# Patient Record
Sex: FEMALE | Race: BLACK OR AFRICAN AMERICAN | Employment: FULL TIME | URBAN - METROPOLITAN AREA
[De-identification: names, ages, dates, MRNs, and addresses within clinical notes are randomized per-mention and may not be internally consistent; named-entity substitution may affect disease eponyms.]

---

## 2017-02-24 ENCOUNTER — OFFICE VISIT (OUTPATIENT)
Dept: INTERNAL MEDICINE CLINIC | Age: 52
End: 2017-02-24

## 2017-02-24 VITALS
BODY MASS INDEX: 24.14 KG/M2 | HEIGHT: 68 IN | WEIGHT: 159.3 LBS | OXYGEN SATURATION: 98 % | DIASTOLIC BLOOD PRESSURE: 72 MMHG | HEART RATE: 92 BPM | SYSTOLIC BLOOD PRESSURE: 101 MMHG | TEMPERATURE: 99.1 F

## 2017-02-24 DIAGNOSIS — K52.9 GASTROENTERITIS: Primary | ICD-10-CM

## 2017-02-24 DIAGNOSIS — E78.5 DYSLIPIDEMIA: ICD-10-CM

## 2017-02-24 DIAGNOSIS — I10 ESSENTIAL HYPERTENSION: ICD-10-CM

## 2017-02-24 NOTE — PROGRESS NOTES
Chief Complaint   Patient presents with    Diarrhea     x 2 days, patient states worst yesterday    Vomiting    Abdominal Pain     patient rates pain today at a 6 on pain scale   . SUBECTIVE:    Trevon Huynh is a 47 y.o. female  Dictation on: 02/24/2017 10:51 AM by: Eldon BORRERO [[id number]]       Current Outpatient Prescriptions   Medication Sig Dispense Refill    meloxicam (MOBIC) 15 mg tablet Take 1 Tab by mouth daily.  30 Tab 6    losartan (COZAAR) 100 mg tablet TAKE 1 TABLET BY MOUTH ONCE DAILY 90 Tab 3    pravastatin (PRAVACHOL) 40 mg tablet TAKE 1 TABLET BY MOUTH ONCE NIGHTLY 30 Tab 11     Past Medical History:   Diagnosis Date    Asthma     Brachiocephalic artery injury     chronic dissection    Hypertension      Past Surgical History:   Procedure Laterality Date    HX HEENT       No Known Allergies    REVIEW OF SYSTEMS:  Review of Systems - Negative except   ENT ROS: negative for - headaches, hearing change, nasal congestion, oral lesions, tinnitus, visual changes or   Respiratory ROS: no cough, shortness of breath, or wheezing  Cardiovascular ROS: no chest pain or dyspnea on exertion  Gastrointestinal ROS: no abdominal pain, change in bowel habits, or black or blood  Genito-Urinary ROS: no dysuria, trouble voiding, or hematuria  Musculoskeletal ROS: negative  Neurological ROS: no TIA or stroke symptoms      Social History     Social History    Marital status:      Spouse name: N/A    Number of children: N/A    Years of education: N/A     Social History Main Topics    Smoking status: Never Smoker    Smokeless tobacco: Never Used    Alcohol use 0.0 oz/week     0 Standard drinks or equivalent per week      Comment: rare    Drug use: No    Sexual activity: Yes     Partners: Male     Other Topics Concern    None     Social History Narrative   r  Family History   Problem Relation Age of Onset    Hypertension Mother     Heart Disease Maternal Grandmother     Heart Attack Maternal Grandmother     Hypertension Maternal Grandmother     Diabetes Maternal Grandmother        OBJECTIVE:  Visit Vitals    /72 (BP 1 Location: Right arm, BP Patient Position: Sitting)    Pulse 92    Temp 99.1 °F (37.3 °C) (Oral)    Ht 5' 8\" (1.727 m)    Wt 159 lb 4.8 oz (72.3 kg)    SpO2 98%    BMI 24.22 kg/m2     ENT: perrla,  eom intact  NECK: supple. Thyroid normal, no JVD  CHEST: clear to ascultation and percussion   HEART: regular rate and rhythm  ABD: soft, bowel sounds active,   EXTREMITIES: no edema, pulse 1+  INTEGUMENT: clear      ASSESSMENT:  1. Gastroenteritis    2. Essential hypertension    3. Dyslipidemia        PLAN:    1. The patient has a viral gastroenteritis. Symptomatic treatment only. I explained to her that this is self limiting. She should avoid dairy products and salads for now. Soft foods for the rest of the day. 2. Blood pressure is excellent today. No adjustments are made. 3. She will hold her antihypertensive medication for the next 48 hours. 4. She will continue her statin as prescribed. Follow-up Disposition:  Return in about 6 months (around 8/24/2017).       Mounika Londono MD

## 2017-02-24 NOTE — LETTER
NOTIFICATION RETURN TO WORK / SCHOOL 
 
2/24/2017 11:05 AM 
 
Ms. Taylor Davis [address] [place] [id number] To Whom It May Concern: 
 
Taylor Davis is currently under the care of Juan Flores. She was seen in the today and  will return to work/school on: 2/27/2017. If there are questions or concerns please have the patient contact our office. Sincerely, Nicolas Tomas MD

## 2017-02-24 NOTE — MR AVS SNAPSHOT
Visit Information Date & Time Provider Department Dept. Phone Encounter #  
 2/24/2017  9:30 AM Greg Phan MD [facility] [phone] [id number] Upcoming Health Maintenance Date Due FOBT Q 1 YEAR AGE 50-75 3/24/2017* BREAST CANCER SCRN MAMMOGRAM 7/8/2018 PAP AKA CERVICAL CYTOLOGY 7/27/2018 DTaP/Tdap/Td series (2 - Td) 2/24/2027 *Topic was postponed. The date shown is not the original due date. Allergies as of 2/24/2017  Review Complete On: 2/24/2017 By: Quynh Boswell No Known Allergies Current Immunizations  Never Reviewed No immunizations on file. Not reviewed this visit Vitals BP  
  
  
  
  
  
 101/72 (BP 1 Location: Right arm, BP Patient Position: Sitting) BMI and BSA Data Body Mass Index Body Surface Area  
 24.22 kg/m 2 1.86 m 2 Preferred Pharmacy Pharmacy Name Phone [address] [facility] [phone] Your Updated Medication List  
  
   
This list is accurate as of: 2/24/17 11:04 AM.  Always use your most recent med list.  
  
  
  
  
 losartan 100 mg tablet Commonly known as:  COZAAR  
TAKE 1 TABLET BY MOUTH ONCE DAILY  
  
 meloxicam 15 mg tablet Commonly known as:  MOBIC Take 1 Tab by mouth daily. pravastatin 40 mg tablet Commonly known as:  PRAVACHOL  
TAKE 1 TABLET BY MOUTH ONCE NIGHTLY Introducing [facility] & HEALTH SERVICES! [facility] introduces [employer] patient portal. Now you can access parts of your medical record, email your doctor's office, and request medication refills online. 1. In your internet browser, go to [email or web address] 2. Click on the First Time User? Click Here link in the Sign In box. You will see the New Member Sign Up page. 3. Enter your [employer] Access Code exactly as it appears below.  You will not need to use this code after youve completed the sign-up process. If you do not sign up before the expiration date, you must request a new code. · [employer] Access Code: [email or web address] Expires: 5/25/2017 11:04 AM 
 
4. Enter the last four digits of your Social Security Number (xxxx) and Date of Birth (mm/dd/yyyy) as indicated and click Submit. You will be taken to the next sign-up page. 5. Create a [employer] ID. This will be your [employer] login ID and cannot be changed, so think of one that is secure and easy to remember. 6. Create a [employer] password. You can change your password at any time. 7. Enter your Password Reset Question and Answer. This can be used at a later time if you forget your password. 8. Enter your e-mail address. You will receive e-mail notification when new information is available in [address] 9. Click Sign Up. You can now view and download portions of your medical record. 10. Click the Download Summary menu link to download a portable copy of your medical information. If you have questions, please visit the Frequently Asked Questions section of the [employer] website. Remember, [employer] is NOT to be used for urgent needs. For medical emergencies, dial 911. Now available from your iPhone and Android! Please provide this summary of care documentation to your next provider. Your primary care clinician is listed as Miquel Ozuna. If you have any questions after today's visit, please call [phone].

## 2017-02-24 NOTE — PROGRESS NOTES
Chief Complaint   Patient presents with    Diarrhea     x 2 days, patient states worst yesterday    Vomiting    Abdominal Pain     patient rates pain today at a 6 on pain scale   1. Have you been to the ER, urgent care clinic since your last visit? Hospitalized since your last visit? No    2. Have you seen or consulted any other health care providers outside of the [address] [place] [address] since your last visit? Include any pap smears or colon screening.  No

## 2017-03-27 RX ORDER — LOSARTAN POTASSIUM 100 MG/1
TABLET ORAL
Qty: 90 TAB | Refills: 3 | Status: SHIPPED | OUTPATIENT
Start: 2017-03-27 | End: 2018-02-06 | Stop reason: SDUPTHER

## 2017-05-01 ENCOUNTER — HOSPITAL ENCOUNTER (OUTPATIENT)
Age: 52
Setting detail: OUTPATIENT SURGERY
Discharge: HOME | End: 2017-05-01
Attending: INTERNAL MEDICINE | Admitting: INTERNAL MEDICINE
Payer: COMMERCIAL

## 2017-05-01 ENCOUNTER — TELEPHONE (OUTPATIENT)
Dept: INTERNAL MEDICINE CLINIC | Age: 52
End: 2017-05-01

## 2017-05-01 ENCOUNTER — ANESTHESIA EVENT (OUTPATIENT)
Dept: ENDOSCOPY | Age: 52
End: 2017-05-01
Payer: COMMERCIAL

## 2017-05-01 ENCOUNTER — ANESTHESIA (OUTPATIENT)
Dept: ENDOSCOPY | Age: 52
End: 2017-05-01
Payer: COMMERCIAL

## 2017-05-01 VITALS
DIASTOLIC BLOOD PRESSURE: 97 MMHG | WEIGHT: 157.13 LBS | TEMPERATURE: 98 F | SYSTOLIC BLOOD PRESSURE: 127 MMHG | RESPIRATION RATE: 9 BRPM | BODY MASS INDEX: 23.81 KG/M2 | OXYGEN SATURATION: 99 % | HEIGHT: 68 IN | HEART RATE: 60 BPM

## 2017-05-01 LAB
H PYLORI FROM TISSUE: NEGATIVE
KIT LOT NO., HCLOLOT: NORMAL
NEGATIVE CONTROL: NEGATIVE
POSITIVE CONTROL: POSITIVE

## 2017-05-01 PROCEDURE — 77030013992 HC SNR POLYP ENDOSC BSC -B: Performed by: INTERNAL MEDICINE

## 2017-05-01 PROCEDURE — 88305 TISSUE EXAM BY PATHOLOGIST: CPT | Performed by: INTERNAL MEDICINE

## 2017-05-01 PROCEDURE — 74011250636 HC RX REV CODE- 250/636: Performed by: INTERNAL MEDICINE

## 2017-05-01 PROCEDURE — 87077 CULTURE AEROBIC IDENTIFY: CPT | Performed by: INTERNAL MEDICINE

## 2017-05-01 PROCEDURE — 76060000032 HC ANESTHESIA 0.5 TO 1 HR: Performed by: INTERNAL MEDICINE

## 2017-05-01 PROCEDURE — 74011250636 HC RX REV CODE- 250/636

## 2017-05-01 PROCEDURE — 74011000250 HC RX REV CODE- 250

## 2017-05-01 PROCEDURE — 77030019988 HC FCPS ENDOSC DISP BSC -B: Performed by: INTERNAL MEDICINE

## 2017-05-01 PROCEDURE — 76040000007: Performed by: INTERNAL MEDICINE

## 2017-05-01 RX ORDER — LIDOCAINE HYDROCHLORIDE 20 MG/ML
5 SOLUTION OROPHARYNGEAL AS NEEDED
Status: DISCONTINUED | OUTPATIENT
Start: 2017-05-01 | End: 2017-05-01 | Stop reason: HOSPADM

## 2017-05-01 RX ORDER — SODIUM CHLORIDE 9 MG/ML
100 INJECTION, SOLUTION INTRAVENOUS CONTINUOUS
Status: DISCONTINUED | OUTPATIENT
Start: 2017-05-01 | End: 2017-05-01 | Stop reason: HOSPADM

## 2017-05-01 RX ORDER — DIPHENHYDRAMINE HYDROCHLORIDE 50 MG/ML
50 INJECTION, SOLUTION INTRAMUSCULAR; INTRAVENOUS ONCE
Status: DISCONTINUED | OUTPATIENT
Start: 2017-05-01 | End: 2017-05-01 | Stop reason: HOSPADM

## 2017-05-01 RX ORDER — FENTANYL CITRATE 50 UG/ML
100 INJECTION, SOLUTION INTRAMUSCULAR; INTRAVENOUS ONCE
Status: DISCONTINUED | OUTPATIENT
Start: 2017-05-01 | End: 2017-05-01 | Stop reason: HOSPADM

## 2017-05-01 RX ORDER — LIDOCAINE HYDROCHLORIDE 20 MG/ML
INJECTION, SOLUTION EPIDURAL; INFILTRATION; INTRACAUDAL; PERINEURAL AS NEEDED
Status: DISCONTINUED | OUTPATIENT
Start: 2017-05-01 | End: 2017-05-01 | Stop reason: HOSPADM

## 2017-05-01 RX ORDER — EPINEPHRINE 0.1 MG/ML
1 INJECTION INTRACARDIAC; INTRAVENOUS
Status: DISCONTINUED | OUTPATIENT
Start: 2017-05-01 | End: 2017-05-01 | Stop reason: HOSPADM

## 2017-05-01 RX ORDER — ATROPINE SULFATE 0.1 MG/ML
0.5 INJECTION INTRAVENOUS
Status: DISCONTINUED | OUTPATIENT
Start: 2017-05-01 | End: 2017-05-01 | Stop reason: HOSPADM

## 2017-05-01 RX ORDER — SODIUM CHLORIDE 0.9 % (FLUSH) 0.9 %
5-10 SYRINGE (ML) INJECTION EVERY 8 HOURS
Status: DISCONTINUED | OUTPATIENT
Start: 2017-05-01 | End: 2017-05-01 | Stop reason: HOSPADM

## 2017-05-01 RX ORDER — DEXTROMETHORPHAN/PSEUDOEPHED 2.5-7.5/.8
1.2 DROPS ORAL
Status: DISCONTINUED | OUTPATIENT
Start: 2017-05-01 | End: 2017-05-01 | Stop reason: HOSPADM

## 2017-05-01 RX ORDER — DEXTROSE MONOHYDRATE AND SODIUM CHLORIDE 5; .9 G/100ML; G/100ML
100 INJECTION, SOLUTION INTRAVENOUS CONTINUOUS
Status: DISCONTINUED | OUTPATIENT
Start: 2017-05-01 | End: 2017-05-01 | Stop reason: HOSPADM

## 2017-05-01 RX ORDER — NALOXONE HYDROCHLORIDE 0.4 MG/ML
0.4 INJECTION, SOLUTION INTRAMUSCULAR; INTRAVENOUS; SUBCUTANEOUS
Status: DISCONTINUED | OUTPATIENT
Start: 2017-05-01 | End: 2017-05-01 | Stop reason: HOSPADM

## 2017-05-01 RX ORDER — FLUMAZENIL 0.1 MG/ML
0.2 INJECTION INTRAVENOUS
Status: DISCONTINUED | OUTPATIENT
Start: 2017-05-01 | End: 2017-05-01 | Stop reason: HOSPADM

## 2017-05-01 RX ORDER — MIDAZOLAM HYDROCHLORIDE 1 MG/ML
5 INJECTION, SOLUTION INTRAMUSCULAR; INTRAVENOUS
Status: DISCONTINUED | OUTPATIENT
Start: 2017-05-01 | End: 2017-05-01 | Stop reason: HOSPADM

## 2017-05-01 RX ORDER — LORAZEPAM 2 MG/ML
2 INJECTION INTRAMUSCULAR AS NEEDED
Status: DISCONTINUED | OUTPATIENT
Start: 2017-05-01 | End: 2017-05-01 | Stop reason: HOSPADM

## 2017-05-01 RX ORDER — SODIUM CHLORIDE 0.9 % (FLUSH) 0.9 %
5-10 SYRINGE (ML) INJECTION AS NEEDED
Status: DISCONTINUED | OUTPATIENT
Start: 2017-05-01 | End: 2017-05-01 | Stop reason: HOSPADM

## 2017-05-01 RX ORDER — PROPOFOL 10 MG/ML
INJECTION, EMULSION INTRAVENOUS AS NEEDED
Status: DISCONTINUED | OUTPATIENT
Start: 2017-05-01 | End: 2017-05-01 | Stop reason: HOSPADM

## 2017-05-01 RX ADMIN — PROPOFOL 50 MG: 10 INJECTION, EMULSION INTRAVENOUS at 10:12

## 2017-05-01 RX ADMIN — PROPOFOL 20 MG: 10 INJECTION, EMULSION INTRAVENOUS at 10:05

## 2017-05-01 RX ADMIN — PROPOFOL 50 MG: 10 INJECTION, EMULSION INTRAVENOUS at 10:00

## 2017-05-01 RX ADMIN — PROPOFOL 60 MG: 10 INJECTION, EMULSION INTRAVENOUS at 10:21

## 2017-05-01 RX ADMIN — PROPOFOL 50 MG: 10 INJECTION, EMULSION INTRAVENOUS at 10:24

## 2017-05-01 RX ADMIN — PROPOFOL 50 MG: 10 INJECTION, EMULSION INTRAVENOUS at 10:03

## 2017-05-01 RX ADMIN — PROPOFOL 50 MG: 10 INJECTION, EMULSION INTRAVENOUS at 10:29

## 2017-05-01 RX ADMIN — LIDOCAINE HYDROCHLORIDE 40 MG: 20 INJECTION, SOLUTION EPIDURAL; INFILTRATION; INTRACAUDAL; PERINEURAL at 09:56

## 2017-05-01 RX ADMIN — PROPOFOL 40 MG: 10 INJECTION, EMULSION INTRAVENOUS at 10:10

## 2017-05-01 RX ADMIN — PROPOFOL 50 MG: 10 INJECTION, EMULSION INTRAVENOUS at 10:17

## 2017-05-01 RX ADMIN — PROPOFOL 50 MG: 10 INJECTION, EMULSION INTRAVENOUS at 10:26

## 2017-05-01 RX ADMIN — SODIUM CHLORIDE 100 ML/HR: 900 INJECTION, SOLUTION INTRAVENOUS at 09:47

## 2017-05-01 RX ADMIN — PROPOFOL 80 MG: 10 INJECTION, EMULSION INTRAVENOUS at 09:58

## 2017-05-01 RX ADMIN — PROPOFOL 50 MG: 10 INJECTION, EMULSION INTRAVENOUS at 10:15

## 2017-05-01 NOTE — ANESTHESIA PREPROCEDURE EVALUATION
Anesthetic History   No history of anesthetic complications            Review of Systems / Medical History  Patient summary reviewed, nursing notes reviewed and pertinent labs reviewed    Pulmonary  Within defined limits          Asthma        Neuro/Psych   Within defined limits           Cardiovascular    Hypertension              Exercise tolerance: >4 METS     GI/Hepatic/Renal  Within defined limits              Endo/Other  Within defined limits      Arthritis     Other Findings              Physical Exam    Airway  Mallampati: II  TM Distance: 4 - 6 cm  Neck ROM: normal range of motion   Mouth opening: Normal     Cardiovascular  Regular rate and rhythm,  S1 and S2 normal,  no murmur, click, rub, or gallop             Dental  No notable dental hx       Pulmonary  Breath sounds clear to auscultation               Abdominal  GI exam deferred       Other Findings            Anesthetic Plan    ASA: 2  Anesthesia type: general and total IV anesthesia          Induction: Intravenous  Anesthetic plan and risks discussed with: Patient

## 2017-05-01 NOTE — IP AVS SNAPSHOT
[address] [facility] 
[phone] Patient: Cheyanne Hummel MRN: [id number] :1965 You are allergic to the following No active allergies Recent Documentation Height Weight Breastfeeding? BMI OB Status Smoking Status 1.727 m 71.3 kg No 23.89 kg/m2 Postmenopausal Never Smoker Emergency Contacts Name Discharge Info Relation Home Work Mobile Osmar Hernandez DISCHARGE CAREGIVER [3] Spouse [3] [phone] About your hospitalization You were admitted on:  May 1, 2017 You last received care in the:  [facility] ENDOSCOPY You were discharged on:  May 1, 2017 Unit phone number:  [phone] Why you were hospitalized Your primary diagnosis was:  Not on File Providers Seen During Your Hospitalizations Provider Role Specialty Primary office phone Hui Duran MD Attending Provider Internal Medicine [phone] Your Primary Care Physician (PCP) Primary Care Physician Office Phone Office Fax [address] [facility] [phone] Follow-up Information Follow up With Details Comments Contact Info Marcelo Payne MD   [facility] [address] [place] [address] 
[phone] Current Discharge Medication List  
  
CONTINUE these medications which have NOT CHANGED Dose & Instructions Dispensing Information Comments Morning Noon Evening Bedtime  
 losartan 100 mg tablet Commonly known as:  COZAAR Your last dose was: Your next dose is: TAKE 1 TABLET BY MOUTH ONCE DAILY Quantity:  90 Tab Refills:  3  
     
   
   
   
  
 meloxicam 15 mg tablet Commonly known as:  MOBIC Your last dose was: Your next dose is:    
   
   
 Dose:  15 mg Take 1 Tab by mouth daily. Quantity:  30 Tab Refills:  6  
     
   
   
   
  
 pravastatin 40 mg tablet Commonly known as:  PRAVACHOL Your last dose was: Your next dose is: TAKE 1 TABLET BY MOUTH ONCE NIGHTLY Quantity:  30 Tab Refills:  11 Discharge Instructions Endoscopy Discharge Instructions Dr. Lashaun Garcia [address] [facility] office [phone] [address] [facility] Office    [phone] NAME: Edgardo Clark RECORD IGITLK:[id number] PATIENT SS#[id number] YOB: 1965 SEX:  female AGE:  47 y.o. FINAL Discharge Procedure and Diagnosis:   
  
Procedure(s): 
COLONOSCOPY 
ESOPHAGOGASTRODUODENOSCOPY (EGD) ESOPHAGOGASTRODUODENAL (EGD) BIOPSY ENDOSCOPIC POLYPECTOMY FINDINGS:    
Colonic polyps removed Diverticulosis 
gastritis MEDICATIONS CONTINUE CURRENT MEDICATIONS 
 
  NEW MEDICATIONS 1.  
 2.  
 3.  
 
 
 
  
  
 
  
 
Testing Schedule Colonoscopy Screening                                   Recommendations Repeat colonoscopy in 3 years Repeat colonoscopy in 5 years Repeat colonoscopy in 10 years New additional 
Tests Call the office  
([phone]) for the appointment time YOUR NEXT APPOINTMENT WITH DR Teague Boulder: in  4 week(s). Darya Lindsey MD  
                 
 
 
                     
                                     
   If you had a colonoscopy the \"C\" indicates specific instructions    
  
x  Ordinarily you may resume your previous diet but your initial diet should be   light. Large meals can cause abdominal discomfort after these procedures. Specific Diet Recommendations:  
    
      High fiber diet. ,  
 
 GERD diet: avoid fried and fatty foods, peppermint, chocolate, alcohol,    
          coffee, citrus fruits and juices, and tomato products. Avoid lying down for 2  to 3  hours after eating.       
            
__x__  You may feel quite tired and need to rest and recuperate for several hours    following these procedures. __x__  Due to the fact that sedation was administered for this procedure, do not drive,   operate machinery or sign legal documents for the next 24 hours. __x__  Mild abdominal pain may be experienced after your procedure, but is should   disappear after several hours. Notify your physician if you have persistent pain,   tenderness or abdominal distension. __x__  C Many patients for the first few hours following the exam may experience        belching or passing gas through the rectum. Walking may help to relieve      
 distention and gas pains. A warm bath or shower will often help with abdominal  cramping.                                                                                        
  
__x__   Loco Victoriano may return to your normal routine tomorrow, according to how you feel        and depending on your doctors instructions. Be sure to call your doctor to make  an appointment for a post-surgery check-up on the date your doctor has   requested. __x__ C Rectal bleeding or spotting in small amounts may occur with the first bowel   movement following a colonoscopy or sigmoidoscopy. __x__  Loco Victoriano may experience a numbness or lack of sensation in throat. If present, do not   
 eat or drink. Before eating, test your ability to drink with small sips of water. Y     You may try clear liquids or soups. If you tolerate these, you may then eat solid     food which is not greasy or spicy.  
  
__x__ C   
 IF POLYPS REMOVED: Avoid any blood thinning medication such as plavix,   aspirin or coumadin  NSAIDS (like advil or alleve) for 7 days. __x__  Notify your physician if you cough or vomit blood or experience chest pain. Your biopsy or testing result should be available in 7-10 days Prescription will be electronically sent to your pharmacy you must  
  let your nurse know your pharmacy:  
                                                                                                                               
 
 
     [address] [place], [place] [address] TO HELP ENSURE A SMOOTH RECOVERY,  
    IT IS IMPORTANT TO FOLLOW THEM. _x___Pamphlet /Educational Information provided for diagnostic findings Additional education information can assessed at the sites below: 
 Anthony 
 http://www.digestive. niddk.nih.gov/ddiseases/a-z.asp Web MD patient information Signature of individual giving instruction : 
 Date: 5/1/2017 Discharge Orders None Introducing [facility] & HEALTH SERVICES! Marce Luis introduces [employer] patient portal. Now you can access parts of your medical record, email your doctor's office, and request medication refills online. 1. In your internet browser, go to [email or web address] 2. Click on the First Time User? Click Here link in the Sign In box. You will see the New Member Sign Up page. 3. Enter your [employer] Access Code exactly as it appears below.  You will not need to use this code after youve completed the sign-up process. If you do not sign up before the expiration date, you must request a new code. · [employer] Access Code: [email or web address] Expires: 5/25/2017 12:04 PM 
 
4. Enter the last four digits of your Social Security Number (xxxx) and Date of Birth (mm/dd/yyyy) as indicated and click Submit. You will be taken to the next sign-up page. 5. Create a [employer] ID. This will be your [employer] login ID and cannot be changed, so think of one that is secure and easy to remember. 6. Create a [employer] password. You can change your password at any time. 7. Enter your Password Reset Question and Answer. This can be used at a later time if you forget your password. 8. Enter your e-mail address. You will receive e-mail notification when new information is available in [address] 9. Click Sign Up. You can now view and download portions of your medical record. 10. Click the Download Summary menu link to download a portable copy of your medical information. If you have questions, please visit the Frequently Asked Questions section of the [employer] website. Remember, [employer] is NOT to be used for urgent needs. For medical emergencies, dial 911. Now available from your iPhone and Android! General Information Please provide this summary of care documentation to your next provider. Patient Signature:  ____________________________________________________________ Date:  ____________________________________________________________  
  
Aleksander John Provider Signature:  ____________________________________________________________ Date:  ____________________________________________________________

## 2017-05-01 NOTE — TELEPHONE ENCOUNTER
Pt had a colonoscopy today [id number] at [facility]. Pt stated, after she awoke from procedure her  told her that the  stated, Juvenalmonique Jt had a Gastrointestinal infection but no Rx was prescribed for her they told her to contact her PCP. Please give a call.  Best contact c [phone] or h [phone].

## 2017-05-01 NOTE — ANESTHESIA POSTPROCEDURE EVALUATION
Post-Anesthesia Evaluation and Assessment    Patient: John Monsivais MRN: [id number]  SSN: [id number]    YOB: 1965  Age: 47 y.o. Sex: female       Cardiovascular Function/Vital Signs  Visit Vitals    BP (!) 127/97    Pulse 60    Temp 36.7 °C (98 °F)    Resp 9    Ht 5' 8\" (1.727 m)    Wt 71.3 kg (157 lb 2 oz)    SpO2 99%    Breastfeeding No    BMI 23.89 kg/m2       Patient is status post general, total IV anesthesia anesthesia for Procedure(s):  COLONOSCOPY  ESOPHAGOGASTRODUODENOSCOPY (EGD)  ESOPHAGOGASTRODUODENAL (EGD) BIOPSY  ENDOSCOPIC POLYPECTOMY. Nausea/Vomiting: None    Postoperative hydration reviewed and adequate. Pain:  Pain Scale 1: Numeric (0 - 10) (05/01/17 1107)  Pain Intensity 1: 0 (05/01/17 1107)   Managed    Neurological Status: At baseline    Mental Status and Level of Consciousness: Arousable    Pulmonary Status:   O2 Device: Room air (05/01/17 1107)   Adequate oxygenation and airway patent    Complications related to anesthesia: None    Post-anesthesia assessment completed.  No concerns    Signed By: Jose Duarte MD     May 1, 2017

## 2017-05-01 NOTE — H&P
G I Procedure Note           Endoscopy History and Physical               [facility] [address] [place] [facility] Office  [address]  [id number]  [id number]    1965  47 y.o.  female      Date of Procedure:   Preoperative Diagnosis:       Procedure:    5/1/2017           SCREENING, GERD                              Procedure(s):  COLONOSCOPY  ESOPHAGOGASTRODUODENOSCOPY (EGD)      Gastroenterologist:  Anesthesia:           MD RANDY Recinos            History and procedure indication:  Kenny Youngblood is a 47 y.o. BLACK OR  female who presents with: SCREENING, GERD   including the additional history of Screening ,Screening for colon cancer,Dyspepsia-acid, GERD, Heartburn, persistent despite therapy,        Past Medical History:   Diagnosis Date    Asthma     Brachiocephalic artery injury     chronic dissection    Hypertension       Prior to Admission medications    Medication Sig Start Date End Date Taking? Authorizing Provider   losartan (COZAAR) 100 mg tablet TAKE 1 TABLET BY MOUTH ONCE DAILY 3/27/17   Alana Garcia MD   meloxicam (MOBIC) 15 mg tablet Take 1 Tab by mouth daily.  10/19/16   Alana Garcia MD   pravastatin (PRAVACHOL) 40 mg tablet TAKE 1 TABLET BY MOUTH ONCE NIGHTLY 2/11/16   Alana Garcia MD     No Known Allergies    Past Surgical History:   Procedure Laterality Date    HX HEENT       Family History   Problem Relation Age of Onset    Hypertension Mother     Heart Disease Maternal Grandmother     Heart Attack Maternal Grandmother     Hypertension Maternal Grandmother     Diabetes Maternal Grandmother       Social History   Substance Use Topics    Smoking status: Never Smoker    Smokeless tobacco: Never Used    Alcohol use 0.0 oz/week     0 Standard drinks or equivalent per week      Comment: rare PHYSICAL EXAM   There were no vitals taken for this visit. General appearance:  alert, well appearing, and in no distress  Mental status:  normal mood, behavior, speech, dress, motor activity and thought processes  Nose:      normal and patent, no erythema, discharge or polyps  Mouth:- mucous membranes moist, pharynx normal without lesions                  [x]  No Loose teeth      []    Loose teeth  Finger opening:  []1     []1.5    [] 2     [] 2.5     [x] 3      [] 3.5     [] 4   Mallampati:         [] Class 1     [x] Class 2    [] Class 3      [] Class 4      Neck - supple,      [x] Full ROM [] Decreased ROM  [] Short Neck no significant adenopathy    Chest - clear to auscultation, no wheezes, rales or rhonchi, symmetric air entry  Heart: normal rate, regular rhythm, normal S1, S2, no murmurs, rubs, clicks or gallops  Abdomen: abdomen soft, bowel sounds  [x] normal  [] increased  [] hypoactive                       [] no tenderness  [] epigastric tenderness  [] LLQ tenderness   [] RLQ tenderness                      No masses, organomegaly or guarding. Rectal exam: negative without mass, lesions or tenderness  Extremities: peripheral pulses normal, no pedal edema, no clubbing or cyanosis  Neurologic: Alert and oriented to person, place, and time; normal strength and tone.                          Normal symmetric reflexes  Normal gait:                                      Assessement:                                 Pre op dx:  SCREENING, GERD   Additional medical problems list below   Patient Active Problem List   Diagnosis Code    HTN (hypertension) I10    Bruit R09.89    Chest pain R07.9    Dyslipidemia E78.5    Carpal tunnel syndrome of left wrist G56.02    Cervical spondylosis M47.812    Brachiocephalic artery injury N15.020F                                                                                         This note documentation was performed prior to this planned procedure       after a history and physical was performed in the office. Date: 4 4 17                   Pre Procedure Evaluation (per anesthesia or per h&p)                                                Sedation/Assessment:                                                                                               Mallampati Classification                            []Class 1                    []Class 2                    [] Class 3                  [] Class 4                                              ASA classfication         []     Class I: Normally healthy         []     Class II: Patient with mild systemic disease (e.g. hypertension)         []     Class III: Patient with severe systemic disease (e.g. CHF), non-decompensated         []     Class IV: Patient with severe systemic disease, decompensated         []     Class V: Moribund patient, survival unlikely                     Plan:  [x]  Egd                                 [x] Colonoscopy                               [] with Moderate Sedation /Conscious Sedation                                 [x] MAC          Patient stable for planned procedure. See orders.      Marco Kwon MD

## 2017-05-01 NOTE — DISCHARGE INSTRUCTIONS
Endoscopy Discharge Instructions     Dr. Obey Amaya office   [facility]                                            NAME: [address] [place] [address] VINNIE:[id number]   PATIENT SS#[id number] YOB: 1965   SEX:  female AGE:  47 y.o. FINAL Discharge Procedure and Diagnosis:       Procedure(s):  COLONOSCOPY  ESOPHAGOGASTRODUODENOSCOPY (EGD)  ESOPHAGOGASTRODUODENAL (EGD) BIOPSY  ENDOSCOPIC POLYPECTOMY       FINDINGS:     Colonic polyps removed  Diverticulosis  gastritis                                            MEDICATIONS    [x] CONTINUE CURRENT MEDICATIONS     [] NEW MEDICATIONS           1.    2.    3.                      Testing   Schedule              Colonoscopy Screening                                   Recommendations   [x]  Repeat colonoscopy in 3 years   []  Repeat colonoscopy in 5 years   []  Repeat colonoscopy in 10 years      New additional  Tests  Call the office   [phone]) for the appointment time      []      []      []                YOUR NEXT APPOINTMENT WITH DR Phu Diaz: in  4 week(s). Pietro Alexander MD                                                                                        If you had a colonoscopy the \"C\" indicates specific instructions        x  Ordinarily you may resume your previous diet but your initial diet should be   light. Large meals can cause abdominal discomfort after these procedures. Specific Diet Recommendations:             [x] High fiber diet.,         [x] GERD diet: avoid fried and fatty foods, peppermint, chocolate, alcohol,               coffee, citrus fruits and juices, and tomato products.  Avoid lying down for 2  to 3  hours after eating.                     __x__  You may feel quite tired and need to rest and recuperate for several hours    following these procedures. __x__  Due to the fact that sedation was administered for this procedure, do not drive,   operate machinery or sign legal documents for the next 24 hours. __x__  Mild abdominal pain may be experienced after your procedure, but is should   disappear after several hours. Notify your physician if you have persistent pain,   tenderness or abdominal distension. __x__  C    Many patients for the first few hours following the exam may experience        belching or passing gas through the rectum. Walking may help to relieve        distention and gas pains. A warm bath or shower will often help with abdominal  cramping.                                                                                            __x__   Claire Rob may return to your normal routine tomorrow, according to how you feel        and depending on your doctors instructions. Be sure to call your doctor to make  an appointment for a post-surgery check-up on the date your doctor has   requested. __x__ C     Rectal bleeding or spotting in small amounts may occur with the first bowel   movement following a colonoscopy or sigmoidoscopy. __x__  Claire Rob may experience a numbness or lack of sensation in throat. If present, do not     eat or drink. Before eating, test your ability to drink with small sips of water. Y     You may try clear liquids or soups. If you tolerate these, you may then eat solid     food which is not greasy or spicy. __x__ C     IF POLYPS REMOVED: Avoid any blood thinning medication such as plavix,   aspirin or coumadin  NSAIDS (like advil or alleve) for 7 days. __x__  Notify your physician if you cough or vomit blood or experience chest pain.            Your biopsy or testing result should be available in 7-10 days Prescription will be electronically sent to your pharmacy you must     let your nurse know your pharmacy:                                                                                                                                            [id number] [place] [address]. TO HELP ENSURE A SMOOTH RECOVERY,       IT IS IMPORTANT TO FOLLOW THEM. _x___Pamphlet /Educational Information provided for diagnostic findings     Additional education information can assessed at the sites below:   [facility]   http://www.digestive. niddk.nih.gov/ddiseases/a-z.asp      Web MD patient information                                                                                                Signature of individual giving instruction :   Date: 5/1/2017

## 2017-05-01 NOTE — PROCEDURES
G I Procedure Note                         EGD    Dr. Veronica Steele office   [facility]                              [id number]                                 [id number]   1965                       47 y.o.                female        Procedure Date: 5/1/2017   Procedure  EGD  with biopsy. Pre Op Diagnosis:                     1. SCREENING, GERD                                                                                                                                                                          Post Op Diagnosis:                    1.   gastritis                                                                 H&p completed  Yes    Anesthesia Assessment Performed prior to procedure:No change   Medications Medication Record            Description of Procedure:  Michell Mendoza  was seen in the endoscopy suite and the pre procedure evaluation was completed. The patient was identified as Michell Mendoza  and the procedure verified as EGD with biopsy. A Time Out was held and the above information confirmed. The risks, benefits, complications, treatment options and expected outcomes were discussed with the patient. The possibilities of reaction to medication, pulmonary aspiration, perforation of a viscus, bleeding, failure to diagnose a condition and creating a complication requiring transfusion or operation were discussed with the patient who  Permit obtained and risks explained ( [address])     Procedure Note:  With the patient in the left lateral position and after appropriate conscious anesthesia the Olympus Video endoscope was passed under direct vision into the oropharynx.   The oropharynx appeared Normal   The instrument was advanced into the esophagus and the findings were a normal appearing esophageal mucosa without other anatomic abnormalities. The instrument was advanced into the stomach through the esophagogastric junction. The gastroscope was advanced progressively through the stomach visualizing the body and antral areas. The findings were a moderate gastritis with erosions. A biopsy was obtained. The instrument was further advanced through the pylorus into the duodenum. The bulb of the duodenum and the second portion of the duodenum were examined and the findings were a smooth appearing duodenal mucosa with mild erythema. A biopsy was obtained   The endoscope was withdrawn back into the stomach and retroflexed with examination of the fundus and cardia and the findings were no additional abnormalities . The instrument was withdrawn back into the esophagus and the the finding were no additional abnormalities    Biopsies were obtained for helicobacter testing and pathologic analysis from the representative areas. The endoscope was completely withdrawn and the patient tolerated the procedure well. 1.  Blood loss was nominal.  2.  For biopsy  Specimen verification by physician and nurse two sources name,           social security numbers     Suggestions  Plan 1. - Acid suppression with a proton pump inhibitor.  - Await pathology. - Await LUANN test result and treat for Helicobacter pylori if positive.       Bailey Driver MD                                                                             G I Procedure Note              COLONOSCOPY   Dr. Chetan Florence office   [facility] [place] [id number]                                   [id number]                                  [id number]   1965                                      47 y.o.                                    female      Procedure Date: 5/1/2017 Pre Op Diagnosis:                     1. SCREENING, GERD                                                                                                                                                                        Post Op Diagnosis:                    1.   diverticulosis, 8mm sigmoid colon removed x 2  p                                                         2.    3.             H&p completed: Yes            Anesthesia Assessment: Performed prior to procedure:      No change  Anesthesia Plan: Performed prior to procedure:                   No change       Medications: See Reviewed List and Reconcilation           Informed consent was obtained     Risk Statement:  Prior to the procedure the risks were explained to the patient and/or to the family including but not limited to perforation, bleeding, adverse drug reaction, aspiration, and even the need for possible surgery. A colonoscopy exam is not 100% accurate which may be related to preparation or blind spots during the exam.The possibility that an abnormality and /or cancer could be missed was also discussed as well as alternative x-ray options. Instrument:    Olympus adult Videocolonoscope                                   Immediate Procedure Reassessment Completed     With the patient in the left lateral position, a rectal examination was performed and the findings were:negative, stool guaiac negative. The Olympus Video colonoscope was inserted under direct vision into the rectum. The colonoscope was passed from the rectum to the cecum, which was identified by the ileocecal valve. The colon findings demonstrated:    ANUS: Anal exam reveals no masses or hemorrhoids, sphincter tone is normal.     RECTUM: Rectal exam reveals no masses or hemorrhoids. SIGMOID COLON: The patient was noted to have diverticulosis. The mucosa is normal with good vascular pattern and without ulcers or polyps.      DESCENDING COLON:  The mucosa is normal with good vascular pattern and without ulcers or polyps. SPLENIC FLEXURE: The splenic flexure is normal.     TRANSVERSE COLON: The mucosa is normal with good vascular pattern and without ulcers or polyps. HEPATIC FLEXURE: The hepatic flexure is normal.     ASCENDING COLON: The mucosa is normal with good vascular pattern and without ulcers or polyps. CECUM:  The ileocecal valve appears normal.     Scatttered diverticulosis was noted. A          A polyp was identified. 8 mm in size,x 3  located in the rectosigmoid removed by snare cautery and retrieved for pathology    he polypectomy site was reviewed and was free of hemorrhage. The colonoscope was slowly withdrawn >6 minute period and the instrument was retroflexed in the rectum. The rectal findings were:Normal  The patient tolerated the entire procedure well. Blood Loss minimal nominal    Colon preparation was good    Recommendations:     - Repeat colonoscopy in 3 years.       Copies sent to   Bakari Sanders MD  CC:  Brooke Robertson MD

## 2017-05-01 NOTE — ROUTINE PROCESS
Silviano Kendrick  1965  [id number]    Situation:  Verbal report received from: Priti Elias Rn  Procedure: Procedure(s):  COLONOSCOPY  ESOPHAGOGASTRODUODENOSCOPY (EGD)  ESOPHAGOGASTRODUODENAL (EGD) BIOPSY  ENDOSCOPIC POLYPECTOMY    Background:    Preoperative diagnosis: SCREENING, GERD  Postoperative diagnosis: diverticulosis, colon polyp    :  Dr. Kathleen Jerome  Assistant(s): Endoscopy Technician-1: Argmonique Jarvis  Endoscopy RN-1: Priti Elias RN    Specimens:   ID Type Source Tests Collected by Time Destination   1 : duodenum bx Preservative Duodenum  Niya Wilson MD 5/1/2017 1008 Pathology   2 : sigmoid colon polyps  Preservative Sigmoid  Niya Wilson MD 5/1/2017 1034 Pathology     H. Pylori  yes        Anesthesia gave intra-procedure sedation and medications, see anesthesia flow sheet yes    Intravenous fluids: NS@ KVO     Vital signs stable      Abdominal assessment: round and soft     Recommendation:  Discharge patient per MD order .      Family or Friend    Permission to share finding with family or friend yes

## 2017-05-01 NOTE — PROGRESS NOTES
Anesthesia reports 600mg Propofol, 40mg Lidocaine and 800mL NS given during procedure. Received report from anesthesia staff on vital signs and status of patient.

## 2017-05-06 RX ORDER — PRAVASTATIN SODIUM 40 MG/1
TABLET ORAL
Qty: 30 TAB | Refills: 11 | Status: SHIPPED | OUTPATIENT
Start: 2017-05-06 | End: 2017-05-08 | Stop reason: SDUPTHER

## 2017-05-09 RX ORDER — PRAVASTATIN SODIUM 40 MG/1
TABLET ORAL
Qty: 30 TAB | Refills: 11 | Status: SHIPPED | OUTPATIENT
Start: 2017-05-09 | End: 2018-06-24 | Stop reason: SDUPTHER

## 2017-07-28 ENCOUNTER — OFFICE VISIT (OUTPATIENT)
Dept: INTERNAL MEDICINE CLINIC | Age: 52
End: 2017-07-28

## 2017-07-28 VITALS
HEIGHT: 68 IN | TEMPERATURE: 98.6 F | WEIGHT: 158.7 LBS | OXYGEN SATURATION: 97 % | HEART RATE: 59 BPM | DIASTOLIC BLOOD PRESSURE: 77 MMHG | BODY MASS INDEX: 24.05 KG/M2 | SYSTOLIC BLOOD PRESSURE: 122 MMHG | RESPIRATION RATE: 18 BRPM

## 2017-07-28 DIAGNOSIS — I10 ESSENTIAL HYPERTENSION: ICD-10-CM

## 2017-07-28 DIAGNOSIS — K57.92 DIVERTICULITIS OF INTESTINE WITHOUT PERFORATION OR ABSCESS WITHOUT BLEEDING, UNSPECIFIED PART OF INTESTINAL TRACT: ICD-10-CM

## 2017-07-28 DIAGNOSIS — Z00.00 PHYSICAL EXAM: ICD-10-CM

## 2017-07-28 DIAGNOSIS — H93.11 TINNITUS, RIGHT: Primary | ICD-10-CM

## 2017-07-28 DIAGNOSIS — E78.5 DYSLIPIDEMIA: ICD-10-CM

## 2017-07-28 NOTE — PROGRESS NOTES
Chief Complaint   Patient presents with    Ear Pain     patient states that she has been experiencing ringing, sharp pain,and stuffiness in her right ear. she also states that the pain goes down into her neck. , and that there is some times tightness in her throat. 1. Have you been to the ER, urgent care clinic since your last visit? Hospitalized since your last visit? No    2. Have you seen or consulted any other health care providers outside of the [address] [place] [address] since your last visit? Include any pap smears or colon screening.  No

## 2017-07-28 NOTE — PROGRESS NOTES
[address] [place] [address] and Primary Care  [place] [address]  [address]  [place] [phone]  Phone:  [phone]  Fax: [phone]       Chief Complaint   Patient presents with    Ear Pain     patient states that she has been experiencing ringing, sharp pain,and stuffiness in her right ear. she also states that the pain goes down into her neck. , and that there is some times tightness in her throat. .      SUBJECTIVE:    Andrew Tejada is a 47 y.o. female Comes in for return visit with several complaints. First complaint is that of ringing in the right ear, which she has had for the last six months. She has also noted a decrease in hearing acuity bilaterally, which has been commented on by her  and daughter. The abdominal pain that she had was related to diverticulitis. She had a colonoscopy and upper endoscopy done by Dr Kandi Henson, and after treatment with antibiotics her pain abated. She also notes occasional pain with eating. She has a past history of primary hypertension and dyslipidemia. Current Outpatient Prescriptions   Medication Sig Dispense Refill    pravastatin (PRAVACHOL) 40 mg tablet TAKE 1 TABLET BY MOUTH ONCE NIGHTLY 30 Tab 11    losartan (COZAAR) 100 mg tablet TAKE 1 TABLET BY MOUTH ONCE DAILY 90 Tab 3    meloxicam (MOBIC) 15 mg tablet Take 1 Tab by mouth daily.  27 Tab 6     Past Medical History:   Diagnosis Date    Asthma     Brachiocephalic artery injury     chronic dissection    Hypertension     Ill-defined condition     high cholesterol     Past Surgical History:   Procedure Laterality Date    COLONOSCOPY N/A 5/1/2017    COL[facility] performed [facility]awn [facility] ENDOSCOPY    HX TONSILLECTOMY       No Known Allergies      REVIEW OF SYSTEMS:  General: negative for - chills or fever  ENT: negative for - headaches, nasal congestion or tinnitus  Respiratory: negative for - cough, hemoptysis, shortness of breath or wheezing  Cardiovascular : negative for - chest pain, edema, palpitations or shortness of breath  Gastrointestinal: negative for - abdominal pain, blood in stools, heartburn or nausea/vomiting  Genito-Urinary: no dysuria, trouble voiding, or hematuria  Musculoskeletal: negative for - gait disturbance, joint pain, joint stiffness or joint swelling  Neurological: no TIA or stroke symptoms  Hematologic: no bruises, no bleeding, no swollen glands  Integument: no lumps, mole changes, nail changes or rash  Endocrine: no malaise/lethargy or unexpected weight changes      Social History     Social History    Marital status:      Spouse name: N/A    Number of children: N/A    Years of education: N/A     Social History Main Topics    Smoking status: Never Smoker    Smokeless tobacco: Never Used    Alcohol use 0.0 oz/week     0 Standard drinks or equivalent per week      Comment: rare    Drug use: No    Sexual activity: Yes     Partners: Male     Other Topics Concern    None     Social History Narrative     Family History   Problem Relation Age of Onset    Hypertension Mother     Heart Disease Maternal Grandmother     Heart Attack Maternal Grandmother     Hypertension Maternal Grandmother     Diabetes Maternal Grandmother        OBJECTIVE:    Visit Vitals    /77 (BP 1 Location: Left arm, BP Patient Position: Sitting)    Pulse (!) 59    Temp 98.6 °F (37 °C) (Oral)    Resp 18    Ht 5' 8\" (1.727 m)    Wt 158 lb 11.2 oz (72 kg)    SpO2 97%    BMI 24.13 kg/m2     CONSTITUTIONAL: well , well nourished, appears age appropriate  EYES: perrla, eom intact  ENMT:moist mucous membranes, pharynx clear  NECK: supple.  Thyroid normal  RESPIRATORY: Chest: clear to ascultation and percussion   CARDIOVASCULAR: Heart: regular rate and rhythm  GASTROINTESTINAL: Abdomen: soft, bowel sounds active  HEMATOLOGIC: no pathological lymph nodes palpated  MUSCULOSKELETAL: Extremities: no edema, pulse 1+   INTEGUMENT: No unusual rashes or suspicious skin lesions noted. Nails appear normal.  NEUROLOGIC: non-focal exam   MENTAL STATUS: alert and oriented, appropriate affect      ASSESSMENT:  1. Tinnitus, right    2. Essential hypertension    3. Dyslipidemia    4. Diverticulitis of intestine without perforation or abscess without bleeding, unspecified part of intestinal tract    5. Physical exam        PLAN:    1. The patient has tinnitus. There is not much that can be done at this point. 2. She also has decreased hearing bilaterally. I will send her to ENT for evaluation. 3. The discomfort she is getting with mastication is rather vague. This could represent a problem with her lingular glands, but she has not noted any swelling. I will continue to observe. 4. Blood pressure is excellent today. No adjustments are made. 5. She will continue statin as prescribed in view of her presumed subclinical disease based on her bruits. 6. She will continue to follow Dr. Brenda Dupree regarding her GI symptoms for her presumed diverticulitis. Follow-up Disposition:  Return in about 6 months (around 1/28/2018).       Mani Dobson MD

## 2017-07-29 LAB
ALBUMIN SERPL-MCNC: 4.6 G/DL (ref 3.5–5.5)
ALBUMIN/GLOB SERPL: 1.8 {RATIO} (ref 1.2–2.2)
ALP SERPL-CCNC: 62 IU/L (ref 39–117)
ALT SERPL-CCNC: 8 IU/L (ref 0–32)
APO B SERPL-MCNC: 82 MG/DL (ref 54–133)
APPEARANCE UR: CLEAR
AST SERPL-CCNC: 12 IU/L (ref 0–40)
BASOPHILS # BLD AUTO: 0 X10E3/UL (ref 0–0.2)
BASOPHILS NFR BLD AUTO: 1 %
BILIRUB SERPL-MCNC: 0.6 MG/DL (ref 0–1.2)
BILIRUB UR QL STRIP: NEGATIVE
BUN SERPL-MCNC: 16 MG/DL (ref 6–24)
BUN/CREAT SERPL: 21 (ref 9–23)
CALCIUM SERPL-MCNC: 10.5 MG/DL (ref 8.7–10.2)
CHLORIDE SERPL-SCNC: 102 MMOL/L (ref 96–106)
CHOLEST SERPL-MCNC: 188 MG/DL (ref 100–199)
CO2 SERPL-SCNC: 23 MMOL/L (ref 18–29)
COLOR UR: YELLOW
CREAT SERPL-MCNC: 0.78 MG/DL (ref 0.57–1)
CRP SERPL HS-MCNC: 1.96 MG/L (ref 0–3)
EOSINOPHIL # BLD AUTO: 0.2 X10E3/UL (ref 0–0.4)
EOSINOPHIL NFR BLD AUTO: 4 %
ERYTHROCYTE [DISTWIDTH] IN BLOOD BY AUTOMATED COUNT: 13.3 % (ref 12.3–15.4)
GLOBULIN SER CALC-MCNC: 2.6 G/DL (ref 1.5–4.5)
GLUCOSE SERPL-MCNC: 77 MG/DL (ref 65–99)
GLUCOSE UR QL: NEGATIVE
HCT VFR BLD AUTO: 40.2 % (ref 34–46.6)
HDLC SERPL-MCNC: 97 MG/DL
HGB BLD-MCNC: 13.3 G/DL (ref 11.1–15.9)
HGB UR QL STRIP: NEGATIVE
IMM GRANULOCYTES # BLD: 0 X10E3/UL (ref 0–0.1)
IMM GRANULOCYTES NFR BLD: 0 %
KETONES UR QL STRIP: NEGATIVE
LDLC SERPL CALC-MCNC: 78 MG/DL (ref 0–99)
LEUKOCYTE ESTERASE UR QL STRIP: NEGATIVE
LYMPHOCYTES # BLD AUTO: 1.2 X10E3/UL (ref 0.7–3.1)
LYMPHOCYTES NFR BLD AUTO: 30 %
MCH RBC QN AUTO: 28.9 PG (ref 26.6–33)
MCHC RBC AUTO-ENTMCNC: 33.1 G/DL (ref 31.5–35.7)
MCV RBC AUTO: 87 FL (ref 79–97)
MICRO URNS: NORMAL
MONOCYTES # BLD AUTO: 0.3 X10E3/UL (ref 0.1–0.9)
MONOCYTES NFR BLD AUTO: 8 %
NEUTROPHILS # BLD AUTO: 2.2 X10E3/UL (ref 1.4–7)
NEUTROPHILS NFR BLD AUTO: 57 %
NITRITE UR QL STRIP: NEGATIVE
PH UR STRIP: 5.5 [PH] (ref 5–7.5)
PLATELET # BLD AUTO: 284 X10E3/UL (ref 150–379)
POTASSIUM SERPL-SCNC: 4.5 MMOL/L (ref 3.5–5.2)
PROT SERPL-MCNC: 7.2 G/DL (ref 6–8.5)
PROT UR QL STRIP: NEGATIVE
RBC # BLD AUTO: 4.6 X10E6/UL (ref 3.77–5.28)
SODIUM SERPL-SCNC: 142 MMOL/L (ref 134–144)
SP GR UR: 1.03 (ref 1–1.03)
TRIGL SERPL-MCNC: 63 MG/DL (ref 0–149)
TSH SERPL DL<=0.005 MIU/L-ACNC: 0.86 UIU/ML (ref 0.45–4.5)
UROBILINOGEN UR STRIP-MCNC: 0.2 MG/DL (ref 0.2–1)
VLDLC SERPL CALC-MCNC: 13 MG/DL (ref 5–40)
WBC # BLD AUTO: 3.9 X10E3/UL (ref 3.4–10.8)

## 2017-08-04 ENCOUNTER — HOSPITAL ENCOUNTER (OUTPATIENT)
Dept: MAMMOGRAPHY | Age: 52
Discharge: HOME | End: 2017-08-04
Attending: INTERNAL MEDICINE
Payer: COMMERCIAL

## 2017-08-04 DIAGNOSIS — Z12.31 VISIT FOR SCREENING MAMMOGRAM: ICD-10-CM

## 2017-08-04 PROCEDURE — 77067 SCR MAMMO BI INCL CAD: CPT

## 2018-02-01 ENCOUNTER — OFFICE VISIT (OUTPATIENT)
Dept: INTERNAL MEDICINE CLINIC | Age: 53
End: 2018-02-01

## 2018-02-01 VITALS
SYSTOLIC BLOOD PRESSURE: 93 MMHG | TEMPERATURE: 98.1 F | HEIGHT: 68 IN | RESPIRATION RATE: 16 BRPM | HEART RATE: 82 BPM | BODY MASS INDEX: 24.66 KG/M2 | WEIGHT: 162.7 LBS | DIASTOLIC BLOOD PRESSURE: 61 MMHG | OXYGEN SATURATION: 97 %

## 2018-02-01 DIAGNOSIS — M54.12 CERVICAL RADICULOPATHY: Primary | ICD-10-CM

## 2018-02-01 DIAGNOSIS — I10 ESSENTIAL HYPERTENSION: ICD-10-CM

## 2018-02-01 DIAGNOSIS — E78.5 DYSLIPIDEMIA: ICD-10-CM

## 2018-02-01 DIAGNOSIS — Z23 ENCOUNTER FOR IMMUNIZATION: ICD-10-CM

## 2018-02-01 RX ORDER — PREDNISONE 20 MG/1
80 TABLET ORAL
Qty: 20 TAB | Refills: 0 | Status: SHIPPED | OUTPATIENT
Start: 2018-02-01 | End: 2018-05-29 | Stop reason: ALTCHOICE

## 2018-02-01 NOTE — PROGRESS NOTES
Chief Complaint   Patient presents with    Hypertension     follow up      1. Have you been to the ER, urgent care clinic since your last visit? Hospitalized since your last visit? No    2. Have you seen or consulted any other health care providers outside of the [address] [place] [address] since your last visit? Include any pap smears or colon screening. No    After obtaining consent, and per orders of Dr. Babs Mcclure, injection of the flu vaccine given by Justine Guadalupe LPN. Patient instructed to remain in clinic for 20 minutes afterwards, and to report any adverse reaction to me immediately.

## 2018-02-01 NOTE — MR AVS SNAPSHOT
[address] [place] [address]. [address] [id number] 
[phone] Patient: Simran Barnhart MRN: [id number] :1965 Visit Information Date & Time Provider Department Dept. Phone Encounter #  
 2018  4:30 PM [facility] [phone] [id number] Upcoming Health Maintenance Date Due FOBT Q 1 YEAR AGE 50-75 2016 Influenza Age 5 to Adult 2017 PAP AKA CERVICAL CYTOLOGY 2018 BREAST CANCER SCRN MAMMOGRAM 2019 DTaP/Tdap/Td series (2 - Td) 2027 Allergies as of 2018  Review Complete On: 2018 By: Paxton Hernandez No Known Allergies Current Immunizations  Never Reviewed Name Date Influenza Vaccine (Quad) PF  Incomplete Not reviewed this visit You Were Diagnosed With   
  
 Codes Comments Encounter for immunization    -  Primary ICD-10-CM: D16 ICD-9-CM: V03.89 Cervical radiculopathy     ICD-10-CM: M54.12 
ICD-9-CM: 723.4 Vitals BP Pulse Temp Resp Height(growth percentile) Weight(growth percentile) 93/61 (BP 1 Location: Left arm, BP Patient Position: Sitting) 82 98.1 °F (36.7 °C) (Oral) 16 5' 8\" (1.727 m) 162 lb 11.2 oz (73.8 kg) SpO2 BMI OB Status Smoking Status 97% 24.74 kg/m2 Postmenopausal Never Smoker Vitals History BMI and BSA Data Body Mass Index Body Surface Area 24.74 kg/m 2 1.88 m 2 Preferred Pharmacy Pharmacy Name Phone [address] [facility] [address] [facility] [phone] Your Updated Medication List  
  
   
This list is accurate as of: 18  5:44 PM.  Always use your most recent med list.  
  
  
  
  
 losartan 100 mg tablet Commonly known as:  COZAAR  
TAKE 1 TABLET BY MOUTH ONCE DAILY  
  
 meloxicam 15 mg tablet Commonly known as:  MOBIC  
TAKE 1 TABLET BY MOUTH DAILY pravastatin 40 mg tablet Commonly known as:  PRAVACHOL  
TAKE 1 TABLET BY MOUTH ONCE NIGHTLY  
  
 predniSONE 20 mg tablet Commonly known as:  Jose Roberto Deakarma Take 4 Tabs by mouth daily (after dinner). Prescriptions Sent to Pharmacy Refills  
 predniSONE (DELTASONE) 20 mg tablet 0 Sig: Take 4 Tabs by mouth daily (after dinner). Class: Normal  
 Pharmacy: [address] [place], [address] [place], [place] [address]E RD AT [address] [facility] #: [phone] Route: Oral  
  
We Performed the Following INFLUENZA VIRUS VAC QUAD,SPLIT,PRESV FREE SYRINGE IM [id number] CPT(R)] AZ IMMUNIZ ADMIN,1 SINGLE/COMB VAC/TOXOID [id number] CPT(R)] Patient Instructions Vaccine Information Statement Influenza (Flu) Vaccine (Inactivated or Recombinant): What you need to know Many Vaccine Information Statements are available in Bahamian and other languages. See www.immunize.org/vis Hojas de Información Sobre Vacunas están disponibles en Español y en muchos otros idiomas. Visite www.immunize.org/vis 1. Why get vaccinated? Influenza (flu) is a contagious disease that spreads around the [place] every year, usually between October and May. Flu is caused by influenza viruses, and is spread mainly by coughing, sneezing, and close contact. Anyone can get flu. Flu strikes suddenly and can last several days. Symptoms vary by age, but can include: 
 fever/chills  sore throat  muscle aches  fatigue  cough  headache  runny or stuffy nose Flu can also lead to pneumonia and blood infections, and cause diarrhea and seizures in children. If you have a medical condition, such as heart or lung disease, flu can make it worse. Flu is more dangerous for some people. Infants and young children, people 72years of age and older, pregnant women, and people with certain health conditions or a weakened immune system are at greatest risk. Each year thousands of people in the [facility] die from flu, and many more are hospitalized. Flu vaccine can: 
 keep you from getting flu, 
 make flu less severe if you do get it, and 
 keep you from spreading flu to your family and other people. 2. Inactivated and recombinant flu vaccines A dose of flu vaccine is recommended every flu season. Children 6 months through 6years of age may need two doses during the same flu season. Everyone else needs only one dose each flu season. Some inactivated flu vaccines contain a very small amount of a mercury-based preservative called thimerosal. Studies have not shown thimerosal in vaccines to be harmful, but flu vaccines that do not contain thimerosal are available. There is no live flu virus in flu shots. They cannot cause the flu. There are many flu viruses, and they are always changing. Each year a new flu vaccine is made to protect against three or four viruses that are likely to cause disease in the upcoming flu season. But even when the vaccine doesnt exactly match these viruses, it may still provide some protection Flu vaccine cannot prevent: 
 flu that is caused by a virus not covered by the vaccine, or 
 illnesses that look like flu but are not. It takes about 2 weeks for protection to develop after vaccination, and protection lasts through the flu season. 3. Some people should not get this vaccine Tell the person who is giving you the vaccine:  If you have any severe, life-threatening allergies. If you ever had a life-threatening allergic reaction after a dose of flu vaccine, or have a severe allergy to any part of this vaccine, you may be advised not to get vaccinated. Most, but not all, types of flu vaccine contain a small amount of egg protein.  If you ever had Guillain-Barré Syndrome (also called GBS). Some people with a history of GBS should not get this vaccine.  This should be discussed with your doctor.  If you are not feeling well. It is usually okay to get flu vaccine when you have a mild illness, but you might be asked to come back when you feel better. 4. Risks of a vaccine reaction With any medicine, including vaccines, there is a chance of reactions. These are usually mild and go away on their own, but serious reactions are also possible. Most people who get a flu shot do not have any problems with it. Minor problems following a flu shot include:  
 soreness, redness, or swelling where the shot was given  hoarseness  sore, red or itchy eyes  cough  fever  aches  headache  itching  fatigue If these problems occur, they usually begin soon after the shot and last 1 or 2 days. More serious problems following a flu shot can include the following:  There may be a small increased risk of Guillain-Barré Syndrome (GBS) after inactivated flu vaccine. This risk has been estimated at 1 or 2 additional cases per million people vaccinated. This is much lower than the risk of severe complications from flu, which can be prevented by flu vaccine.  Young children who get the flu shot along with pneumococcal vaccine (PCV13) and/or DTaP vaccine at the same time might be slightly more likely to have a seizure caused by fever. Ask your doctor for more information. Tell your doctor if a child who is getting flu vaccine has ever had a seizure. Problems that could happen after any injected vaccine:  People sometimes faint after a medical procedure, including vaccination. Sitting or lying down for about 15 minutes can help prevent fainting, and injuries caused by a fall. Tell your doctor if you feel dizzy, or have vision changes or ringing in the ears.  Some people get severe pain in the shoulder and have difficulty moving the arm where a shot was given. This happens very rarely.  Any medication can cause a severe allergic reaction. Such reactions from a vaccine are very rare, estimated at about 1 in a million doses, and would happen within a few minutes to a few hours after the vaccination. As with any medicine, there is a very remote chance of a vaccine causing a serious injury or death. The safety of vaccines is always being monitored. For more information, visit: www.cdc.gov/vaccinesafety/ 
 
5. What if there is a serious reaction? What should I look for?  Look for anything that concerns you, such as signs of a severe allergic reaction, very high fever, or unusual behavior. Signs of a severe allergic reaction can include hives, swelling of the face and throat, difficulty breathing, a fast heartbeat, dizziness, and weakness  usually within a few minutes to a few hours after the vaccination. What should I do?  If you think it is a severe allergic reaction or other emergency that cant wait, call 9-1-1 and get the person to the nearest hospital. Otherwise, call your doctor.  Reactions should be reported to the Vaccine Adverse Event Reporting System (VAERS). Your doctor should file this report, or you can do it yourself through  the VAERS web site at www.vaers. [facility].gov, or by calling 5-427.118.6792. [employer] does not give medical advice. 6. The National Vaccine Injury Compensation Program 
 
The [facility] Vaccine Injury Compensation Program (VICP) is a federal program that was created to compensate people who may have been injured by certain vaccines. Persons who believe they may have been injured by a vaccine can learn about the program and about filing a claim by calling 2-100.760.3760 or visiting the [employer] website at www.[facility].gov/vaccinecompensation. There is a time limit to file a claim for compensation. 7. How can I learn more?  Ask your healthcare provider. He or she can give you the vaccine package insert or suggest other sources of information.  Call your local or state health department.  Contact the Centers for Disease Control and Prevention (CDC): 
- Call 9-772.307.5418 (1-800-CDC-INFO) or 
- Visit CDCs website at www.cdc.gov/flu Vaccine Information Statement Inactivated Influenza Vaccine 8/7/2015 
42 AUNDREA Joseph 328CT-99 [facility] and [employer] [facility] Centers for Disease Control and Prevention Office Use Only Introducing [facility]! [facility] introduces Earbits patient portal. Now you can access parts of your medical record, email your doctor's office, and request medication refills online. 1. In your internet browser, go to [email or web address] 2. Click on the First Time User? Click Here link in the Sign In box. You will see the New Member Sign Up page. 3. Enter your Earbits Access Code exactly as it appears below. You will not need to use this code after youve completed the sign-up process. If you do not sign up before the expiration date, you must request a new code. · Earbits Access Code: [email or web address] Expires: 5/2/2018  5:44 PM 
 
4. Enter the last four digits of your Social Security Number (xxxx) and Date of Birth (mm/dd/yyyy) as indicated and click Submit. You will be taken to the next sign-up page. 5. Create a Earbits ID. This will be your Earbits login ID and cannot be changed, so think of one that is secure and easy to remember. 6. Create a Earbits password. You can change your password at any time. 7. Enter your Password Reset Question and Answer. This can be used at a later time if you forget your password. 8. Enter your e-mail address. You will receive e-mail notification when new information is available in [address] 9. Click Sign Up. You can now view and download portions of your medical record. 10. Click the Download Summary menu link to download a portable copy of your medical information. If you have questions, please visit the Frequently Asked Questions section of the [employer] website. Remember, [employer] is NOT to be used for urgent needs. For medical emergencies, dial 911. Now available from your iPhone and Android! Please provide this summary of care documentation to your next provider. Your primary care clinician is listed as Miquel Ozuna. If you have any questions after today's visit, please call [phone].

## 2018-02-01 NOTE — PATIENT INSTRUCTIONS
Vaccine Information Statement    Influenza (Flu) Vaccine (Inactivated or Recombinant): What you need to know    Many Vaccine Information Statements are available in Romansh and other languages. See www.immunize.org/vis  Hojas de Información Sobre Vacunas están disponibles en Español y en muchos otros idiomas. Visite www.immunize.org/vis    1. Why get vaccinated? Influenza (flu) is a contagious disease that spreads around the [place] every year, usually between October and May. Flu is caused by influenza viruses, and is spread mainly by coughing, sneezing, and close contact. Anyone can get flu. Flu strikes suddenly and can last several days. Symptoms vary by age, but can include:   fever/chills   sore throat   muscle aches   fatigue   cough   headache    runny or stuffy nose    Flu can also lead to pneumonia and blood infections, and cause diarrhea and seizures in children. If you have a medical condition, such as heart or lung disease, flu can make it worse. Flu is more dangerous for some people. Infants and young children, people 72years of age and older, pregnant women, and people with certain health conditions or a weakened immune system are at greatest risk. Each year thousands of people in the [facility] die from flu, and many more are hospitalized. Flu vaccine can:   keep you from getting flu,   make flu less severe if you do get it, and   keep you from spreading flu to your family and other people. 2. Inactivated and recombinant flu vaccines    A dose of flu vaccine is recommended every flu season. Children 6 months through 6years of age may need two doses during the same flu season. Everyone else needs only one dose each flu season.        Some inactivated flu vaccines contain a very small amount of a mercury-based preservative called thimerosal. Studies have not shown thimerosal in vaccines to be harmful, but flu vaccines that do not contain thimerosal are available. There is no live flu virus in flu shots. They cannot cause the flu. There are many flu viruses, and they are always changing. Each year a new flu vaccine is made to protect against three or four viruses that are likely to cause disease in the upcoming flu season. But even when the vaccine doesnt exactly match these viruses, it may still provide some protection    Flu vaccine cannot prevent:   flu that is caused by a virus not covered by the vaccine, or   illnesses that look like flu but are not. It takes about 2 weeks for protection to develop after vaccination, and protection lasts through the flu season. 3. Some people should not get this vaccine    Tell the person who is giving you the vaccine:     If you have any severe, life-threatening allergies. If you ever had a life-threatening allergic reaction after a dose of flu vaccine, or have a severe allergy to any part of this vaccine, you may be advised not to get vaccinated. Most, but not all, types of flu vaccine contain a small amount of egg protein.  If you ever had Guillain-Barré Syndrome (also called GBS). Some people with a history of GBS should not get this vaccine. This should be discussed with your doctor.  If you are not feeling well. It is usually okay to get flu vaccine when you have a mild illness, but you might be asked to come back when you feel better. 4. Risks of a vaccine reaction    With any medicine, including vaccines, there is a chance of reactions. These are usually mild and go away on their own, but serious reactions are also possible. Most people who get a flu shot do not have any problems with it.      Minor problems following a flu shot include:    soreness, redness, or swelling where the shot was given     hoarseness   sore, red or itchy eyes   cough   fever   aches   headache   itching   fatigue  If these problems occur, they usually begin soon after the shot and last 1 or 2 days. More serious problems following a flu shot can include the following:     There may be a small increased risk of Guillain-Barré Syndrome (GBS) after inactivated flu vaccine. This risk has been estimated at 1 or 2 additional cases per million people vaccinated. This is much lower than the risk of severe complications from flu, which can be prevented by flu vaccine.  Young children who get the flu shot along with pneumococcal vaccine (PCV13) and/or DTaP vaccine at the same time might be slightly more likely to have a seizure caused by fever. Ask your doctor for more information. Tell your doctor if a child who is getting flu vaccine has ever had a seizure. Problems that could happen after any injected vaccine:      People sometimes faint after a medical procedure, including vaccination. Sitting or lying down for about 15 minutes can help prevent fainting, and injuries caused by a fall. Tell your doctor if you feel dizzy, or have vision changes or ringing in the ears.  Some people get severe pain in the shoulder and have difficulty moving the arm where a shot was given. This happens very rarely.  Any medication can cause a severe allergic reaction. Such reactions from a vaccine are very rare, estimated at about 1 in a million doses, and would happen within a few minutes to a few hours after the vaccination. As with any medicine, there is a very remote chance of a vaccine causing a serious injury or death. The safety of vaccines is always being monitored. For more information, visit: www.cdc.gov/vaccinesafety/    5. What if there is a serious reaction? What should I look for?  Look for anything that concerns you, such as signs of a severe allergic reaction, very high fever, or unusual behavior.     Signs of a severe allergic reaction can include hives, swelling of the face and throat, difficulty breathing, a fast heartbeat, dizziness, and weakness - usually within a few minutes to a few hours after the vaccination. What should I do?  If you think it is a severe allergic reaction or other emergency that cant wait, call 9-1-1 and get the person to the nearest hospital. Otherwise, call your doctor.  Reactions should be reported to the Vaccine Adverse Event Reporting System (VAERS). Your doctor should file this report, or you can do it yourself through  the VAERS web site at www.vaers. [facility].gov, or by calling 4-700.886.2056. VAERS does not give medical advice. 6. The National Vaccine Injury Compensation Program    The [facility] Vaccine Injury Compensation Program (VICP) is a federal program that was created to compensate people who may have been injured by certain vaccines. Persons who believe they may have been injured by a vaccine can learn about the program and about filing a claim by calling 2-258.396.3439 or visiting the [employer] website at www.[facility].gov/vaccinecompensation. There is a time limit to file a claim for compensation. 7. How can I learn more?  Ask your healthcare provider. He or she can give you the vaccine package insert or suggest other sources of information.  Call your local or state health department.  Contact the Centers for Disease Control and Prevention (CDC):  - Call 9-769.415.9617 (1-800-CDC-INFO) or  - Visit CDCs website at www.cdc.gov/flu    Vaccine Information Statement   Inactivated Influenza Vaccine   8/7/2015  42 AUNDREA Quinn 378AP-62    Department of Health and Human Services  Centers for Disease Control and Prevention    Office Use Only

## 2018-02-02 NOTE — PROGRESS NOTES
Chief Complaint   Patient presents with    Hypertension     follow up    . SUBECTIVE:    Adriana Lewis is a 47 y.o. female   Comes in for a return visit complaining of pain on the left side of her neck radiating down to shoulder and anterior and posterior chest region. It is episodic and oftentimes worse at night. It has been present now for the last 2-3 weeks. There has been no history of trauma. She has a past history of primary hypertension and dyslipidemia. MedDATA/gwo         Current Outpatient Prescriptions   Medication Sig Dispense Refill    predniSONE (DELTASONE) 20 mg tablet Take 4 Tabs by mouth daily (after dinner).  20 Tab 0    pravastatin (PRAVACHOL) 40 mg tablet TAKE 1 TABLET BY MOUTH ONCE NIGHTLY 30 Tab 11    losartan (COZAAR) 100 mg tablet TAKE 1 TABLET BY MOUTH ONCE DAILY 90 Tab 3    meloxicam (MOBIC) 15 mg tablet TAKE 1 TABLET BY MOUTH DAILY 30 Tab 11     Past Medical History:   Diagnosis Date    Asthma     Brachiocephalic artery injury     chronic dissection    Hypertension     Ill-defined condition     high cholesterol     Past Surgical History:   Procedure Laterality Date    COLONOSCOPY N/A 5/1/2017    COLONOSCOPY performed by Silvina Fowler [facility] at [facility] ENDOSCOPY    HX TONSILLECTOMY       No Known Allergies    REVIEW OF SYSTEMS:  Review of Systems - Negative except   ENT ROS: negative for - headaches, hearing change, nasal congestion, oral lesions, tinnitus, visual changes or   Respiratory ROS: no cough, shortness of breath, or wheezing  Cardiovascular ROS: no chest pain or dyspnea on exertion  Gastrointestinal ROS: no abdominal pain, change in bowel habits, or black or blood  Genito-Urinary ROS: no dysuria, trouble voiding, or hematuria  Musculoskeletal ROS: negative  Neurological ROS: no TIA or stroke symptoms      Social History     Social History    Marital status:      Spouse name: N/A    Number of children: 2    Years of education: N/A     Occupational History   42 [employer] History Main Topics    Smoking status: Never Smoker    Smokeless tobacco: Never Used    Alcohol use 0.0 oz/week     0 Standard drinks or equivalent per week      Comment: rare    Drug use: No    Sexual activity: Yes     Partners: Male     Other Topics Concern    None     Social History Narrative   r  Family History   Problem Relation Age of Onset    Hypertension Mother     Heart Disease Maternal Grandmother     Heart Attack Maternal Grandmother     Hypertension Maternal Grandmother     Diabetes Maternal Grandmother        OBJECTIVE:  Visit Vitals    BP 93/61 (BP 1 Location: Left arm, BP Patient Position: Sitting)    Pulse 82    Temp 98.1 °F (36.7 °C) (Oral)    Resp 16    Ht 5' 8\" (1.727 m)    Wt 162 lb 11.2 oz (73.8 kg)    SpO2 97%    BMI 24.74 kg/m2     ENT: perrla,  eom intact  NECK: supple. Thyroid normal, no JVD, excellent range of motion of cervical spine  CHEST: clear to ascultation and percussion   HEART: regular rate and rhythm  ABD: soft, bowel sounds active,   EXTREMITIES: no edema, pulse 1+  INTEGUMENT: clear  Neurological: Positive Adson's and Spurling's maneuver      ASSESSMENT:  1. Cervical radiculopathy    2. Essential hypertension    3. Dyslipidemia    4. Encounter for immunization        PLAN:    1. The patient appears to have cervical radiculopathy. I will give her a 5-day course of Prednisone 80 mg every day pc dinner. 2. She is somewhat intolerant of the Meloxicam.   3. Blood pressure excellent today, no adjustments are made. 4. She will continue statin as prescribed. MedDATA/gwo          . Orders Placed This Encounter    Influenza virus vaccine (QUADRIVALENT PRES FREE SYRINGE) IM (10636)    predniSONE (DELTASONE) 20 mg tablet       Follow-up Disposition:  Return in about 3 months (around 5/1/2018).       Cruz Xiao MD

## 2018-02-07 RX ORDER — LOSARTAN POTASSIUM 100 MG/1
TABLET ORAL
Qty: 90 TAB | Refills: 3 | Status: SHIPPED | OUTPATIENT
Start: 2018-02-07 | End: 2019-05-07 | Stop reason: SDUPTHER

## 2018-05-29 ENCOUNTER — OFFICE VISIT (OUTPATIENT)
Dept: INTERNAL MEDICINE CLINIC | Age: 53
End: 2018-05-29

## 2018-05-29 VITALS
BODY MASS INDEX: 24.71 KG/M2 | OXYGEN SATURATION: 97 % | RESPIRATION RATE: 16 BRPM | HEIGHT: 68 IN | HEART RATE: 66 BPM | TEMPERATURE: 97.9 F | WEIGHT: 163 LBS | DIASTOLIC BLOOD PRESSURE: 74 MMHG | SYSTOLIC BLOOD PRESSURE: 145 MMHG

## 2018-05-29 DIAGNOSIS — R00.2 PALPITATIONS: Primary | ICD-10-CM

## 2018-05-29 DIAGNOSIS — R07.89 OTHER CHEST PAIN: ICD-10-CM

## 2018-05-29 DIAGNOSIS — I10 ESSENTIAL HYPERTENSION: ICD-10-CM

## 2018-05-29 DIAGNOSIS — Z00.00 PHYSICAL EXAM: ICD-10-CM

## 2018-05-29 DIAGNOSIS — R19.5 OCCULT BLOOD IN STOOLS: ICD-10-CM

## 2018-05-29 DIAGNOSIS — E78.5 DYSLIPIDEMIA: ICD-10-CM

## 2018-05-29 RX ORDER — LORAZEPAM 0.5 MG/1
0.5 TABLET ORAL
Qty: 40 TAB | Refills: 1 | Status: SHIPPED | OUTPATIENT
Start: 2018-05-29 | End: 2019-07-10 | Stop reason: SDUPTHER

## 2018-05-29 NOTE — PROGRESS NOTES
Chief Complaint   Patient presents with    Chest Pain     Patient states that she thinks she notices a \"flutter\" in her heart beat. 1. Have you been to the ER, urgent care clinic since your last visit? Hospitalized since your last visit? No    2. Have you seen or consulted any other health care providers outside of the [address] [place] [address] since your last visit? Include any pap smears or colon screening.  No

## 2018-05-29 NOTE — MR AVS SNAPSHOT
[address] [place] [address] 
 
 
 [address] Lam [address] [id number] 
[phone] Patient: Leonid Winn MRN: [id number] :[id number] Visit Information Date & Time Provider Department Dept. Phone Encounter #  
 2018  3:30 PM [facility] [phone] [id number] Your Appointments 7/3/2018  3:30 PM  
Any with Heather Alvares MD  
[address] [place] [address] and Primary Care [facility] Appt Note: 1 month follow up  
 [address] Lam [address] 1 [facility]  
  
   
 [address] [address] Upcoming Health Maintenance Date Due FOBT Q 1 YEAR AGE 50-75 2016 PAP AKA CERVICAL CYTOLOGY 2018 Influenza Age 5 to Adult 2018 BREAST CANCER SCRN MAMMOGRAM 2019 DTaP/Tdap/Td series (2 - Td) 2027 Allergies as of 2018  Review Complete On: 2018 By: David Alan No Known Allergies Current Immunizations  Never Reviewed Name Date Influenza Vaccine (Quad) PF 2018 Not reviewed this visit You Were Diagnosed With   
  
 Codes Comments Essential hypertension    -  Primary ICD-10-CM: I10 
ICD-9-CM: 401.9 Other chest pain     ICD-10-CM: R07.89 ICD-9-CM: 786.59 Palpitations     ICD-10-CM: R00.2 ICD-9-CM: 785.1 Dyslipidemia     ICD-10-CM: E78.5 ICD-9-CM: 272.4 Occult blood in stools     ICD-10-CM: R19.5 ICD-9-CM: 792.1 Physical exam     ICD-10-CM: Z00.00 ICD-9-CM: V70.9 Vitals BP Pulse Temp Resp Height(growth percentile) Weight(growth percentile) 145/74 66 97.9 °F (36.6 °C) (Oral) 16 5' 8\" (1.727 m) 163 lb (73.9 kg) SpO2 BMI OB Status Smoking Status 97% 24.78 kg/m2 Postmenopausal Never Smoker Vitals History BMI and BSA Data Body Mass Index Body Surface Area 24.78 kg/m 2 1.88 m 2 Preferred Pharmacy Pharmacy Name Phone [address] [facility] [phone] Your Updated Medication List  
  
   
This list is accurate as of 5/29/18  5:22 PM.  Always use your most recent med list.  
  
  
  
  
 LORazepam 0.5 mg tablet Commonly known as:  ATIVAN Take 1 Tab by mouth two (2) times daily as needed for Anxiety. Max Daily Amount: 1 mg.  
  
 losartan 100 mg tablet Commonly known as:  COZAAR  
TAKE 1 TABLET BY MOUTH ONCE DAILY  
  
 meloxicam 15 mg tablet Commonly known as:  MOBIC  
TAKE 1 TABLET BY MOUTH DAILY pravastatin 40 mg tablet Commonly known as:  PRAVACHOL  
TAKE 1 TABLET BY MOUTH ONCE NIGHTLY Prescriptions Printed Refills LORazepam (ATIVAN) 0.5 mg tablet 1 Sig: Take 1 Tab by mouth two (2) times daily as needed for Anxiety. Max Daily Amount: 1 mg. Class: Print Route: Oral  
  
We Performed the Following APOLIPOPROTEIN B [id number] CPT(R)] CBC WITH AUTOMATED DIFF [87414 CPT(R)] COLLECTION VENOUS BLOOD,VENIPUNCTURE [id number] CPT(R)] CRP, HIGH SENSITIVITY [14281 CPT(R)] LIPID PANEL [38095 CPT(R)] METABOLIC PANEL, COMPREHENSIVE [30280 CPT(R)] OCCULT BLOOD, IMMUNOASSAY (FIT) [id number] CPT(R)] TSH 3RD GENERATION [71062 CPT(R)] URINALYSIS W/ RFLX MICROSCOPIC [44105 CPT(R)] Introducing [facility]! Matthew Vences introduces Guarnic patient portal. Now you can access parts of your medical record, email your doctor's office, and request medication refills online. 1. In your internet browser, go to [email or web address] 2. Click on the First Time User? Click Here link in the Sign In box. You will see the New Member Sign Up page. 3. Enter your Guarnic Access Code exactly as it appears below. You will not need to use this code after youve completed the sign-up process. If you do not sign up before the expiration date, you must request a new code. · AdReady Access Code: [email or web address] Expires: 8/27/2018  4:04 PM 
 
4. Enter the last four digits of your Social Security Number (xxxx) and Date of Birth (mm/dd/yyyy) as indicated and click Submit. You will be taken to the next sign-up page. 5. Create a AdReady ID. This will be your AdReady login ID and cannot be changed, so think of one that is secure and easy to remember. 6. Create a AdReady password. You can change your password at any time. 7. Enter your Password Reset Question and Answer. This can be used at a later time if you forget your password. 8. Enter your e-mail address. You will receive e-mail notification when new information is available in [address] 9. Click Sign Up. You can now view and download portions of your medical record. 10. Click the Download Summary menu link to download a portable copy of your medical information. If you have questions, please visit the Frequently Asked Questions section of the [employer]Ready website. Remember, AdReady is NOT to be used for urgent needs. For medical emergencies, dial 911. Now available from your iPhone and Android! Please provide this summary of care documentation to your next provider. Your primary care clinician is listed as Miquel Ozuna. If you have any questions after today's visit, please call [phone].

## 2018-05-30 LAB
ALBUMIN SERPL-MCNC: 4.3 G/DL (ref 3.5–5.5)
ALBUMIN/GLOB SERPL: 1.7 {RATIO} (ref 1.2–2.2)
ALP SERPL-CCNC: 56 IU/L (ref 39–117)
ALT SERPL-CCNC: 10 IU/L (ref 0–32)
APO B SERPL-MCNC: 72 MG/DL (ref 54–133)
APPEARANCE UR: CLEAR
AST SERPL-CCNC: 18 IU/L (ref 0–40)
BACTERIA #/AREA URNS HPF: NORMAL /[HPF]
BASOPHILS # BLD AUTO: 0.1 X10E3/UL (ref 0–0.2)
BASOPHILS NFR BLD AUTO: 2 %
BILIRUB SERPL-MCNC: 0.2 MG/DL (ref 0–1.2)
BILIRUB UR QL STRIP: NEGATIVE
BUN SERPL-MCNC: 19 MG/DL (ref 6–24)
BUN/CREAT SERPL: 23 (ref 9–23)
CALCIUM SERPL-MCNC: 10.1 MG/DL (ref 8.7–10.2)
CASTS URNS QL MICRO: NORMAL /LPF
CHLORIDE SERPL-SCNC: 101 MMOL/L (ref 96–106)
CHOLEST SERPL-MCNC: 175 MG/DL (ref 100–199)
CO2 SERPL-SCNC: 23 MMOL/L (ref 18–29)
COLOR UR: YELLOW
CREAT SERPL-MCNC: 0.84 MG/DL (ref 0.57–1)
CRP SERPL HS-MCNC: 1.25 MG/L (ref 0–3)
EOSINOPHIL # BLD AUTO: 0.2 X10E3/UL (ref 0–0.4)
EOSINOPHIL NFR BLD AUTO: 4 %
EPI CELLS #/AREA URNS HPF: NORMAL /HPF
ERYTHROCYTE [DISTWIDTH] IN BLOOD BY AUTOMATED COUNT: 12.8 % (ref 12.3–15.4)
GFR SERPLBLD CREATININE-BSD FMLA CKD-EPI: 80 ML/MIN/1.73
GFR SERPLBLD CREATININE-BSD FMLA CKD-EPI: 92 ML/MIN/1.73
GLOBULIN SER CALC-MCNC: 2.5 G/DL (ref 1.5–4.5)
GLUCOSE SERPL-MCNC: 65 MG/DL (ref 65–99)
GLUCOSE UR QL: NEGATIVE
HCT VFR BLD AUTO: 38.9 % (ref 34–46.6)
HDLC SERPL-MCNC: 89 MG/DL
HGB BLD-MCNC: 12.9 G/DL (ref 11.1–15.9)
HGB UR QL STRIP: ABNORMAL
IMM GRANULOCYTES # BLD: 0 X10E3/UL (ref 0–0.1)
IMM GRANULOCYTES NFR BLD: 0 %
KETONES UR QL STRIP: NEGATIVE
LDLC SERPL CALC-MCNC: 75 MG/DL (ref 0–99)
LEUKOCYTE ESTERASE UR QL STRIP: NEGATIVE
LYMPHOCYTES # BLD AUTO: 1.5 X10E3/UL (ref 0.7–3.1)
LYMPHOCYTES NFR BLD AUTO: 32 %
MCH RBC QN AUTO: 28.2 PG (ref 26.6–33)
MCHC RBC AUTO-ENTMCNC: 33.2 G/DL (ref 31.5–35.7)
MCV RBC AUTO: 85 FL (ref 79–97)
MICRO URNS: ABNORMAL
MONOCYTES # BLD AUTO: 0.3 X10E3/UL (ref 0.1–0.9)
MONOCYTES NFR BLD AUTO: 5 %
MUCOUS THREADS URNS QL MICRO: PRESENT
NEUTROPHILS # BLD AUTO: 2.7 X10E3/UL (ref 1.4–7)
NEUTROPHILS NFR BLD AUTO: 57 %
NITRITE UR QL STRIP: NEGATIVE
PH UR STRIP: 5.5 [PH] (ref 5–7.5)
PLATELET # BLD AUTO: 271 X10E3/UL (ref 150–379)
POTASSIUM SERPL-SCNC: 3.6 MMOL/L (ref 3.5–5.2)
PROT SERPL-MCNC: 6.8 G/DL (ref 6–8.5)
PROT UR QL STRIP: NEGATIVE
RBC # BLD AUTO: 4.57 X10E6/UL (ref 3.77–5.28)
RBC #/AREA URNS HPF: NORMAL /HPF
SODIUM SERPL-SCNC: 142 MMOL/L (ref 134–144)
SP GR UR: 1.02 (ref 1–1.03)
TRIGL SERPL-MCNC: 54 MG/DL (ref 0–149)
TSH SERPL DL<=0.005 MIU/L-ACNC: 1.25 UIU/ML (ref 0.45–4.5)
UROBILINOGEN UR STRIP-MCNC: 0.2 MG/DL (ref 0.2–1)
VLDLC SERPL CALC-MCNC: 11 MG/DL (ref 5–40)
WBC # BLD AUTO: 4.7 X10E3/UL (ref 3.4–10.8)
WBC #/AREA URNS HPF: NORMAL /HPF

## 2018-05-30 NOTE — PROGRESS NOTES
[address] [facility] and Primary Care  [place] [address]  [address] [facility] [address]  Phone:  [phone]  Fax: [phone]       Chief Complaint   Patient presents with    Chest Pain     Patient states that she thinks she notices a \"flutter\" in her heart beat. .      SUBJECTIVE:    Timothy Matthews is a 47 y.o. female She comes in for a return visit for followup. She is concerned because for the last two months she has had this vague sensation in her chest, which is episodic, lasting for several minutes, abating only to return again. There are no precipitating or exacerbating factors. She also had this vague left sided chest discomfort, which lasts for several seconds and abates. She notes that when she bends over to  items or similar actions, she gets this vague sensation in her chest.  It improves once she straightens up. She has a past history of primary hypertension and dyslipidemia. 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 She was seen by vascular surgery regarding this and it is not felt that anything needs to be done. Current Outpatient Prescriptions   Medication Sig Dispense Refill    LORazepam (ATIVAN) 0.5 mg tablet Take 1 Tab by mouth two (2) times daily as needed for Anxiety.  Max Daily Amount: 1 mg. 40 Tab 1    losartan (COZAAR) 100 mg tablet TAKE 1 TABLET BY MOUTH ONCE DAILY 90 Tab 3    pravastatin (PRAVACHOL) 40 mg tablet TAKE 1 TABLET BY MOUTH ONCE NIGHTLY 30 Tab 11    meloxicam (MOBIC) 15 mg tablet TAKE 1 TABLET BY MOUTH DAILY 30 Tab 11     Past Medical History:   Diagnosis Date    Asthma     Brachiocephalic artery injury     chronic dissection    Hypertension     Ill-defined condition     high cholesterol     Past Surgical History:   Procedure Laterality Date    COLONOSCOPY N/A 5/1/2017    COLONOSCOPY performed by Chayo Ventura MD at [facility] ENDOSCOPY    HX TONSILLECTOMY       No Known Allergies      REVIEW OF SYSTEMS:  General: negative for - chills or fever  ENT: negative for - headaches, nasal congestion or tinnitus  Respiratory: negative for - cough, hemoptysis, shortness of breath or wheezing  Cardiovascular : negative for - chest pain, edema, palpitations or shortness of breath  Gastrointestinal: negative for - abdominal pain, blood in stools, heartburn or nausea/vomiting  Genito-Urinary: no dysuria, trouble voiding, or hematuria  Musculoskeletal: negative for - gait disturbance, joint pain, joint stiffness or joint swelling  Neurological: no TIA or stroke symptoms  Hematologic: no bruises, no bleeding, no swollen glands  Integument: no lumps, mole changes, nail changes or rash  Endocrine: no malaise/lethargy or unexpected weight changes      Social History     Social History    Marital status:      Spouse name: N/A    Number of children: 2    Years of education: N/A     Occupational History   [address] History Main Topics    Smoking status: Never Smoker    Smokeless tobacco: Never Used    Alcohol use 0.0 oz/week     0 Standard drinks or equivalent per week      Comment: rare    Drug use: No    Sexual activity: Yes     Partners: Male     Other Topics Concern    None     Social History Narrative     Family History   Problem Relation Age of Onset    Hypertension Mother     Heart Disease Maternal Grandmother     Heart Attack Maternal Grandmother     Hypertension Maternal Grandmother     Diabetes Maternal Grandmother        OBJECTIVE:    Visit Vitals    /74    Pulse 66    Temp 97.9 °F (36.6 °C) (Oral)    Resp 16    Ht 5' 8\" (1.727 m)    Wt 163 lb (73.9 kg)    SpO2 97%    BMI 24.78 kg/m2     CONSTITUTIONAL: well , well nourished, appears age appropriate  EYES: perrla, eom intact  ENMT:moist mucous membranes, pharynx clear  NECK: supple.  Thyroid normal  RESPIRATORY: Chest: clear to ascultation and percussion   CARDIOVASCULAR: Heart: regular rate and rhythm  GASTROINTESTINAL: Abdomen: soft, bowel sounds active  HEMATOLOGIC: no pathological lymph nodes palpated  MUSCULOSKELETAL: Extremities: no edema, pulse 1+   INTEGUMENT: No unusual rashes or suspicious skin lesions noted. Nails appear normal.  NEUROLOGIC: non-focal exam   MENTAL STATUS: alert and oriented, appropriate affect      ASSESSMENT:  1. Palpitations    2. Other chest pain    3. Essential hypertension    4. Dyslipidemia    5. Occult blood in stools    6. Physical exam        PLAN:    1. These palpitations/fluttering sensation are not related to rhythm disturbances most likely. I think this is more related to nerves. I do not think a cardiac workup is indicated. I suggest an antidepressant or p.r.n. use of a minor tranquilizer. She opts for the second. 2. She has musculoskeletal chest wall pain on the left. Nothing needs to be done, other than reassurance. 3. Her blood pressure is excellent and no adjustments were made. 4. She will continue her statin as prescribed. The statin was prescribed primarily because of the bruits noted with the dissection that occurred. It was unclear whether or not this was atherosclerotic or related to fibromuscular dysplasia. .  Orders Placed This Encounter    OCCULT BLOOD, IMMUNOASSAY (FIT)    APOLIPOPROTEIN B    CBC WITH AUTOMATED DIFF    CRP, HIGH SENSITIVITY    LIPID PANEL    METABOLIC PANEL, COMPREHENSIVE    TSH 3RD GENERATION    URINALYSIS W/ RFLX MICROSCOPIC    LORazepam (ATIVAN) 0.5 mg tablet         Follow-up Disposition:  Return in about 4 weeks (around 6/26/2018).       Jarrett Sykes MD

## 2018-06-05 LAB — HEMOCCULT STL QL IA: NEGATIVE

## 2018-06-24 RX ORDER — PRAVASTATIN SODIUM 40 MG/1
TABLET ORAL
Qty: 90 TAB | Refills: 11 | Status: SHIPPED | OUTPATIENT
Start: 2018-06-24 | End: 2018-06-25 | Stop reason: SDUPTHER

## 2018-06-25 RX ORDER — PRAVASTATIN SODIUM 40 MG/1
TABLET ORAL
Qty: 90 TAB | Refills: 3 | Status: SHIPPED | OUTPATIENT
Start: 2018-06-25 | End: 2019-07-12 | Stop reason: SDUPTHER

## 2018-08-28 ENCOUNTER — HOSPITAL ENCOUNTER (OUTPATIENT)
Dept: MAMMOGRAPHY | Age: 53
Discharge: HOME | End: 2018-08-28
Attending: INTERNAL MEDICINE
Payer: COMMERCIAL

## 2018-08-28 DIAGNOSIS — Z12.31 VISIT FOR SCREENING MAMMOGRAM: ICD-10-CM

## 2018-08-28 PROCEDURE — 77067 SCR MAMMO BI INCL CAD: CPT

## 2019-05-08 RX ORDER — LOSARTAN POTASSIUM 100 MG/1
TABLET ORAL
Qty: 90 TAB | Refills: 3 | Status: SHIPPED | OUTPATIENT
Start: 2019-05-08 | End: 2020-03-23 | Stop reason: SDUPTHER

## 2019-07-08 ENCOUNTER — OFFICE VISIT (OUTPATIENT)
Dept: INTERNAL MEDICINE CLINIC | Age: 54
End: 2019-07-08

## 2019-07-08 VITALS
RESPIRATION RATE: 16 BRPM | SYSTOLIC BLOOD PRESSURE: 115 MMHG | WEIGHT: 174.4 LBS | TEMPERATURE: 98.4 F | OXYGEN SATURATION: 96 % | HEIGHT: 68 IN | BODY MASS INDEX: 26.43 KG/M2 | DIASTOLIC BLOOD PRESSURE: 73 MMHG | HEART RATE: 73 BPM

## 2019-07-08 DIAGNOSIS — I10 ESSENTIAL HYPERTENSION: ICD-10-CM

## 2019-07-08 DIAGNOSIS — M54.12 CERVICAL RADICULOPATHY: Primary | ICD-10-CM

## 2019-07-08 DIAGNOSIS — E78.5 DYSLIPIDEMIA: ICD-10-CM

## 2019-07-08 DIAGNOSIS — Z00.00 PHYSICAL EXAM: ICD-10-CM

## 2019-07-08 DIAGNOSIS — L30.9 DERMATITIS: ICD-10-CM

## 2019-07-08 RX ORDER — FLUOCINONIDE 1 MG/G
CREAM TOPICAL DAILY
Qty: 30 G | Refills: 2 | Status: SHIPPED | OUTPATIENT
Start: 2019-07-08 | End: 2019-10-02 | Stop reason: SDUPTHER

## 2019-07-08 RX ORDER — PREDNISONE 20 MG/1
TABLET ORAL
Qty: 16 TAB | Refills: 0 | Status: SHIPPED | OUTPATIENT
Start: 2019-07-08 | End: 2019-07-12 | Stop reason: SDUPTHER

## 2019-07-08 NOTE — PROGRESS NOTES
Chief Complaint   Patient presents with    Shoulder Pain     Patient states that she has been experiencing \"shooting pain\" in her right shoulder x one week.  Rash     Patient states that she has an itchy rash on both arms and hands. 1. Have you been to the ER, urgent care clinic since your last visit? Hospitalized since your last visit? No    2. Have you seen or consulted any other health care providers outside of the [address] [place] [address] since your last visit? Include any pap smears or colon screening.  No

## 2019-07-09 LAB
ALBUMIN SERPL-MCNC: 4.5 G/DL (ref 3.5–5.5)
ALBUMIN/GLOB SERPL: 1.8 {RATIO} (ref 1.2–2.2)
ALP SERPL-CCNC: 63 IU/L (ref 39–117)
ALT SERPL-CCNC: 12 IU/L (ref 0–32)
APO B SERPL-MCNC: 75 MG/DL
AST SERPL-CCNC: 12 IU/L (ref 0–40)
BASOPHILS # BLD AUTO: 0 X10E3/UL (ref 0–0.2)
BASOPHILS NFR BLD AUTO: 1 %
BILIRUB SERPL-MCNC: 0.4 MG/DL (ref 0–1.2)
BUN SERPL-MCNC: 10 MG/DL (ref 6–24)
BUN/CREAT SERPL: 11 (ref 9–23)
CALCIUM SERPL-MCNC: 10.3 MG/DL (ref 8.7–10.2)
CHLORIDE SERPL-SCNC: 103 MMOL/L (ref 96–106)
CHOLEST SERPL-MCNC: 184 MG/DL (ref 100–199)
CO2 SERPL-SCNC: 25 MMOL/L (ref 20–29)
CREAT SERPL-MCNC: 0.87 MG/DL (ref 0.57–1)
CRP SERPL HS-MCNC: 7.4 MG/L (ref 0–3)
EOSINOPHIL # BLD AUTO: 0.1 X10E3/UL (ref 0–0.4)
EOSINOPHIL NFR BLD AUTO: 1 %
ERYTHROCYTE [DISTWIDTH] IN BLOOD BY AUTOMATED COUNT: 13.3 % (ref 12.3–15.4)
GLOBULIN SER CALC-MCNC: 2.5 G/DL (ref 1.5–4.5)
GLUCOSE SERPL-MCNC: 73 MG/DL (ref 65–99)
GLUCOSE UR QL: NORMAL
HCT VFR BLD AUTO: 39.7 % (ref 34–46.6)
HDLC SERPL-MCNC: 93 MG/DL
HGB BLD-MCNC: 13.1 G/DL (ref 11.1–15.9)
IMM GRANULOCYTES # BLD AUTO: 0 X10E3/UL (ref 0–0.1)
IMM GRANULOCYTES NFR BLD AUTO: 0 %
KETONES UR QL STRIP: NORMAL
LDLC SERPL CALC-MCNC: 75 MG/DL (ref 0–99)
LYMPHOCYTES # BLD AUTO: 1 X10E3/UL (ref 0.7–3.1)
LYMPHOCYTES NFR BLD AUTO: 19 %
MCH RBC QN AUTO: 28.7 PG (ref 26.6–33)
MCHC RBC AUTO-ENTMCNC: 33 G/DL (ref 31.5–35.7)
MCV RBC AUTO: 87 FL (ref 79–97)
MONOCYTES # BLD AUTO: 0.4 X10E3/UL (ref 0.1–0.9)
MONOCYTES NFR BLD AUTO: 8 %
NEUTROPHILS # BLD AUTO: 3.9 X10E3/UL (ref 1.4–7)
NEUTROPHILS NFR BLD AUTO: 71 %
PH UR STRIP: NORMAL [PH]
PLATELET # BLD AUTO: 313 X10E3/UL (ref 150–450)
POTASSIUM SERPL-SCNC: 4.2 MMOL/L (ref 3.5–5.2)
PROT SERPL-MCNC: 7 G/DL (ref 6–8.5)
PROT UR QL STRIP: NORMAL
RBC # BLD AUTO: 4.57 X10E6/UL (ref 3.77–5.28)
SODIUM SERPL-SCNC: 143 MMOL/L (ref 134–144)
SP GR UR: NORMAL
TRIGL SERPL-MCNC: 82 MG/DL (ref 0–149)
TSH SERPL DL<=0.005 MIU/L-ACNC: 1.03 UIU/ML (ref 0.45–4.5)
VLDLC SERPL CALC-MCNC: 16 MG/DL (ref 5–40)
WBC # BLD AUTO: 5.4 X10E3/UL (ref 3.4–10.8)

## 2019-07-10 DIAGNOSIS — R00.2 PALPITATIONS: ICD-10-CM

## 2019-07-10 RX ORDER — MELOXICAM 15 MG/1
15 TABLET ORAL DAILY
Qty: 30 TAB | Refills: 11 | Status: SHIPPED | OUTPATIENT
Start: 2019-07-10 | End: 2021-02-04 | Stop reason: ALTCHOICE

## 2019-07-10 RX ORDER — LORAZEPAM 0.5 MG/1
0.5 TABLET ORAL
Qty: 40 TAB | Refills: 1 | Status: SHIPPED | OUTPATIENT
Start: 2019-07-10 | End: 2021-02-04 | Stop reason: ALTCHOICE

## 2019-07-11 ENCOUNTER — TELEPHONE (OUTPATIENT)
Dept: INTERNAL MEDICINE CLINIC | Age: 54
End: 2019-07-11

## 2019-07-12 DIAGNOSIS — M54.12 CERVICAL RADICULOPATHY: ICD-10-CM

## 2019-07-12 RX ORDER — PREDNISONE 20 MG/1
TABLET ORAL
Qty: 16 TAB | Refills: 0 | Status: SHIPPED | OUTPATIENT
Start: 2019-07-12 | End: 2019-08-12 | Stop reason: ALTCHOICE

## 2019-07-12 RX ORDER — PRAVASTATIN SODIUM 40 MG/1
TABLET ORAL
Qty: 90 TAB | Refills: 3 | Status: SHIPPED | OUTPATIENT
Start: 2019-07-12 | End: 2020-08-03 | Stop reason: SDUPTHER

## 2019-07-14 NOTE — PROGRESS NOTES
[address] [place] [address] and Primary Care  [place] [address]  [address] [place] [address]  Phone:  [phone]  Fax: [phone]       Chief Complaint   Patient presents with    Shoulder Pain     Patient states that she has been experiencing \"shooting pain\" in her right shoulder x one week.  Rash     Patient states that she has an itchy rash on both arms and hands. .      SUBJECTIVE:    Nicole Vail is a 49 y.o. female Comes in for return visit complaining of pain in her right shoulder for the last week. It has gotten progressively worse and is worse nocturnally. She has also had itching in both arms and hands. She has not noted a justin rash, however. She has a past history of primary hypertension and dyslipidemia. Current Outpatient Medications   Medication Sig Dispense Refill    fluocinonide (VANOS) 0.1 % topical cream Apply  to affected area daily. 30 g 2    losartan (COZAAR) 100 mg tablet TAKE 1 TABLET BY MOUTH ONCE DAILY 90 Tab 3    pravastatin (PRAVACHOL) 40 mg tablet TAKE 1 TABLET BY MOUTH ONCE NIGHTLY 90 Tab 3    predniSONE (DELTASONE) 20 mg tablet 4 tablets after dinner for 4 days 16 Tab 0    meloxicam (MOBIC) 15 mg tablet Take 1 Tab by mouth daily. 30 Tab 11    LORazepam (ATIVAN) 0.5 mg tablet Take 1 Tab by mouth two (2) times daily as needed for Anxiety.  Max Daily Amount: 1 mg. 40 Tab 1     Past Medical History:   Diagnosis Date    Asthma     Brachiocephalic artery injury     chronic dissection    Hypertension     Ill-defined condition     high cholesterol     Past Surgical History:   Procedure Laterality Date    COLONOSCOPY N/A 5/1/2017    COLONOSCOPY performed by Soco Valenzuela MD at [facility] ENDOSCOPY    HX TONSILLECTOMY       No Known Allergies      REVIEW OF SYSTEMS:  General: negative for - chills or fever  ENT: negative for - headaches, nasal congestion or tinnitus  Respiratory: negative for - cough, hemoptysis, shortness of breath or wheezing  Cardiovascular : negative for - chest pain, edema, palpitations or shortness of breath  Gastrointestinal: negative for - abdominal pain, blood in stools, heartburn or nausea/vomiting  Genito-Urinary: no dysuria, trouble voiding, or hematuria  Musculoskeletal: negative for - gait disturbance, joint pain, joint stiffness or joint swelling  Neurological: no TIA or stroke symptoms  Hematologic: no bruises, no bleeding, no swollen glands  Integument: no lumps, mole changes, nail changes or rash  Endocrine: no malaise/lethargy or unexpected weight changes      Social History     Socioeconomic History    Marital status:      Spouse name: Not on file    Number of children: 2    Years of education: Not on file    Highest education level: Not on file   Occupational History    Occupation: [employer]   Tobacco Use    Smoking status: Never Smoker    Smokeless tobacco: Never Used   Substance and Sexual Activity    Alcohol use: Yes     Alcohol/week: 0.0 oz     Comment: rare    Drug use: No    Sexual activity: Yes     Partners: Male     Family History   Problem Relation Age of Onset    Hypertension Mother     Heart Disease Maternal Grandmother     Heart Attack Maternal Grandmother     Hypertension Maternal Grandmother     Diabetes Maternal Grandmother        OBJECTIVE:    Visit Vitals  /73   Pulse 73   Temp 98.4 °F (36.9 °C) (Oral)   Resp 16   Ht 5' 8\" (1.727 m)   Wt 174 lb 6.4 oz (79.1 kg)   SpO2 96%   BMI 26.52 kg/m²     CONSTITUTIONAL: well , well nourished, appears age appropriate  EYES: perrla, eom intact  ENMT:moist mucous membranes, pharynx clear  NECK: supple.  Thyroid normal  RESPIRATORY: Chest: clear to ascultation and percussion   CARDIOVASCULAR: Heart: regular rate and rhythm  GASTROINTESTINAL: Abdomen: soft, bowel sounds active  HEMATOLOGIC: no pathological lymph nodes palpated  MUSCULOSKELETAL: Extremities: no edema, pulse 1+   INTEGUMENT: No unusual rashes or suspicious skin lesions noted. Nails appear normal.  Papular eruption on arms   NEUROLOGIC: non-focal exam, positive Adson's and Spurling's maneuver on R side  MENTAL STATUS: alert and oriented, appropriate affect      ASSESSMENT:  1. Cervical radiculopathy    2. Dermatitis    3. Essential hypertension    4. Dyslipidemia    5. Physical exam        PLAN:    1. The patient appears to have a cervical radiculopathy. I will give her a four day course of prednisone 80 mg p.c. dinner. I will see how she fares with this. 2. The dermatitis is nonspecific and will be treated with Lidex ointment, apply locally b.i.d.  3. Her blood pressure is excellent today, no adjustments are made. 4. She will continue statin in view of her increased cardiovascular risk. Orders Placed This Encounter    APOLIPOPROTEIN B    CBC WITH AUTOMATED DIFF    CRP, HIGH SENSITIVITY    LIPID PANEL    METABOLIC PANEL, COMPREHENSIVE    TSH 3RD GENERATION    URINALYSIS W/ RFLX MICROSCOPIC    DISCONTD: predniSONE (DELTASONE) 20 mg tablet    fluocinonide (VANOS) 0.1 % topical cream         Follow-up and Dispositions    · Return in about 6 months (around 1/8/2020).            Stiven Castano MD

## 2019-08-01 ENCOUNTER — OFFICE VISIT (OUTPATIENT)
Dept: INTERNAL MEDICINE CLINIC | Age: 54
End: 2019-08-01

## 2019-08-12 ENCOUNTER — OFFICE VISIT (OUTPATIENT)
Dept: INTERNAL MEDICINE CLINIC | Age: 54
End: 2019-08-12

## 2019-08-12 VITALS
DIASTOLIC BLOOD PRESSURE: 69 MMHG | BODY MASS INDEX: 27.37 KG/M2 | HEART RATE: 74 BPM | RESPIRATION RATE: 16 BRPM | WEIGHT: 180.6 LBS | OXYGEN SATURATION: 97 % | SYSTOLIC BLOOD PRESSURE: 109 MMHG | HEIGHT: 68 IN | TEMPERATURE: 98.2 F

## 2019-08-12 DIAGNOSIS — I87.2 VENOUS INSUFFICIENCY: Primary | ICD-10-CM

## 2019-08-12 DIAGNOSIS — R60.9 EDEMA, UNSPECIFIED TYPE: ICD-10-CM

## 2019-08-12 DIAGNOSIS — E66.3 OVERWEIGHT (BMI 25.0-29.9): ICD-10-CM

## 2019-08-12 DIAGNOSIS — I10 ESSENTIAL HYPERTENSION: ICD-10-CM

## 2019-08-12 NOTE — PROGRESS NOTES
Chief Complaint   Patient presents with    Foot Swelling     Patient in office with visible swelling in her both ankles and feet, with some pain in left leg. 1. Have you been to the ER, urgent care clinic since your last visit? Hospitalized since your last visit? No    2. Have you seen or consulted any other health care providers outside of the [address] [place] [address] since your last visit? Include any pap smears or colon screening.  No

## 2019-08-12 NOTE — PROGRESS NOTES
Chief Complaint   Patient presents with    Foot Swelling     Patient in office with visible swelling in her both ankles and feet, with some pain in left leg. .      SUBECTIVE:    Antonia Coles is a 49 y.o. female Comes in for return visit complaining of swelling of the left lower extremity. This is orthostatic in nature. It is a bit more exaggerated now than usual.  This has been present for the last several weeks. He also noted mild pain on the anterior aspect of the left leg. There is no tenderness, no pain of the calf. She has had no meaningful weight gain since I last saw her. Current Outpatient Medications   Medication Sig Dispense Refill    pravastatin (PRAVACHOL) 40 mg tablet TAKE 1 TABLET BY MOUTH ONCE NIGHTLY 90 Tab 3    meloxicam (MOBIC) 15 mg tablet Take 1 Tab by mouth daily. 30 Tab 11    LORazepam (ATIVAN) 0.5 mg tablet Take 1 Tab by mouth two (2) times daily as needed for Anxiety. Max Daily Amount: 1 mg. 40 Tab 1    fluocinonide (VANOS) 0.1 % topical cream Apply  to affected area daily.  30 g 2    losartan (COZAAR) 100 mg tablet TAKE 1 TABLET BY MOUTH ONCE DAILY 90 Tab 3     Past Medical History:   Diagnosis Date    Asthma     Brachiocephalic artery injury     chronic dissection    Hypertension     Ill-defined condition     high cholesterol     Past Surgical History:   Procedure Laterality Date    COLONOSCOPY N/A 5/1/2017    [facility] END[facility]    HX TONSILLECTOMY       No Known Allergies    REVIEW OF SYSTEMS:  Review of Systems - Negative except   ENT ROS: negative for - headaches, hearing change, nasal congestion, oral lesions, tinnitus, visual changes or   Respiratory ROS: no cough, shortness of breath, or wheezing  Cardiovascular ROS: no chest pain or dyspnea on exertion  Gastrointestinal ROS: no abdominal pain, change in bowel habits, or black or blood  Genito-Urinary ROS: no dysuria, trouble voiding, or hematuria  Musculoskeletal ROS: negative  Neurological ROS: no TIA or stroke symptoms      Social History     Socioeconomic History    Marital status:      Spouse name: Not on file    Number of children: 2    Years of education: Not on file    Highest education level: Not on file   Occupational History    Occupation: [employer]   Tobacco Use    Smoking status: Never Smoker    Smokeless tobacco: Never Used   Substance and Sexual Activity    Alcohol use: Yes     Alcohol/week: 0.0 standard drinks     Comment: rare    Drug use: No    Sexual activity: Yes     Partners: Male   r  Family History   Problem Relation Age of Onset    Hypertension Mother     Heart Disease Maternal Grandmother     Heart Attack Maternal Grandmother     Hypertension Maternal Grandmother     Diabetes Maternal Grandmother        OBJECTIVE:  Visit Vitals  /69   Pulse 74   Temp 98.2 °F (36.8 °C) (Oral)   Resp 16   Ht 5' 8\" (1.727 m)   Wt 180 lb 9.6 oz (81.9 kg)   SpO2 97%   BMI 27.46 kg/m²     ENT: perrla,  eom intact  NECK: supple. Thyroid normal, no JVD  CHEST: clear to ascultation and percussion   HEART: regular rate and rhythm  ABD: soft, bowel sounds active,   EXTREMITIES: 1+ edema distally L leg and trace R leg, pulse 1+  INTEGUMENT: clear      ASSESSMENT:  1. Venous insufficiency    2. Edema, unspecified type    3. Essential hypertension    4. Overweight (BMI 25.0-29. 9)        PLAN:    1. The current asymmetrical swelling of the lower extremities is most likely related to venous insufficiency. She has 1+ on the left and trace on the right. There is a significant orthostatic component present. There is no suggestive findings of DVT. I explained to the patient that this most likely represents venous insufficiency. If anything changes as far as quality or character, she is to call. The ideal treatment is support stockings, but it is not practical given the current ambient temperature.   This should get better once the weather changes, specifically the temperature drops. 2. Her BP is entirely normal today also. 3. I encouraged her to minimize further weight gain.     .  Orders Placed This Encounter    NT-PRO BNP             Alma Reynolds MD

## 2019-08-13 LAB — NT-PROBNP SERPL-MCNC: 73 PG/ML (ref 0–249)

## 2019-12-02 ENCOUNTER — HOSPITAL ENCOUNTER (OUTPATIENT)
Dept: GENERAL RADIOLOGY | Age: 54
Discharge: HOME | End: 2019-12-02
Attending: FAMILY MEDICINE
Payer: COMMERCIAL

## 2019-12-02 DIAGNOSIS — M25.511 RIGHT SHOULDER PAIN: ICD-10-CM

## 2019-12-02 PROCEDURE — 73030 X-RAY EXAM OF SHOULDER: CPT

## 2020-03-23 RX ORDER — LOSARTAN POTASSIUM 100 MG/1
TABLET ORAL
Qty: 90 TAB | Refills: 3 | Status: SHIPPED | OUTPATIENT
Start: 2020-03-23 | End: 2021-02-04 | Stop reason: ALTCHOICE

## 2020-08-04 RX ORDER — PRAVASTATIN SODIUM 40 MG/1
TABLET ORAL
Qty: 90 TAB | Refills: 3 | Status: SHIPPED | OUTPATIENT
Start: 2020-08-04 | End: 2022-03-19

## 2020-08-04 RX ORDER — PRAVASTATIN SODIUM 40 MG/1
TABLET ORAL
Qty: 90 TAB | Refills: 3 | Status: SHIPPED | OUTPATIENT
Start: 2020-08-04 | End: 2020-08-04 | Stop reason: SDUPTHER

## 2021-02-04 ENCOUNTER — OFFICE VISIT (OUTPATIENT)
Dept: FAMILY MEDICINE CLINIC | Age: 56
End: 2021-02-04
Payer: COMMERCIAL

## 2021-02-04 VITALS
SYSTOLIC BLOOD PRESSURE: 120 MMHG | OXYGEN SATURATION: 100 % | HEIGHT: 68 IN | RESPIRATION RATE: 12 BRPM | DIASTOLIC BLOOD PRESSURE: 88 MMHG | WEIGHT: 173.6 LBS | TEMPERATURE: 97.5 F | BODY MASS INDEX: 26.31 KG/M2 | HEART RATE: 79 BPM

## 2021-02-04 DIAGNOSIS — Z76.89 ENCOUNTER TO ESTABLISH CARE: Primary | ICD-10-CM

## 2021-02-04 DIAGNOSIS — I10 ESSENTIAL HYPERTENSION: ICD-10-CM

## 2021-02-04 DIAGNOSIS — R00.2 HEART PALPITATIONS: ICD-10-CM

## 2021-02-04 DIAGNOSIS — I77.79 DISSECTION OF RIGHT SUBCLAVIAN ARTERY (HCC): ICD-10-CM

## 2021-02-04 DIAGNOSIS — E78.5 DYSLIPIDEMIA: ICD-10-CM

## 2021-02-04 DIAGNOSIS — R59.0 SUPRACLAVICULAR ADENOPATHY: ICD-10-CM

## 2021-02-04 PROCEDURE — 93000 ELECTROCARDIOGRAM COMPLETE: CPT | Performed by: NURSE PRACTITIONER

## 2021-02-04 PROCEDURE — 99203 OFFICE O/P NEW LOW 30 MIN: CPT | Performed by: NURSE PRACTITIONER

## 2021-02-04 RX ORDER — AMLODIPINE BESYLATE 5 MG/1
5 TABLET ORAL DAILY
Qty: 90 TAB | Refills: 3 | Status: SHIPPED | OUTPATIENT
Start: 2021-02-04 | End: 2022-02-07

## 2021-02-04 RX ORDER — SUCRALFATE 1 G/1
TABLET ORAL AS NEEDED
COMMUNITY
Start: 2021-01-13 | End: 2022-10-11

## 2021-02-04 RX ORDER — LATANOPROST 50 UG/ML
1 SOLUTION/ DROPS OPHTHALMIC
COMMUNITY
Start: 2020-12-23 | End: 2022-10-11

## 2021-02-04 RX ORDER — ALBUTEROL SULFATE 90 UG/1
AEROSOL, METERED RESPIRATORY (INHALATION)
COMMUNITY

## 2021-02-04 NOTE — PROGRESS NOTES
HISTORY OF PRESENT ILLNESS  Thong Pinzon is a 48 y.o. female. HPI  Patient comes in today to establish care  PCP - Dr. Jerri Ariza on [facility]. Last visit last year,.     OBGYN - Dr. Zelda Cuellar.  Last visit Jan 2021. Goes to [facility] [employer]. Done in Nov 2020. Saw Dr. Zainab Valentin - last visit last month.  problems with diverticulitis. Hx of colon polyps. Last colonoscopy in 2017. Used to see Dr. Dora Quintanilla and Dr. Christa Harris- dull aching pain in left side of chest.  Had nuclear stress test.    Pain is always there - ibuprofen and tylenol does not help. Review of chart shows CTA chest from 2009 and 2015 - shows chronic right brachiocephalic artery dissection. She has not seen vascular since. She has been having swelling in left supraclavicular region, having palpitations almost daily, states they can occur when she is just laying in bed. Denies excessive stress or anxiety. She does drink mostly regular coffee all day.   Pt concerned with losartan making her feel strange. when she takes it, she does not feel focused. She used to take at night and would still have same feeling. Monitors BP at home. 110-120/80s  No regular exercise. Tries to eat healthy. Tries to watch salt intake. Eats a lot of salads, no meat. Trying to drink more water. Usually drinks coffee.   No Known Allergies    Past Medical History:   Diagnosis Date    Asthma     Brachiocephalic artery injury     chronic dissection    Hypertension     Ill-defined condition     high cholesterol       Past Surgical History:   Procedure Laterality Date    COLONOSCOPY N/A 5/1/2017    COLONOSCOPY performed by Lynne Adorno MD at [facility] END[facility]COPY    HX TONSILLECTOMY         Social History     Socioeconomic History    Marital status:      Spouse name: Not on file    Number of children: 2    Years of education: Not on file    Highest education level: Not on file   Occupational History    Occupation: [employer]   Social Needs    Financial resource strain: Not on file    Food insecurity     Worry: Not on file     Inability: Not on file    Transportation needs     Medical: Not on file     Non-medical: Not on file   Tobacco Use    Smoking status: Never Smoker    Smokeless tobacco: Never Used   Substance and Sexual Activity    Alcohol use: Yes     Alcohol/week: 0.0 standard drinks     Frequency: Monthly or less     Drinks per session: 1 or 2     Binge frequency: Never     Comment: rare    Drug use: No    Sexual activity: Yes     Partners: Male   Lifestyle    Physical activity     Days per week: Not on file     Minutes per session: Not on file    Stress: Not on file   Relationships    Social connections     Talks on phone: Not on file     Gets together: Not on file     Attends Zoroastrianism service: Not on file     Active member of club or organization: Not on file     Attends meetings of clubs or organizations: Not on file     Relationship status: Not on file    Intimate partner violence     Fear of current or ex partner: Not on file     Emotionally abused: Not on file     Physically abused: Not on file     Forced sexual activity: Not on file   Other Topics Concern    Not on file   Social History Narrative    Not on file       Family History   Problem Relation Age of Onset    Hypertension Mother     Glaucoma Father     No Known Problems Sister     No Known Problems Brother     Heart Disease Maternal Grandmother     Heart Attack Maternal Grandmother     Hypertension Maternal Grandmother     Diabetes Maternal Grandmother        Current Outpatient Medications   Medication Sig    sucralfate (CARAFATE) 1 gram tablet as needed.  albuterol (PROVENTIL HFA, VENTOLIN HFA, PROAIR HFA) 90 mcg/actuation inhaler Take  by inhalation every four (4) hours as needed for Wheezing.  latanoprost (XALATAN) 0.005 % ophthalmic solution 1 Drop nightly.     amLODIPine (NORVASC) 5 mg tablet Take 1 Tab by mouth daily.    pravastatin (PRAVACHOL) 40 mg tablet TAKE 1 TABLET BY MOUTH ONCE NIGHTLY     No current facility-administered medications for this visit. Review of Systems   Constitutional: Negative for chills, diaphoresis, fever, malaise/fatigue and weight loss. HENT: Negative for sore throat. Eyes: Positive for blurred vision. Negative for double vision. Respiratory: Negative for cough, sputum production, shortness of breath and wheezing. Cardiovascular: Positive for chest pain (radiating to back on left) and palpitations. Negative for leg swelling. Gastrointestinal: Negative for abdominal pain, blood in stool, constipation, diarrhea, melena, nausea and vomiting. Genitourinary: Negative for dysuria, frequency, hematuria and urgency. Musculoskeletal: Positive for back pain and neck pain. Skin: Negative. Swelling in left supraclavicular region   Neurological: Negative for dizziness, tingling, sensory change, speech change, focal weakness and headaches. Vitals:    02/04/21 1440   BP: 120/88   Pulse: 79   Resp: 12   Temp: 97.5 °F (36.4 °C)   TempSrc: Skin   SpO2: 100%   Weight: 173 lb 9.6 oz (78.7 kg)   Height: 5' 8\" (1.727 m)     Physical Exam  Vitals signs reviewed. Constitutional:       Appearance: Normal appearance. She is well-developed, well-groomed and normal weight. Neck:      Thyroid: No thyromegaly. Cardiovascular:      Rate and Rhythm: Normal rate and regular rhythm. Heart sounds: Normal heart sounds, S1 normal and S2 normal.   Pulmonary:      Effort: Pulmonary effort is normal.      Breath sounds: Normal breath sounds. Abdominal:      General: Bowel sounds are normal.      Palpations: Abdomen is soft. Tenderness: There is no abdominal tenderness. Musculoskeletal: Normal range of motion. Lymphadenopathy:      Cervical: No cervical adenopathy. Upper Body:      Right upper body: No supraclavicular adenopathy.       Left upper body: Supraclavicular adenopathy present. Skin:     General: Skin is warm and dry. Neurological:      Mental Status: She is alert and oriented to person, place, and time. Psychiatric:         Attention and Perception: Attention normal.         Mood and Affect: Mood normal.         Speech: Speech normal.         Behavior: Behavior normal. Behavior is cooperative. Thought Content: Thought content normal.       ASSESSMENT and PLAN    ICD-10-CM ICD-9-CM    1. Encounter to establish care  Z76.89 V65.8    2. Essential hypertension  I10 401.9 CBC WITH AUTOMATED DIFF      LIPID PANEL      METABOLIC PANEL, COMPREHENSIVE      HEMOGLOBIN A1C WITH EAG      amLODIPine (NORVASC) 5 mg tablet   3. Heart palpitations  R00.2 785.1 CBC WITH AUTOMATED DIFF      LIPID PANEL      METABOLIC PANEL, COMPREHENSIVE      HEMOGLOBIN A1C WITH EAG      TSH 3RD GENERATION      AMB POC EKG ROUTINE W/ 12 LEADS, INTER & REP      MAGNESIUM      REFERRAL TO CARDIOLOGY   4. Dissection of right subclavian artery (HCC)  I77.79 443.29 CTA CHEST W OR W WO CONT      REFERRAL TO VASCULAR SURGERY   5. Supraclavicular adenopathy  R59.0 785.6 CTA CHEST W OR W WO CONT      REFERRAL TO VASCULAR SURGERY   6. Dyslipidemia  E78.5 272.4 CBC WITH AUTOMATED DIFF      LIPID PANEL     Encounter Diagnoses   Name Primary?     Encounter to establish care Yes    Essential hypertension     Heart palpitations     Dissection of right subclavian artery (HCC)     Supraclavicular adenopathy     Dyslipidemia      Orders Placed This Encounter    CTA CHEST W OR W WO CONT    CBC WITH AUTOMATED DIFF    LIPID PANEL    METABOLIC PANEL, COMPREHENSIVE    HEMOGLOBIN A1C WITH EAG    TSH 3RD GENERATION    MAGNESIUM    [facility]    [facility]    AMB POC EKG ROUTINE W/ 12 LEADS, INTER & REP    sucralfate (CARAFATE) 1 gram tablet    albuterol (PROVENTIL HFA, VENTOLIN HFA, PROAIR HFA) 90 mcg/actuation inhaler    latanoprost (XALATAN) 0.005 % ophthalmic solution    amLODIPine (NORVASC) 5 mg tablet     Diagnoses and all orders for this visit:    1. Encounter to establish care    2. Essential hypertension - not tolerating losartan due to side effects. Change to amlodipine,  -     CBC WITH AUTOMATED DIFF; Future  -     LIPID PANEL; Future  -     METABOLIC PANEL, COMPREHENSIVE; Future  -     HEMOGLOBIN A1C WITH EAG; Future  -     amLODIPine (NORVASC) 5 mg tablet; Take 1 Tab by mouth daily. 3. Heart palpitations  -     CBC WITH AUTOMATED DIFF; Future  -     LIPID PANEL; Future  -     METABOLIC PANEL, COMPREHENSIVE; Future  -     HEMOGLOBIN A1C WITH EAG; Future  -     TSH 3RD GENERATION; Future  -     AMB POC EKG ROUTINE W/ 12 LEADS, INTER & REP  -     MAGNESIUM; Future  -     REFERRAL TO CARDIOLOGY    4. CHRONIC Dissection of right subclavian artery (HCC)  CTA from 2009 and 2015 shows chronic brachiocephalic artery dissection. Has not seen vascular in years. Patient to schedule appt. Will order CTA chest  -     CTA CHEST W OR W WO CONT; Future  -     REFERRAL TO VASCULAR SURGERY    5. Supraclavicular adenopathy  -     CTA CHEST W OR W WO CONT; Future  -     REFERRAL TO VASCULAR SURGERY    6. Dyslipidemia  -     CBC WITH AUTOMATED DIFF; Future  -     LIPID PANEL; Future      Follow-up and Dispositions    · Return in about 3 months (around 5/4/2021), or if symptoms worsen or fail to improve.       lab results and schedule of future lab studies reviewed with patient  reviewed diet, exercise and weight control    I have reviewed the patient's allergies and made any necessary changes. Medical, procedural, social and family histories have been reviewed and updated as medically indicated. I have reconciled and/or revised patient medications in the EMR. I have discussed each diagnosis listed in this note with Alfredo Cordero and/or their family.  I have discussed treatment options and the risk/benefit analysis of those options, including safe use of medications and possible medication side effects. Through the use of shared decision making we have agreed to the above plan. The patient has received an after-visit summary and questions were answered concerning future plans. ANASTACIO CartwrightC

## 2021-02-04 NOTE — PROGRESS NOTES
Chief Complaint   Patient presents with   Christian Vaughan Hence, [facility]     Pt concerned with losartan making her feel strange. 1. Have you been to the ER, urgent care clinic since your last visit? Hospitalized since your last visit? No    2. Have you seen or consulted any other health care providers outside of the [address] [place] [address] since your last visit? Include any pap smears or colon screening. No     NO NEW IMMUNIZATIONS SHOWN ON VIIS.    PAP - Dr. Vaughan Hence 1/21     Health Maintenance Due   Topic Date Due    Shingrix Vaccine Age 50> (1 of 2) 02/25/2016    PAP AKA CERVICAL CYTOLOGY  07/27/2018    Lipid Screen  07/08/2020    Breast Cancer Screen Mammogram  08/28/2020

## 2021-02-04 NOTE — PATIENT INSTRUCTIONS
Diverticulitis: Care Instructions  Overview     Diverticulitis occurs when pouches form in the wall of the colon and become inflamed or infected. It can be very painful. Doctors aren't sure what causes diverticulitis. There is no proof that foods such as nuts, seeds, or berries cause it or make it worse. A low-fiber diet may cause the colon to work harder to push stool forward. Pouches may form because of this extra work. It may be hard to think about healthy eating while you're in pain. But as you recover, you might think about how you can use healthy eating for overall better health. Healthy eating may help you avoid future attacks. Follow-up care is a key part of your treatment and safety. Be sure to make and go to all appointments, and call your doctor if you are having problems. It's also a good idea to know your test results and keep a list of the medicines you take. How can you care for yourself at home? · Drink plenty of fluids, enough so that your urine is light yellow or clear like water. If you have kidney, heart, or liver disease and have to limit fluids, talk with your doctor before you increase the amount of fluids you drink. · Stay with liquids or a bland diet (plain rice, bananas, dry toast or crackers, applesauce) until you are feeling better. Then you can return to regular foods and slowly increase the amount of fiber in your diet. · Use a heating pad set on low on your belly to relieve mild cramps and pain. · Get extra rest until you are feeling better. · Be safe with medicines. Read and follow all instructions on the label. ? If the doctor gave you a prescription medicine for pain, take it as prescribed. ? If you are not taking a prescription pain medicine, ask your doctor if you can take an over-the-counter medicine. · If your doctor prescribed antibiotics, take them as directed. Do not stop taking them just because you feel better.  You need to take the full course of antibiotics. · Do not use laxatives or enemas unless your doctor tells you to use them. When should you call for help? Call your doctor now or seek immediate medical care if:    · You have a fever.     · You are vomiting.     · You have new or worse belly pain.     · You cannot pass stools or gas. Watch closely for changes in your health, and be sure to contact your doctor if you have any problems. Where can you learn more? Go to http://www.gray.com/  Enter H901 in the search box to learn more about \"Diverticulitis: Care Instructions. \"  Current as of: April 15, 2020               Content Version: 12.6  © 8087-0613 [employer], [employer]. Care instructions adapted under license by [employer] (which disclaims liability or warranty for this information). If you have questions about a medical condition or this instruction, always ask your healthcare professional. [address] any warranty or liability for your use of this information.

## 2021-02-08 ENCOUNTER — TELEPHONE (OUTPATIENT)
Dept: FAMILY MEDICINE CLINIC | Age: 56
End: 2021-02-08

## 2021-02-08 DIAGNOSIS — Z76.89 ENCOUNTER TO ESTABLISH CARE: Primary | ICD-10-CM

## 2021-02-08 DIAGNOSIS — R00.2 HEART PALPITATIONS: ICD-10-CM

## 2021-02-08 DIAGNOSIS — I10 ESSENTIAL HYPERTENSION: ICD-10-CM

## 2021-02-08 NOTE — TELEPHONE ENCOUNTER
----- Message from Audrey Bailey sent at 2/8/2021 12:03 PM EST -----  Regarding: ANNETTE MUÑOZ / [facility] Message/Vendor Calls    Pt is requesting lab order be faxed to [address] [place] [address].  Fax not provided      Callback required   Best contact number(s):  [phone]          Audrey Bailey

## 2021-02-08 NOTE — TELEPHONE ENCOUNTER
Verified patient with two type of identifiers. Informed pt new lab orders for [facility] completed. Pt did not have fax number so will  orders from .

## 2021-02-13 LAB
ALB/GLOBRATIO, 58C: 1.7 (CALC) (ref 1–2.5)
ALBUMIN SERPL-MCNC: 4.5 G/DL (ref 3.6–5.1)
ALKALINE PHOSPHATASE, TOTAL, 25002000: 60 U/L (ref 37–153)
ALT SERPL-CCNC: 10 U/L (ref 6–29)
AST SERPL W P-5'-P-CCNC: 14 U/L (ref 10–35)
BASOPHILS # BLD: 51 CELLS/UL (ref 0–200)
BASOPHILS NFR BLD: 1 %
BILIRUB SERPL-MCNC: 0.3 MG/DL (ref 0.2–1.2)
BUN SERPL-MCNC: 14 MG/DL (ref 7–25)
BUN/CREATININE RATIO,BUCR: NORMAL (CALC) (ref 6–22)
CALCIUM SERPL-MCNC: 10.3 MG/DL (ref 8.6–10.4)
CHLORIDE SERPL-SCNC: 106 MMOL/L (ref 98–110)
CHOL/HDL RATIO,CHHDX: 2.1 (CALC)
CHOLEST SERPL-MCNC: 212 MG/DL
CO2 SERPL-SCNC: 26 MMOL/L (ref 20–32)
CREAT SERPL-MCNC: 0.79 MG/DL (ref 0.5–1.05)
EAG (MG/DL),9916804: 100 (CALC)
EAG (MMOL/L),9916805: 5.5 (CALC)
EOSINOPHIL # BLD: 82 CELLS/UL (ref 15–500)
EOSINOPHIL NFR BLD: 1.6 %
ERYTHROCYTE [DISTWIDTH] IN BLOOD BY AUTOMATED COUNT: 12.4 % (ref 11–15)
GLOBULIN,GLOB: 2.6 G/DL (CALC) (ref 1.9–3.7)
GLUCOSE SERPL-MCNC: 77 MG/DL (ref 65–99)
HBA1C MFR BLD HPLC: 5.1 % OF TOTAL HGB
HCT VFR BLD AUTO: 38.9 % (ref 35–45)
HDLC SERPL-MCNC: 101 MG/DL
HGB BLD-MCNC: 13.1 G/DL (ref 11.7–15.5)
LDL-CHOLESTEROL: 89 MG/DL (CALC)
LYMPHOCYTES # BLD: 1469 CELLS/UL (ref 850–3900)
LYMPHOCYTES NFR BLD: 28.8 %
MAGNESIUM SERPL-MCNC: 2.3 MG/DL (ref 1.5–2.5)
MCH RBC QN AUTO: 29.6 PG (ref 27–33)
MCHC RBC AUTO-ENTMCNC: 33.7 G/DL (ref 32–36)
MCV RBC AUTO: 87.8 FL (ref 80–100)
MONOCYTES # BLD: 423 CELLS/UL (ref 200–950)
MONOCYTES NFR BLD: 8.3 %
NEUTROPHILS # BLD AUTO: 3075 CELLS/UL (ref 1500–7800)
NEUTROPHILS # BLD: 60.3 %
NON-HDL CHOLESTEROL, 011976: 111 MG/DL (CALC)
PLATELET # BLD AUTO: 309 THOUSAND/UL (ref 140–400)
PMV BLD AUTO: 10.3 FL (ref 7.5–12.5)
POTASSIUM SERPL-SCNC: 4.1 MMOL/L (ref 3.5–5.3)
PROT SERPL-MCNC: 7.1 G/DL (ref 6.1–8.1)
RBC # BLD AUTO: 4.43 MILLION/UL (ref 3.8–5.1)
SODIUM SERPL-SCNC: 142 MMOL/L (ref 135–146)
TRIGL SERPL-MCNC: 119 MG/DL (ref ?–150)
TSH SERPL DL<=0.005 MIU/L-ACNC: 0.73 MIU/L
WBC # BLD AUTO: 5.1 THOUSAND/UL (ref 3.8–10.8)

## 2021-02-15 NOTE — PROGRESS NOTES
RECOMMENDATIONS:  Diabetes and thyroid screen negative. Liver and kidney function normal.  Blood counts normal (not anemic). Cholesterol numbers look great!

## 2021-02-26 ENCOUNTER — HOSPITAL ENCOUNTER (OUTPATIENT)
Dept: CT IMAGING | Age: 56
Discharge: HOME | End: 2021-02-26
Payer: OTHER GOVERNMENT

## 2021-02-26 DIAGNOSIS — R59.0 SUPRACLAVICULAR ADENOPATHY: ICD-10-CM

## 2021-02-26 DIAGNOSIS — I77.79 DISSECTION OF RIGHT SUBCLAVIAN ARTERY (HCC): ICD-10-CM

## 2021-02-26 PROCEDURE — 71275 CT ANGIOGRAPHY CHEST: CPT

## 2021-02-26 PROCEDURE — 74011000636 HC RX REV CODE- 636: Performed by: NURSE PRACTITIONER

## 2021-02-26 RX ADMIN — IOPAMIDOL 80 ML: 755 INJECTION, SOLUTION INTRAVENOUS at 17:53

## 2021-03-18 ENCOUNTER — TELEPHONE (OUTPATIENT)
Dept: FAMILY MEDICINE CLINIC | Age: 56
End: 2021-03-18

## 2021-03-18 NOTE — TELEPHONE ENCOUNTER
Patient would like a call from Ailyn she states that she got her blood pressure check at work it was 140/108 they told her to go home and contact her doctor she can be reached @ [phone]

## 2021-03-18 NOTE — TELEPHONE ENCOUNTER
Verified patient with two type of identifiers. Pt states was sent home by employer due to BP and saying she has a slight headache. Pt states also had some arm numbness. Pt states took meds around 6 AM and BP was 113/74 at home. Pt hd BP taken at work at 9 AM. Pt needs note for work.  Pt scheduled appt 3/19/21 at 3:30 AM.

## 2021-03-19 ENCOUNTER — OFFICE VISIT (OUTPATIENT)
Dept: FAMILY MEDICINE CLINIC | Age: 56
End: 2021-03-19
Payer: COMMERCIAL

## 2021-03-19 VITALS
HEIGHT: 68 IN | HEART RATE: 74 BPM | TEMPERATURE: 97.8 F | OXYGEN SATURATION: 100 % | DIASTOLIC BLOOD PRESSURE: 76 MMHG | RESPIRATION RATE: 12 BRPM | SYSTOLIC BLOOD PRESSURE: 108 MMHG | BODY MASS INDEX: 26.34 KG/M2 | WEIGHT: 173.8 LBS

## 2021-03-19 DIAGNOSIS — I77.79 DISSECTION OF RIGHT SUBCLAVIAN ARTERY (HCC): ICD-10-CM

## 2021-03-19 DIAGNOSIS — I10 ESSENTIAL HYPERTENSION: Primary | ICD-10-CM

## 2021-03-19 PROCEDURE — 99212 OFFICE O/P EST SF 10 MIN: CPT | Performed by: NURSE PRACTITIONER

## 2021-03-19 RX ORDER — GUAIFENESIN 100 MG/5ML
81 LIQUID (ML) ORAL DAILY
COMMUNITY
Start: 2021-03-19 | End: 2022-10-11

## 2021-03-19 NOTE — LETTER
NOTIFICATION RETURN TO WORK    3/19/2021 10:17 AM    Ms. Reina Layne  [address] [facility] [address] [facility] [address] [id number]      To Whom It May Concern:    Reina Layne is currently under the care of [facility]. She will be out of work on 3/18/21-3/19/21 and will return to work on: 3/22/21    If there are questions or concerns please have the patient contact our office.     Sincerely,          Cassie Mazariegos NP

## 2021-03-19 NOTE — PROGRESS NOTES
Chief Complaint   Patient presents with    Hypertension    Headache     Pt states was sent home by employer due to BP (140/108) and saying she has a slight headache. Pt states also had some arm numbness. Pt states took meds around 6 AM and BP was 113/74 at home. Pt hd BP taken at work at 9 AM. Pt needs note for work. 1. Have you been to the ER, urgent care clinic since your last visit? Hospitalized since your last visit? No    2. Have you seen or consulted any other health care providers outside of the [address] [place] [address] since your last visit? Include any pap smears or colon screening.  No    Health Maintenance Due   Topic Date Due    Hepatitis C Screening  Never done    Shingrix Vaccine Age 50> (1 of 2) Never done    PAP AKA CERVICAL CYTOLOGY  07/27/2018    Breast Cancer Screen Mammogram  08/28/2020

## 2021-03-19 NOTE — PROGRESS NOTES
Mansoor Lopez (: 1965) is a 55 y.o. female, established patient, here for evaluation of the following chief complaint(s):  Hypertension and Headache       ASSESSMENT/PLAN:  1. Essential hypertension - BP reading stable today. Provided note to return to work. 2. Dissection of right subclavian artery ([address] - CTA done 2021 shows stable. Has appt with vascular in near future. Return if symptoms worsen or fail to improve. SUBJECTIVE/OBJECTIVE:  HPI  Patient comes in today for elevated blood pressure reading at work  Pt states was sent home by employer due to BP (140/108) and saying she has a slight headache. Pt states also had some left arm numbness. Pt states took meds around 6 AM and BP was 113/74 at home. Pt hd BP taken at work at 9 AM. Pt needs note for work. Yesterday morning she had coffee and salt in her grits. thinks her headache was related to strong perfume. Patient had EKG on 21 - NSR. Patient is scheduled to see cardiology regarding palpitations and vascular regarding chronic dissection of right brachiocephalic artery. CTA chest on 2021 shows unchanged dissection. Some progression of atherosclerosis.   She is on statin, can take ASA 81mg  No Known Allergies    Past Medical History:   Diagnosis Date    Asthma     Brachiocephalic artery injury     chronic dissection    Glaucoma, low tension     Hypertension     Ill-defined condition     high cholesterol       Past Surgical History:   Procedure Laterality Date    COLONOSCOPY N/A 2017    COLONOSCOPY performed by [facility]ayo Segura MD at [facility] END[facility]    HX TONSILLECTOMY         Social History     Socioeconomic History    Marital status:      Spouse name: Not on file    Number of children: 2    Years of education: Not on file    Highest education level: Not on file   Occupational History    Occupation: [employer] Financial resource strain: Not on file   [employer] insecurity     Worry: Not on file     Inability: Not on file    Transportation needs     Medical: Not on file     Non-medical: Not on file   Tobacco Use    Smoking status: Never Smoker    Smokeless tobacco: Never Used   Substance and Sexual Activity    Alcohol use: Yes     Alcohol/week: 0.0 standard drinks     Frequency: Monthly or less     Drinks per session: 1 or 2     Binge frequency: Never     Comment: rare    Drug use: No    Sexual activity: Yes     Partners: Male   Lifestyle    Physical activity     Days per week: Not on file     Minutes per session: Not on file    Stress: Not on file   Relationships    Social connections     Talks on phone: Not on file     Gets together: Not on file     Attends Zoroastrianism service: Not on file     Active member of club or organization: Not on file     Attends meetings of clubs or organizations: Not on file     Relationship status: Not on file    Intimate partner violence     Fear of current or ex partner: Not on file     Emotionally abused: Not on file     Physically abused: Not on file     Forced sexual activity: Not on file   Other Topics Concern    Not on file   Social History Narrative    Not on file       Family History   Problem Relation Age of Onset    Hypertension Mother     Glaucoma Father     No Known Problems Sister     No Known Problems Brother     Heart Disease Maternal Grandmother     Heart Attack Maternal Grandmother     Hypertension Maternal Grandmother     Diabetes Maternal Grandmother        Current Outpatient Medications   Medication Sig    sucralfate (CARAFATE) 1 gram tablet as needed.  albuterol (PROVENTIL HFA, VENTOLIN HFA, PROAIR HFA) 90 mcg/actuation inhaler Take  by inhalation every four (4) hours as needed for Wheezing.  latanoprost (XALATAN) 0.005 % ophthalmic solution 1 Drop nightly.  amLODIPine (NORVASC) 5 mg tablet Take 1 Tab by mouth daily.     pravastatin (PRAVACHOL) 40 mg tablet TAKE 1 TABLET BY MOUTH ONCE NIGHTLY No current facility-administered medications for this visit. Review of Systems   Constitutional: Negative. Respiratory: Negative. Cardiovascular: Negative. Genitourinary: Negative. Neurological: Negative for numbness (tingling resolved left arm). Vitals:    03/19/21 0942 03/19/21 1015   BP: 120/77 108/76   Pulse: 74    Resp: 12    Temp: 97.8 °F (36.6 °C)    TempSrc: Skin    SpO2: 100%    Weight: 173 lb 12.8 oz (78.8 kg)    Height: 5' 8\" (1.727 m)      Physical Exam  Vitals signs reviewed. Constitutional:       Appearance: Normal appearance. She is well-developed, well-groomed and overweight. Cardiovascular:      Rate and Rhythm: Normal rate. Pulmonary:      Effort: Pulmonary effort is normal.   Skin:     General: Skin is warm and dry. Neurological:      Mental Status: She is alert and oriented to person, place, and time. Psychiatric:         Behavior: Behavior is cooperative. On this date 03/19/2021 I have spent 15 minutes reviewing previous notes, test results and face to face with the patient discussing the diagnosis and importance of compliance with the treatment plan as well as documenting on the day of the visit. An electronic signature was used to authenticate this note.   -- Piper Luis NP

## 2021-07-26 ENCOUNTER — OFFICE VISIT (OUTPATIENT)
Dept: CARDIOLOGY CLINIC | Age: 56
End: 2021-07-26
Payer: COMMERCIAL

## 2021-07-26 VITALS
OXYGEN SATURATION: 97 % | RESPIRATION RATE: 18 BRPM | HEART RATE: 88 BPM | DIASTOLIC BLOOD PRESSURE: 70 MMHG | WEIGHT: 173 LBS | SYSTOLIC BLOOD PRESSURE: 106 MMHG | BODY MASS INDEX: 26.22 KG/M2 | HEIGHT: 68 IN

## 2021-07-26 DIAGNOSIS — I10 ESSENTIAL HYPERTENSION: ICD-10-CM

## 2021-07-26 DIAGNOSIS — E78.5 DYSLIPIDEMIA: ICD-10-CM

## 2021-07-26 DIAGNOSIS — R06.09 DOE (DYSPNEA ON EXERTION): ICD-10-CM

## 2021-07-26 DIAGNOSIS — I77.79 DISSECTION OF RIGHT SUBCLAVIAN ARTERY (HCC): ICD-10-CM

## 2021-07-26 DIAGNOSIS — R00.2 PALPITATIONS: ICD-10-CM

## 2021-07-26 DIAGNOSIS — R07.2 PRECORDIAL PAIN: Primary | ICD-10-CM

## 2021-07-26 PROCEDURE — 99204 OFFICE O/P NEW MOD 45 MIN: CPT | Performed by: INTERNAL MEDICINE

## 2021-07-26 PROCEDURE — 93000 ELECTROCARDIOGRAM COMPLETE: CPT | Performed by: INTERNAL MEDICINE

## 2021-07-26 NOTE — PROGRESS NOTES
Chief Complaint   Patient presents with    New Patient     referred by pcp for palpitations - gets 1-2 times daily lasting for a couple of seconds     Shortness of Breath     and chest pressure when walking up stairs     Ankle swelling     just the left      1. Have you been to the ER, urgent care clinic since your last visit? Hospitalized since your last visit? No     2. Have you seen or consulted any other health care providers outside of the [address] [place] [address] since your last visit? Include any pap smears or colon screening.   No

## 2021-07-26 NOTE — PROGRESS NOTES
Vish Castellano NP         Subjective/HPI:     Michelle Kahn is a 55 y.o. female is here for new patient consultation. She has a past medical hx significant for HTN, hyperlipidemia, and chronic brachiocephalic artery dissection followed by Dr Navneet Snyder. She presents with c/o left sided chest pressure described as bricks sitting on her chest associated with exertion. She reports she has ZHOU that has worsened. She can only go up a few steps and will be out of breath. She has a fluttering sensation in her chest daily, lasting a few seconds. Symptoms started last year and have persisted. She has occasional left ankle swelling. Previous cardiac evaluation includes nuclear stress test 8/2015 neg for ischemia, echo with EF 55-60%, mild MR/TR/OH. Family med hx significant for grandmother with CAD, HTN, and DM. Current Outpatient Medications   Medication Sig    aspirin 81 mg chewable tablet Take 1 Tab by mouth daily.  albuterol (PROVENTIL HFA, VENTOLIN HFA, PROAIR HFA) 90 mcg/actuation inhaler Take  by inhalation every four (4) hours as needed for Wheezing.  latanoprost (XALATAN) 0.005 % ophthalmic solution 1 Drop nightly.  amLODIPine (NORVASC) 5 mg tablet Take 1 Tab by mouth daily.  pravastatin (PRAVACHOL) 40 mg tablet TAKE 1 TABLET BY MOUTH ONCE NIGHTLY    sucralfate (CARAFATE) 1 gram tablet as needed. (Patient not taking: Reported on 7/26/2021)     No current facility-administered medications for this visit. Vitals:    07/26/21 1333   BP: 106/70   Pulse: 88   Resp: 18   SpO2: 97%   Weight: 173 lb (78.5 kg)   Height: 5' 8\" (1.727 m)       I have reviewed the nurses notes, vitals, problem list, allergy list, medical history, family, social history and medications. Review of Symptoms:  General: Pt denies excessive weight gain or loss. Pt is able to conduct ADL's  HEENT: Denies blurred vision, headaches, epistaxis and difficulty swallowing.   Respiratory: Denies shortness of breath, +ZHOU, denies wheezing or stridor. Cardiovascular: + precordial pressure,+ palpitations,+ left leg edema denies PND  Gastrointestinal: Denies poor appetite, indigestion, abdominal pain or blood in stool  Urinary: Denies dysuria, pyuria  Musculoskeletal: Denies pain or swelling from muscles or joints  Neurologic: Denies tremor, paresthesias, or sensory motor disturbance  Skin: Denies rash, itching or texture change. Psych: Denies depression        Physical Exam:      General: Well developed, cooperative, alert in no acute distress, appears states age. HEENT: Supple, No carotid bruits, no JVD, trach is midline. PERRL, EOM intact  Heart:  Normal S1/S2 negative S3 or S4. Regular, no murmur, gallop or rub. Respiratory: Clear bilaterally x 4, no wheezing or rales  Abdomen:   Soft, non-tender, no masses, bowel sounds are active. Extremities:  No edema, normal cap refill, no cyanosis, atraumatic. Neuro: A&Ox3, speech clear, gait stable. Skin: Skin color is normal. No rashes or lesions. Non diaphoretic  Vascular: 2+ pulses symmetric in all extremities    Cardiographics    ECG: Sinus  Rhythm HR 90  -Old anteroseptal infarct. Assessment:      Diagnoses and all orders for this visit:    1. Precordial pain  -     ECHO ADULT COMPLETE; Future  -     CARDIAC HOLTER MONITOR; Future  -     NUCLEAR CARDIAC STRESS TEST; Future    2. ZHOU (dyspnea on exertion)  -     ECHO ADULT COMPLETE; Future  -     CARDIAC HOLTER MONITOR; Future  -     NUCLEAR CARDIAC STRESS TEST; Future    3. Palpitations  -     ECHO ADULT COMPLETE; Future  -     CARDIAC HOLTER MONITOR; Future  -     NUCLEAR CARDIAC STRESS TEST; Future    4. Essential hypertension  -     AMB POC EKG ROUTINE W/ 12 LEADS, INTER & REP  -     ECHO ADULT COMPLETE; Future  -     CARDIAC HOLTER MONITOR; Future  -     NUCLEAR CARDIAC STRESS TEST; Future    5. Dyslipidemia  -     ECHO ADULT COMPLETE; Future  -     CARDIAC HOLTER MONITOR;  Future  -     NUCLEAR CARDIAC STRESS TEST; Future    6. Dissection of right subclavian artery (HCC)  -     ECHO ADULT COMPLETE; Future  -     CARDIAC HOLTER MONITOR; Future  -     NUCLEAR CARDIAC STRESS TEST; Future      Specialty Problems        Cardiology Problems    Dyslipidemia        HTN (hypertension)              ICD-10-CM ICD-9-CM    1. Precordial pain  R07.2 786.51 ECHO ADULT COMPLETE      CARDIAC HOLTER MONITOR      NUCLEAR CARDIAC STRESS TEST   2. ZHOU (dyspnea on exertion)  R06.00 786.09 ECHO ADULT COMPLETE      CARDIAC HOLTER MONITOR      NUCLEAR CARDIAC STRESS TEST   3. Palpitations  R00.2 785.1 ECHO ADULT COMPLETE      CARDIAC HOLTER MONITOR      NUCLEAR CARDIAC STRESS TEST   4. Essential hypertension  I10 401.9 AMB POC EKG ROUTINE W/ 12 LEADS, INTER & REP      ECHO ADULT COMPLETE      CARDIAC HOLTER MONITOR      NUCLEAR CARDIAC STRESS TEST   5. Dyslipidemia  E78.5 272.4 ECHO ADULT COMPLETE      CARDIAC HOLTER MONITOR      NUCLEAR CARDIAC STRESS TEST   6. Dissection of right subclavian artery (HCC)  I77.79 443.29 ECHO ADULT COMPLETE      CARDIAC HOLTER MONITOR      NUCLEAR CARDIAC STRESS TEST         PLAN:  1. Precordial pain  Reports she has had onset of chest pressure with exertion, feels like bricks on her chest. She has worsening ZHOU and palpitations. ECG SR. Nuclear stress test neg for ischemia 8/31/15. Echo with EF 55-60%, mild MR/TR/GA. Obtain myoview exercise stress test and echo. Cont on ASA, statin    2. ZHOU (dyspnea on exertion)  See plans above    3. Palpitations  Reports daily episodes of fluttering heart beats, occurs most at night. Electrolytes and TSH NL 2/12/21. Obtain 24hr holter. 4. Essential hypertension  Well controlled on Amlodipine 5mg    5. Dyslipidemia  LDL 89 2/12/21. On Pravachol 40mg    6.  Dissection of right subclavian artery ([facility] [address]  Followed by Dr Gilbert Drummond    Patient seen and examined by me with nurse practitioner. Katie Mackay personally performed all components of the history, physical, and medical decision making and agree with the assessment and plan with minor modifications as noted. Classic, progressive exertional angina. ZHOU and palpitations as well. Eval as above. F/U after studies     Niels Vickers MD    Patient was made aware during visit today that all testing completed would be instantaneously available on their MyChart for review. Discussed that these results will be made available to the provider at the same time. They were advised to wait at least 3 business days to allow for provider's interpretation of results with follow-up before calling our office with concerns about their results.

## 2021-07-26 NOTE — LETTER
7/26/2021    Patient: Gabriela Sullivan   YOB: 1965   Date of Visit: 7/26/2021     Lori Ji NP  [address] [place] [address]  Via In Basket    Dear Lori Ji NP,      Thank you for referring Ms. Kassy Hernandez to [facility] for evaluation. My notes for this consultation are attached. If you have questions, please do not hesitate to call me. I look forward to following your patient along with you.       Sincerely,    Maury Roblero MD

## 2021-08-27 ENCOUNTER — ANCILLARY PROCEDURE (OUTPATIENT)
Dept: CARDIOLOGY CLINIC | Age: 56
End: 2021-08-27
Payer: COMMERCIAL

## 2021-08-27 ENCOUNTER — CLINICAL SUPPORT (OUTPATIENT)
Dept: CARDIOLOGY CLINIC | Age: 56
End: 2021-08-27

## 2021-08-27 ENCOUNTER — ANCILLARY PROCEDURE (OUTPATIENT)
Dept: CARDIOLOGY CLINIC | Age: 56
End: 2021-08-27

## 2021-08-27 VITALS
BODY MASS INDEX: 26.22 KG/M2 | DIASTOLIC BLOOD PRESSURE: 70 MMHG | WEIGHT: 173 LBS | HEIGHT: 68 IN | SYSTOLIC BLOOD PRESSURE: 106 MMHG

## 2021-08-27 VITALS
SYSTOLIC BLOOD PRESSURE: 120 MMHG | DIASTOLIC BLOOD PRESSURE: 86 MMHG | HEIGHT: 68 IN | BODY MASS INDEX: 26.37 KG/M2 | WEIGHT: 174 LBS

## 2021-08-27 DIAGNOSIS — R07.2 PRECORDIAL PAIN: ICD-10-CM

## 2021-08-27 DIAGNOSIS — I10 ESSENTIAL HYPERTENSION: ICD-10-CM

## 2021-08-27 DIAGNOSIS — E78.5 DYSLIPIDEMIA: ICD-10-CM

## 2021-08-27 DIAGNOSIS — R06.09 DOE (DYSPNEA ON EXERTION): ICD-10-CM

## 2021-08-27 DIAGNOSIS — I77.79 DISSECTION OF RIGHT SUBCLAVIAN ARTERY (HCC): ICD-10-CM

## 2021-08-27 DIAGNOSIS — R00.2 PALPITATIONS: ICD-10-CM

## 2021-08-27 PROCEDURE — 93306 TTE W/DOPPLER COMPLETE: CPT | Performed by: INTERNAL MEDICINE

## 2021-08-27 PROCEDURE — 93225 XTRNL ECG REC<48 HRS REC: CPT | Performed by: INTERNAL MEDICINE

## 2021-08-27 PROCEDURE — 93015 CV STRESS TEST SUPVJ I&R: CPT | Performed by: INTERNAL MEDICINE

## 2021-08-27 PROCEDURE — 78452 HT MUSCLE IMAGE SPECT MULT: CPT | Performed by: INTERNAL MEDICINE

## 2021-08-27 PROCEDURE — 93227 XTRNL ECG REC<48 HR R&I: CPT | Performed by: INTERNAL MEDICINE

## 2021-08-27 PROCEDURE — A9502 TC99M TETROFOSMIN: HCPCS | Performed by: INTERNAL MEDICINE

## 2021-08-29 LAB
STRESS BASELINE DIAS BP: 86 MMHG
STRESS BASELINE HR: 61 BPM
STRESS BASELINE SYS BP: 120 MMHG
STRESS ESTIMATED WORKLOAD: 4.6 METS
STRESS EXERCISE DUR MIN: NORMAL MIN:SEC
STRESS PEAK DIAS BP: 92 MMHG
STRESS PEAK SYS BP: 148 MMHG
STRESS PERCENT HR ACHIEVED: 90 %
STRESS POST PEAK HR: 148 BPM
STRESS RATE PRESSURE PRODUCT: NORMAL BPM*MMHG
STRESS ST DEPRESSION: 0 MM
STRESS ST ELEVATION: 0 MM
STRESS TARGET HR: 165 BPM

## 2021-08-30 ENCOUNTER — TELEPHONE (OUTPATIENT)
Dept: CARDIOLOGY CLINIC | Age: 56
End: 2021-08-30

## 2021-08-30 LAB
ECHO AO ASC DIAM: 3.47 CM
ECHO AO ROOT DIAM: 3.07 CM
ECHO AV PEAK GRADIENT: 6.22 MMHG
ECHO AV PEAK VELOCITY: 124.66 CM/S
ECHO EST RA PRESSURE: 3 MMHG
ECHO LA AREA 4C: 16.42 CM2
ECHO LA MAJOR AXIS: 2.98 CM
ECHO LA MINOR AXIS: 1.55 CM
ECHO LA VOL 2C: 44.14 ML (ref 22–52)
ECHO LA VOL 4C: 37.79 ML (ref 22–52)
ECHO LA VOL BP: 47.72 ML (ref 22–52)
ECHO LA VOL/BSA BIPLANE: 24.85 ML/M2 (ref 16–28)
ECHO LA VOLUME INDEX A2C: 22.99 ML/M2 (ref 16–28)
ECHO LA VOLUME INDEX A4C: 19.68 ML/M2 (ref 16–28)
ECHO LV E' LATERAL VELOCITY: 11.73 CM/S
ECHO LV GLOBAL LONGITUDINAL STRAIN (GLS): -18 PERCENT
ECHO LV INTERNAL DIMENSION DIASTOLIC: 4.43 CM (ref 3.9–5.3)
ECHO LV INTERNAL DIMENSION SYSTOLIC: 2.39 CM
ECHO LV ISOVOLUMETRIC RELAXATION TIME (IVRT): 79.92 MS
ECHO LV IVSD: 0.95 CM (ref 0.6–0.9)
ECHO LV MASS 2D: 146.4 G (ref 67–162)
ECHO LV MASS INDEX 2D: 76.2 G/M2 (ref 43–95)
ECHO LV POSTERIOR WALL DIASTOLIC: 1.02 CM (ref 0.6–0.9)
ECHO LVOT PEAK GRADIENT: 4.04 MMHG
ECHO LVOT PEAK VELOCITY: 100.53 CM/S
ECHO MV "A" WAVE DURATION: 142.72 MS
ECHO MV A VELOCITY: 66.9 CM/S
ECHO MV E DECELERATION TIME (DT): 154.15 MS
ECHO MV E VELOCITY: 80.06 CM/S
ECHO MV E/A RATIO: 1.2
ECHO MV E/E' LATERAL: 6.83
ECHO PVEIN A DURATION: 127.5 MS
ECHO PVEIN A VELOCITY: 32.45 CM/S
ECHO RIGHT VENTRICULAR SYSTOLIC PRESSURE (RVSP): 25.88 MMHG
ECHO RV TAPSE: 3.03 CM (ref 1.5–2)
ECHO TV REGURGITANT MAX VELOCITY: 239.17 CM/S
ECHO TV REGURGITANT PEAK GRADIENT: 22.88 MMHG
GLOBAL LONGITUDINAL STRAIN 2 CHAMBER: -19.7 PERCENT
GLOBAL LONGITUDINAL STRAIN 4 CHAMBER: -17.9 PERCENT
GLOBAL LONGITUDINAL STRAIN LONG AXIS: -16.5 PERCENT
LA VOL DISK BP: 43.49 ML (ref 22–52)

## 2021-08-30 NOTE — TELEPHONE ENCOUNTER
Spoke with patient. Verified patient with two patient identifiers. Advised both echo and EST normal.  Patient verbalized understanding.

## 2021-08-30 NOTE — PROGRESS NOTES
Please call the patient and inform that echocardiogram is normal, ejection fraction is 55-60%. No concern for heart failure at this time. Valves working appropriately.     Thanks,  Juan Oh

## 2021-08-30 NOTE — TELEPHONE ENCOUNTER
----- Message from Carmen Gonzalez NP sent at 8/30/2021  1:13 PM EDT -----  Please call the patient and inform that echocardiogram is normal, ejection fraction is 55-60%. No concern for heart failure at this time. Valves working appropriately.     Thanks,  [employer]

## 2021-09-02 ENCOUNTER — TELEPHONE (OUTPATIENT)
Dept: CARDIOLOGY CLINIC | Age: 56
End: 2021-09-02

## 2021-09-02 NOTE — PROGRESS NOTES
Please call the patient and inform that holter/event monitor is normal.  No extra heart beats, fast or slow heart rates during monitoring period.     Thanks,  [employer]

## 2021-09-02 NOTE — TELEPHONE ENCOUNTER
Spoke with patient. Verified patient with two patient identifiers. Advised pt of results for holter/ event monitor  Patient verbalized understanding.

## 2021-09-02 NOTE — TELEPHONE ENCOUNTER
----- Message from Yanet MediaANNETTE sent at 9/2/2021 12:49 PM EDT -----  Please call the patient and inform that holter/event monitor is normal.  No extra heart beats, fast or slow heart rates during monitoring period.     Thanks,  [employer]

## 2021-09-07 ENCOUNTER — TELEPHONE (OUTPATIENT)
Dept: CARDIOLOGY CLINIC | Age: 56
End: 2021-09-07

## 2021-09-07 NOTE — TELEPHONE ENCOUNTER
LMVM to call me. (Monitor benign). Spoke with patient regarding his medication prednisone also patient rescheduled his appointment.

## 2021-09-07 NOTE — TELEPHONE ENCOUNTER
----- Message from Alex Rush MD sent at 9/7/2021 10:09 AM EDT -----  Few extra beats; no treatment needed.

## 2021-09-07 NOTE — TELEPHONE ENCOUNTER
Spoke with patient. Verified patient with two patient identifiers. Advised we had already discussed monitor results, did not need her. Patient verbalized understanding.

## 2021-10-20 ENCOUNTER — IMMUNIZATION CLINIC (OUTPATIENT)
Dept: FAMILY MEDICINE CLINIC | Age: 56
End: 2021-10-20
Payer: COMMERCIAL

## 2021-10-20 DIAGNOSIS — Z23 ENCOUNTER FOR IMMUNIZATION: Primary | ICD-10-CM

## 2021-10-20 PROCEDURE — 90686 IIV4 VACC NO PRSV 0.5 ML IM: CPT | Performed by: FAMILY MEDICINE

## 2021-10-20 PROCEDURE — 90471 IMMUNIZATION ADMIN: CPT | Performed by: FAMILY MEDICINE

## 2022-02-07 DIAGNOSIS — I10 ESSENTIAL HYPERTENSION: ICD-10-CM

## 2022-02-07 RX ORDER — AMLODIPINE BESYLATE 5 MG/1
TABLET ORAL
Qty: 90 TABLET | Refills: 0 | Status: SHIPPED | OUTPATIENT
Start: 2022-02-07 | End: 2022-05-10

## 2022-02-07 RX ORDER — AMLODIPINE BESYLATE 5 MG/1
5 TABLET ORAL DAILY
Qty: 90 TABLET | Refills: 3 | Status: CANCELLED | OUTPATIENT
Start: 2022-02-07

## 2022-03-10 ENCOUNTER — OFFICE VISIT (OUTPATIENT)
Dept: FAMILY MEDICINE CLINIC | Age: 57
End: 2022-03-10
Payer: OTHER GOVERNMENT

## 2022-03-10 VITALS
HEIGHT: 68 IN | DIASTOLIC BLOOD PRESSURE: 80 MMHG | RESPIRATION RATE: 14 BRPM | HEART RATE: 79 BPM | TEMPERATURE: 98.9 F | SYSTOLIC BLOOD PRESSURE: 125 MMHG | OXYGEN SATURATION: 100 % | BODY MASS INDEX: 26.4 KG/M2 | WEIGHT: 174.2 LBS

## 2022-03-10 DIAGNOSIS — Z12.31 ENCOUNTER FOR SCREENING MAMMOGRAM FOR MALIGNANT NEOPLASM OF BREAST: ICD-10-CM

## 2022-03-10 DIAGNOSIS — I10 ESSENTIAL HYPERTENSION: Primary | ICD-10-CM

## 2022-03-10 DIAGNOSIS — E78.5 DYSLIPIDEMIA: ICD-10-CM

## 2022-03-10 DIAGNOSIS — K63.5 POLYP OF COLON, UNSPECIFIED PART OF COLON, UNSPECIFIED TYPE: ICD-10-CM

## 2022-03-10 DIAGNOSIS — Z11.59 NEED FOR HEPATITIS C SCREENING TEST: ICD-10-CM

## 2022-03-10 DIAGNOSIS — K57.90 DIVERTICULOSIS: ICD-10-CM

## 2022-03-10 PROCEDURE — 99214 OFFICE O/P EST MOD 30 MIN: CPT | Performed by: NURSE PRACTITIONER

## 2022-03-10 NOTE — PROGRESS NOTES
Perry Yadav (: 1965) is a 65 y.o. female, established patient, here for evaluation of the following chief complaint(s):  Medication Evaluation       ASSESSMENT/PLAN:  Below is the assessment and plan developed based on review of pertinent history, physical exam, labs, studies, and medications. 1. Essential hypertension - stable  Continue amlodipine  -     CBC W/O DIFF; Future  -     METABOLIC PANEL, COMPREHENSIVE; Future  -     LIPID PANEL; Future  -     TSH 3RD GENERATION; Future  -     URINALYSIS W/ RFLX MICROSCOPIC; Future  2. Dyslipidemia  3. Need for hepatitis C screening test  -     HEPATITIS C AB; Future  4. Polyp of colon, unspecified part of colon, unspecified type - patient should have repeated colonoscopy in , overdue for follow up  5. Diverticulosis  -     REFERRAL TO GASTROENTEROLOGY  6. Encounter for screening mammogram for malignant neoplasm of breast  -     NIK MAMMO BI SCREENING INCL CAD; Future      Return in about 1 year (around 3/10/2023). 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 Overdue for mammogram.  Pap with Dr. Abril Bishop - last done 1-2 years ago. Needs to schedule follow up. Patient saw cardiology regarding palpitations and vascular regarding chronic dissection of right brachiocephalic artery. holter/event monitor, echo, NST, EST normal.    CTA chest on 2021 shows unchanged dissection. Some progression of atherosclerosis. She is on statin, can take ASA 81mg    Hx HTN - taking amlodipine. Compliant w/ meds, low salt diet. No swelling, headache or dizziness. No chest pain, SOB, palpitations.   Otherwise feeling well since the last visit    No Known Allergies    Past Medical History:   Diagnosis Date    Asthma     Brachiocephalic artery injury     chronic dissection    Glaucoma, low tension     Hyperlipidemia     Hypertension     Ill-defined condition     high cholesterol       Past Surgical History:   Procedure Laterality Date    COLONOSCOPY N/A 5/1/2017    COLONOSCOPY performed by Beckie Johnson MD at \[facility]\"" ENDOSCOPY    HX TONSILLECTOMY         Social History     Socioeconomic History    Marital status:      Spouse name: Not on file    Number of children: 2    Years of education: Not on file    Highest education level: Not on file   Occupational History    Occupation: [employer]   Tobacco Use    Smoking status: Never Smoker    Smokeless tobacco: Never Used   Vaping Use    Vaping Use: Never used   Substance and Sexual Activity    Alcohol use: Yes     Alcohol/week: 0.0 standard drinks     Comment: rare    Drug use: No    Sexual activity: Yes     Partners: Male   Other Topics Concern    Not on file   Social History Narrative    Works at [employer]. Social Determinants of Health     Financial Resource Strain:     Difficulty of Paying Living Expenses: Not on file   Food Insecurity:     Worried About Running Out of Food in the Last Year: Not on file    Enedina of Food in the Last Year: Not on file   Transportation Needs:     Lack of Transportation (Medical): Not on file    Lack of Transportation (Non-Medical):  Not on file   Physical Activity:     Days of Exercise per Week: Not on file    Minutes of Exercise per Session: Not on file   Stress:     Feeling of Stress : Not on file   Social Connections:     Frequency of Communication with Friends and Family: Not on file    Frequency of Social Gatherings with Friends and Family: Not on file    Attends Taoism Services: Not on file    Active Member of Clubs or Organizations: Not on file    Attends Club or Organization Meetings: Not on file    Marital Status: Not on file   Intimate Partner Violence:     Fear of Current or Ex-Partner: Not on file    Emotionally Abused: Not on file    Physically Abused: Not on file  Sexually Abused: Not on file   Housing Stability:     Unable to Pay for Housing in the Last Year: Not on file    Number of Places Lived in the Last Year: Not on file    Unstable Housing in the Last Year: Not on file       Family History   Problem Relation Age of Onset    Hypertension Mother    Carreon Glaucoma Father     No Known Problems Sister     No Known Problems Brother     Heart Disease Maternal Grandmother     Heart Attack Maternal Grandmother     Hypertension Maternal Grandmother     Diabetes Maternal Grandmother        Current Outpatient Medications   Medication Sig    amLODIPine (NORVASC) 5 mg tablet TAKE 1 TABLET BY MOUTH DAILY    aspirin 81 mg chewable tablet Take 1 Tab by mouth daily.  albuterol (PROVENTIL HFA, VENTOLIN HFA, PROAIR HFA) 90 mcg/actuation inhaler Take  by inhalation every four (4) hours as needed for Wheezing.  latanoprost (XALATAN) 0.005 % ophthalmic solution 1 Drop nightly.  pravastatin (PRAVACHOL) 40 mg tablet TAKE 1 TABLET BY MOUTH ONCE NIGHTLY    sucralfate (CARAFATE) 1 gram tablet as needed. (Patient not taking: Reported on 7/26/2021)     No current facility-administered medications for this visit. Review of Systems   Constitutional: Negative. Cardiovascular: Negative. Gastrointestinal: Negative. Genitourinary: Negative. Neurological: Negative. Psychiatric/Behavioral: Negative. Vitals:    03/10/22 1525   BP: 125/80   Pulse: 79   Resp: 14   Temp: 98.9 °F (37.2 °C)   TempSrc: Oral   SpO2: 100%   Weight: 174 lb 3.2 oz (79 kg)   Height: 5' 8\" (1.727 m)     Physical Exam  Vitals reviewed. Constitutional:       Appearance: Normal appearance. She is well-developed, well-groomed and overweight. HENT:      Right Ear: Hearing normal.      Left Ear: Hearing normal.   Neck:      Thyroid: Thyromegaly (fullness) present. Cardiovascular:      Rate and Rhythm: Normal rate and regular rhythm.       Pulses:           Dorsalis pedis pulses are 2+ on the right side and 2+ on the left side. Heart sounds: Normal heart sounds, S1 normal and S2 normal.   Pulmonary:      Effort: Pulmonary effort is normal.      Breath sounds: Normal breath sounds. Abdominal:      General: Bowel sounds are normal.      Palpations: Abdomen is soft. Tenderness: There is abdominal tenderness in the periumbilical area and left upper quadrant. Musculoskeletal:      Right lower leg: No edema. Left lower leg: No edema. Lymphadenopathy:      Cervical: No cervical adenopathy. Skin:     General: Skin is warm and dry. Neurological:      Mental Status: She is alert and oriented to person, place, and time. Psychiatric:         Attention and Perception: Attention normal.         Mood and Affect: Mood and affect normal.         Speech: Speech normal.         Behavior: Behavior normal. Behavior is cooperative. Thought Content: Thought content normal.         Cognition and Memory: Cognition and memory normal.       On this date 03/10/2022 I have spent 32 minutes reviewing previous notes, test results and face to face with the patient discussing the diagnosis and importance of compliance with the treatment plan as well as documenting on the day of the visit. An electronic signature was used to authenticate this note.   -- Mar Andrew NP

## 2022-03-10 NOTE — PROGRESS NOTES
Chief Complaint   Patient presents with    Medication Evaluation       1. \"Have you been to the ER, urgent care clinic since your last visit? Hospitalized since your last visit? \" No    2. \"Have you seen or consulted any other health care providers outside of the [address] [place] [address] since your last visit? \" No     3. For patients aged 39-70: Has the patient had a colonoscopy / FIT/ Cologuard? Yes - Care Gap present. Rooming MA/LPN to request most recent results      If the patient is female:    4. For patients aged 41-77: Has the patient had a mammogram within the past 2 years? No      5. For patients aged 21-65: Has the patient had a pap smear?  No    Health Maintenance Due   Topic Date Due    Hepatitis C Screening  Never done    Cervical cancer screen  Never done    Shingrix Vaccine Age 50> (1 of 2) Never done    Breast Cancer Screen Mammogram  08/28/2020    Lipid Screen  02/12/2022

## 2022-03-19 RX ORDER — PRAVASTATIN SODIUM 40 MG/1
TABLET ORAL
Qty: 90 TABLET | Refills: 3 | Status: SHIPPED | OUTPATIENT
Start: 2022-03-19

## 2022-04-05 ENCOUNTER — TELEPHONE (OUTPATIENT)
Dept: FAMILY MEDICINE CLINIC | Age: 57
End: 2022-04-05

## 2022-04-05 NOTE — TELEPHONE ENCOUNTER
----- Message from Ana Perez sent at 4/4/2022  3:45 PM EDT -----  Subject: Appointment Request    Reason for Call: Urgent (Patient Request) No Script    QUESTIONS  Type of Appointment? Established Patient  Reason for appointment request? No appointments available during search  Additional Information for Provider? patient needs an appointment for   swollen left foot nothing available wants a call back to schedule.   ---------------------------------------------------------------------------  --------------  CALL BACK INFO  What is the best way for the office to contact you? OK to leave message on   voicemail  Preferred Call Back Phone Number? [phone]  ---------------------------------------------------------------------------  --------------  SCRIPT ANSWERS  Relationship to Patient? Self  (Is the patient requesting to see the provider for a procedure?)? No  (Is the patient requesting to see the provider urgently  today or   tomorrow. )? Yes  Have you been diagnosed with, awaiting test results for, or told that you   are suspected of having COVID-19 (Coronavirus)? (If patient has tested   negative or was tested as a requirement for work, school, or travel and   not based on symptoms, answer no)? No  Within the past 10 days have you developed any of the following symptoms   (answer no if symptoms have been present longer than 10 days or began   more than 10 days ago)? Fever or Chills, Cough, Shortness of breath or   difficulty breathing, Loss of taste or smell, Sore throat, Nasal   congestion, Sneezing or runny nose, Fatigue or generalized body aches   (answer no if pain is specific to a body part e.g. back pain), Diarrhea,   Headache? No  Have you had close contact with someone with COVID-19 in the last 7 days? No  (Service Expert  click yes below to proceed with [email or web address] As Usual   Scheduling)?  Yes

## 2022-04-05 NOTE — TELEPHONE ENCOUNTER
Verified patient with two type of identifiers. Pt states having swelling on left foot near her toes x 1 week. Pt states swelling comes and goes with walking. Pt denies injury, redness, warmth. Pt to go to ED if she develops increased pain, shortness of breath, chest pain. Pt verbalized understanding. Pt scheduled for 4/7/22 at 9:30 AM. Pt verbalized understanding.

## 2022-04-07 ENCOUNTER — OFFICE VISIT (OUTPATIENT)
Dept: FAMILY MEDICINE CLINIC | Age: 57
End: 2022-04-07

## 2022-04-07 VITALS
WEIGHT: 171 LBS | DIASTOLIC BLOOD PRESSURE: 82 MMHG | BODY MASS INDEX: 25.91 KG/M2 | RESPIRATION RATE: 14 BRPM | OXYGEN SATURATION: 98 % | HEIGHT: 68 IN | HEART RATE: 70 BPM | TEMPERATURE: 97.9 F | SYSTOLIC BLOOD PRESSURE: 125 MMHG

## 2022-04-07 DIAGNOSIS — M79.672 LEFT FOOT PAIN: Primary | ICD-10-CM

## 2022-04-07 DIAGNOSIS — E78.5 DYSLIPIDEMIA: ICD-10-CM

## 2022-04-07 DIAGNOSIS — I10 ESSENTIAL HYPERTENSION: ICD-10-CM

## 2022-04-07 DIAGNOSIS — Z11.59 NEED FOR HEPATITIS C SCREENING TEST: ICD-10-CM

## 2022-04-07 PROCEDURE — 99213 OFFICE O/P EST LOW 20 MIN: CPT | Performed by: NURSE PRACTITIONER

## 2022-04-07 RX ORDER — PREDNISONE 10 MG/1
10 TABLET ORAL 2 TIMES DAILY
Qty: 6 TABLET | Refills: 0 | Status: SHIPPED | OUTPATIENT
Start: 2022-04-07 | End: 2022-04-10

## 2022-04-07 NOTE — PATIENT INSTRUCTIONS
Gout: Care Instructions  Overview     Gout is a form of arthritis caused by a buildup of uric acid crystals in a joint. It causes sudden attacks of pain, swelling, redness, and stiffness, usually in one joint, especially the big toe. Gout usually comes on without a cause. But it can be brought on by drinking alcohol (especially beer), eating or drinking things made with high-fructose corn syrup, or eating seafood or red meat. Taking certain medicines, such as diuretics, can also trigger an attack of gout. Taking your medicines as prescribed and following up with your doctor regularly can help you avoid gout attacks in the future. Follow-up care is a key part of your treatment and safety. Be sure to make and go to all appointments, and call your doctor if you are having problems. It's also a good idea to know your test results and keep a list of the medicines you take. How can you care for yourself at home? · If the joint is swollen, put ice or a cold pack on the area for 10 to 20 minutes at a time. Put a thin cloth between the ice and your skin. · Prop up the sore limb on a pillow when you ice it or anytime you sit or lie down during the next 3 days. Try to keep it above the level of your heart. This can help reduce swelling. · Rest sore joints. Avoid activities that put weight or strain on the joints for a few days. Take short rest breaks from your regular activities during the day. · Take your medicines exactly as prescribed. Call your doctor if you think you are having a problem with your medicine. · Take pain medicines exactly as directed. ? If the doctor gave you a prescription medicine for pain, take it as prescribed. ? If you are not taking a prescription pain medicine, ask your doctor if you can take an over-the-counter medicine. · Eat less seafood and red meat. · Avoid foods or drinks that are made with high-fructose corn syrup. · Check with your doctor before drinking alcohol.   · Losing weight, if you are overweight, may help reduce attacks of gout. But do not go on a diet that causes rapid weight loss. Losing a lot of weight in a short amount of time can cause a gout attack. When should you call for help? Call your doctor now or seek immediate medical care if:    · You have a fever.     · The joint is so painful you cannot use it.     · You have sudden, unexplained swelling, redness, warmth, or severe pain in one or more joints. Watch closely for changes in your health, and be sure to contact your doctor if:    · You have joint pain.     · Your symptoms get worse or are not improving after 2 or 3 days. Where can you learn more? Go to http://www.gray.com/  Enter E531 in the search box to learn more about \"Gout: Care Instructions. \"  Current as of: December 20, 2021               Content Version: 13.2  © 1343-0939 [employer]. Care instructions adapted under license by [employer] (which disclaims liability or warranty for this information). If you have questions about a medical condition or this instruction, always ask your healthcare professional. [place] [address] any warranty or liability for your use of this information. Purine-Restricted Diet: Care Instructions  Your Care Instructions     Purines are substances that are found in some foods. Your body turns purines into uric acid. High levels of uric acid can cause gout, which is a form of arthritis that causes pain and inflammation in joints. You may be able to help control the amount of uric acid in your body by limiting high-purine foods in your diet. Follow-up care is a key part of your treatment and safety. Be sure to make and go to all appointments, and call your doctor if you are having problems. It's also a good idea to know your test results and keep a list of the medicines you take. How can you care for yourself at home?   · Plan your meals and snacks around foods that are low in purines and are safe for you to eat. These foods include:  ? Green vegetables and tomatoes. ? Fruits. ? Whole-grain breads, rice, and cereals. ? Eggs, peanut butter, and nuts. ? Low-fat milk, cheese, and other milk products. ? Popcorn. ? Gelatin desserts, chocolate, cocoa, and cakes and sweets, in small amounts. · You can eat certain foods that are medium-high in purines, but eat them only once in a while. These foods include:  ? Legumes, such as dried beans and dried peas. You can have 1 cup cooked legumes each day. ? Asparagus, cauliflower, spinach, mushrooms, and green peas. ? Fish and seafood (other than very high-purine seafood). ? Oatmeal, wheat bran, and wheat germ. · Limit very high-purine foods, including:  ? Organ meats, such as liver, kidneys, sweetbreads, and brains. ? Meats, including barron, beef, pork, and lamb. ? Game meats and any other meats in large amounts. ? Anchovies, sardines, herring, mackerel, and scallops. ? Gravy. ? Beer. Where can you learn more? Go to http://www.gray.com/  Enter F448 in the search box to learn more about \"Purine-Restricted Diet: Care Instructions. \"  Current as of: September 8, 2021               Content Version: 13.2  © 2006-2022 [employer]. Care instructions adapted under license by [employer] (which disclaims liability or warranty for this information). If you have questions about a medical condition or this instruction, always ask your healthcare professional. [place] [address] any warranty or liability for your use of this information.

## 2022-04-07 NOTE — PROGRESS NOTES
Chief Complaint   Patient presents with    Foot Swelling     Left      Pt states having swelling on left foot near her toes x 1 week. Pt states swelling comes and goes with walking. Pt denies injury, redness, warmth. 1. \"Have you been to the ER, urgent care clinic since your last visit? Hospitalized since your last visit? \" No    2. \"Have you seen or consulted any other health care providers outside of the [address] [place] [address] since your last visit? \" No     3. For patients aged 39-70: Has the patient had a colonoscopy / FIT/ Cologuard? Yes - Care Gap present. Most recent result on file      If the patient is female:    4. For patients aged 41-77: Has the patient had a mammogram within the past 2 years? No      5. For patients aged 21-65: Has the patient had a pap smear?  No    PAP - Dr. Ashlee Chang 2021 FW    Health Maintenance Due   Topic Date Due    Hepatitis C Screening  Never done    Cervical cancer screen  Never done    Breast Cancer Screen Mammogram  08/28/2020    Lipid Screen  02/12/2022

## 2022-04-07 NOTE — PROGRESS NOTES
Ashley Donato (: 1965) is a 65 y.o. female, established patient, here for evaluation of the following chief complaint(s): Foot Swelling (Left)       ASSESSMENT/PLAN:  Below is the assessment and plan developed based on review of pertinent history, physical exam, labs, studies, and medications. 1. Left foot pain - likely gout based on symptoms, history. Will check uric acid level. Given steroid, purine restricted diet. -     METABOLIC PANEL, COMPREHENSIVE; Future  -     CBC W/O DIFF; Future  -     URIC ACID; Future  -     XR FOOT LT AP/LAT; Future  -     predniSONE (DELTASONE) 10 mg tablet; Take 10 mg by mouth two (2) times a day for 3 days. , Normal, Disp-6 Tablet, R-0  2. Essential hypertension 0 stable. Needs labs done from visit last month.  -     METABOLIC PANEL, COMPREHENSIVE; Future  -     CBC W/O DIFF; Future  -     TSH 3RD GENERATION; Future  -     LIPID PANEL; Future  -     URINALYSIS W/MICROSCOPIC; Future  3. Dyslipidemia  -     METABOLIC PANEL, COMPREHENSIVE; Future  -     LIPID PANEL; Future  4. Need for hepatitis C screening test  -     HEPATITIS C AB; Future      Return if symptoms worsen or fail to improve. SUBJECTIVE/OBJECTIVE:  HPI patient comes in today for foot pain. Pt states having swelling on left foot near her toes x 1-2 weeks. started after eating chickenand shrimp from [place]. States she was not able to walk on foot. Had redness, warmth, swelling. States she was not able to wear shoes, even wearing a sock or having a sheet across foot caused pain. Denies injury. No hx of gout. No pain or swelling in calf until last few days.   No open wounds/sores on foot  No Known Allergies    Past Medical History:   Diagnosis Date    Asthma     Brachiocephalic artery injury     chronic dissection    Glaucoma, low tension     Hyperlipidemia     Hypertension     Ill-defined condition     high cholesterol       Past Surgical History:   Procedure Laterality Date    COLONOSCOPY N/A 5/1/2017    COLONOSCOPY performed by Brent Rubio MD at [facility] ENDOSCOPY    HX TONSILLECTOMY         Social History     Socioeconomic History    Marital status:      Spouse name: Not on file    Number of children: 2    Years of education: Not on file    Highest education level: Not on file   Occupational History    Occupation: [employer]   Tobacco Use    Smoking status: Never Smoker    Smokeless tobacco: Never Used   Vaping Use    Vaping Use: Never used   Substance and Sexual Activity    Alcohol use: Yes     Alcohol/week: 0.0 standard drinks     Comment: rare    Drug use: No    Sexual activity: Yes     Partners: Male   Other Topics Concern    Not on file   Social History Narrative    Works at [employer]. Social Determinants of Health     Financial Resource Strain:     Difficulty of Paying Living Expenses: Not on file   Food Insecurity:     Worried About Running Out of Food in the Last Year: Not on file    [place] of Food in the Last Year: Not on file   Transportation Needs:     Lack of Transportation (Medical): Not on file    Lack of Transportation (Non-Medical):  Not on file   Physical Activity:     Days of Exercise per Week: Not on file    Minutes of Exercise per Session: Not on file   Stress:     Feeling of Stress : Not on file   Social Connections:     Frequency of Communication with Friends and Family: Not on file    Frequency of Social Gatherings with Friends and Family: Not on file    Attends Yazdanism Services: Not on file    Active Member of Clubs or Organizations: Not on file    Attends Club or Organization Meetings: Not on file    Marital Status: Not on file   Intimate Partner Violence:     Fear of Current or Ex-Partner: Not on file    Emotionally Abused: Not on file    Physically Abused: Not on file    Sexually Abused: Not on file   Housing Stability:     Unable to Pay for Housing in the Last Year: Not on file    Number of Places Lived in the Last Year: Not on file    Unstable Housing in the Last Year: Not on file       Family History   Problem Relation Age of Onset    Hypertension Mother    [facility] Glaucoma Father     No Known Problems Sister     No Known Problems Brother     Heart Disease Maternal Grandmother     Heart Attack Maternal Grandmother     Hypertension Maternal Grandmother     Diabetes Maternal Grandmother        Current Outpatient Medications   Medication Sig    pravastatin (PRAVACHOL) 40 mg tablet TAKE 1 TABLET BY MOUTH EVERY NIGHT    amLODIPine (NORVASC) 5 mg tablet TAKE 1 TABLET BY MOUTH DAILY    aspirin 81 mg chewable tablet Take 1 Tab by mouth daily.  albuterol (PROVENTIL HFA, VENTOLIN HFA, PROAIR HFA) 90 mcg/actuation inhaler Take  by inhalation every four (4) hours as needed for Wheezing.  sucralfate (CARAFATE) 1 gram tablet as needed. (Patient not taking: Reported on 7/26/2021)    latanoprost (XALATAN) 0.005 % ophthalmic solution 1 Drop nightly. (Patient not taking: Reported on 4/7/2022)     No current facility-administered medications for this visit. Review of Systems   Constitutional: Negative for chills and fever. Respiratory: Negative. Cardiovascular: Negative. Musculoskeletal: Positive for arthralgias (left foot pain). Neurological: Negative. Vitals:    04/07/22 0938   BP: 125/82   Pulse: 70   Resp: 14   Temp: 97.9 °F (36.6 °C)   TempSrc: Oral   SpO2: 98%   Weight: 171 lb (77.6 kg)   Height: 5' 8\" (1.727 m)     Physical Exam  Constitutional:       Appearance: Normal appearance. Cardiovascular:      Rate and Rhythm: Normal rate. Pulses:           Dorsalis pedis pulses are 2+ on the left side. Comments: Trace ankle/foot edema  Pulmonary:      Effort: Pulmonary effort is normal.   Musculoskeletal:      Left lower leg: Edema present. Left foot: Normal range of motion.    Feet:      Left foot:      Skin integrity: Skin integrity normal.      Comments: Tenderness over 1st-3rd metatarsals, mild edema. No erythema. No open sores/wounds  Skin:     General: Skin is warm and dry. Neurological:      Mental Status: She is alert and oriented to person, place, and time. Psychiatric:         Mood and Affect: Mood normal.         Behavior: Behavior normal.         Thought Content: Thought content normal.         Judgment: Judgment normal.       On this date 04/07/2022 I have spent 21 minutes reviewing previous notes, test results and face to face with the patient discussing the diagnosis and importance of compliance with the treatment plan as well as documenting on the day of the visit. An electronic signature was used to authenticate this note.   -- Elli Gonzales NP

## 2022-04-08 LAB
ALBUMIN SERPL-MCNC: 4.1 G/DL (ref 3.5–5)
ALBUMIN/GLOB SERPL: 1.3 {RATIO} (ref 1.1–2.2)
ALP SERPL-CCNC: 73 U/L (ref 45–117)
ALT SERPL-CCNC: 19 U/L (ref 12–78)
ANION GAP SERPL CALC-SCNC: 6 MMOL/L (ref 5–15)
APPEARANCE UR: CLEAR
AST SERPL-CCNC: 13 U/L (ref 15–37)
BACTERIA URNS QL MICRO: ABNORMAL /HPF
BILIRUB SERPL-MCNC: 0.4 MG/DL (ref 0.2–1)
BILIRUB UR QL: NEGATIVE
BUN SERPL-MCNC: 18 MG/DL (ref 6–20)
BUN/CREAT SERPL: 21 (ref 12–20)
CALCIUM SERPL-MCNC: 10.2 MG/DL (ref 8.5–10.1)
CHLORIDE SERPL-SCNC: 107 MMOL/L (ref 97–108)
CHOLEST SERPL-MCNC: 204 MG/DL
CO2 SERPL-SCNC: 27 MMOL/L (ref 21–32)
COLOR UR: ABNORMAL
CREAT SERPL-MCNC: 0.86 MG/DL (ref 0.55–1.02)
EPITH CASTS URNS QL MICRO: ABNORMAL /LPF
ERYTHROCYTE [DISTWIDTH] IN BLOOD BY AUTOMATED COUNT: 12.7 % (ref 11.5–14.5)
GLOBULIN SER CALC-MCNC: 3.2 G/DL (ref 2–4)
GLUCOSE SERPL-MCNC: 84 MG/DL (ref 65–100)
GLUCOSE UR STRIP.AUTO-MCNC: NEGATIVE MG/DL
HCT VFR BLD AUTO: 45.1 % (ref 35–47)
HCV AB SERPL QL IA: NONREACTIVE
HDLC SERPL-MCNC: 106 MG/DL
HDLC SERPL: 1.9 {RATIO} (ref 0–5)
HGB BLD-MCNC: 14 G/DL (ref 11.5–16)
HGB UR QL STRIP: ABNORMAL
HYALINE CASTS URNS QL MICRO: ABNORMAL /LPF (ref 0–5)
KETONES UR QL STRIP.AUTO: NEGATIVE MG/DL
LDLC SERPL CALC-MCNC: 86.4 MG/DL (ref 0–100)
LEUKOCYTE ESTERASE UR QL STRIP.AUTO: NEGATIVE
MCH RBC QN AUTO: 29.2 PG (ref 26–34)
MCHC RBC AUTO-ENTMCNC: 31 G/DL (ref 30–36.5)
MCV RBC AUTO: 94.2 FL (ref 80–99)
NITRITE UR QL STRIP.AUTO: NEGATIVE
NRBC # BLD: 0 K/UL (ref 0–0.01)
NRBC BLD-RTO: 0 PER 100 WBC
PH UR STRIP: 5 [PH] (ref 5–8)
PLATELET # BLD AUTO: 311 K/UL (ref 150–400)
PMV BLD AUTO: 10.1 FL (ref 8.9–12.9)
POTASSIUM SERPL-SCNC: 4.6 MMOL/L (ref 3.5–5.1)
PROT SERPL-MCNC: 7.3 G/DL (ref 6.4–8.2)
PROT UR STRIP-MCNC: NEGATIVE MG/DL
RBC # BLD AUTO: 4.79 M/UL (ref 3.8–5.2)
RBC #/AREA URNS HPF: ABNORMAL /HPF (ref 0–5)
SODIUM SERPL-SCNC: 140 MMOL/L (ref 136–145)
SP GR UR REFRACTOMETRY: 1.02 (ref 1–1.03)
TRIGL SERPL-MCNC: 58 MG/DL (ref ?–150)
TSH SERPL DL<=0.05 MIU/L-ACNC: 0.68 UIU/ML (ref 0.36–3.74)
URATE SERPL-MCNC: 4 MG/DL (ref 2.6–6)
UROBILINOGEN UR QL STRIP.AUTO: 0.2 EU/DL (ref 0.2–1)
VLDLC SERPL CALC-MCNC: 11.6 MG/DL
WBC # BLD AUTO: 3.9 K/UL (ref 3.6–11)
WBC URNS QL MICRO: ABNORMAL /HPF (ref 0–4)

## 2022-04-11 ENCOUNTER — PATIENT MESSAGE (OUTPATIENT)
Dept: FAMILY MEDICINE CLINIC | Age: 57
End: 2022-04-11

## 2022-04-12 NOTE — PROGRESS NOTES
Thyroid screen negative. Liver and kidney function normal.  Blood counts normal (not anemic). Cholesterol numbers look great! Hepatitis C screening negative. Uric acid level normal.  This is an indirect measurement of gout. Just because it is not elevated, it does not necessarily rule gout out. If it was high, then it would be 100% gout.  Hope your foot is feeling better

## 2022-04-13 ENCOUNTER — PATIENT MESSAGE (OUTPATIENT)
Dept: FAMILY MEDICINE CLINIC | Age: 57
End: 2022-04-13

## 2022-04-13 ENCOUNTER — TELEPHONE (OUTPATIENT)
Dept: FAMILY MEDICINE CLINIC | Age: 57
End: 2022-04-13

## 2022-04-13 DIAGNOSIS — M79.662 PAIN AND SWELLING OF LEFT LOWER LEG: Primary | ICD-10-CM

## 2022-04-13 DIAGNOSIS — M79.89 PAIN AND SWELLING OF LEFT LOWER LEG: Primary | ICD-10-CM

## 2022-04-13 RX ORDER — CEPHALEXIN 500 MG/1
500 CAPSULE ORAL 3 TIMES DAILY
Qty: 30 CAPSULE | Refills: 0 | Status: SHIPPED | OUTPATIENT
Start: 2022-04-13 | End: 2022-04-23

## 2022-04-13 NOTE — TELEPHONE ENCOUNTER
Call patient  Ordered doppler to r/o DVT  I can also send antibiotic to provide coverage for cellulitis    Orders Placed This Encounter    DUPLEX LOWER EXT VENOUS LEFT     Standing Status:   Future     Standing Expiration Date:   10/13/2022    cephALEXin (KEFLEX) 500 mg capsule     Sig: Take 1 Capsule by mouth three (3) times daily for 10 days.      Dispense:  30 Capsule     Refill:  0

## 2022-04-13 NOTE — TELEPHONE ENCOUNTER
Left Message for patient to inform per Sherman Chin NP   Call patient  Ordered doppler to r/o DVT  I can also send antibiotic to provide coverage for cellulitis           Orders Placed This Encounter    DUPLEX LOWER EXT VENOUS LEFT       Standing Status:   Future       Standing Expiration Date:   10/13/2022    cephALEXin (KEFLEX) 500 mg capsule       Sig: Take 1 Capsule by mouth three (3) times daily for 10 days.       Dispense:  30 Capsule       Refill:  0      Pt given number to [facility]  and to call back with any questions. Also sent pt [employer] message.

## 2022-04-13 NOTE — TELEPHONE ENCOUNTER
Regarding: Left Foot   ----- Message from Gokul Chavira NP sent at 4/13/2022 11:02 AM EDT -----       ----- Message from Lisandra Sandhu to Praful Trammell NP sent at 4/13/2022 10:29 AM -----   Good morning Mrs Esther Moy,    Since my last visit with you my foot remains the same. The swelling still exists and pain. The medicine is not working and Im having pain in my leg. Please call me when time permits. This number will be good.  [phone]    Thanks,  Jimena Scott

## 2022-04-13 NOTE — TELEPHONE ENCOUNTER
Steffen Prietouch 4/13/2022 10:33 AM EDT    ???  ----- Message -----  From: Gege Ovalle  Sent: 4/13/2022 10:29 AM EDT  To: [facility]  Subject: Left Foot     Good morning Mrs Juan Luis Kim,    Since my last visit with you my foot remains the same. The swelling still exists and pain. The medicine is not working and Im having pain in my leg. Please call me when time permits. This number will be good.  [phone]    Thanks,  Ita Caballero

## 2022-04-19 ENCOUNTER — HOSPITAL ENCOUNTER (OUTPATIENT)
Dept: VASCULAR SURGERY | Age: 57
Discharge: HOME | End: 2022-04-19
Payer: COMMERCIAL

## 2022-04-19 DIAGNOSIS — M79.662 PAIN AND SWELLING OF LEFT LOWER LEG: ICD-10-CM

## 2022-04-19 DIAGNOSIS — M79.89 PAIN AND SWELLING OF LEFT LOWER LEG: ICD-10-CM

## 2022-04-19 PROCEDURE — 93971 EXTREMITY STUDY: CPT

## 2022-04-19 PROCEDURE — 93971 EXTREMITY STUDY: CPT | Performed by: INTERNAL MEDICINE

## 2022-05-01 ENCOUNTER — APPOINTMENT (OUTPATIENT)
Dept: GENERAL RADIOLOGY | Age: 57
End: 2022-05-01
Attending: EMERGENCY MEDICINE
Payer: COMMERCIAL

## 2022-05-01 ENCOUNTER — HOSPITAL ENCOUNTER (EMERGENCY)
Age: 57
Discharge: HOME | End: 2022-05-01
Attending: EMERGENCY MEDICINE
Payer: COMMERCIAL

## 2022-05-01 VITALS
OXYGEN SATURATION: 97 % | WEIGHT: 174 LBS | RESPIRATION RATE: 18 BRPM | HEART RATE: 85 BPM | HEIGHT: 68 IN | TEMPERATURE: 99.1 F | DIASTOLIC BLOOD PRESSURE: 91 MMHG | SYSTOLIC BLOOD PRESSURE: 155 MMHG | BODY MASS INDEX: 26.37 KG/M2

## 2022-05-01 DIAGNOSIS — R07.89 MUSCULOSKELETAL CHEST PAIN: Primary | ICD-10-CM

## 2022-05-01 LAB
ALBUMIN SERPL-MCNC: 4 G/DL (ref 3.5–5)
ALBUMIN/GLOB SERPL: 1 {RATIO} (ref 1.1–2.2)
ALP SERPL-CCNC: 83 U/L (ref 45–117)
ALT SERPL-CCNC: 17 U/L (ref 12–78)
ANION GAP SERPL CALC-SCNC: 9 MMOL/L (ref 5–15)
AST SERPL-CCNC: 15 U/L (ref 15–37)
BASOPHILS # BLD: 0 K/UL (ref 0–0.1)
BASOPHILS NFR BLD: 1 % (ref 0–1)
BILIRUB SERPL-MCNC: 0.4 MG/DL (ref 0.2–1)
BUN SERPL-MCNC: 10 MG/DL (ref 6–20)
BUN/CREAT SERPL: 14 (ref 12–20)
CALCIUM SERPL-MCNC: 10.1 MG/DL (ref 8.5–10.1)
CHLORIDE SERPL-SCNC: 104 MMOL/L (ref 97–108)
CO2 SERPL-SCNC: 27 MMOL/L (ref 21–32)
CREAT SERPL-MCNC: 0.74 MG/DL (ref 0.55–1.02)
D DIMER PPP FEU-MCNC: 0.28 MG/L FEU (ref 0–0.65)
DIFFERENTIAL METHOD BLD: NORMAL
EOSINOPHIL # BLD: 0.1 K/UL (ref 0–0.4)
EOSINOPHIL NFR BLD: 2 % (ref 0–7)
ERYTHROCYTE [DISTWIDTH] IN BLOOD BY AUTOMATED COUNT: 12.3 % (ref 11.5–14.5)
GLOBULIN SER CALC-MCNC: 3.9 G/DL (ref 2–4)
GLUCOSE SERPL-MCNC: 88 MG/DL (ref 65–100)
HCT VFR BLD AUTO: 45 % (ref 35–47)
HGB BLD-MCNC: 14.6 G/DL (ref 11.5–16)
IMM GRANULOCYTES # BLD AUTO: 0 K/UL (ref 0–0.04)
IMM GRANULOCYTES NFR BLD AUTO: 0 % (ref 0–0.5)
LYMPHOCYTES # BLD: 1 K/UL (ref 0.8–3.5)
LYMPHOCYTES NFR BLD: 21 % (ref 12–49)
MCH RBC QN AUTO: 29.4 PG (ref 26–34)
MCHC RBC AUTO-ENTMCNC: 32.4 G/DL (ref 30–36.5)
MCV RBC AUTO: 90.7 FL (ref 80–99)
MONOCYTES # BLD: 0.3 K/UL (ref 0–1)
MONOCYTES NFR BLD: 6 % (ref 5–13)
NEUTS SEG # BLD: 3.5 K/UL (ref 1.8–8)
NEUTS SEG NFR BLD: 70 % (ref 32–75)
NRBC # BLD: 0 K/UL (ref 0–0.01)
NRBC BLD-RTO: 0 PER 100 WBC
PLATELET # BLD AUTO: 284 K/UL (ref 150–400)
PMV BLD AUTO: 9.9 FL (ref 8.9–12.9)
POTASSIUM SERPL-SCNC: 3.4 MMOL/L (ref 3.5–5.1)
PROT SERPL-MCNC: 7.9 G/DL (ref 6.4–8.2)
RBC # BLD AUTO: 4.96 M/UL (ref 3.8–5.2)
SODIUM SERPL-SCNC: 140 MMOL/L (ref 136–145)
TROPONIN-HIGH SENSITIVITY: 4 NG/L (ref 0–51)
WBC # BLD AUTO: 4.9 K/UL (ref 3.6–11)

## 2022-05-01 PROCEDURE — 36415 COLL VENOUS BLD VENIPUNCTURE: CPT

## 2022-05-01 PROCEDURE — 85379 FIBRIN DEGRADATION QUANT: CPT

## 2022-05-01 PROCEDURE — 71045 X-RAY EXAM CHEST 1 VIEW: CPT

## 2022-05-01 PROCEDURE — 93005 ELECTROCARDIOGRAM TRACING: CPT

## 2022-05-01 PROCEDURE — 80053 COMPREHEN METABOLIC PANEL: CPT

## 2022-05-01 PROCEDURE — 85025 COMPLETE CBC W/AUTO DIFF WBC: CPT

## 2022-05-01 PROCEDURE — 99285 EMERGENCY DEPT VISIT HI MDM: CPT

## 2022-05-01 PROCEDURE — 84484 ASSAY OF TROPONIN QUANT: CPT

## 2022-05-01 RX ORDER — METHOCARBAMOL 500 MG/1
500 TABLET, FILM COATED ORAL 4 TIMES DAILY
Qty: 28 TABLET | Refills: 0 | Status: SHIPPED | OUTPATIENT
Start: 2022-05-01 | End: 2022-05-08

## 2022-05-01 NOTE — ED NOTES
Per pt reports right shoulder pain that started yesterday that radiates to left/right side of chest, denies sob, nausea/vomiting. PMHx HTN and high cholesterol. Emergency Department Nursing Plan of Care       The Nursing Plan of Care is developed from the Nursing assessment and Emergency Department Attending provider initial evaluation. The plan of care may be reviewed in the ED Provider note.     The Plan of Care was developed with the following considerations:   Patient / Family readiness to learn indicated by:verbalized understanding  Persons(s) to be included in education: patient  Barriers to Learning/Limitations:No    [id number] [facility], RAUL    5/1/2022   11:40 AM

## 2022-05-01 NOTE — ED PROVIDER NOTES
EMERGENCY DEPARTMENT HISTORY AND PHYSICAL EXAM      Date: 5/1/2022  Patient Name: Shon Saab  Patient Age and Sex: 65 y.o. female     History of Presenting Illness     Chief Complaint   Patient presents with    Shoulder Pain       History Provided By: Patient    HPI: Shon Saab is a 52-year-old history of HTN hyperlipidemia chronic right brachiocephalic artery dissection presenting with left shoulder pain. Patient reports onset of left neck and shoulder pain upon waking yesterday pain rating from her right chest to the back behind her shoulder blade and down her left arm. Pain is worse with left arm movement. It was relieved with ibuprofen transiently yesterday and again with Tylenol today however she presents for persistent pain. Pain is moderate to severe in nature. Transient shortness of breath yesterday which is now resolved. No nausea vomiting or diaphoresis. No exertional nature to the pain. Pain is sore in nature. There are no other complaints, changes, or physical findings at this time. PCP: Boston Morales NP    No current facility-administered medications on file prior to encounter. Current Outpatient Medications on File Prior to Encounter   Medication Sig Dispense Refill    pravastatin (PRAVACHOL) 40 mg tablet TAKE 1 TABLET BY MOUTH EVERY NIGHT 90 Tablet 3    amLODIPine (NORVASC) 5 mg tablet TAKE 1 TABLET BY MOUTH DAILY 90 Tablet 0    aspirin 81 mg chewable tablet Take 1 Tab by mouth daily.  sucralfate (CARAFATE) 1 gram tablet as needed. (Patient not taking: Reported on 7/26/2021)      albuterol (PROVENTIL HFA, VENTOLIN HFA, PROAIR HFA) 90 mcg/actuation inhaler Take  by inhalation every four (4) hours as needed for Wheezing.  latanoprost (XALATAN) 0.005 % ophthalmic solution 1 Drop nightly.  (Patient not taking: Reported on 4/7/2022)         Past History     Past Medical History:  Past Medical History:   Diagnosis Date    Asthma     Brachiocephalic artery injury     chronic dissection    Glaucoma, low tension     Hyperlipidemia     Hypertension     Ill-defined condition     high cholesterol       Past Surgical History:  Past Surgical History:   Procedure Laterality Date    COLONOSCOPY N/A 5/1/2017    COL[facility]COPY performed by [facility]useppe [facility] at [facility] ENDOSCOPY    HX TONSILLECTOMY         Family History:  Family History   Problem Relation Age of Onset    Hypertension Mother     Glaucoma Father     No Known Problems Sister     No Known Problems Brother     Heart Disease Maternal Grandmother     Heart Attack Maternal Grandmother     Hypertension Maternal Grandmother     Diabetes Maternal Grandmother        Social History:  Social History     Tobacco Use    Smoking status: Never Smoker    Smokeless tobacco: Never Used   Vaping Use    Vaping Use: Never used   Substance Use Topics    Alcohol use: Yes     Alcohol/week: 0.0 standard drinks     Comment: rare    Drug use: No       Allergies:  No Known Allergies      Review of Systems   Review of Systems   Constitutional: Negative for chills and fever. HENT: Negative for congestion and rhinorrhea. Respiratory: Positive for shortness of breath. Cardiovascular: Positive for chest pain. Gastrointestinal: Negative for abdominal pain, nausea and vomiting. Genitourinary: Negative for dysuria and frequency. Musculoskeletal: Positive for arthralgias. Negative for myalgias. All other systems reviewed and are negative. Physical Exam   Physical Exam  Vitals and nursing note reviewed. Constitutional:       General: She is not in acute distress. Appearance: Normal appearance. She is not ill-appearing. HENT:      Head: Normocephalic. Mouth/Throat:      Mouth: Mucous membranes are moist.   Eyes:      Conjunctiva/sclera: Conjunctivae normal.   Cardiovascular:      Rate and Rhythm: Normal rate and regular rhythm. Pulses: Normal pulses.       Comments: Radial and ulnar pulses are symmetric bilaterally  Pulmonary:      Effort: Pulmonary effort is normal.      Breath sounds: Normal breath sounds. Abdominal:      General: Abdomen is flat. Palpations: Abdomen is soft. Musculoskeletal:         General: No deformity. Comments: Tenderness palpation along left trapezius muscle some pain with range of motion of left shoulder   Skin:     General: Skin is warm and dry. Capillary Refill: Capillary refill takes less than 2 seconds. Neurological:      Mental Status: She is alert and oriented to person, place, and time. Mental status is at baseline. Psychiatric:         Behavior: Behavior normal.         Thought Content: Thought content normal.          Diagnostic Study Results     Labs -     Recent Results (from the past 12 hour(s))   D DIMER    Collection Time: 05/01/22 11:36 AM   Result Value Ref Range    D-dimer 0.28 0.00 - 0.65 mg/L FEU   CBC WITH AUTOMATED DIFF    Collection Time: 05/01/22 11:36 AM   Result Value Ref Range    WBC 4.9 3.6 - 11.0 K/uL    RBC 4.96 3.80 - 5.20 M/uL    HGB 14.6 11.5 - 16.0 g/dL    HCT 45.0 35.0 - 47.0 %    MCV 90.7 80.0 - 99.0 FL    MCH 29.4 26.0 - 34.0 PG    MCHC 32.4 30.0 - 36.5 g/dL    RDW 12.3 11.5 - 14.5 %    PLATELET 725 351 - 508 K/uL    MPV 9.9 8.9 - 12.9 FL    NRBC 0.0 0  WBC    ABSOLUTE NRBC 0.00 0.00 - 0.01 K/uL    NEUTROPHILS 70 32 - 75 %    LYMPHOCYTES 21 12 - 49 %    MONOCYTES 6 5 - 13 %    EOSINOPHILS 2 0 - 7 %    BASOPHILS 1 0 - 1 %    IMMATURE GRANULOCYTES 0 0.0 - 0.5 %    ABS. NEUTROPHILS 3.5 1.8 - 8.0 K/UL    ABS. LYMPHOCYTES 1.0 0.8 - 3.5 K/UL    ABS. MONOCYTES 0.3 0.0 - 1.0 K/UL    ABS. EOSINOPHILS 0.1 0.0 - 0.4 K/UL    ABS. BASOPHILS 0.0 0.0 - 0.1 K/UL    ABS. IMM.  GRANS. 0.0 0.00 - 0.04 K/UL    DF AUTOMATED     METABOLIC PANEL, COMPREHENSIVE    Collection Time: 05/01/22 11:36 AM   Result Value Ref Range    Sodium 140 136 - 145 mmol/L    Potassium 3.4 (L) 3.5 - 5.1 mmol/L    Chloride 104 97 - 108 mmol/L    CO2 27 21 - 32 mmol/L    Anion gap 9 5 - 15 mmol/L    Glucose 88 65 - 100 mg/dL    BUN 10 6 - 20 MG/DL    Creatinine 0.74 0.55 - 1.02 MG/DL    BUN/Creatinine ratio 14 12 - 20      GFR est AA >60 >60 ml/min/1.73m2    GFR est non-AA >60 >60 ml/min/1.73m2    Calcium 10.1 8.5 - 10.1 MG/DL    Bilirubin, total 0.4 0.2 - 1.0 MG/DL    ALT (SGPT) 17 12 - 78 U/L    AST (SGOT) 15 15 - 37 U/L    Alk. phosphatase 83 45 - 117 U/L    Protein, total 7.9 6.4 - 8.2 g/dL    Albumin 4.0 3.5 - 5.0 g/dL    Globulin 3.9 2.0 - 4.0 g/dL    A-G Ratio 1.0 (L) 1.1 - 2.2     TROPONIN-HIGH SENSITIVITY    Collection Time: 05/01/22 11:36 AM   Result Value Ref Range    Troponin-High Sensitivity 4 0 - 51 ng/L       Radiologic Studies -   XR CHEST PORT   Final Result   No acute cardiopulmonary process seen        CT Results  (Last 48 hours)    None        CXR Results  (Last 48 hours)               05/01/22 1106  XR CHEST PORT Final result    Impression:  No acute cardiopulmonary process seen       Narrative:  EXAM: XR CHEST PORT       INDICATION: chest pain       COMPARISON: 7/17/2015       FINDINGS: A portable AP radiograph of the chest was obtained at 1056 hours. The   lungs are clear. The cardiac and mediastinal contours and pulmonary vascularity   are normal.  The bones and soft tissues are grossly within normal limits. Medical Decision Making   I am the first provider for this patient. I reviewed the vital signs, available nursing notes, past medical history, past surgical history, family history and social history. Vital Signs-Reviewed the patient's vital signs. Patient Vitals for the past 12 hrs:   Temp Pulse Resp BP SpO2   05/01/22 1039 99.1 °F (37.3 °C) 85 18 (!) 155/91 97 %       Records Reviewed: Nursing Notes and Old Medical Records    Provider Notes (Medical Decision Making):   Differential diagnosis includes musculoskeletal chest pain, trapezius muscle spasm, ACS, PE.     Doubt worsening of chronic dissection given location and nature of pain. Doubt PE however given pleuritic nature of pain and dyspnea yesterday will obtain D-dimer; cannot apply PERC given her age. .  Will evaluate for ACS with EKG troponin. CBC for anemia CMP for electrolyte derangement MICHAEL. Chest x-ray for pneumothorax or other acute cardiopulmonary pathology. ED Course:   Initial assessment performed. The patients presenting problems have been discussed, and they are in agreement with the care plan formulated and outlined with them. I have encouraged them to ask questions as they arise throughout their visit. ED Course as of 05/01/22 1234   Sun May 01, 2022   1044 On chart review 81year-old history hypertension, hyperlipidemia, chronic brachiocephalic artery dissection followed by Dr. Leonela Escamilla, followed by Dr. Loyd Sands, echo NST Holter negative. CTA chest 2/26 showed stable dissection. [WB]   1216 X-ray clear [WB]   1222 Troponin 4, D-dimer negative [WB]   1222 Likely skeletal chest pain [WB]      ED Course User Index  [WB] Kelin Ryan MD     Critical Care Time:   0    Disposition:  Discharge Note:  The patient has been re-evaluated and is ready for discharge. Reviewed available results with patient. Counseled patient on diagnosis and care plan. Patient has expressed understanding, and all questions have been answered. Patient agrees with plan and agrees to follow up as recommended, or to return to the ED if their symptoms worsen. Discharge instructions have been provided and explained to the patient, along with reasons to return to the ED. PLAN:  Current Discharge Medication List      START taking these medications    Details   methocarbamoL (ROBAXIN) 500 mg tablet Take 1 Tablet by mouth four (4) times daily for 7 days. Qty: 28 Tablet, Refills: 0  Start date: 5/1/2022, End date: 5/8/2022           2.    Follow-up Information     Follow up With Specialties Details Why Yvette Sports    [facility] EMERGENCY DEPT Emergency Medicine  As needed, If symptoms haylie Cami [facility]  [phone]        3. Return to ED if worse     Diagnosis     Clinical Impression:   1. Musculoskeletal chest pain        Attestations:    Soco Maki M.D. Please note that this dictation was completed with [employer], the computer voice recognition software. Quite often unanticipated grammatical, syntax, homophones, and other interpretive errors are inadvertently transcribed by the computer software. Please disregard these errors. Please excuse any errors that have escaped final proofreading. Thank you.

## 2022-05-01 NOTE — DISCHARGE INSTRUCTIONS
Take aleve 500 mg twice a day, tylenol 1000 mg every 6 hours, robaxin as prescribed. Follow up with PCP.

## 2022-05-02 LAB
ATRIAL RATE: 80 BPM
CALCULATED P AXIS, ECG09: 63 DEGREES
CALCULATED R AXIS, ECG10: 14 DEGREES
CALCULATED T AXIS, ECG11: 37 DEGREES
DIAGNOSIS, 93000: NORMAL
P-R INTERVAL, ECG05: 160 MS
Q-T INTERVAL, ECG07: 354 MS
QRS DURATION, ECG06: 76 MS
QTC CALCULATION (BEZET), ECG08: 408 MS
VENTRICULAR RATE, ECG03: 80 BPM

## 2022-05-03 ENCOUNTER — PATIENT MESSAGE (OUTPATIENT)
Dept: FAMILY MEDICINE CLINIC | Age: 57
End: 2022-05-03

## 2022-05-03 DIAGNOSIS — M62.830 BACK SPASM: Primary | ICD-10-CM

## 2022-05-03 RX ORDER — CYCLOBENZAPRINE HCL 10 MG
10 TABLET ORAL
Qty: 30 TABLET | Refills: 1 | Status: SHIPPED | OUTPATIENT
Start: 2022-05-03 | End: 2022-07-06 | Stop reason: SDUPTHER

## 2022-05-03 NOTE — TELEPHONE ENCOUNTER
From: Ana Desouza  To: Brody Nelson NP  Sent: 5/3/2022 10:37 AM EDT  Subject: Appointment Request    Appointment Request From: Ana Desuoza    With Provider: Brody Nelson NP [facility]    Preferred Date Range: 5/3/2022  5/4/2022    Preferred Times: Any Time    Reason for visit: Routine/Problem Visit    Comments:  Chest pain shoulder. check aneurysm. Methocarbamol 500mg is not helping.

## 2022-05-03 NOTE — TELEPHONE ENCOUNTER
Stop Robaxin  Ordered Flexeril, can also take Tylenol ES 2 tabs every 6 hours as needed for pain  Orders Placed This Encounter    cyclobenzaprine (FLEXERIL) 10 mg tablet     Sig: Take 1 Tablet by mouth three (3) times daily as needed for Muscle Spasm(s).      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

## 2022-07-06 ENCOUNTER — OFFICE VISIT (OUTPATIENT)
Dept: FAMILY MEDICINE CLINIC | Age: 57
End: 2022-07-06
Payer: COMMERCIAL

## 2022-07-06 VITALS
HEIGHT: 68 IN | TEMPERATURE: 98.4 F | WEIGHT: 171 LBS | DIASTOLIC BLOOD PRESSURE: 83 MMHG | SYSTOLIC BLOOD PRESSURE: 127 MMHG | BODY MASS INDEX: 25.91 KG/M2 | HEART RATE: 73 BPM | OXYGEN SATURATION: 97 % | RESPIRATION RATE: 16 BRPM

## 2022-07-06 DIAGNOSIS — M62.830 BACK SPASM: ICD-10-CM

## 2022-07-06 DIAGNOSIS — L25.9 CONTACT DERMATITIS, UNSPECIFIED CONTACT DERMATITIS TYPE, UNSPECIFIED TRIGGER: Primary | ICD-10-CM

## 2022-07-06 PROCEDURE — 99212 OFFICE O/P EST SF 10 MIN: CPT | Performed by: NURSE PRACTITIONER

## 2022-07-06 RX ORDER — PREDNISONE 20 MG/1
20 TABLET ORAL 2 TIMES DAILY
Qty: 10 TABLET | Refills: 0 | Status: SHIPPED | OUTPATIENT
Start: 2022-07-06 | End: 2022-07-11

## 2022-07-06 RX ORDER — TRIAMCINOLONE ACETONIDE 1 MG/G
CREAM TOPICAL 2 TIMES DAILY
Qty: 60 G | Refills: 1 | Status: SHIPPED | OUTPATIENT
Start: 2022-07-06 | End: 2022-10-11 | Stop reason: SDUPTHER

## 2022-07-06 RX ORDER — CYCLOBENZAPRINE HCL 10 MG
10 TABLET ORAL
Qty: 30 TABLET | Refills: 1 | Status: SHIPPED | OUTPATIENT
Start: 2022-07-06

## 2022-07-06 NOTE — PROGRESS NOTES
Chief Complaint   Patient presents with    Rash     x 3 weeks       1. \"Have you been to the ER, urgent care clinic since your last visit? Hospitalized since your last visit? \" Yes When: 5/1/22 [facility] for shoulder pain    2. \"Have you seen or consulted any other health care providers outside of the [address] [place] [address] since your last visit? \" No     3. For patients aged 39-70: Has the patient had a colonoscopy / FIT/ Cologuard? No      If the patient is female:    4. For patients aged 41-77: Has the patient had a mammogram within the past 2 years? Yes - Care Gap present. Most recent result on file      5. For patients aged 21-65: Has the patient had a pap smear? Yes - Care Gap present.  Rooming MA/LPN to request most recent results - Dr Cornelia Bowden, does not remember first name or practice    Health Maintenance Due   Topic Date Due    Cervical cancer screen  Never done    Shingrix Vaccine Age 50> (1 of 2) Never done

## 2022-07-06 NOTE — PROGRESS NOTES
Raghavendra Lam (: 1965) is a 65 y.o. female, established patient, here for evaluation of the following chief complaint(s):  Rash (x 3 weeks)       ASSESSMENT/PLAN:  Below is the assessment and plan developed based on review of pertinent history, physical exam, labs, studies, and medications. 1. Contact dermatitis, unspecified contact dermatitis type, unspecified trigger - given oral steroid, steroid cream,  Avoid contact with irritants. Use mild soaps and lotions. Avoid prolonged use of steroid cream to avoid hypopigmentation  -     predniSONE (DELTASONE) 20 mg tablet; Take 1 Tablet by mouth two (2) times a day for 5 days. , Normal, Disp-10 Tablet, R-0  -     triamcinolone acetonide (KENALOG) 0.1 % topical cream; Apply  to affected area two (2) times a day. use thin layer, Normal, Disp-60 g, R-1  2. Back spasm  -     cyclobenzaprine (FLEXERIL) 10 mg tablet; Take 1 Tablet by mouth three (3) times daily as needed for Muscle Spasm(s). , Normal, Disp-30 Tablet, R-1      Return if symptoms worsen or fail to improve. SUBJECTIVE/OBJECTIVE:  HPI  Patient comes in today for rash  Has a raised, pruritic rash on bilateral elbows, right forearm, neck, and behind ears. The worst area of the rash is located on left forearm. 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 She has been using Aveeno wash and lotion, but had been using coconut oil which has fragrances, vaseline.   Has used hydrocortisone cream without any relief  No Known Allergies    Past Medical History:   Diagnosis Date    Asthma     Brachiocephalic artery injury     chronic dissection    Glaucoma, low tension     Hyperlipidemia     Hypertension     Ill-defined condition     high cholesterol       Past Surgical History:   Procedure Laterality Date    COLONOSCOPY N/A 2017    [facility]    HX TONSILLECTOMY         Social History Socioeconomic History    Marital status:      Spouse name: Not on file    Number of children: 2    Years of education: Not on file    Highest education level: Not on file   Occupational History    Occupation: [employer]   Tobacco Use    Smoking status: Never Smoker    Smokeless tobacco: Never Used   Vaping Use    Vaping Use: Never used   Substance and Sexual Activity    Alcohol use: Yes     Alcohol/week: 0.0 standard drinks     Comment: rare    Drug use: No    Sexual activity: Yes     Partners: Male   Other Topics Concern    Not on file   Social History Narrative    Works at [employer]. Social Determinants of Health     Financial Resource Strain:     Difficulty of Paying Living Expenses: Not on file   Food Insecurity:     Worried About Running Out of Food in the Last Year: Not on file    [place] of Food in the Last Year: Not on file   Transportation Needs:     Lack of Transportation (Medical): Not on file    Lack of Transportation (Non-Medical):  Not on file   Physical Activity:     Days of Exercise per Week: Not on file    Minutes of Exercise per Session: Not on file   Stress:     Feeling of Stress : Not on file   Social Connections:     Frequency of Communication with Friends and Family: Not on file    Frequency of Social Gatherings with Friends and Family: Not on file    Attends Congregation Services: Not on file    Active Member of [address] [place] [address] or Organizations: Not on file    Attends Club or Organization Meetings: Not on file    Marital Status: Not on file   Intimate Partner Violence:     Fear of Current or Ex-Partner: Not on file    Emotionally Abused: Not on file    Physically Abused: Not on file    Sexually Abused: Not on file   Housing Stability:     Unable to Pay for Housing in the Last Year: Not on file    Number of [place] in the Last Year: Not on file    Unstable Housing in the Last Year: Not on file       Family History   Problem Relation Age of Onset    Hypertension Mother     Glaucoma Father     No Known Problems Sister     No Known Problems Brother     Heart Disease Maternal Grandmother     Heart Attack Maternal Grandmother     Hypertension Maternal Grandmother     Diabetes Maternal Grandmother        Current Outpatient Medications   Medication Sig    amLODIPine (NORVASC) 5 mg tablet TAKE 1 TABLET BY MOUTH DAILY    cyclobenzaprine (FLEXERIL) 10 mg tablet Take 1 Tablet by mouth three (3) times daily as needed for Muscle Spasm(s).  pravastatin (PRAVACHOL) 40 mg tablet TAKE 1 TABLET BY MOUTH EVERY NIGHT    aspirin 81 mg chewable tablet Take 1 Tab by mouth daily.  albuterol (PROVENTIL HFA, VENTOLIN HFA, PROAIR HFA) 90 mcg/actuation inhaler Take  by inhalation every four (4) hours as needed for Wheezing.  sucralfate (CARAFATE) 1 gram tablet as needed. (Patient not taking: Reported on 7/26/2021)    latanoprost (XALATAN) 0.005 % ophthalmic solution 1 Drop nightly. (Patient not taking: Reported on 4/7/2022)     No current facility-administered medications for this visit. Review of Systems   Constitutional: Negative. Respiratory: Negative. Cardiovascular: Negative. Skin: Positive for rash (pruritic rash). Vitals:    07/06/22 0929   BP: 127/83   Pulse: 73   Resp: 16   Temp: 98.4 °F (36.9 °C)   TempSrc: Oral   SpO2: 97%   Weight: 171 lb (77.6 kg)   Height: 5' 8\" (1.727 m)     Physical Exam  Vitals reviewed. Constitutional:       Appearance: Normal appearance. She is well-developed and well-groomed. Cardiovascular:      Rate and Rhythm: Normal rate. Pulmonary:      Effort: Pulmonary effort is normal.   Skin:     General: Skin is warm and dry. Findings: Rash present. Rash is papular (hyperpigmented papular rash noted left neck, bilateral elbows and right forearm). Neurological:      Mental Status: She is alert and oriented to person, place, and time.    Psychiatric:         Behavior: Behavior is cooperative. On this date 07/06/2022 I have spent 15 minutes reviewing previous notes, test results and face to face with the patient discussing the diagnosis and importance of compliance with the treatment plan as well as documenting on the day of the visit. An electronic signature was used to authenticate this note.   -- Cassie Mazariegos NP

## 2022-07-06 NOTE — PATIENT INSTRUCTIONS
Dermatitis: Care Instructions  Overview  Dermatitis is the general name used for any rash or inflammation of the skin. Different kinds of dermatitis cause different kinds of rashes. Common causes of a rash include new medicines, plants (such as poison oak or poison ivy), heat, and stress. Certain illnesses can also cause a rash. An allergic reaction to something that touches your skin, such as latex, nickel, or poison ivy, is called contact dermatitis. Contact dermatitis may also be caused by something that irritates the skin, such as bleach, a chemical, or soap. These types of rashes cannot be spread from person to person. How long your rash will last depends on what caused it. Rashes may last a few days or months. Follow-up care is a key part of your treatment and safety. Be sure to make and go to all appointments, and call your doctor if you are having problems. It's also a good idea to know your test results and keep a list of the medicines you take. How can you care for yourself at home? · Do not scratch the rash. Cut your nails short, and file them smooth. Or wear gloves if this helps keep you from scratching. · Wash the area with water only. Pat dry. · Put cold, wet cloths on the rash to reduce itching. · Keep cool, and stay out of the sun. · Leave the rash open to the air as much as possible. · If the rash itches, use hydrocortisone cream. Follow the directions on the label. Calamine lotion may help for plant rashes. · If itching affects your sleep, ask your doctor if you can take an antihistamine that might reduce itching and make you sleepy, such as diphenhydramine (Benadryl). Be safe with medicines. Read and follow all instructions on the label. · If your doctor prescribed a cream, use it as directed. If your doctor prescribed medicine, take it exactly as directed. When should you call for help?    Call your doctor now or seek immediate medical care if:    · You have symptoms of infection, such as:  ? Increased pain, swelling, warmth, or redness. ? Red streaks leading from the area. ? Pus draining from the area. ? A fever.     · You have joint pain along with the rash. Watch closely for changes in your health, and be sure to contact your doctor if:    · Your rash is changing or getting worse.     · You are not getting better as expected. Where can you learn more? Go to http://www.gray.com/  Enter F270 in the search box to learn more about \"Dermatitis: Care Instructions. \"  Current as of: November 15, 2021               Content Version: 13.2  © 1699-3375 [employer]. Care instructions adapted under license by [employer] (which disclaims liability or warranty for this information). If you have questions about a medical condition or this instruction, always ask your healthcare professional. [address] any warranty or liability for your use of this information.

## 2022-08-23 ENCOUNTER — NURSE TRIAGE (OUTPATIENT)
Dept: OTHER | Facility: CLINIC | Age: 57
End: 2022-08-23

## 2022-08-23 NOTE — TELEPHONE ENCOUNTER
Received call from Nav Ying at [facility] with [employer] [facility]. Subjective: Caller states \"Having some left leg swelling for a couple weeks now. \"     Current Symptoms: left lower leg swelling, knee down, pain, denies chest pain/SOB, no redness or discoloration, no prior injuries    Onset: 2 weeks ago; gradual, intermittent    Associated Symptoms: reduced activity    Pain Severity: 9/10; aching and pain; constant    Temperature: Denies      What has been tried: tylenol, elevation, compression stocking, doesn't help    LMP: NA Pregnant: NA    Recommended disposition: See HCP within 4 Hours (or PCP triage). Advised to go to [facility] if unable to get appointment in time frame. Care advice provided, patient verbalizes understanding; denies any other questions or concerns; instructed to call back for any new or worsening symptoms. Patient/Caller agrees with recommended disposition; writer provided warm transfer to Tegan Glover at [facility] for appointment scheduling    Attention Provider: Thank you for allowing me to participate in the care of your patient. The patient was connected to triage in response to information provided to the [facility]. Please do not respond through this encounter as the response is not directed to a shared pool.       Reason for Disposition   [1] Thigh, calf, or ankle swelling AND [2] only 1 side    Protocols used: Leg Swelling and Edema-ADULT-

## 2022-09-02 ENCOUNTER — NURSE TRIAGE (OUTPATIENT)
Dept: OTHER | Facility: CLINIC | Age: 57
End: 2022-09-02

## 2022-09-02 NOTE — TELEPHONE ENCOUNTER
Received call from [place] at [facility] with Red Flag Complaint. Subjective: Caller states \"has swelling in left leg up to knee, left and right ankle and feet. Has pain in left leg and right foot\"     Current Symptoms:     Onset: 2 weeks ago; intermittent    Associated Symptoms: NA    Pain Severity: 9/10; difficult to walk;     Temperature:      What has been tried: nothing    LMP: NA Pregnant: No    Recommended disposition: See in Office Today    Care advice provided, patient verbalizes understanding; denies any other questions or concerns; instructed to call back for any new or worsening symptoms. Was disconnected with pt when attempting to transfer to scheduling. Tried calling pt back multiple times with no answer. Spoke with [facility] Cronin [place], dispo  provided. She will attempt to reach pt as well    Attention Provider: Thank you for allowing me to participate in the care of your patient. The patient was connected to triage in response to information provided to the ECC. Please do not respond through this encounter as the response is not directed to a shared pool.         Reason for Disposition   MODERATE swelling of both ankles (e.g., swelling extends up to the knees) AND new-onset or worsening    Protocols used: Leg Swelling and Edema-ADULT-[place]

## 2022-09-06 ENCOUNTER — OFFICE VISIT (OUTPATIENT)
Dept: URGENT CARE | Age: 57
End: 2022-09-06
Payer: COMMERCIAL

## 2022-09-06 VITALS
TEMPERATURE: 97.5 F | OXYGEN SATURATION: 98 % | DIASTOLIC BLOOD PRESSURE: 95 MMHG | BODY MASS INDEX: 26.3 KG/M2 | SYSTOLIC BLOOD PRESSURE: 135 MMHG | HEART RATE: 101 BPM | WEIGHT: 173 LBS | RESPIRATION RATE: 16 BRPM

## 2022-09-06 DIAGNOSIS — R60.0 BILATERAL LEG EDEMA: Primary | ICD-10-CM

## 2022-09-06 PROCEDURE — 99202 OFFICE O/P NEW SF 15 MIN: CPT | Performed by: FAMILY MEDICINE

## 2022-09-06 RX ORDER — HYDROCHLOROTHIAZIDE 25 MG/1
25 TABLET ORAL DAILY
Qty: 30 TABLET | Refills: 0 | Status: SHIPPED | OUTPATIENT
Start: 2022-09-06 | End: 2022-10-11 | Stop reason: SDUPTHER

## 2022-09-06 NOTE — PROGRESS NOTES
Ankle swelling  This is a new problem. The current episode started more than 1 week ago. The problem occurs daily. The problem has not changed since onset. Pertinent negatives include no chest pain, no abdominal pain, no headaches and no shortness of breath. Associated symptoms comments: - left lower leg and ankle edema more than rt - feels tightness   No joint/ muscle pain   No trouble walking   No swelling when wakes up  and develops coni the day- she is on her feet all day @ work . The symptoms are aggravated by walking and standing. The symptoms are relieved by rest and laying down. She has tried nothing for the symptoms. Past Medical History:   Diagnosis Date    Asthma     Brachiocephalic artery injury     chronic dissection    Glaucoma, low tension     Hyperlipidemia     Hypertension     Ill-defined condition     high cholesterol        Past Surgical History:   Procedure Laterality Date    COLONOSCOPY N/A 5/1/2017    COLONOSCOPY performed by Cherie Barajas MD at hospitals ENDOSCOPY    HX TONSILLECTOMY           Family History   Problem Relation Age of Onset    Hypertension Mother     Glaucoma Father     No Known Problems Sister     No Known Problems Brother     Heart Disease Maternal Grandmother     Heart Attack Maternal Grandmother     Hypertension Maternal Grandmother     Diabetes Maternal Grandmother         Social History     Socioeconomic History    Marital status:      Spouse name: Not on file    Number of children: 2    Years of education: Not on file    Highest education level: Not on file   Occupational History    Occupation: [employer]   Tobacco Use    Smoking status: Never    Smokeless tobacco: Never   Vaping Use    Vaping Use: Never used   Substance and Sexual Activity    Alcohol use:  Yes     Alcohol/week: 0.0 standard drinks     Comment: rare    Drug use: No    Sexual activity: Yes     Partners: Male   Other Topics Concern    Not on file   Social History Narrative Works at [employer]. Social Determinants of Health     Financial Resource Strain: Not on file   Food Insecurity: Not on file   Transportation Needs: Not on file   Physical Activity: Not on file   Stress: Not on file   Social Connections: Not on file   Intimate Partner Violence: Not on file   Housing Stability: Not on file                ALLERGIES: Patient has no known allergies. Review of Systems   Constitutional:  Negative for chills and fever. Respiratory:  Negative for shortness of breath. Cardiovascular:  Negative for chest pain. Gastrointestinal:  Negative for abdominal pain. Musculoskeletal:  Negative for arthralgias, back pain, gait problem, joint swelling and myalgias. Neurological:  Negative for headaches. All other systems reviewed and are negative. Vitals:    09/06/22 1553   BP: (!) 135/95   Pulse: (!) 101   Resp: 16   Temp: 97.5 °F (36.4 °C)   SpO2: 98%   Weight: 173 lb (78.5 kg)       Physical Exam  Vitals and nursing note reviewed. Cardiovascular:      Rate and Rhythm: Normal rate and regular rhythm. Pulmonary:      Effort: Pulmonary effort is normal.      Breath sounds: Normal breath sounds. Musculoskeletal:         General: No tenderness or signs of injury. Right lower leg: No edema. Left lower leg: Edema (trace) present. Comments: Edema on Left lower leg and ankle   No calf tenderness bilateral  No vericose veins        MDM    Procedures        ICD-10-CM ICD-9-CM    1. Bilateral leg edema  R60.0 782.3     leg > Rt           Secondary to Amlodipine     Start HCTZ- if DAGOBERTO stable otr low, can stop amlodipine   Medications Ordered Today   Medications    hydroCHLOROthiazide (HYDRODIURIL) 25 mg tablet     Sig: Take 1 Tablet by mouth daily. Dispense:  30 Tablet     Refill:  0     No results found for any visits on 09/06/22. The patients condition was discussed with the patient and they understand.   The patient is to follow up with primary care doctor. If signs and symptoms become worse the pt is to go to the ER. The patient is to take medications as prescribed.

## 2022-10-11 ENCOUNTER — VIRTUAL VISIT (OUTPATIENT)
Dept: FAMILY MEDICINE CLINIC | Age: 57
End: 2022-10-11
Payer: COMMERCIAL

## 2022-10-11 DIAGNOSIS — I10 ESSENTIAL HYPERTENSION: ICD-10-CM

## 2022-10-11 DIAGNOSIS — M25.472 ANKLE EDEMA, BILATERAL: Primary | ICD-10-CM

## 2022-10-11 DIAGNOSIS — M25.471 ANKLE EDEMA, BILATERAL: Primary | ICD-10-CM

## 2022-10-11 DIAGNOSIS — L25.9 CONTACT DERMATITIS, UNSPECIFIED CONTACT DERMATITIS TYPE, UNSPECIFIED TRIGGER: ICD-10-CM

## 2022-10-11 PROCEDURE — 99213 OFFICE O/P EST LOW 20 MIN: CPT | Performed by: NURSE PRACTITIONER

## 2022-10-11 RX ORDER — TRIAMCINOLONE ACETONIDE 1 MG/G
CREAM TOPICAL 2 TIMES DAILY
Qty: 60 G | Refills: 1 | Status: SHIPPED | OUTPATIENT
Start: 2022-10-11

## 2022-10-11 RX ORDER — HYDROCHLOROTHIAZIDE 25 MG/1
25 TABLET ORAL DAILY
Qty: 90 TABLET | Refills: 3 | Status: SHIPPED | OUTPATIENT
Start: 2022-10-11

## 2022-10-11 RX ORDER — FLUCONAZOLE 150 MG/1
TABLET ORAL
Qty: 6 TABLET | Refills: 0 | Status: SHIPPED | OUTPATIENT
Start: 2022-10-11

## 2022-10-11 NOTE — PROGRESS NOTES
Chief Complaint   Patient presents with    Ankle swelling     x 1 month       1. \"Have you been to the ER, urgent care clinic since your last visit? Hospitalized since your last visit? \" Yes When: [facility] urgent care due to ankle swelling     2. \"Have you seen or consulted any other health care providers outside of the [address] [place] [address] since your last visit? \" No     3. For patients aged 39-70: Has the patient had a colonoscopy / FIT/ Cologuard? Yes - Care Gap present. Most recent result on file      If the patient is female:    4. For patients aged 41-77: Has the patient had a mammogram within the past 2 years? Yes - Care Gap present. Rooming MA/LPN to request most recent results [phone]      5. For patients aged 21-65: Has the patient had a pap smear? Yes - Care Gap present.  Rooming MA/LPN to request most recent results [address] Maintenance Due   Topic Date Due    Cervical cancer screen  Never done    Shingrix Vaccine Age 49> (1 of 2) Never done    COVID-19 Vaccine (4 - Booster for Moderna series) 03/03/2022    Flu Vaccine (1) 08/01/2022    Breast Cancer Screen Mammogram  09/11/2022

## 2022-10-11 NOTE — PROGRESS NOTES
Pool Grijalva is a 65 y.o. female who was seen by synchronous (real-time) audio-video technology on 10/11/2022 for Ankle swelling (x 1 month)        Assessment & Plan:   Diagnoses and all orders for this visit:    1. Ankle edema, bilateral - continue HCTZ. Encouraged compression stockings. Swelling could be related to use of CCB - patient to hold amlodipine for 3-4 days to see if improvement. Monitor BP while holding medication. Patient to send message with update. 2. Essential hypertension  -     hydroCHLOROthiazide (HYDRODIURIL) 25 mg tablet; Take 1 Tablet by mouth daily. 3. Contact dermatitis, unspecified contact dermatitis type, unspecified trigger  -     triamcinolone acetonide (KENALOG) 0.1 % topical cream; Apply  to affected area two (2) times a day. use thin layer  -     fluconazole (DIFLUCAN) 150 mg tablet; Take 1 tablet by mouth every 4 days for 6 doses      I spent at least 22 minutes on this visit with this established patient. Subjective: [facility]. States usually occurs after being on feet all day. Was given HCTZ. Has been helping, When she takes medication, it does help. Does not use much salt in diet. She is on her feet all day. She has been wearing compression stockings. No dyspnea, no orthopnea. No varicose veins. Of note, she is taking amlodipine 5mg  Prior to Admission medications    Medication Sig Start Date End Date Taking? Authorizing Provider   triamcinolone acetonide (KENALOG) 0.1 % topical cream Apply  to affected area two (2) times a day. use thin layer 10/11/22  Yes Jd Virgen NP   hydroCHLOROthiazide (HYDRODIURIL) 25 mg tablet Take 1 Tablet by mouth daily.  10/11/22  Yes Jd Virgne NP   fluconazole (DIFLUCAN) 150 mg tablet Take 1 tablet by mouth every 4 days for 6 doses 10/11/22  Yes Jd Virgen NP   cyclobenzaprine (FLEXERIL) 10 mg tablet Take 1 Tablet by mouth three (3) times daily as needed for Muscle Spasm(s). 7/6/22  Yes Rebecca Virgen NP   amLODIPine (NORVASC) 5 mg tablet TAKE 1 TABLET BY MOUTH DAILY 5/10/22  Yes Susie Virgen NP   pravastatin (PRAVACHOL) 40 mg tablet TAKE 1 TABLET BY MOUTH EVERY NIGHT 3/19/22  Yes Reina Barcenas MD   albuterol (PROVENTIL HFA, VENTOLIN HFA, PROAIR HFA) 90 mcg/actuation inhaler Take  by inhalation every four (4) hours as needed for Wheezing. Yes Provider, Historical   hydroCHLOROthiazide (HYDRODIURIL) 25 mg tablet Take 1 Tablet by mouth daily. 9/6/22 10/11/22  Julia Melvin MD   triamcinolone acetonide (KENALOG) 0.1 % topical cream Apply  to affected area two (2) times a day. use thin layer 7/6/22 10/11/22  Rebecca Virgen NP   aspirin 81 mg chewable tablet Take 1 Tab by mouth daily. Patient not taking: Reported on 10/11/2022 3/19/21 10/11/22  Nessa Mehta NP   sucralfate (CARAFATE) 1 gram tablet as needed. Patient not taking: No sig reported 1/13/21 10/11/22  Provider, Historical   latanoprost (XALATAN) 0.005 % ophthalmic solution 1 Drop nightly. Patient not taking: No sig reported 12/23/20 10/11/22  Provider, Historical     Patient Active Problem List   Diagnosis Code    HTN (hypertension) I10    Bruit R09.89    Chest pain R07.9    Dyslipidemia E78.5    Carpal tunnel syndrome of left wrist G56.02    Cervical spondylosis N61.147    Brachiocephalic artery injury S84.914O     Current Outpatient Medications   Medication Sig Dispense Refill    triamcinolone acetonide (KENALOG) 0.1 % topical cream Apply  to affected area two (2) times a day. use thin layer 60 g 1    hydroCHLOROthiazide (HYDRODIURIL) 25 mg tablet Take 1 Tablet by mouth daily. 90 Tablet 3    fluconazole (DIFLUCAN) 150 mg tablet Take 1 tablet by mouth every 4 days for 6 doses 6 Tablet 0    cyclobenzaprine (FLEXERIL) 10 mg tablet Take 1 Tablet by mouth three (3) times daily as needed for Muscle Spasm(s).  30 Tablet 1    amLODIPine (NORVASC) 5 mg tablet TAKE 1 TABLET BY MOUTH DAILY 90 Tablet 3    pravastatin (PRAVACHOL) 40 mg tablet TAKE 1 TABLET BY MOUTH EVERY NIGHT 90 Tablet 3    albuterol (PROVENTIL HFA, VENTOLIN HFA, PROAIR HFA) 90 mcg/actuation inhaler Take  by inhalation every four (4) hours as needed for Wheezing. No Known Allergies  Past Medical History:   Diagnosis Date    Asthma     Brachiocephalic artery injury     chronic dissection    Glaucoma, low tension     Hyperlipidemia     Hypertension     Ill-defined condition     high cholesterol     Past Surgical History:   Procedure Laterality Date    COLONOSCOPY N/A 5/1/2017    COLONOSCOPY performed by Kenzie Akers MD at [facility] ENDOSCOPY    HX TONSILLECTOMY       Family History   Problem Relation Age of Onset    Hypertension Mother     Glaucoma Father     No Known Problems Sister     No Known Problems Brother     Heart Disease Maternal Grandmother     Heart Attack Maternal Grandmother     Hypertension Maternal Grandmother     Diabetes Maternal Grandmother      Social History     Tobacco Use    Smoking status: Never    Smokeless tobacco: Never   Substance Use Topics    Alcohol use: Yes     Alcohol/week: 0.0 standard drinks     Comment: rare       Review of Systems   Constitutional: Negative. Respiratory: Negative. Cardiovascular:  Positive for leg swelling (bilateral ankle swelling). Negative for chest pain and palpitations. Gastrointestinal: Negative. Genitourinary: Negative. Musculoskeletal: Negative. Neurological: Negative. Objective:   No flowsheet data found.    General: alert, cooperative, no distress   Mental  status: normal mood, behavior, speech, dress, motor activity, and thought processes, able to follow commands   HENT: NCAT   Neck: no visualized mass   Resp: no respiratory distress   Neuro: no gross deficits   Skin: no discoloration or lesions of concern on visible areas   Psychiatric: normal affect, consistent with stated mood, no evidence of hallucinations       We discussed the expected course, resolution and complications of the diagnosis(es) in detail. Medication risks, benefits, costs, interactions, and alternatives were discussed as indicated. I advised her to contact the office if her condition worsens, changes or fails to improve as anticipated. She expressed understanding with the diagnosis(es) and plan. Yoyl Ojeda, was evaluated through a synchronous (real-time) audio-video encounter. The patient (or guardian if applicable) is aware that this is a billable service, which includes applicable co-pays. This Virtual Visit was conducted with patient's (and/or legal guardian's) consent. The visit was conducted pursuant to the emergency declaration under the [address] [place] [address], [address] [place] [address] authority and the [employer] and [employer] General Act. Patient identification was verified, and a caregiver was present when appropriate. The patient was located at: Home: [address] [place] [phone]  The provider was located at:  Facility (Appt Department): [facility] [address]

## 2022-10-19 ENCOUNTER — PATIENT MESSAGE (OUTPATIENT)
Dept: FAMILY MEDICINE CLINIC | Age: 57
End: 2022-10-19

## 2022-10-20 NOTE — TELEPHONE ENCOUNTER
Teja Kumar 10/20/2022 9:07 AM EDT      ----- Message -----  From: Patti Brownlee  Sent: 10/19/2022 6:39 PM EDT  To: Jovan Mcclelland  Subject: Mariama Johnson been monitoring my blood pressure for the past 7 days and here are my results. I did two readings. I forgot to take a reading on Oct. 18th    Wednesday, Oct. 12th 3:40pm 106/78 108/74  Thursday, Oct 13th 4:30pm 127/86 139/90  Friday, Oct 14th 4:25pm 111/75 118/80  Saturday Oct 15th 9:55pm 112/75 106/80  Jax Oct 16th 7:00pm 118/80 122/82  Monday Oct 17th 6:50pm 110/75 110/76  Wednesday, Oct 19th 6:35pm 124/82 114/85  Let me know what you think.

## 2023-03-19 RX ORDER — PRAVASTATIN SODIUM 40 MG/1
TABLET ORAL
Qty: 90 TABLET | Refills: 3 | Status: SHIPPED | OUTPATIENT
Start: 2023-03-19

## 2023-09-25 RX ORDER — HYDROCHLOROTHIAZIDE 25 MG/1
25 TABLET ORAL DAILY
Qty: 90 TABLET | Refills: 3 | OUTPATIENT
Start: 2023-09-25

## 2023-09-25 NOTE — TELEPHONE ENCOUNTER
For Pharmacy Admin Tracking Only    Program: Medication Refill  CPA in place:    Recommendation Provided To:    Intervention Detail: New Rx: 1, reason: Patient Preference  Intervention Accepted By:   Dhiraj Kaimnski Closed?:    Time Spent (min): 5

## 2023-09-26 RX ORDER — HYDROCHLOROTHIAZIDE 25 MG/1
25 TABLET ORAL DAILY
Qty: 30 TABLET | Refills: 0 | Status: SHIPPED | OUTPATIENT
Start: 2023-09-26

## 2023-09-26 NOTE — TELEPHONE ENCOUNTER
----- Message from [facility] sent at 9/26/2023 10:05 AM EDT -----  Subject: Refill Request    QUESTIONS  Name of Medication? hydroCHLOROthiazide (HYDRODIURIL) 25 MG tablet  Patient-reported dosage and instructions? once a day   How many days do you have left? 0  [facility]? [id number]  Pharmacy phone number (if available)? [phone]  ---------------------------------------------------------------------------  --------------  CALL BACK INFO  What is the best way for the office to contact you? OK to leave message on   voicemail  Preferred Call Back Phone Number? [phone]  ---------------------------------------------------------------------------  --------------  SCRIPT ANSWERS  Relationship to Patient?  Self

## 2023-09-26 NOTE — TELEPHONE ENCOUNTER
Patient has scheduled an appt    Last appointment: 10/11/22  Next appointment: 10/6/23  Previous refill encounter(s): 10/11/22 #90 with 3 refills    Requested Prescriptions     Pending Prescriptions Disp Refills    hydroCHLOROthiazide (HYDRODIURIL) 25 MG tablet 30 tablet 0     Sig: Take 1 tablet by mouth daily     Refused Prescriptions Disp Refills    hydroCHLOROthiazide (HYDRODIURIL) 25 MG tablet [Pharmacy Med Name: HYDROCHLOROTHIAZIDE 25MG TABLETS] 90 tablet 3     Sig: TAKE 1 TABLET BY MOUTH DAILY     Refused By: Neda Sinha     Reason for Refusal: Patient needs an appointment

## 2023-09-27 RX ORDER — HYDROCHLOROTHIAZIDE 25 MG/1
25 TABLET ORAL DAILY
Qty: 90 TABLET | OUTPATIENT
Start: 2023-09-27

## 2023-10-03 SDOH — ECONOMIC STABILITY: TRANSPORTATION INSECURITY
IN THE PAST 12 MONTHS, HAS LACK OF TRANSPORTATION KEPT YOU FROM MEETINGS, WORK, OR FROM GETTING THINGS NEEDED FOR DAILY LIVING?: NO

## 2023-10-03 SDOH — ECONOMIC STABILITY: HOUSING INSECURITY
IN THE LAST 12 MONTHS, WAS THERE A TIME WHEN YOU DID NOT HAVE A STEADY PLACE TO SLEEP OR SLEPT IN A SHELTER (INCLUDING NOW)?: NO

## 2023-10-03 SDOH — ECONOMIC STABILITY: FOOD INSECURITY: WITHIN THE PAST 12 MONTHS, YOU WORRIED THAT YOUR FOOD WOULD RUN OUT BEFORE YOU GOT MONEY TO BUY MORE.: NEVER TRUE

## 2023-10-03 SDOH — ECONOMIC STABILITY: INCOME INSECURITY: HOW HARD IS IT FOR YOU TO PAY FOR THE VERY BASICS LIKE FOOD, HOUSING, MEDICAL CARE, AND HEATING?: NOT HARD AT ALL

## 2023-10-03 SDOH — ECONOMIC STABILITY: FOOD INSECURITY: WITHIN THE PAST 12 MONTHS, THE FOOD YOU BOUGHT JUST DIDN'T LAST AND YOU DIDN'T HAVE MONEY TO GET MORE.: NEVER TRUE

## 2023-10-06 ENCOUNTER — OFFICE VISIT (OUTPATIENT)
Age: 58
End: 2023-10-06
Payer: OTHER GOVERNMENT

## 2023-10-06 VITALS
SYSTOLIC BLOOD PRESSURE: 121 MMHG | TEMPERATURE: 98.2 F | HEIGHT: 68 IN | DIASTOLIC BLOOD PRESSURE: 85 MMHG | OXYGEN SATURATION: 98 % | HEART RATE: 87 BPM | WEIGHT: 172 LBS | BODY MASS INDEX: 26.07 KG/M2 | RESPIRATION RATE: 18 BRPM

## 2023-10-06 DIAGNOSIS — H40.9 GLAUCOMA, UNSPECIFIED GLAUCOMA TYPE, UNSPECIFIED LATERALITY: ICD-10-CM

## 2023-10-06 DIAGNOSIS — L30.9 DERMATITIS: ICD-10-CM

## 2023-10-06 DIAGNOSIS — Z23 ENCOUNTER FOR IMMUNIZATION: ICD-10-CM

## 2023-10-06 DIAGNOSIS — E78.2 MIXED HYPERLIPIDEMIA: ICD-10-CM

## 2023-10-06 DIAGNOSIS — Z00.00 ROUTINE GENERAL MEDICAL EXAMINATION AT A HEALTH CARE FACILITY: Primary | ICD-10-CM

## 2023-10-06 DIAGNOSIS — I10 ESSENTIAL (PRIMARY) HYPERTENSION: ICD-10-CM

## 2023-10-06 DIAGNOSIS — Z13.1 SCREENING FOR DIABETES MELLITUS (DM): ICD-10-CM

## 2023-10-06 LAB
ALBUMIN SERPL-MCNC: 4 G/DL (ref 3.5–5)
ALBUMIN/GLOB SERPL: 1.2 (ref 1.1–2.2)
ALP SERPL-CCNC: 64 U/L (ref 45–117)
ALT SERPL-CCNC: 17 U/L (ref 12–78)
ANION GAP SERPL CALC-SCNC: 6 MMOL/L (ref 5–15)
APPEARANCE UR: CLEAR
AST SERPL-CCNC: 14 U/L (ref 15–37)
BILIRUB SERPL-MCNC: 0.5 MG/DL (ref 0.2–1)
BILIRUB UR QL: NEGATIVE
BUN SERPL-MCNC: 15 MG/DL (ref 6–20)
BUN/CREAT SERPL: 17 (ref 12–20)
CALCIUM SERPL-MCNC: 10.6 MG/DL (ref 8.5–10.1)
CHLORIDE SERPL-SCNC: 105 MMOL/L (ref 97–108)
CHOLEST SERPL-MCNC: 234 MG/DL
CO2 SERPL-SCNC: 29 MMOL/L (ref 21–32)
COLOR UR: ABNORMAL
CREAT SERPL-MCNC: 0.9 MG/DL (ref 0.55–1.02)
ERYTHROCYTE [DISTWIDTH] IN BLOOD BY AUTOMATED COUNT: 12.2 % (ref 11.5–14.5)
EST. AVERAGE GLUCOSE BLD GHB EST-MCNC: 100 MG/DL
GLOBULIN SER CALC-MCNC: 3.4 G/DL (ref 2–4)
GLUCOSE SERPL-MCNC: 89 MG/DL (ref 65–100)
GLUCOSE UR STRIP.AUTO-MCNC: NEGATIVE MG/DL
HBA1C MFR BLD: 5.1 % (ref 4–5.6)
HCT VFR BLD AUTO: 44.4 % (ref 35–47)
HDLC SERPL-MCNC: 98 MG/DL
HDLC SERPL: 2.4 (ref 0–5)
HGB BLD-MCNC: 14.3 G/DL (ref 11.5–16)
HGB UR QL STRIP: NEGATIVE
KETONES UR QL STRIP.AUTO: ABNORMAL MG/DL
LDLC SERPL CALC-MCNC: 122.8 MG/DL (ref 0–100)
LEUKOCYTE ESTERASE UR QL STRIP.AUTO: NEGATIVE
MCH RBC QN AUTO: 29.4 PG (ref 26–34)
MCHC RBC AUTO-ENTMCNC: 32.2 G/DL (ref 30–36.5)
MCV RBC AUTO: 91.2 FL (ref 80–99)
NITRITE UR QL STRIP.AUTO: NEGATIVE
NRBC # BLD: 0 K/UL (ref 0–0.01)
NRBC BLD-RTO: 0 PER 100 WBC
PH UR STRIP: 6.5 (ref 5–8)
PLATELET # BLD AUTO: 323 K/UL (ref 150–400)
PMV BLD AUTO: 9.9 FL (ref 8.9–12.9)
POTASSIUM SERPL-SCNC: 3.6 MMOL/L (ref 3.5–5.1)
PROT SERPL-MCNC: 7.4 G/DL (ref 6.4–8.2)
PROT UR STRIP-MCNC: NEGATIVE MG/DL
RBC # BLD AUTO: 4.87 M/UL (ref 3.8–5.2)
SODIUM SERPL-SCNC: 140 MMOL/L (ref 136–145)
SP GR UR REFRACTOMETRY: 1.03 (ref 1–1.03)
TRIGL SERPL-MCNC: 66 MG/DL
TSH SERPL DL<=0.05 MIU/L-ACNC: 0.82 UIU/ML (ref 0.36–3.74)
UROBILINOGEN UR QL STRIP.AUTO: 1 EU/DL (ref 0.2–1)
VLDLC SERPL CALC-MCNC: 13.2 MG/DL
WBC # BLD AUTO: 4 K/UL (ref 3.6–11)

## 2023-10-06 PROCEDURE — 90472 IMMUNIZATION ADMIN EACH ADD: CPT | Performed by: NURSE PRACTITIONER

## 2023-10-06 PROCEDURE — 3079F DIAST BP 80-89 MM HG: CPT | Performed by: NURSE PRACTITIONER

## 2023-10-06 PROCEDURE — 99396 PREV VISIT EST AGE 40-64: CPT | Performed by: NURSE PRACTITIONER

## 2023-10-06 PROCEDURE — 90471 IMMUNIZATION ADMIN: CPT | Performed by: NURSE PRACTITIONER

## 2023-10-06 PROCEDURE — 90750 HZV VACC RECOMBINANT IM: CPT | Performed by: NURSE PRACTITIONER

## 2023-10-06 PROCEDURE — 90674 CCIIV4 VAC NO PRSV 0.5 ML IM: CPT | Performed by: NURSE PRACTITIONER

## 2023-10-06 PROCEDURE — 3074F SYST BP LT 130 MM HG: CPT | Performed by: NURSE PRACTITIONER

## 2023-10-06 RX ORDER — LATANOPROST 50 UG/ML
SOLUTION/ DROPS OPHTHALMIC
COMMUNITY
Start: 2023-09-30

## 2023-10-06 RX ORDER — HYDROCHLOROTHIAZIDE 25 MG/1
25 TABLET ORAL DAILY
Qty: 90 TABLET | Refills: 3 | Status: SHIPPED | OUTPATIENT
Start: 2023-10-06

## 2023-10-06 ASSESSMENT — ENCOUNTER SYMPTOMS
ABDOMINAL PAIN: 0
CONSTIPATION: 0
SHORTNESS OF BREATH: 0
COUGH: 0
DIARRHEA: 0
NAUSEA: 0
BLOOD IN STOOL: 0
VOMITING: 0

## 2023-10-06 ASSESSMENT — PATIENT HEALTH QUESTIONNAIRE - PHQ9
1. LITTLE INTEREST OR PLEASURE IN DOING THINGS: 0
2. FEELING DOWN, DEPRESSED OR HOPELESS: 0
SUM OF ALL RESPONSES TO PHQ QUESTIONS 1-9: 0
SUM OF ALL RESPONSES TO PHQ QUESTIONS 1-9: 0
SUM OF ALL RESPONSES TO PHQ9 QUESTIONS 1 & 2: 0
SUM OF ALL RESPONSES TO PHQ QUESTIONS 1-9: 0
SUM OF ALL RESPONSES TO PHQ QUESTIONS 1-9: 0

## 2023-10-06 NOTE — PROGRESS NOTES
Lila Yu (:  1965) is a 63 y.o. female,Established patient, here for evaluation of the following chief complaint(s):  Follow-up (Hypertension and medication refills) and Skin Problem (Dry skin right arm )         ASSESSMENT/PLAN:  1. Routine general medical examination at a health care facility  2. Essential (primary) hypertension - continue HCTZ 25mg. Stable. Check labs  -     hydroCHLOROthiazide (HYDRODIURIL) 25 MG tablet; Take 1 tablet by mouth daily, Disp-90 tablet, R-3Normal  -     CBC; Future  -     Comprehensive Metabolic Panel; Future  -     TSH; Future  -     Urinalysis; Future  3. Mixed hyperlipidemia - check FLP  -     Lipid Panel; Future  4. Dermatitis - will trial stronger topical steroid. If no improvement, patient to schedule with dermatology  -     fluocinolone 0.01 % cream; Apply topically 2 times daily As needed for rash on arm and feet, Topical, 2 TIMES DAILY Starting Fri 10/6/2023, Disp-60 g, R-0, Normal  5. Screening for diabetes mellitus (DM)  -     Hemoglobin A1C; Future  6. Glaucoma, unspecified glaucoma type, unspecified laterality - per ophthalmology  7. Encounter for immunization  - Zoster, SHINGRIX, (18 yrs +), IM  - Influenza, FLUCELVAX, (age 10 mo+), IM, Preservative Free, 0.5 mL      Return in about 6 months (around 2024). Subjective   SUBJECTIVE/OBJECTIVE:  HPI  patient comes in today for CPE    Hx HTN - taking HCTZ 25mg  Compliant w/ meds, low salt diet. No swelling, headache or dizziness. No chest pain, SOB, palpitations. Otherwise feeling well since the last visit. Minimal swelling in ankles, especially left    Rash on right forearm and bilateral feet. Triamcinolone did not help rash on arm    Dr. Alexei Huerta - last week - found floaters in right eye. Followed for glaucoma - no change in pressures. 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  Pap with

## 2023-11-02 DIAGNOSIS — I10 ESSENTIAL (PRIMARY) HYPERTENSION: ICD-10-CM

## 2023-11-02 RX ORDER — HYDROCHLOROTHIAZIDE 25 MG/1
12.5 TABLET ORAL DAILY
Qty: 45 TABLET | Refills: 3 | Status: ON HOLD
Start: 2023-11-02

## 2023-11-02 NOTE — TELEPHONE ENCOUNTER
Left a detailed message regarding patient labs and adjusted medication doses per NP Masood. Patient can return call to office if needed.

## 2023-11-02 NOTE — TELEPHONE ENCOUNTER
----- Message from BETZY Khanna NP sent at 11/1/2023  8:02 PM EDT -----  Cholesterol numbers elevated on pravastatin.  Have you missed any doses recently?  If not, may need to change medication to atorvastatin 20mg, which is a stronger medication.    Calcium level is elevated again.  This is likely related to the HCTZ you are taking for your blood pressure.  I would like to cut this medication in half (half of 25mg tablet - 12.5mg dose).  Monitor BP at home, if BP remains above 140/90, we may need to add another medication.    Liver and kidney function normal  Diabetes screening negative.  Thyroid screening negative  Blood counts normal

## 2023-11-20 ENCOUNTER — OFFICE VISIT (OUTPATIENT)
Age: 58
End: 2023-11-20

## 2023-11-20 VITALS
BODY MASS INDEX: 25.85 KG/M2 | TEMPERATURE: 99 F | DIASTOLIC BLOOD PRESSURE: 83 MMHG | RESPIRATION RATE: 20 BRPM | WEIGHT: 170 LBS | HEART RATE: 76 BPM | SYSTOLIC BLOOD PRESSURE: 131 MMHG | OXYGEN SATURATION: 100 %

## 2023-11-20 DIAGNOSIS — N10 ACUTE PYELONEPHRITIS: Primary | ICD-10-CM

## 2023-11-20 LAB
BILIRUBIN, URINE, POC: NEGATIVE
BLOOD URINE, POC: ABNORMAL
GLUCOSE URINE, POC: NEGATIVE
KETONES, URINE, POC: ABNORMAL
LEUKOCYTE ESTERASE, URINE, POC: ABNORMAL
NITRITE, URINE, POC: POSITIVE
PH, URINE, POC: 6 (ref 4.6–8)
PROTEIN,URINE, POC: ABNORMAL
SPECIFIC GRAVITY, URINE, POC: >=1.03 (ref 1–1.03)
URINALYSIS CLARITY, POC: CLEAR
URINALYSIS COLOR, POC: YELLOW
UROBILINOGEN, POC: ABNORMAL

## 2023-11-20 RX ORDER — ONDANSETRON 4 MG/1
4 TABLET, FILM COATED ORAL 3 TIMES DAILY PRN
Qty: 12 TABLET | Refills: 0 | Status: SHIPPED | OUTPATIENT
Start: 2023-11-20

## 2023-11-20 RX ORDER — CIPROFLOXACIN 500 MG/1
500 TABLET, FILM COATED ORAL 2 TIMES DAILY
Qty: 14 TABLET | Refills: 0 | Status: SHIPPED | OUTPATIENT
Start: 2023-11-20 | End: 2023-11-27

## 2023-11-20 RX ORDER — CEFTRIAXONE 1 G/1
1000 INJECTION, POWDER, FOR SOLUTION INTRAMUSCULAR; INTRAVENOUS ONCE
Status: COMPLETED | OUTPATIENT
Start: 2023-11-20 | End: 2023-11-20

## 2023-11-20 RX ADMIN — CEFTRIAXONE 1000 MG: 1 INJECTION, POWDER, FOR SOLUTION INTRAMUSCULAR; INTRAVENOUS at 16:59

## 2023-11-20 NOTE — PROGRESS NOTES
Oropharynx is clear. Eyes:      General: No scleral icterus. Extraocular Movements: Extraocular movements intact. Conjunctiva/sclera: Conjunctivae normal.      Pupils: Pupils are equal, round, and reactive to light. Cardiovascular:      Rate and Rhythm: Normal rate and regular rhythm. Pulmonary:      Effort: Pulmonary effort is normal.      Breath sounds: Normal breath sounds. Abdominal:      General: Abdomen is flat. Bowel sounds are normal. There is no distension. Palpations: Abdomen is soft. There is no mass. Tenderness: There is abdominal tenderness in the suprapubic area and left lower quadrant. There is right CVA tenderness. There is no guarding or rebound. Negative signs include Rovsing's sign, McBurney's sign and obturator sign. Neurological:      Mental Status: She is alert. Results for orders placed or performed in visit on 11/20/23   AMB POC URINALYSIS DIP STICK AUTO W/ MICRO   Result Value Ref Range    Color (UA POC) Yellow     Clarity (UA POC) Clear     Glucose, Urine, POC Negative Negative    Bilirubin, Urine, POC Negative Negative    Ketones, Urine, POC 2+ Negative    Specific Gravity, Urine, POC >=1.030 1.001 - 1.035    Blood (UA POC) 2+ Negative    pH, Urine, POC 6.0 4.6 - 8.0    Protein, Urine, POC 2+ Negative    Urobilinogen, POC 0.2 mg/dL     Nitrite, Urine, POC Positive Negative    Leukocyte Esterase, Urine, POC 1+ Negative         An electronic signature was used to authenticate this note.     BETZY Ford NP

## 2023-11-23 LAB
BACTERIA SPEC CULT: ABNORMAL
CC UR VC: ABNORMAL
SERVICE CMNT-IMP: ABNORMAL

## 2023-12-16 ENCOUNTER — OFFICE VISIT (OUTPATIENT)
Age: 58
End: 2023-12-16

## 2023-12-16 VITALS
SYSTOLIC BLOOD PRESSURE: 161 MMHG | OXYGEN SATURATION: 98 % | BODY MASS INDEX: 25.46 KG/M2 | RESPIRATION RATE: 16 BRPM | TEMPERATURE: 97.8 F | WEIGHT: 168 LBS | HEIGHT: 68 IN | DIASTOLIC BLOOD PRESSURE: 94 MMHG | HEART RATE: 65 BPM

## 2023-12-16 DIAGNOSIS — H69.91 DYSFUNCTION OF RIGHT EUSTACHIAN TUBE: Primary | ICD-10-CM

## 2023-12-16 DIAGNOSIS — R03.0 ELEVATED BLOOD PRESSURE READING: ICD-10-CM

## 2023-12-16 NOTE — PROGRESS NOTES
Subjective: (As above and below)     The patient/guardian gave verbal consent to treat. Chief Complaint   Patient presents with    Headache     Pt. C/o right ear pain starting 1 week ago, Tues night started with headache above right eye into right ear       Janes Castro is a 63 y.o. female who presents for evaluation of : right ear pain and pressure. Symptom onset 1 week ago. Associated mild headache. No blurred vision, vomiting, fever, chills or URI symptoms . Preceding illness: none. No other identified aggravating or alleviating factors. Symptoms are constant and overall not improved. Review of Systems    Review of Systems - negative except as listed above    Reviewed PmHx, RxHx, FmHx, SocHx, AllgHx and updated in chart. Family History   Problem Relation Age of Onset    Heart Disease Maternal Grandmother     Heart Attack Maternal Grandmother     Hypertension Maternal Grandmother     Diabetes Maternal Grandmother     Glaucoma Father     Hypertension Mother     No Known Problems Sister     No Known Problems Brother        Past Medical History:   Diagnosis Date    Asthma     Brachiocephalic artery injury     chronic dissection    Glaucoma, low tension     Hyperlipidemia     Hypertension     Ill-defined condition     high cholesterol      Social History     Socioeconomic History    Marital status:      Spouse name: None    Number of children: None    Years of education: None    Highest education level: None   Tobacco Use    Smoking status: Never     Passive exposure: Never    Smokeless tobacco: Never   Vaping Use    Vaping Use: Never used   Substance and Sexual Activity    Alcohol use: Yes     Comment: occ    Drug use: No   Social History Narrative    Works at [employer] since Nov 2021 - works at [employer].      Social Determinants of Health     Financial Resource Strain: Low Risk  (10/3/2023)    Overall Financial Resource Strain (CARDIA)     Difficulty of Paying

## 2023-12-18 ENCOUNTER — APPOINTMENT (OUTPATIENT)
Facility: HOSPITAL | Age: 58
End: 2023-12-18
Attending: ANESTHESIOLOGY
Payer: OTHER GOVERNMENT

## 2023-12-18 ENCOUNTER — HOSPITAL ENCOUNTER (INPATIENT)
Facility: HOSPITAL | Age: 58
LOS: 25 days | Discharge: NURSING, REHAB OR LONG-TERM CARE | End: 2024-01-12
Attending: ANESTHESIOLOGY | Admitting: ANESTHESIOLOGY
Payer: OTHER GOVERNMENT

## 2023-12-18 DIAGNOSIS — I60.9 SAH (SUBARACHNOID HEMORRHAGE) (HCC): Primary | ICD-10-CM

## 2023-12-18 DIAGNOSIS — I67.848 CEREBRAL VASOSPASM: ICD-10-CM

## 2023-12-18 DIAGNOSIS — I60.8 ANEURYSMAL SUBARACHNOID HEMORRHAGE (HCC): ICD-10-CM

## 2023-12-18 DIAGNOSIS — I67.1 INTRACRANIAL ANEURYSM: ICD-10-CM

## 2023-12-18 DIAGNOSIS — I67.848 VASOSPASM OF CEREBRAL ARTERY: ICD-10-CM

## 2023-12-18 LAB
ACT BLD: 163 SECS (ref 79–138)
ACT BLD: 244 SECS (ref 79–138)
ACT BLD: 255 SECS (ref 79–138)
ANION GAP SERPL CALC-SCNC: 12 MMOL/L (ref 5–15)
ARTERIAL PATENCY WRIST A: POSITIVE
ASPIRIN: 391 ARU
BASE EXCESS BLD CALC-SCNC: 4.9 MMOL/L
BASOPHILS # BLD: 0 K/UL (ref 0–0.1)
BASOPHILS NFR BLD: 0 % (ref 0–1)
BDY SITE: ABNORMAL
BUN SERPL-MCNC: 11 MG/DL (ref 6–20)
BUN/CREAT SERPL: 11 (ref 12–20)
CA-I BLD-SCNC: 1.23 MMOL/L (ref 1.12–1.32)
CALCIUM SERPL-MCNC: 9.4 MG/DL (ref 8.5–10.1)
CHLORIDE SERPL-SCNC: 102 MMOL/L (ref 97–108)
CO2 SERPL-SCNC: 25 MMOL/L (ref 21–32)
CREAT SERPL-MCNC: 1 MG/DL (ref 0.55–1.02)
DIFFERENTIAL METHOD BLD: ABNORMAL
EOSINOPHIL # BLD: 0 K/UL (ref 0–0.4)
EOSINOPHIL NFR BLD: 0 % (ref 0–7)
ERYTHROCYTE [DISTWIDTH] IN BLOOD BY AUTOMATED COUNT: 12.1 % (ref 11.5–14.5)
EST. AVERAGE GLUCOSE BLD GHB EST-MCNC: 100 MG/DL
GAS FLOW.O2 O2 DELIVERY SYS: ABNORMAL
GAS FLOW.O2 SETTING OXYMISER: 16 BPM
GLUCOSE BLD STRIP.AUTO-MCNC: 102 MG/DL (ref 65–117)
GLUCOSE BLD STRIP.AUTO-MCNC: 125 MG/DL (ref 65–117)
GLUCOSE BLD STRIP.AUTO-MCNC: 159 MG/DL (ref 65–117)
GLUCOSE BLD STRIP.AUTO-MCNC: 177 MG/DL (ref 65–117)
GLUCOSE BLD STRIP.AUTO-MCNC: 90 MG/DL (ref 65–117)
GLUCOSE BLD STRIP.AUTO-MCNC: 98 MG/DL (ref 65–117)
GLUCOSE SERPL-MCNC: 178 MG/DL (ref 65–100)
HBA1C MFR BLD: 5.1 % (ref 4–5.6)
HCO3 BLD-SCNC: 27.2 MMOL/L (ref 22–26)
HCT VFR BLD AUTO: 43.1 % (ref 35–47)
HGB BLD-MCNC: 14.2 G/DL (ref 11.5–16)
IMM GRANULOCYTES # BLD AUTO: 0.1 K/UL (ref 0–0.04)
IMM GRANULOCYTES NFR BLD AUTO: 1 % (ref 0–0.5)
LYMPHOCYTES # BLD: 0.4 K/UL (ref 0.8–3.5)
LYMPHOCYTES NFR BLD: 3 % (ref 12–49)
MAGNESIUM SERPL-MCNC: 1.9 MG/DL (ref 1.6–2.4)
MCH RBC QN AUTO: 29.2 PG (ref 26–34)
MCHC RBC AUTO-ENTMCNC: 32.9 G/DL (ref 30–36.5)
MCV RBC AUTO: 88.7 FL (ref 80–99)
MONOCYTES # BLD: 0.7 K/UL (ref 0–1)
MONOCYTES NFR BLD: 5 % (ref 5–13)
NEUTS SEG # BLD: 13.5 K/UL (ref 1.8–8)
NEUTS SEG NFR BLD: 91 % (ref 32–75)
NRBC # BLD: 0 K/UL (ref 0–0.01)
NRBC BLD-RTO: 0 PER 100 WBC
O2/TOTAL GAS SETTING VFR VENT: 50 %
P2Y12 PLT RESPONSE: 88 PRU (ref 194–418)
PCO2 BLD: 32.4 MMHG (ref 35–45)
PEEP RESPIRATORY: 5 CMH2O
PH BLD: 7.53 (ref 7.35–7.45)
PHOSPHATE SERPL-MCNC: 1.7 MG/DL (ref 2.6–4.7)
PLATELET # BLD AUTO: 256 K/UL (ref 150–400)
PMV BLD AUTO: 9.5 FL (ref 8.9–12.9)
PO2 BLD: 189 MMHG (ref 80–100)
POTASSIUM SERPL-SCNC: 2.6 MMOL/L (ref 3.5–5.1)
PROCALCITONIN SERPL-MCNC: 0.21 NG/ML
RBC # BLD AUTO: 4.86 M/UL (ref 3.8–5.2)
RBC MORPH BLD: ABNORMAL
SAO2 % BLD: 99.8 % (ref 92–97)
SERVICE CMNT-IMP: ABNORMAL
SERVICE CMNT-IMP: NORMAL
SODIUM SERPL-SCNC: 139 MMOL/L (ref 136–145)
SPECIMEN TYPE: ABNORMAL
VAS BASILAR ARTERY EDV: 15.2 CM/S
VAS BASILAR ARTERY MEAN VEL: 26 CM/S
VAS BASILAR ARTERY PSV: 48.9 CM/S
VAS LEFT ACA EDV: 15.8 CM/S
VAS LEFT ACA MEAN VEL: 30.1 CM/S
VAS LEFT ACA PSV: 58.7 CM/S
VAS LEFT EX ICA MEAN VEL: 38 CM/S
VAS LEFT ICA DIST EDV: 17.9 CM/S
VAS LEFT ICA DIST PSV: 79.4 CM/S
VAS LEFT ICA EDV: 10.2 CM/S
VAS LEFT ICA MEAN VEL: 22.3 CM/S
VAS LEFT ICA PSV: 46.4 CM/S
VAS LEFT LINDEGAARD RATIO: 0.74
VAS LEFT MCA 1 EDV: 15.7 CM/S
VAS LEFT MCA 1 MEAN VEL: 28.2 CM/S
VAS LEFT MCA 1 PSV: 53.2 CM/S
VAS LEFT PCA 1 EDV: 9.5 CM/S
VAS LEFT PCA 1 MEAN VEL: 19.6 CM/S
VAS LEFT PCA 1 PSV: 39.7 CM/S
VAS LEFT VERTEBRAL EDV TCD: 13.8 CM/S
VAS LEFT VERTEBRAL MEAN VEL: 27.3 CM/S
VAS LEFT VERTEBRAL PSV TCD: 54.4 CM/S
VAS RIGHT ACA EDV: 13.9 CM/S
VAS RIGHT ACA MEAN VEL: 38.8 CM/S
VAS RIGHT ACA PSV: 88.5 CM/S
VAS RIGHT EX ICA MEAN VEL: 41 CM/S
VAS RIGHT ICA DIST EDV: 14.6 CM/S
VAS RIGHT ICA DIST PSV: 92.4 CM/S
VAS RIGHT ICA EDV: 5.3 CM/S
VAS RIGHT ICA MEAN VEL: 14.1 CM/S
VAS RIGHT ICA PSV: 31.7 CM/S
VAS RIGHT LINDEGAARD RATIO: 0.8
VAS RIGHT MCA 1 EDV: 17.9 CM/S
VAS RIGHT MCA 1 MEAN VEL: 31.3 CM/S
VAS RIGHT MCA 1 PSV: 58.1 CM/S
VAS RIGHT PCA 1 EDV: 7.1 CM/S
VAS RIGHT PCA 1 MEAN VEL: 18.8 CM/S
VAS RIGHT PCA 1 PSV: 42.1 CM/S
VAS RIGHT VERTEBRAL EDV TCD: 9.7 CM/S
VAS RIGHT VERTEBRAL MEAN VEL: 17 CM/S
VAS RIGHT VERTEBRAL PSV TCD: 31.6 CM/S
VENTILATION MODE VENT: ABNORMAL
VT SETTING VENT: 400 ML
WBC # BLD AUTO: 14.7 K/UL (ref 3.6–11)

## 2023-12-18 PROCEDURE — 82803 BLOOD GASES ANY COMBINATION: CPT

## 2023-12-18 PROCEDURE — 82962 GLUCOSE BLOOD TEST: CPT

## 2023-12-18 PROCEDURE — 93886 INTRACRANIAL COMPLETE STUDY: CPT

## 2023-12-18 PROCEDURE — 71045 X-RAY EXAM CHEST 1 VIEW: CPT

## 2023-12-18 PROCEDURE — 2580000003 HC RX 258: Performed by: RADIOLOGY

## 2023-12-18 PROCEDURE — A4216 STERILE WATER/SALINE, 10 ML: HCPCS | Performed by: NURSE PRACTITIONER

## 2023-12-18 PROCEDURE — 94002 VENT MGMT INPAT INIT DAY: CPT

## 2023-12-18 PROCEDURE — 84145 PROCALCITONIN (PCT): CPT

## 2023-12-18 PROCEDURE — 6370000000 HC RX 637 (ALT 250 FOR IP): Performed by: NURSE PRACTITIONER

## 2023-12-18 PROCEDURE — 6360000002 HC RX W HCPCS: Performed by: NURSE PRACTITIONER

## 2023-12-18 PROCEDURE — 36600 WITHDRAWAL OF ARTERIAL BLOOD: CPT

## 2023-12-18 PROCEDURE — 2580000003 HC RX 258: Performed by: NURSE PRACTITIONER

## 2023-12-18 PROCEDURE — 74018 RADEX ABDOMEN 1 VIEW: CPT

## 2023-12-18 PROCEDURE — 6360000002 HC RX W HCPCS

## 2023-12-18 PROCEDURE — 2000000000 HC ICU R&B

## 2023-12-18 PROCEDURE — 2580000003 HC RX 258

## 2023-12-18 PROCEDURE — 85576 BLOOD PLATELET AGGREGATION: CPT

## 2023-12-18 PROCEDURE — 2500000003 HC RX 250 WO HCPCS: Performed by: STUDENT IN AN ORGANIZED HEALTH CARE EDUCATION/TRAINING PROGRAM

## 2023-12-18 PROCEDURE — 2500000003 HC RX 250 WO HCPCS: Performed by: RADIOLOGY

## 2023-12-18 PROCEDURE — 83036 HEMOGLOBIN GLYCOSYLATED A1C: CPT

## 2023-12-18 PROCEDURE — 03HY32Z INSERTION OF MONITORING DEVICE INTO UPPER ARTERY, PERCUTANEOUS APPROACH: ICD-10-PCS | Performed by: ANESTHESIOLOGY

## 2023-12-18 PROCEDURE — 85347 COAGULATION TIME ACTIVATED: CPT

## 2023-12-18 PROCEDURE — B3161ZZ FLUOROSCOPY OF RIGHT INTERNAL CAROTID ARTERY USING LOW OSMOLAR CONTRAST: ICD-10-PCS | Performed by: RADIOLOGY

## 2023-12-18 PROCEDURE — 6360000004 HC RX CONTRAST MEDICATION: Performed by: RADIOLOGY

## 2023-12-18 PROCEDURE — 6370000000 HC RX 637 (ALT 250 FOR IP): Performed by: ANESTHESIOLOGY

## 2023-12-18 PROCEDURE — APPNB60 APP NON BILLABLE TIME 46-60 MINS

## 2023-12-18 PROCEDURE — 83735 ASSAY OF MAGNESIUM: CPT

## 2023-12-18 PROCEDURE — 36415 COLL VENOUS BLD VENIPUNCTURE: CPT

## 2023-12-18 PROCEDURE — APPNB180 APP NON BILLABLE TIME > 60 MINS: Performed by: NURSE PRACTITIONER

## 2023-12-18 PROCEDURE — 5A1935Z RESPIRATORY VENTILATION, LESS THAN 24 CONSECUTIVE HOURS: ICD-10-PCS | Performed by: ANESTHESIOLOGY

## 2023-12-18 PROCEDURE — 84100 ASSAY OF PHOSPHORUS: CPT

## 2023-12-18 PROCEDURE — 99254 IP/OBS CNSLTJ NEW/EST MOD 60: CPT

## 2023-12-18 PROCEDURE — 70450 CT HEAD/BRAIN W/O DYE: CPT

## 2023-12-18 PROCEDURE — 6360000002 HC RX W HCPCS: Performed by: RADIOLOGY

## 2023-12-18 PROCEDURE — B31R1ZZ FLUOROSCOPY OF INTRACRANIAL ARTERIES USING LOW OSMOLAR CONTRAST: ICD-10-PCS | Performed by: RADIOLOGY

## 2023-12-18 PROCEDURE — 2580000003 HC RX 258: Performed by: STUDENT IN AN ORGANIZED HEALTH CARE EDUCATION/TRAINING PROGRAM

## 2023-12-18 PROCEDURE — 80048 BASIC METABOLIC PNL TOTAL CA: CPT

## 2023-12-18 PROCEDURE — B3151ZZ FLUOROSCOPY OF BILATERAL COMMON CAROTID ARTERIES USING LOW OSMOLAR CONTRAST: ICD-10-PCS | Performed by: RADIOLOGY

## 2023-12-18 PROCEDURE — C1769 GUIDE WIRE: HCPCS

## 2023-12-18 PROCEDURE — APPNB30 APP NON BILLABLE TIME 0-30 MINS: Performed by: NURSE PRACTITIONER

## 2023-12-18 PROCEDURE — B31F1ZZ FLUOROSCOPY OF LEFT VERTEBRAL ARTERY USING LOW OSMOLAR CONTRAST: ICD-10-PCS | Performed by: RADIOLOGY

## 2023-12-18 PROCEDURE — 2500000003 HC RX 250 WO HCPCS: Performed by: NURSE PRACTITIONER

## 2023-12-18 PROCEDURE — 03VK3HZ RESTRICTION OF RIGHT INTERNAL CAROTID ARTERY WITH INTRALUMINAL DEVICE, FLOW DIVERTER, PERCUTANEOUS APPROACH: ICD-10-PCS | Performed by: RADIOLOGY

## 2023-12-18 PROCEDURE — 85025 COMPLETE CBC W/AUTO DIFF WBC: CPT

## 2023-12-18 RX ORDER — POTASSIUM CHLORIDE 750 MG/1
40 TABLET, FILM COATED, EXTENDED RELEASE ORAL ONCE
Status: DISCONTINUED | OUTPATIENT
Start: 2023-12-18 | End: 2023-12-18

## 2023-12-18 RX ORDER — NICARDIPINE HYDROCHLORIDE 2.5 MG/ML
INJECTION INTRAVENOUS
Status: DISPENSED
Start: 2023-12-18 | End: 2023-12-18

## 2023-12-18 RX ORDER — INSULIN LISPRO 100 [IU]/ML
0-4 INJECTION, SOLUTION INTRAVENOUS; SUBCUTANEOUS EVERY 4 HOURS
Status: DISCONTINUED | OUTPATIENT
Start: 2023-12-18 | End: 2023-12-18

## 2023-12-18 RX ORDER — VERAPAMIL HYDROCHLORIDE 2.5 MG/ML
INJECTION, SOLUTION INTRAVENOUS PRN
Status: COMPLETED | OUTPATIENT
Start: 2023-12-18 | End: 2023-12-18

## 2023-12-18 RX ORDER — ONDANSETRON 4 MG/1
4 TABLET, ORALLY DISINTEGRATING ORAL EVERY 8 HOURS PRN
Status: DISCONTINUED | OUTPATIENT
Start: 2023-12-18 | End: 2024-01-12 | Stop reason: HOSPADM

## 2023-12-18 RX ORDER — SODIUM CHLORIDE, SODIUM LACTATE, POTASSIUM CHLORIDE, CALCIUM CHLORIDE 600; 310; 30; 20 MG/100ML; MG/100ML; MG/100ML; MG/100ML
INJECTION, SOLUTION INTRAVENOUS CONTINUOUS
Status: DISCONTINUED | OUTPATIENT
Start: 2023-12-18 | End: 2023-12-24

## 2023-12-18 RX ORDER — LIDOCAINE HYDROCHLORIDE 10 MG/ML
INJECTION, SOLUTION EPIDURAL; INFILTRATION; INTRACAUDAL; PERINEURAL PRN
Status: COMPLETED | OUTPATIENT
Start: 2023-12-18 | End: 2023-12-18

## 2023-12-18 RX ORDER — SODIUM CHLORIDE, SODIUM LACTATE, POTASSIUM CHLORIDE, AND CALCIUM CHLORIDE .6; .31; .03; .02 G/100ML; G/100ML; G/100ML; G/100ML
1000 INJECTION, SOLUTION INTRAVENOUS ONCE
Status: COMPLETED | OUTPATIENT
Start: 2023-12-18 | End: 2023-12-18

## 2023-12-18 RX ORDER — ATROPINE SULFATE 0.1 MG/ML
INJECTION INTRAVENOUS
Status: DISPENSED
Start: 2023-12-18 | End: 2023-12-18

## 2023-12-18 RX ORDER — LABETALOL HYDROCHLORIDE 5 MG/ML
5 INJECTION, SOLUTION INTRAVENOUS EVERY 6 HOURS PRN
Status: DISCONTINUED | OUTPATIENT
Start: 2023-12-18 | End: 2023-12-18

## 2023-12-18 RX ORDER — SODIUM CHLORIDE 0.9 % (FLUSH) 0.9 %
5-40 SYRINGE (ML) INJECTION PRN
Status: DISCONTINUED | OUTPATIENT
Start: 2023-12-18 | End: 2024-01-12 | Stop reason: HOSPADM

## 2023-12-18 RX ORDER — ONDANSETRON 2 MG/ML
4 INJECTION INTRAMUSCULAR; INTRAVENOUS EVERY 6 HOURS PRN
Status: DISCONTINUED | OUTPATIENT
Start: 2023-12-18 | End: 2024-01-12 | Stop reason: HOSPADM

## 2023-12-18 RX ORDER — EPINEPHRINE 0.1 MG/ML
INJECTION INTRAVENOUS
Status: DISPENSED
Start: 2023-12-18 | End: 2023-12-18

## 2023-12-18 RX ORDER — AMIODARONE HYDROCHLORIDE 50 MG/ML
INJECTION, SOLUTION INTRAVENOUS
Status: DISPENSED
Start: 2023-12-18 | End: 2023-12-18

## 2023-12-18 RX ORDER — LEVETIRACETAM 500 MG/5ML
500 INJECTION, SOLUTION, CONCENTRATE INTRAVENOUS EVERY 12 HOURS
Status: DISCONTINUED | OUTPATIENT
Start: 2023-12-18 | End: 2023-12-21

## 2023-12-18 RX ORDER — SODIUM CHLORIDE 9 MG/ML
INJECTION, SOLUTION INTRAVENOUS PRN
Status: DISCONTINUED | OUTPATIENT
Start: 2023-12-18 | End: 2024-01-12 | Stop reason: HOSPADM

## 2023-12-18 RX ORDER — CHLORHEXIDINE GLUCONATE ORAL RINSE 1.2 MG/ML
15 SOLUTION DENTAL 2 TIMES DAILY
Status: DISCONTINUED | OUTPATIENT
Start: 2023-12-18 | End: 2023-12-19

## 2023-12-18 RX ORDER — POLYETHYLENE GLYCOL 3350 17 G/17G
17 POWDER, FOR SOLUTION ORAL DAILY PRN
Status: DISCONTINUED | OUTPATIENT
Start: 2023-12-18 | End: 2024-01-12 | Stop reason: HOSPADM

## 2023-12-18 RX ORDER — FENTANYL CITRATE 50 UG/ML
25 INJECTION, SOLUTION INTRAMUSCULAR; INTRAVENOUS
Status: DISCONTINUED | OUTPATIENT
Start: 2023-12-18 | End: 2024-01-03

## 2023-12-18 RX ORDER — SODIUM CHLORIDE 0.9 % (FLUSH) 0.9 %
5-40 SYRINGE (ML) INJECTION EVERY 12 HOURS SCHEDULED
Status: DISCONTINUED | OUTPATIENT
Start: 2023-12-18 | End: 2024-01-12 | Stop reason: HOSPADM

## 2023-12-18 RX ORDER — PROPOFOL 10 MG/ML
5-50 INJECTION, EMULSION INTRAVENOUS CONTINUOUS
Status: DISCONTINUED | OUTPATIENT
Start: 2023-12-18 | End: 2023-12-19

## 2023-12-18 RX ORDER — ACETAMINOPHEN 325 MG/1
650 TABLET ORAL EVERY 6 HOURS PRN
Status: DISCONTINUED | OUTPATIENT
Start: 2023-12-18 | End: 2024-01-12 | Stop reason: HOSPADM

## 2023-12-18 RX ORDER — ACETAMINOPHEN 650 MG/1
650 SUPPOSITORY RECTAL EVERY 6 HOURS PRN
Status: DISCONTINUED | OUTPATIENT
Start: 2023-12-18 | End: 2024-01-12 | Stop reason: HOSPADM

## 2023-12-18 RX ORDER — ASPIRIN 325 MG
325 TABLET ORAL ONCE
Status: COMPLETED | OUTPATIENT
Start: 2023-12-18 | End: 2023-12-18

## 2023-12-18 RX ORDER — DEXTROSE MONOHYDRATE 100 MG/ML
INJECTION, SOLUTION INTRAVENOUS CONTINUOUS PRN
Status: DISCONTINUED | OUTPATIENT
Start: 2023-12-18 | End: 2024-01-12 | Stop reason: HOSPADM

## 2023-12-18 RX ORDER — INSULIN LISPRO 100 [IU]/ML
0-4 INJECTION, SOLUTION INTRAVENOUS; SUBCUTANEOUS EVERY 6 HOURS
Status: DISCONTINUED | OUTPATIENT
Start: 2023-12-19 | End: 2023-12-19

## 2023-12-18 RX ORDER — POTASSIUM CHLORIDE 7.45 MG/ML
10 INJECTION INTRAVENOUS
Status: COMPLETED | OUTPATIENT
Start: 2023-12-18 | End: 2023-12-18

## 2023-12-18 RX ORDER — SENNA AND DOCUSATE SODIUM 50; 8.6 MG/1; MG/1
1 TABLET, FILM COATED ORAL 2 TIMES DAILY
Status: DISCONTINUED | OUTPATIENT
Start: 2023-12-18 | End: 2024-01-12 | Stop reason: HOSPADM

## 2023-12-18 RX ADMIN — WATER 1000 MG: 1 INJECTION INTRAMUSCULAR; INTRAVENOUS; SUBCUTANEOUS at 03:20

## 2023-12-18 RX ADMIN — HEPARIN SODIUM: 1000 INJECTION INTRAVENOUS; SUBCUTANEOUS at 15:22

## 2023-12-18 RX ADMIN — SENNOSIDES AND DOCUSATE SODIUM 1 TABLET: 50; 8.6 TABLET ORAL at 19:59

## 2023-12-18 RX ADMIN — ASPIRIN 325 MG: 325 TABLET ORAL at 14:24

## 2023-12-18 RX ADMIN — POTASSIUM CHLORIDE 10 MEQ: 10 INJECTION, SOLUTION INTRAVENOUS at 08:54

## 2023-12-18 RX ADMIN — POTASSIUM CHLORIDE 10 MEQ: 10 INJECTION, SOLUTION INTRAVENOUS at 10:14

## 2023-12-18 RX ADMIN — FAMOTIDINE 20 MG: 10 INJECTION, SOLUTION INTRAVENOUS at 19:59

## 2023-12-18 RX ADMIN — SODIUM CHLORIDE, PRESERVATIVE FREE 10 ML: 5 INJECTION INTRAVENOUS at 20:06

## 2023-12-18 RX ADMIN — CHLORHEXIDINE GLUCONATE 15 ML: 1.2 RINSE ORAL at 08:34

## 2023-12-18 RX ADMIN — SODIUM CHLORIDE, POTASSIUM CHLORIDE, SODIUM LACTATE AND CALCIUM CHLORIDE 1000 ML: 600; 310; 30; 20 INJECTION, SOLUTION INTRAVENOUS at 17:34

## 2023-12-18 RX ADMIN — POTASSIUM CHLORIDE 10 MEQ: 10 INJECTION, SOLUTION INTRAVENOUS at 06:37

## 2023-12-18 RX ADMIN — SENNOSIDES AND DOCUSATE SODIUM 1 TABLET: 50; 8.6 TABLET ORAL at 08:32

## 2023-12-18 RX ADMIN — IOPAMIDOL 102 ML: 612 INJECTION, SOLUTION INTRAVENOUS at 16:18

## 2023-12-18 RX ADMIN — HEPARIN SODIUM: 1000 INJECTION INTRAVENOUS; SUBCUTANEOUS at 15:20

## 2023-12-18 RX ADMIN — PROPOFOL 40 MCG/KG/MIN: 10 INJECTION, EMULSION INTRAVENOUS at 02:00

## 2023-12-18 RX ADMIN — SODIUM CHLORIDE, POTASSIUM CHLORIDE, SODIUM LACTATE AND CALCIUM CHLORIDE: 600; 310; 30; 20 INJECTION, SOLUTION INTRAVENOUS at 18:25

## 2023-12-18 RX ADMIN — LEVETIRACETAM 500 MG: 100 INJECTION, SOLUTION, CONCENTRATE INTRAVENOUS at 02:58

## 2023-12-18 RX ADMIN — LIDOCAINE HYDROCHLORIDE 10 ML: 10 INJECTION, SOLUTION EPIDURAL; INFILTRATION; INTRACAUDAL; PERINEURAL at 15:17

## 2023-12-18 RX ADMIN — FAMOTIDINE 20 MG: 10 INJECTION, SOLUTION INTRAVENOUS at 08:31

## 2023-12-18 RX ADMIN — Medication 60 MG: at 11:30

## 2023-12-18 RX ADMIN — VERAPAMIL HYDROCHLORIDE 5 MG: 2.5 INJECTION, SOLUTION INTRAVENOUS at 15:37

## 2023-12-18 RX ADMIN — Medication 60 MG: at 17:05

## 2023-12-18 RX ADMIN — PROPOFOL 30 MCG/KG/MIN: 10 INJECTION, EMULSION INTRAVENOUS at 04:29

## 2023-12-18 RX ADMIN — CHLORHEXIDINE GLUCONATE 15 ML: 1.2 RINSE ORAL at 20:06

## 2023-12-18 RX ADMIN — Medication 60 MG: at 08:49

## 2023-12-18 RX ADMIN — SODIUM CHLORIDE, POTASSIUM CHLORIDE, SODIUM LACTATE AND CALCIUM CHLORIDE: 600; 310; 30; 20 INJECTION, SOLUTION INTRAVENOUS at 04:31

## 2023-12-18 RX ADMIN — Medication 60 MG: at 23:20

## 2023-12-18 RX ADMIN — POTASSIUM BICARBONATE 40 MEQ: 782 TABLET, EFFERVESCENT ORAL at 07:04

## 2023-12-18 RX ADMIN — Medication 60 MG: at 20:00

## 2023-12-18 RX ADMIN — AZITHROMYCIN MONOHYDRATE 500 MG: 500 INJECTION, POWDER, LYOPHILIZED, FOR SOLUTION INTRAVENOUS at 03:20

## 2023-12-18 RX ADMIN — PROPOFOL 30 MCG/KG/MIN: 10 INJECTION, EMULSION INTRAVENOUS at 11:57

## 2023-12-18 RX ADMIN — LEVETIRACETAM 500 MG: 100 INJECTION, SOLUTION, CONCENTRATE INTRAVENOUS at 14:15

## 2023-12-18 RX ADMIN — SODIUM CHLORIDE, PRESERVATIVE FREE 10 ML: 5 INJECTION INTRAVENOUS at 08:34

## 2023-12-18 RX ADMIN — Medication 60 MG: at 04:48

## 2023-12-18 RX ADMIN — PROPOFOL 25 MCG/KG/MIN: 10 INJECTION, EMULSION INTRAVENOUS at 21:58

## 2023-12-18 RX ADMIN — TICAGRELOR 180 MG: 90 TABLET ORAL at 14:24

## 2023-12-18 RX ADMIN — POTASSIUM CHLORIDE 10 MEQ: 10 INJECTION, SOLUTION INTRAVENOUS at 07:53

## 2023-12-18 RX ADMIN — POTASSIUM PHOSPHATE, MONOBASIC POTASSIUM PHOSPHATE, DIBASIC 30 MMOL: 224; 236 INJECTION, SOLUTION, CONCENTRATE INTRAVENOUS at 12:36

## 2023-12-18 ASSESSMENT — PULMONARY FUNCTION TESTS
PIF_VALUE: 16
PIF_VALUE: 16
PIF_VALUE: 19
PIF_VALUE: 20
PIF_VALUE: 19
PIF_VALUE: 20
PIF_VALUE: 14

## 2023-12-18 ASSESSMENT — PAIN SCALES - GENERAL
PAINLEVEL_OUTOF10: 0

## 2023-12-18 NOTE — PROGRESS NOTES
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    Patient remains intubated and sedated on 20 mcg/kg/min of propofol. She is more responsive compared to exam pre-procedure. Eyes will open to voice. Attempts to track horizontally with eyes. PERRL. No blink to visual threat. She is following commands in all extremities and moving extremities spontaneously. Nods appropriately. Denies headache. Attempted to smile and stick out tongue with ETT in place. No facial droop appreciated with ETT in place. Withdraws to pain in extremities.     Skin cool to touch. Appropriate for ethnicity. Distal foot pulses palpable. Right groin arteriotomy site with slight sanguinous drainage on dressing. Site was marked. Will monitor. No continuous bleeding noted. No hematoma or bruising noted.       Plan to leave intubated overnight with possible extubation tomorrow if neuro exam remains stable. Intensivist will manage SAT/SBT tomorrow. Will hold off on Head CT tonight and will have a follow up Head CT in AM.    Check aspirin test and P2Y12 test, labs pending.  SBP goal <140  Follow neurovascular checks post procedure protocol    Plan discussed with Dr. Hamilton, Intensivist, and RN.       Skylar Shi, [facility]  Neurocritical care NP/Neurointerventional Surgery

## 2023-12-18 NOTE — CARE COORDINATION
Care Management Initial Assessment       RUR: Calculating   Readmission? No     12/18/23 1424   Service Assessment   Patient Orientation Unable to Assess  (intubated , sedated)   Cognition Other (see comment)  (Unable to assess)   History Provided By Spouse  ( Ravinder Geller [phone])   Primary Caregiver Self   Support Systems Spouse/Significant Other;Parent   Patient's Healthcare Decision Maker is: Named in Scanned ACP Document   PCP Verified by CM Yes  (Serena BO)   Last Visit to PCP Within last year   Prior Functional Level Independent in ADLs/IADLs   Current Functional Level Assistance with the following:;Bathing;Dressing;Toileting;Mobility   Can patient return to prior living arrangement Unknown at present   Ability to make needs known: Unable   Family able to assist with home care needs: Yes   Would you like for me to discuss the discharge plan with any other family members/significant others, and if so, who? Yes  (Spouse)   Financial Resources Medicaid   Community Resources None   Social/Functional History   Lives With Spouse   Type of Home House   Home Layout Two level   Home Access Stairs to enter with rails   Entrance Stairs - Number of Steps 2   Entrance Stairs - Rails Both   Bathroom Shower/Tub Tub/Shower unit   Bathroom Equipment Grab bars in shower;Shower chair   Home Equipment None   Receives Help From Family   ADL Assistance Independent   Ambulation Assistance Independent   Transfer Assistance Independent   Active  Yes   Mode of Transportation Car   Type of Occupation [employer]   Discharge Planning   Living Arrangements Spouse/Significant Other   Current Services Prior To Admission None   Potential Assistance Needed N/A   Potential Assistance Purchasing Medications No   One/Two Story Residence Two story   Lift Chair Available No   History of falls? 0     Patient transferred from [facility] for neurosurgery/neurointerventional evaluation. CTA: SAH, patient remains intubated

## 2023-12-18 NOTE — H&P
[facility]    ICU TEAM H & P Note        Name: Soledad Geller   : 1965   MRN: [id number]   Date: 2023        Subjective:     HPI:  This is a 58-year-old female with history significant for asthma, brachiocephalic artery injury, glaucoma, hyperlipidemia and hypertension that presented to [facility] after being found down by her  at home laying in vomit.  Patient is unable to participate in HPI as she is currently intubated and sedated and majority information obtained from EMR/medical records.  In short, initially reported that the patient had some abdominal pain for a week and nausea and vomiting since yesterday. Apparently,  reports that he was downstairs watching football and heard a thump and found the patient covered in vomit on the floor.  Patient has been having headache for the last several days and was actually evaluated urgent care for headache and was told it was eustachian tube dysfunction.  On arrival in the emergency department, patient is lethargic with limited ability to answer questions.  She wakes up to tactile stimuli and is able to tell me her right side hurts but is otherwise not answering questions and patient became lethargic and started vomiting dark red emesis during triage per ER documentation. 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 Further laboratory workup was completed and significant for hypokalemia with potassium of 2.5, mild EVELYN with creatinine of 1.18 and mild elevation in blood glucose of 219.  Neuro interventional and neurosurgery were consulted with no consulted and  by: Angel Somers EDT    CBC with Diff    Collection Time: 12/17/23 11:08 PM   Result Value Ref Range    WBC 16.8 (H) 3.6 - 11.0 K/uL    RBC 5.02 3.80 - 5.20 M/uL    Hemoglobin 14.5 11.5 - 16.0 g/dL    Hematocrit 44.6 35.0 - 47.0 %    MCV 88.8 80.0 - 99.0 FL    MCH 28.9 26.0 - 34.0 PG    MCHC 32.5 30.0 - 36.5 g/dL    RDW 12.2 11.5 - 14.5 %    Platelets 382 150 - 400 K/uL    MPV 10.0 8.9 - 12.9 FL    Nucleated RBCs 0.0 0  WBC    nRBC 0.00 0.00 - 0.01 K/uL    Neutrophils % 92 (H) 32 - 75 %    Lymphocytes % 4 (L) 12 - 49 %    Monocytes % 4 (L) 5 - 13 %    Eosinophils % 0 0 - 7 %    Basophils % 0 0 - 1 %    Immature Granulocytes 0 0.0 - 0.5 %    Neutrophils Absolute 15.4 (H) 1.8 - 8.0 K/UL    Lymphocytes Absolute 0.7 (L) 0.8 - 3.5 K/UL    Monocytes Absolute 0.7 0.0 - 1.0 K/UL    Eosinophils Absolute 0.0 0.0 - 0.4 K/UL    Basophils Absolute 0.0 0.0 - 0.1 K/UL    Absolute Immature Granulocyte 0.0 0.00 - 0.04 K/UL    Differential Type SMEAR SCANNED      RBC Comment NORMOCYTIC, NORMOCHROMIC     CMP    Collection Time: 12/17/23 11:08 PM   Result Value Ref Range    Sodium 136 136 - 145 mmol/L    Potassium 2.5 (LL) 3.5 - 5.1 mmol/L    Chloride 100 97 - 108 mmol/L    CO2 28 21 - 32 mmol/L    Anion Gap 8 5 - 15 mmol/L    Glucose 219 (H) 65 - 100 mg/dL    BUN 14 6 - 20 MG/DL    Creatinine 1.18 (H) 0.55 - 1.02 MG/DL    Bun/Cre Ratio 12 12 - 20      Est, Glom Filt Rate 54 (L) >60 ml/min/1.73m2    Calcium 10.1 8.5 - 10.1 MG/DL    Total Bilirubin 0.5 0.2 - 1.0 MG/DL    ALT 19 12 - 78 U/L    AST 18 15 - 37 U/L    Alk Phosphatase 67 45 - 117 U/L    Total Protein 8.5 (H) 6.4 - 8.2 g/dL    Albumin 4.2 3.5 - 5.0 g/dL    Globulin 4.3 (H) 2.0 - 4.0 g/dL    Albumin/Globulin Ratio 1.0 (L) 1.1 - 2.2     Lipase    Collection Time: 12/17/23 11:08 PM   Result Value Ref Range    Lipase 28 13 - 75 U/L   Extra Tubes Hold    Collection Time: 12/17/23 11:08 PM   Result Value Ref Range    Specimen HOld SST     Comment:        Add-on orders for

## 2023-12-18 NOTE — PROGRESS NOTES
Dr Hamilton called me a few minutes ago asking not necessarily for a consult but my thoughts on possibly putting in a prophylactic ventricular drain. The fusiform aneurysm is not amenable to clipping without sacrificing major cerebral vessels. I have looked at the CT of the brain done earlier this AM and in my opinion, she does not have hydrocephalus, in fact the ventricles are small and nearly slit-like so under those circumstances I would not recommend placing a ventricular drain in the absence of hydrocephalus. In addition there is some increased risk of re-rupture of the aneurysm if a ventricular drain is placed before it has been secured in some way. I have discussed this with Dr Hamilton  of course we remain available for questions or if she were to develop hydrocephalus. The fact that she is intubated and has a large SAH makes her a poorer grade SAH with a poor prognosis

## 2023-12-18 NOTE — PROGRESS NOTES
Patient arrives to angio for diagnotic cerebral angiogram with possible rt M1 aneurysm repair, with possible flow diverting stent and/or possible web embolization and/or coil and/or glue with general anesthesia.    1440: Patient placed on angio table with assistance. IR Staff and anesthesia present at bedside. Anesthesia to remain at bedside to manage pt airway, medications, VS and pt status.     1630: TRANSFER - OUT REPORT:    Verbal report given to Bruce CLEANING on Soledad Geller  being transferred to ICU 12 for routine post-op       Report consisted of patient's Situation, Background, Assessment and   Recommendations(SBAR).     Information from the following report(s) Surgery Report, Intake/Output, MAR, Cardiac Rhythm NSR, and Alarm Parameters was reviewed with the receiving nurse.           Lines:   Peripheral IV 12/17/23 Left Antecubital (Active)   Site Assessment Clean, dry & intact 12/18/23 0800   Line Status Flushed;Capped;Infusing 12/18/23 0800   Line Care Connections checked and tightened 12/18/23 0800   Phlebitis Assessment No symptoms 12/18/23 0800   Infiltration Assessment 0 12/18/23 0800   Alcohol Cap Used Yes 12/18/23 0800   Dressing Status Clean, dry & intact 12/18/23 0800   Dressing Type Transparent 12/18/23 0800       Peripheral IV 12/17/23 Distal;Left;Posterior Forearm (Active)   Site Assessment Clean, dry & intact 12/18/23 0800   Line Status Flushed;Capped;Infusing 12/18/23 0800   Line Care Cap changed;Connections checked and tightened 12/18/23 0800   Phlebitis Assessment No symptoms 12/18/23 0800   Infiltration Assessment 0 12/18/23 0800   Alcohol Cap Used Yes 12/18/23 0800   Dressing Status Clean, dry & intact 12/18/23 0800   Dressing Type Transparent 12/18/23 0800       Peripheral IV 12/18/23 Distal;Right;Ventral Cephalic (Active)   Site Assessment Clean, dry & intact 12/18/23 0800   Line Status Flushed;Capped;Infusing 12/18/23 0800   Line Care Cap changed;Connections checked and tightened

## 2023-12-18 NOTE — BRIEF OP NOTE
Neuro-Interventional Surgery - Brief procedure note      Patient: Soledad Geller MRN: [id number]     YOB: 1965  Age: 58 y.o.  Sex: female      Service: Neuro-Interventional Surgery    Date of Procedure: 12/18/2023    Pre-Procedure Diagnosis: Intracranial aneurysm and Subarachnoid hemorrhage    Post-Procedure Diagnosis: SAME    Procedure(s): Catheter cerebral/cervical arteriogram and Flow diversion stent embolization of right supraclinoid ICA aneurysm    Vessels selected: Right common carotid artery, Right internal carotid artery, Left common carotid artery, and Left vertebral artery    Brief Description of Procedure: Again visualized is the ectatic, eccentrically fusiform supraclinoid right ICA aneurysm, likely source of patient's subarachnoid hemorrhage.  Aneurysm embolization performed by deploying flow diversion stent across the neck of the supraclinoid ICA aneurysm.  No immediate complications.    Right common femoral arterial puncture site hemostasis achieved by deploying 8 F Angio-Seal closure device without complications.  No hematoma or oozing noted.  Distal R lower extremity pulses remain unchanged post hemostasis.  Sterile dressing applied over the puncture site.     Anesthesia:General    Estimated Blood Loss: 30 ml.    Specimens:  None    Implants: 4.5 mm x 48 mm RENEE X flow diversion stent.    Apparent Intraoperative Complications:  None immediate    Patient Condition:  Stable    Disposition:  Intensive care unit    Attestation:  I performed the procedure.        Signed By: Venu Hamilton MD     December 18, 2023     [facility] NEUROINTERVENTIONAL SURGERY

## 2023-12-18 NOTE — PROGRESS NOTES
Code Stroke -- ICH      1.) HPI:  Patient found down on the floor vomiting by spouse at home yesterday evening. Her family brought her into [facility] ED where she was found to have a right sided M1 aneurysm rupture with IPH and SAH noted on CT scan.  She was transferred to [facility] for a higher level of care, intervention by Neurointerventional Surgery and followed by Neurosurgery.  She is currently sedated on propofol and ventilated. She had been feeling bad for a couple of weeks with a headache on the right side of her head and over her right eye.  She had been seen at urgent care and treated for eustachian tube dysfunction.  She has a history of HTN and HLD.    2.) Medical hx  Principal Problem:    Aneurysmal subarachnoid hemorrhage (HCC)      3.) Traumatic ICH?   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 Large associated subarachnoid hemorrhage. Fibromuscular dysplasia.     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  Hypoplastic right vertebral artery.    Plan:  Neurointerventional Surgery will take her to angio today for aneurysm repair.     Time spent: 60 minutes.     BETZY Quintanilla - NP  Neurocritical Care Nurse Practitioner  [phone]       4:47 AM  NIHSS:      1a-LOC: 1    1b-Month/Age:2    1c-Open/Close Hand:0    2-Best Gaze:2    3-Visual Fields:0    4-Facial Palsy:0 (intubated with ETT)    5a-Left Arm:2    5b-Right Arm:2    6a-Left Leg:3    6b-Right Leg:3    7-Limb Ataxia:0    8-Sensory:1    9-Best Language:2    10-Dysarthria:UN - intubated and sedated    11-Extinction/Inattention: 1  TOTAL SCORE: 19

## 2023-12-18 NOTE — PROGRESS NOTES
0044: TRANSFER - IN REPORT:    Verbal report received from HERMILA Bolivar on Soledad Geller  being received from [facility] for urgent transfer      Report consisted of patient's Situation, Background, Assessment and   Recommendations(SBAR).     Information from the following report(s) Nurse Handoff Report, Index, Adult Overview, Intake/Output, MAR, Recent Results, Cardiac Rhythm NSR, Alarm Parameters, and Neuro Assessment was reviewed with the receiving nurse.    Opportunity for questions and clarification was provided.      0150: Assessment completed upon patient's arrival to unit and care assumed. Brought in by critical care transport crew. Updated report received. Pt arrived with garcia in place. PT OGT on intermitted suction @[facility] ER due to aspiration risk. YURIDIA Silva made aware and ordered to keep it on intermitted suction.     0200: After initial assessment. YURIDIA Silva at bedside. Turned pt sedation off to obtain a true neuro assessment. Pt could follow basic commands in all extremities. See charting for full assessment.     0230: YURIDIA Hernadez wanted Q1 neuros to be done without turning sedation off, to give pt some rest. Will work sedation down till pt can follow commands without discomfort.

## 2023-12-18 NOTE — PROGRESS NOTES
I participated in the IDT meeting where the plan of care for Soledad Geller was discussed on [facility] 7S2 INTENSIVE CARE. I reviewed the patient's medical record prior to this meeting.    We will continue to follow and assess for spiritual/emotional support during this hospitalization.    Please contact  paging service for any immediate needs.      _____________________________  Sonia Sifuentes M.Div., M.S., B.C.C.  Staff

## 2023-12-18 NOTE — PROGRESS NOTES
NIS Brief Progress Note    Patient is a 58 y.o. female who was transferred overnight to [facility] from [facility] ED due to a SAH due to ruptured cerebral aneurysm. CTA showed a large fusiform aneurysm of the right supraclinoid ICA with eccentric broad-based component. 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 Fibromuscular dysplasia.     Patient intubated and sedated on propofol. Eyes will open slightly to voice. She is following commands and giving me a thumbs up. Wiggles toes on the right foot. No blink to threat. No tracking with eyes. Pupils equal round 3 mm bilaterally and sluggish response to light. Yonis on command. Appears to move right side more than left side. Withdraws to pain in all extremities. + corneal reflexes. Negative Babinski's reflex.  Skin cool to touch, but appropriate for ethnicity.    PLAN: Baseline mRS score: 0  Continue with current NIS plan outlined in consult note  Start TCDs today   SBP goal <140  Strict I and O  Potassium low at 2.6, potassium replaced this morning per Intensivist   Check Mag, Phos, BMP daily   NSGY following  CTH this morning shows some IVH associated with the SAH, radiology report pending  ECHO pending  Neuro checks every hour    Plan discussed with Dr. Hamilton, Intensivist, RN, and patient's family.    Skylar Shi, [facility]  Neurocritical care NP/Neurointerventional Surgery     Addendum: Radiology report of CTH: New intraventricular extension with blood in the bilateral and third ventricles. Ongoing large degree of diffuse subarachnoid hemorrhage. No hydrocephalus. Per Neurosurgery notes, Dr. Ferrara does not feel patient needs EVD at this time.    11:41 AM Spoke with Dr. Hamilton and he would like to obtain a follow up CT of Head to assess for any progression of SAH and assess for any hydrocephalus prior to going to angio for possible aneurysm treatment. Discussed with Intensivist and primary RN.     1306: CT  of Head reviewed and stable. TCDs show no evidence of vasospasm.  1344: Dr. Hamilton had a long discussion with the patient's family on risks and benefits of performing procedure to treat aneurysm. The family initially decided to hold off on doing the procedure, however, the patient's  would like to go ahead and move forward with treating the aneurysm. I informed Dr. Hamilton and RN. Per discussion with Dr. Hamilton, ordered 325 mg of aspirin and 180 mg of Brilinta prior to procedure in preparation for flow diversion stent embolization of the fusiform aneurysm. I explained to the patient's  that there is a risk of further hemorrhage with starting antiplatelet agents. Angio team was also notified. Discussed with the Intensivist.

## 2023-12-18 NOTE — CONSULTS
NeuroInterventional Surgery Consult  Christina Hernadez, APRN - NP    Patient: Soledad Geller MRN: [id number]  SSN: [id number]    YOB: 1965  Age: 58 y.o.  Sex: female      Chief Complaint: Headache, vomiting    HPI:   58 y.o. -H Black /   female consulted for subarachnoid hemorrhage see on CT.  Patient was found down on the floor vomiting by spouse at home yesterday evening.  They stated that when they found her she was able to speak to them, was making sense and was even making jokes waiting for EMS to arrive.  When EMS arrived she was able to get up to a chair with assistance. EMS brought her into [facility] ED where she was found to have a right sided M1 aneurysm rupture with a large diffuse amount of SAH and some IPH noted on CT scan.  The ED physician immediately notified the Neurointerventional  surgeon on call, Dr. Venu Hamilton, and Neurosurgeon, Dr. Fonseca. She was then transferred to [facility] ICU for a higher level of care and intervention by Neurointerventional Surgery for possible aneurysm repair.      She had been feeling bad for a couple of weeks with a headache on the right side of her head and over her right eye.  She had been seen yesterday urgent care and treated for eustachian tube dysfunction.  She has a history of HTN and HLD.  She was not on any blood thinners but had been using a NSAID for the headache.    She is currently sedated with propofol, ventilated and with family at the bedside.      Past Medical History:   Diagnosis Date    Asthma     Brachiocephalic artery injury     chronic dissection    Glaucoma, low tension     Hyperlipidemia     Hypertension     Ill-defined condition     high cholesterol     Family History   Problem Relation Age of Onset    Heart Disease Maternal Grandmother     Heart Attack Maternal Grandmother     Hypertension Maternal Grandmother     Diabetes Maternal Grandmother     Glaucoma Father     Hypertension Mother     No Known  patient's spouse did disclose that the patient is a Mandaen and that she would prefer to not receive blood or blood products, but that in the event of an emergency the spouse, himself, will consent for her to receive blood during the procedure because he believes that she would agree with that decision. The patient's spouse expressed understanding and elected to proceed with the procedure. The patient's spouse consented for the procedure.                  Time spent: 60 minutes.     BETZY Quintanilla NP  Neurocritical Care Nurse Practitioner  [phone]       I have discussed the diagnosis and the intended plan as seen in the above orders with Emily Pride NP - ICU intensivist, Primary nurse and  family at the bedside.    Thank you for this consult and participating in the care of this patient.    I have spent 60 minutes of time involved in chart review, lab review, imaging review, consultations with specialists and attendings, family discussion/decision making and documentation.     Signed By: BETZY Quintanilla NP, Neurocritical Care Nurse Practitioner    December 18, 2023

## 2023-12-18 NOTE — PROGRESS NOTES
RT Note:  Vent Set up     12/18/23 0144   Vent Information   Vent Mode AC/VC   $Ventilation $Initial Day   Ventilator Settings   Vt (Set, mL) 400 mL   Resp Rate (Set) 16 bpm   PEEP/CPAP (cmH2O) 5   FiO2  50 %   Vent Patient Data (Readings)   Vt (Measured) 415 mL   Peak Inspiratory Pressure (cmH2O) 16 cmH2O   Rate Measured 16 br/min   Minute Volume (L/min) 6.7 Liters   Peak Inspiratory Flow (lpm) 46 L/sec  (1:3)   Mean Airway Pressure (cmH2O) 8.7 cmH20   Plateau Pressure (cm H2O) 12 cm H2O   Driving Pressure 7   I:E Ratio 1:3   Flow Sensitivity 3 L/min   PEEP Intrinsic (cm H2O) 0.3 cm H2O   Static Compliance (L/cm H2O) 51

## 2023-12-19 ENCOUNTER — APPOINTMENT (OUTPATIENT)
Facility: HOSPITAL | Age: 58
End: 2023-12-19
Attending: ANESTHESIOLOGY
Payer: OTHER GOVERNMENT

## 2023-12-19 LAB
ANION GAP SERPL CALC-SCNC: 9 MMOL/L (ref 5–15)
BASOPHILS # BLD: 0 K/UL (ref 0–0.1)
BASOPHILS NFR BLD: 0 % (ref 0–1)
BUN SERPL-MCNC: 7 MG/DL (ref 6–20)
BUN/CREAT SERPL: 13 (ref 12–20)
CALCIUM SERPL-MCNC: 8.3 MG/DL (ref 8.5–10.1)
CHLORIDE SERPL-SCNC: 112 MMOL/L (ref 97–108)
CO2 SERPL-SCNC: 21 MMOL/L (ref 21–32)
CREAT SERPL-MCNC: 0.53 MG/DL (ref 0.55–1.02)
DIFFERENTIAL METHOD BLD: ABNORMAL
EOSINOPHIL # BLD: 0 K/UL (ref 0–0.4)
EOSINOPHIL NFR BLD: 0 % (ref 0–7)
ERYTHROCYTE [DISTWIDTH] IN BLOOD BY AUTOMATED COUNT: 12.3 % (ref 11.5–14.5)
GLUCOSE BLD STRIP.AUTO-MCNC: 122 MG/DL (ref 65–117)
GLUCOSE BLD STRIP.AUTO-MCNC: 89 MG/DL (ref 65–117)
GLUCOSE BLD STRIP.AUTO-MCNC: 95 MG/DL (ref 65–117)
GLUCOSE BLD STRIP.AUTO-MCNC: 97 MG/DL (ref 65–117)
GLUCOSE SERPL-MCNC: 115 MG/DL (ref 65–100)
HCT VFR BLD AUTO: 35.9 % (ref 35–47)
HGB BLD-MCNC: 11.8 G/DL (ref 11.5–16)
IMM GRANULOCYTES # BLD AUTO: 0 K/UL (ref 0–0.04)
IMM GRANULOCYTES NFR BLD AUTO: 0 % (ref 0–0.5)
LYMPHOCYTES # BLD: 0.4 K/UL (ref 0.8–3.5)
LYMPHOCYTES NFR BLD: 3 % (ref 12–49)
MAGNESIUM SERPL-MCNC: 1.9 MG/DL (ref 1.6–2.4)
MCH RBC QN AUTO: 28.9 PG (ref 26–34)
MCHC RBC AUTO-ENTMCNC: 32.9 G/DL (ref 30–36.5)
MCV RBC AUTO: 87.8 FL (ref 80–99)
MONOCYTES # BLD: 0.6 K/UL (ref 0–1)
MONOCYTES NFR BLD: 5 % (ref 5–13)
NEUTS SEG # BLD: 11.5 K/UL (ref 1.8–8)
NEUTS SEG NFR BLD: 92 % (ref 32–75)
NRBC # BLD: 0 K/UL (ref 0–0.01)
NRBC BLD-RTO: 0 PER 100 WBC
PHOSPHATE SERPL-MCNC: 2.2 MG/DL (ref 2.6–4.7)
PLATELET # BLD AUTO: 240 K/UL (ref 150–400)
PMV BLD AUTO: 9.6 FL (ref 8.9–12.9)
POTASSIUM SERPL-SCNC: 3.2 MMOL/L (ref 3.5–5.1)
RBC # BLD AUTO: 4.09 M/UL (ref 3.8–5.2)
RBC MORPH BLD: ABNORMAL
SERVICE CMNT-IMP: ABNORMAL
SERVICE CMNT-IMP: NORMAL
SODIUM SERPL-SCNC: 142 MMOL/L (ref 136–145)
VAS BASILAR ARTERY EDV: 14.9 CM/S
VAS BASILAR ARTERY MEAN VEL: 34 CM/S
VAS BASILAR ARTERY PSV: 73.1 CM/S
VAS LEFT ACA EDV: 29.6 CM/S
VAS LEFT ACA MEAN VEL: 50.6 CM/S
VAS LEFT ACA PSV: 92.7 CM/S
VAS LEFT EX ICA MEAN VEL: 43 CM/S
VAS LEFT ICA DIST EDV: 19.4 CM/S
VAS LEFT ICA DIST PSV: 91.8 CM/S
VAS LEFT ICA EDV: 18.9 CM/S
VAS LEFT ICA MEAN VEL: 39.2 CM/S
VAS LEFT ICA PSV: 79.8 CM/S
VAS LEFT LINDEGAARD RATIO: 0.8
VAS LEFT MCA 1 EDV: 14.9 CM/S
VAS LEFT MCA 1 MEAN VEL: 32.5 CM/S
VAS LEFT MCA 1 PSV: 67.6 CM/S
VAS LEFT PCA 1 EDV: 18.2 CM/S
VAS LEFT PCA 1 MEAN VEL: 32.2 CM/S
VAS LEFT PCA 1 PSV: 60.2 CM/S
VAS LEFT VERTEBRAL EDV TCD: 14.9 CM/S
VAS LEFT VERTEBRAL MEAN VEL: 27.3 CM/S
VAS LEFT VERTEBRAL PSV TCD: 52.2 CM/S
VAS RIGHT ACA EDV: 52.8 CM/S
VAS RIGHT ACA MEAN VEL: 83 CM/S
VAS RIGHT ACA PSV: 143.3 CM/S
VAS RIGHT EX ICA MEAN VEL: 50 CM/S
VAS RIGHT ICA DIST EDV: 25.5 CM/S
VAS RIGHT ICA DIST PSV: 99.3 CM/S
VAS RIGHT ICA EDV: 28.1 CM/S
VAS RIGHT ICA MEAN VEL: 50.4 CM/S
VAS RIGHT ICA PSV: 95 CM/S
VAS RIGHT LINDEGAARD RATIO: 1
VAS RIGHT MCA 1 EDV: 27.7 CM/S
VAS RIGHT MCA 1 MEAN VEL: 51.1 CM/S
VAS RIGHT MCA 1 PSV: 97.9 CM/S
VAS RIGHT PCA 1 EDV: 17 CM/S
VAS RIGHT PCA 1 MEAN VEL: 28.6 CM/S
VAS RIGHT PCA 1 PSV: 51.7 CM/S
VAS RIGHT VERTEBRAL EDV TCD: 13.8 CM/S
VAS RIGHT VERTEBRAL MEAN VEL: 25.5 CM/S
VAS RIGHT VERTEBRAL PSV TCD: 48.9 CM/S
WBC # BLD AUTO: 12.5 K/UL (ref 3.6–11)

## 2023-12-19 PROCEDURE — 6370000000 HC RX 637 (ALT 250 FOR IP): Performed by: NURSE PRACTITIONER

## 2023-12-19 PROCEDURE — 97163 PT EVAL HIGH COMPLEX 45 MIN: CPT

## 2023-12-19 PROCEDURE — 84100 ASSAY OF PHOSPHORUS: CPT

## 2023-12-19 PROCEDURE — 97165 OT EVAL LOW COMPLEX 30 MIN: CPT

## 2023-12-19 PROCEDURE — 36415 COLL VENOUS BLD VENIPUNCTURE: CPT

## 2023-12-19 PROCEDURE — 6370000000 HC RX 637 (ALT 250 FOR IP): Performed by: ANESTHESIOLOGY

## 2023-12-19 PROCEDURE — 83735 ASSAY OF MAGNESIUM: CPT

## 2023-12-19 PROCEDURE — 80048 BASIC METABOLIC PNL TOTAL CA: CPT

## 2023-12-19 PROCEDURE — 6370000000 HC RX 637 (ALT 250 FOR IP): Performed by: STUDENT IN AN ORGANIZED HEALTH CARE EDUCATION/TRAINING PROGRAM

## 2023-12-19 PROCEDURE — 2000000000 HC ICU R&B

## 2023-12-19 PROCEDURE — 70450 CT HEAD/BRAIN W/O DYE: CPT

## 2023-12-19 PROCEDURE — 6360000002 HC RX W HCPCS: Performed by: NURSE PRACTITIONER

## 2023-12-19 PROCEDURE — 92612 ENDOSCOPY SWALLOW (FEES) VID: CPT

## 2023-12-19 PROCEDURE — 2580000003 HC RX 258: Performed by: NURSE PRACTITIONER

## 2023-12-19 PROCEDURE — 97535 SELF CARE MNGMENT TRAINING: CPT

## 2023-12-19 PROCEDURE — 97116 GAIT TRAINING THERAPY: CPT

## 2023-12-19 PROCEDURE — 6360000002 HC RX W HCPCS

## 2023-12-19 PROCEDURE — 2580000003 HC RX 258

## 2023-12-19 PROCEDURE — 92526 ORAL FUNCTION THERAPY: CPT

## 2023-12-19 PROCEDURE — 82962 GLUCOSE BLOOD TEST: CPT

## 2023-12-19 PROCEDURE — 93886 INTRACRANIAL COMPLETE STUDY: CPT

## 2023-12-19 PROCEDURE — 99233 SBSQ HOSP IP/OBS HIGH 50: CPT | Performed by: NURSE PRACTITIONER

## 2023-12-19 PROCEDURE — A4216 STERILE WATER/SALINE, 10 ML: HCPCS | Performed by: NURSE PRACTITIONER

## 2023-12-19 PROCEDURE — 2500000003 HC RX 250 WO HCPCS: Performed by: NURSE PRACTITIONER

## 2023-12-19 PROCEDURE — 97530 THERAPEUTIC ACTIVITIES: CPT

## 2023-12-19 PROCEDURE — 85025 COMPLETE CBC W/AUTO DIFF WBC: CPT

## 2023-12-19 RX ORDER — INSULIN LISPRO 100 [IU]/ML
0-4 INJECTION, SOLUTION INTRAVENOUS; SUBCUTANEOUS
Status: DISCONTINUED | OUTPATIENT
Start: 2023-12-19 | End: 2023-12-21

## 2023-12-19 RX ORDER — HYDRALAZINE HYDROCHLORIDE 20 MG/ML
10 INJECTION INTRAMUSCULAR; INTRAVENOUS EVERY 4 HOURS PRN
Status: DISCONTINUED | OUTPATIENT
Start: 2023-12-19 | End: 2023-12-21

## 2023-12-19 RX ORDER — ATROPINE SULFATE 0.1 MG/ML
INJECTION INTRAVENOUS
Status: DISCONTINUED
Start: 2023-12-19 | End: 2023-12-19 | Stop reason: WASHOUT

## 2023-12-19 RX ORDER — CALCIUM GLUCONATE 20 MG/ML
2000 INJECTION, SOLUTION INTRAVENOUS ONCE
Status: COMPLETED | OUTPATIENT
Start: 2023-12-19 | End: 2023-12-19

## 2023-12-19 RX ORDER — HYDRALAZINE HYDROCHLORIDE 20 MG/ML
5 INJECTION INTRAMUSCULAR; INTRAVENOUS EVERY 6 HOURS PRN
Status: DISCONTINUED | OUTPATIENT
Start: 2023-12-19 | End: 2023-12-19

## 2023-12-19 RX ORDER — AMIODARONE HYDROCHLORIDE 50 MG/ML
INJECTION, SOLUTION INTRAVENOUS
Status: DISCONTINUED
Start: 2023-12-19 | End: 2023-12-19 | Stop reason: WASHOUT

## 2023-12-19 RX ORDER — PRAVASTATIN SODIUM 20 MG
40 TABLET ORAL NIGHTLY
Status: DISCONTINUED | OUTPATIENT
Start: 2023-12-19 | End: 2024-01-12 | Stop reason: HOSPADM

## 2023-12-19 RX ORDER — MAGNESIUM SULFATE 1 G/100ML
1000 INJECTION INTRAVENOUS ONCE
Status: COMPLETED | OUTPATIENT
Start: 2023-12-19 | End: 2023-12-19

## 2023-12-19 RX ORDER — ASPIRIN 81 MG/1
81 TABLET, CHEWABLE ORAL DAILY
Status: DISCONTINUED | OUTPATIENT
Start: 2023-12-19 | End: 2024-01-12 | Stop reason: HOSPADM

## 2023-12-19 RX ORDER — EPINEPHRINE 0.1 MG/ML
INJECTION INTRAVENOUS
Status: DISCONTINUED
Start: 2023-12-19 | End: 2023-12-19 | Stop reason: WASHOUT

## 2023-12-19 RX ADMIN — Medication 60 MG: at 20:33

## 2023-12-19 RX ADMIN — LEVETIRACETAM 500 MG: 100 INJECTION, SOLUTION, CONCENTRATE INTRAVENOUS at 14:32

## 2023-12-19 RX ADMIN — SODIUM CHLORIDE, PRESERVATIVE FREE 10 ML: 5 INJECTION INTRAVENOUS at 08:19

## 2023-12-19 RX ADMIN — WATER 1000 MG: 1 INJECTION INTRAMUSCULAR; INTRAVENOUS; SUBCUTANEOUS at 02:30

## 2023-12-19 RX ADMIN — POTASSIUM & SODIUM PHOSPHATES POWDER PACK 280-160-250 MG 250 MG: 280-160-250 PACK at 04:41

## 2023-12-19 RX ADMIN — SENNOSIDES AND DOCUSATE SODIUM 1 TABLET: 50; 8.6 TABLET ORAL at 20:33

## 2023-12-19 RX ADMIN — HYDRALAZINE HYDROCHLORIDE 10 MG: 20 INJECTION INTRAMUSCULAR; INTRAVENOUS at 16:06

## 2023-12-19 RX ADMIN — AZITHROMYCIN MONOHYDRATE 500 MG: 500 INJECTION, POWDER, LYOPHILIZED, FOR SOLUTION INTRAVENOUS at 02:30

## 2023-12-19 RX ADMIN — ACETAMINOPHEN 650 MG: 325 TABLET ORAL at 23:46

## 2023-12-19 RX ADMIN — Medication 60 MG: at 15:58

## 2023-12-19 RX ADMIN — FAMOTIDINE 20 MG: 10 INJECTION, SOLUTION INTRAVENOUS at 20:33

## 2023-12-19 RX ADMIN — TICAGRELOR 90 MG: 90 TABLET ORAL at 20:33

## 2023-12-19 RX ADMIN — SODIUM CHLORIDE, POTASSIUM CHLORIDE, SODIUM LACTATE AND CALCIUM CHLORIDE: 600; 310; 30; 20 INJECTION, SOLUTION INTRAVENOUS at 20:28

## 2023-12-19 RX ADMIN — MAGNESIUM SULFATE HEPTAHYDRATE 1000 MG: 1 INJECTION, SOLUTION INTRAVENOUS at 04:41

## 2023-12-19 RX ADMIN — HYDRALAZINE HYDROCHLORIDE 10 MG: 20 INJECTION INTRAMUSCULAR; INTRAVENOUS at 23:13

## 2023-12-19 RX ADMIN — CHLORHEXIDINE GLUCONATE 15 ML: 1.2 RINSE ORAL at 08:13

## 2023-12-19 RX ADMIN — TICAGRELOR 90 MG: 90 TABLET ORAL at 12:02

## 2023-12-19 RX ADMIN — SENNOSIDES AND DOCUSATE SODIUM 1 TABLET: 50; 8.6 TABLET ORAL at 08:13

## 2023-12-19 RX ADMIN — Medication 60 MG: at 04:00

## 2023-12-19 RX ADMIN — Medication 60 MG: at 23:46

## 2023-12-19 RX ADMIN — SODIUM CHLORIDE, PRESERVATIVE FREE 10 ML: 5 INJECTION INTRAVENOUS at 09:00

## 2023-12-19 RX ADMIN — SODIUM CHLORIDE, PRESERVATIVE FREE 10 ML: 5 INJECTION INTRAVENOUS at 09:01

## 2023-12-19 RX ADMIN — Medication 60 MG: at 08:13

## 2023-12-19 RX ADMIN — POTASSIUM BICARBONATE 40 MEQ: 782 TABLET, EFFERVESCENT ORAL at 04:41

## 2023-12-19 RX ADMIN — HYDRALAZINE HYDROCHLORIDE 10 MG: 20 INJECTION INTRAMUSCULAR; INTRAVENOUS at 07:02

## 2023-12-19 RX ADMIN — ACETAMINOPHEN 650 MG: 325 TABLET ORAL at 08:13

## 2023-12-19 RX ADMIN — SODIUM CHLORIDE, PRESERVATIVE FREE 10 ML: 5 INJECTION INTRAVENOUS at 20:39

## 2023-12-19 RX ADMIN — CALCIUM GLUCONATE 2000 MG: 20 INJECTION, SOLUTION INTRAVENOUS at 05:00

## 2023-12-19 RX ADMIN — ASPIRIN 81 MG CHEWABLE TABLET 81 MG: 81 TABLET CHEWABLE at 12:02

## 2023-12-19 RX ADMIN — FAMOTIDINE 20 MG: 10 INJECTION, SOLUTION INTRAVENOUS at 08:13

## 2023-12-19 RX ADMIN — SODIUM CHLORIDE, POTASSIUM CHLORIDE, SODIUM LACTATE AND CALCIUM CHLORIDE: 600; 310; 30; 20 INJECTION, SOLUTION INTRAVENOUS at 07:47

## 2023-12-19 RX ADMIN — Medication 60 MG: at 11:25

## 2023-12-19 RX ADMIN — PRAVASTATIN SODIUM 40 MG: 40 TABLET ORAL at 20:33

## 2023-12-19 RX ADMIN — ACETAMINOPHEN 650 MG: 325 TABLET ORAL at 16:00

## 2023-12-19 RX ADMIN — HYDRALAZINE HYDROCHLORIDE 5 MG: 20 INJECTION INTRAMUSCULAR; INTRAVENOUS at 06:06

## 2023-12-19 RX ADMIN — LEVETIRACETAM 500 MG: 100 INJECTION, SOLUTION, CONCENTRATE INTRAVENOUS at 02:30

## 2023-12-19 ASSESSMENT — PAIN SCALES - GENERAL
PAINLEVEL_OUTOF10: 0
PAINLEVEL_OUTOF10: 0
PAINLEVEL_OUTOF10: 3
PAINLEVEL_OUTOF10: 0
PAINLEVEL_OUTOF10: 0

## 2023-12-19 ASSESSMENT — PAIN DESCRIPTION - ONSET: ONSET: ON-GOING

## 2023-12-19 ASSESSMENT — PAIN DESCRIPTION - LOCATION
LOCATION: HEAD
LOCATION: HEAD

## 2023-12-19 ASSESSMENT — PULMONARY FUNCTION TESTS: PIF_VALUE: 10

## 2023-12-19 ASSESSMENT — PAIN DESCRIPTION - DESCRIPTORS: DESCRIPTORS: ACHING

## 2023-12-19 NOTE — PROGRESS NOTES
Neurocritical Care Brief Progress Note:  Soledad Geller is 58 y.o female with pmh of Asthma, FMD, HTN, HLD, chronic brachiocephalic artery dissection who arrived from [facility] ED intubated to [facility] ICU early morning of 12/18/23 with SAH from a ruptured cerebral aneurysm. CTA showed a large fusiform aneurysm of the right supraclinoid ICA with eccentric broad-based component.    The pt was taken to IR this afternoon by Dr. Hamilton for an emergent cerebral/cervical arteriogram and Flow diversion stent embolization of right supraclinoid ICA aneurysm.     Right groin arteriotomy site continues to ooz, dressing is saturated. Will apply quickClot and continue to monitor groin site. No hematoma, bruising, or tenderness to palpation. Distal pulses intact and warm to touch.     Remained intubated on vent and sedated on propofol of 25 mcg. spouse at the bedside and opportunities to ask/answer questions provided.     N+ 139 and Mag is 1.9. hypophos 1.7 and hypoK+ of 2.6 replaced per intensivist. Pending morning labs. Aspirin test is 391 and p2y12 88. Pending repeat CTH in the morning. Will start maintenance dose of DAPT if repeat CTH remained stable with no worsening bleed. Pending TTE and daily TCD's. No vsp on today's TCD's. Current I/o is +304 daily total. Continue with current IVF LR at 75ml/hr.     Addendum @ 05:24 am  -CTH obtained~pending radiologist read. It looks improved per my review; will await for radiologist read.  -hypoK+ and hypophos improved but remained low. Replacement per intensivist  -current I/o is +238 this shift, +777 daily total, and +502 since admission~euvolemia  -sedation turned off~planned for SBT per intensivist   Physical Exam:   Blood pressure (!) 104/59, pulse 58, temperature 97.6 °F (36.4 °C), temperature source Axillary, resp. rate 16, weight 77 kg (169 lb 11.2 oz), SpO2 100 %.    GEN: Calm, well developed, well nourished, ill-appearing female patient in NAD  HEENT: Normocephalic. Non-icter, no

## 2023-12-19 NOTE — PLAN OF CARE
Speech Language Pathology  Flexible Endoscopic Evaluation of Swallowing-FEES and Dysphagia Treatment  Patient: Soledad Geller (58 y.o. female)  Date: 12/19/2023  Primary Diagnosis: Aneurysmal subarachnoid hemorrhage (HCC) [I60.8]       Precautions:                      ASSESSMENT :  The patient presents with functional oral phase of swallow subjectively and mild pharyngeal dysphagia objectively. Noted epiglottis is asymmetric which could be an effect of pt's recent subarachnoid hemorrhage and pt with some fullness consistent with recent intubation. Pt with slight weak pharyngeal squeeze with suspected reduced base of tongue retraction which resulting in base of tongue residue with puree and solid consistency. Pt with one instance of premature spillage which results in penetration presumed during the swallow that mostly clears with the force of the swallow with thin liquids. Penetration and residue were not present with trials of mildly-thick liquids nor other trials of thin liquids. Otherwise, no other significant aspiration, penetration, nor residue. At this juncture, recommend pt initiate diet as outlined below.     Dysphagia treatment completed discussing results of FEES, stroke involvement and its impact on swallowing, as well as intubation and its impact on swallowing. Pt's family member and pt expressed understanding to that. Discussed diet modification and plan to advance in the short-term. Will benefit from continued SLP.     Patient will benefit from skilled intervention to address the above impairments.       PLAN :  Recommendations and Planned Interventions:  Diet: Soft and bite sized and thin liquids  Can advance diet to regular/thins if no difficulty  Good oral care  General aspiration precautions  Meds as tolerated (pt took liquid meds administered by RN during FEES with no penetration, aspiration, nor residue)    Acute SLP Services: Yes, patient will be followed by speech-language pathology 3x/week  (0-7): 0     Location:  0 - Nil significant pooled secretions in pyriform fossae or laryngeal vestibule Amount:   0 - Nil significant pooled secretions in pyriform fossae (0-20%) Response*:  0 - Secretions in pyriform fossae or laryngeal vestibule effectively cleared   Comments (e.g., quality/texture/color):      *Normal airway responses in the pharynx or laryngeal vestibule may include spontaneous coughing, throat clearing, and/or swallowing        Part IV:  Swallow Trials:    Subjective comments regarding oral phase of swallow: WFL    Comments regarding esophageal phase of swallow: No deficits appreciated in this modality                            Dysphagia Severity Rating:   Oral phase: WFL  Pharyngeal phase: Mild        COMMUNICATION/EDUCATION:     The patient's plan of care including recommendations, planned interventions, and recommended diet changes were discussed with: Registered nurse    Patient/family have participated as able in goal setting and plan of care    Thank you,  Kourtney Gay, SLP       Problem: SLP Adult - Impaired Swallowing  Goal: By Discharge: Advance to least restrictive diet without signs or symptoms of aspiration for planned discharge setting.  See evaluation for individualized goals.  Description: Speech Therapy Goals  Initiated 12/19/23    1. Patient will tolerate baseline diet without adverse effects within 7 days.   Outcome: Progressing

## 2023-12-19 NOTE — CONSULTS
Right MCA 1 Mean Velocity 51.1 cm/s    Right CARLITOS Mean Velocity 83.0 cm/s    Right ICA Mean Velocity 50.4 cm/s    Right PCA 1 Mean Velocity 28.6 cm/s    Right Vertebral Mean Velocity 25.5 cm/s    Left MCA 1 Mean Velocity 32.5 cm/s    Left CARLITOS Mean Velocity 50.6 cm/s    Left ICA Mean Velocity 39.2 cm/s    Left PCA 1 Mean Velocity 32.2 cm/s    Left Vertebral Mean Velocity 27.3 cm/s    Right External ICA Mean Velocity 50.0 cm/s    Right Lindegaard Ratio 1.00     Basilar Artery Mean Elie 34.0 cm/s    Left External ICA Mean Velocity 43.0 cm/s    Left Lindegaard Ratio 0.80         Imaging (my read):  CT HEAD WO CONTRAST    Result Date: 12/17/2023  CTA HEAD There is a large fusiform aneurysm of the M1 segment on the right. Aneurysm measures 8.8 x 16 x 9.9 mm. Petrous and cavernous carotid arteries are patent..M1 segments are patent. Symmetric arborization of M2 vessels is demonstrated. The left vertebral artery is dominant.. 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 ..There is no aneurysm. There are no sizable posterior communicating arteries. Subarachnoid hemorrhage. Lipoma at the falx. 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 Large associated subarachnoid hemorrhage. Fibromuscular dysplasia. 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 The findings were called to Dr. Pinto on 12/17/2023 at 11:48 PM by Dr. Lake. [phone]      CTA HEAD NECK W CONTRAST    Result Date: 12/17/2023    CTA NECK There is a beaded appearance of the distal internal carotid arteries bilaterally.. The bilateral subclavian, common carotid, and internal carotid arteries are patent with no flow-limiting stenosis. % of right carotid artery stenosis: 0 % of left carotid artery stenosis: 0 Measurements utilizing NASCET criteria. NASCET method was utilized for calculating stenosis. Left vertebral artery is dominant.. The  Intensivist, Primary nurse and  family at the bedside.    Thank you for this consult and participating in the care of this patient.    I have spent 60 minutes of time involved in chart review, lab review, imaging review, consultations with specialists and attendings, family discussion/decision making and documentation.     Signed By: BETZY Hotl NP, Neurocritical Care Nurse Practitioner    December 19, 2023

## 2023-12-19 NOTE — PROGRESS NOTES
CRITICAL CARE NOTE    Name: Soledad Geller   : 1965   MRN: [id number]   Date: 2023      REASON FOR ICU ADMISSION:  SAH     PRINCIPAL ICU DIAGNOSIS   SAH, Aneurysmal  HTN    BRIEF PATIENT SUMMARY   This is a 58-year-old female with history significant for asthma, brachiocephalic artery injury, glaucoma, hyperlipidemia and hypertension that presented to [facility] after being found down by her  at home.  reports patient had been having headache for the last several days and was actually evaluated urgent care for headache and was told it was eustachian tube dysfunction. 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 Pt intubated at OSH for airway protection. Neuro interventional and neurosurgery were consulted with no consulted and neurosurgery with recommendations for four-vessel angiogram for diagnostic and possible intervention. Patient was transferred to the [facility] for further higher level of care and need of neurosurgery/neurointerventional and admitted to ICU. Pt underwent angio and flow diversion stent embolization of R ICA aneurysm     COMPREHENSIVE ASSESSMENT & PLAN:SYSTEM BASED     24 HOUR EVENTS: NAEON, CT imaging this AM grossly stable. Plan to extubate pt this AM, however pt coughed ETT out prior. Tolerating well.     NEUROLOGICAL:   Q1h neurochecks  STAT CT head if acute changes  Nimotop, keppra ppx  Daily TCDs  NIS following  GALINA per NIS, received ASA, brillinta pre angio  Monitor mentation    PULMONOLOGY:   O2 per NC for spO2 92-98%  Monitor for evidence of post self extubation edema  Complete 5 day CAP course    CARDIOVASCULAR:   SBP goal <140, MAP >65  PRN cardene for  dry & intact 12/18/23 2000   Dressing Type Transparent 12/18/23 2000        Drain(s):    NG/OG/NJ/NE Tube Orogastric 16 fr Right mouth (Active)   Surrounding Skin Clean, dry & intact 12/19/23 0800   Securement device Tape 12/19/23 0800   Status Clamped 12/19/23 0800   Placement Verified External Catheter Length 12/19/23 0800   NG/OG/NJ/NE External Measurement (cm) 52 cm 12/19/23 0800   Drainage Appearance Brown;Clear 12/18/23 0151   Free Water/Flush (mL) 60 mL 12/19/23 0000   Output (mL) 150 ml 12/18/23 0700       Urinary Catheter 12/18/23 Steward-Temperature (Active)   $ Urethral catheter insertion $ Not inserted for procedure 12/18/23 0055   Catheter Indications Need for fluid volume management of the critically ill patient in a critical care setting 12/19/23 0800   Site Assessment No urethral drainage 12/19/23 0800   Urine Color Yellow 12/19/23 0800   Urine Appearance Clear 12/19/23 0800   Collection Container Standard 12/19/23 0800   Securement Method Securing device (Describe) 12/19/23 0800   Catheter Care  Perineal wipes 12/18/23 2000   Catheter Best Practices  Drainage tube clipped to bed;Catheter secured to thigh;Tamper seal intact;Bag below bladder;Bag not on floor;Lack of dependent loop in tubing;Drainage bag less than half full 12/19/23 0800   Status Draining;Patent 12/19/23 0800   Output (mL) 75 mL 12/19/23 0800     Airway:    Non-Surgical Airway 12/17/23 Endotracheal tube (Active)   $ Intubation Emergent  $ Yes 12/18/23 0005   Secured At 23 cm 12/18/23 2003   Measured From Teeth 12/18/23 2003   Secured Location Center 12/18/23 2003   Secured By Commercial tube cruz 12/18/23 2003   Site Assessment Dry 12/18/23 2003   Cuff Pressure 25 cm H2O 12/18/23 2003        Atrial Line:    Arterial Line 12/18/23 Right Radial (Active)   Site Assessment Clean, dry & intact 12/19/23 0800   Line Status Arterial fluids per protocol;Intact and in place;Blood return noted 12/19/23 0800   Art Line Interventions Zeroed and

## 2023-12-19 NOTE — PROGRESS NOTES
Bedside and Verbal shift change report given to Law RN (oncoming nurse) by Kiesha and HERMILA Barrera (offgoing nurse). Report included the following information Nurse Handoff Report, Index, Adult Overview, Surgery Report, Intake/Output, MAR, Recent Results, Cardiac Rhythm NSR-SB, and Neuro Assessment.     2200: R angio site has been oozing since procedure. Day shift made providers aware. At this time dressing is saturated. NP Neelam notified. Redressed, with quick clot per NP Neelam.     0523: Prop turned off for SAT and SBT     0535: SBT started

## 2023-12-19 NOTE — PLAN OF CARE
Problem: Occupational Therapy - Adult  Goal: By Discharge: Performs self-care activities at highest level of function for planned discharge setting.  See evaluation for individualized goals.  Description: FUNCTIONAL STATUS PRIOR TO ADMISSION:  Patient was ambulatory without the use of AE  Receives Help From: Family, ADL Assistance: Independent,  ,  ,  ,  ,  ,  , Ambulation Assistance: Independent, Transfer Assistance: Independent, Active : Yes     HOME SUPPORT: Patient lived with spouse but didn't require assistance.    Occupational Therapy Goals:  Initiated 12/19/2023  1.  Patient will perform self-feeding with Modified [place] within 7 day(s).  2.  Patient will perform grooming with Minimal Assist within 7 day(s).  3.  Patient will perform bathing with Minimal Assist within 7 day(s).  4.  Patient will perform toilet transfers with Minimal Assist  within 7 day(s).  5.  Patient will perform all aspects of toileting with Minimal Assist within 7 day(s).  6.  Patient will participate in upper extremity therapeutic exercise/activities with Supervision for 5 minutes within 7 day(s).    7.  Patient will utilize energy conservation techniques during functional activities with verbal cues within 7 day(s).   Outcome: Progressing   OCCUPATIONAL THERAPY EVALUATION    Patient: Soledad Geller (58 y.o. female)  Date: 12/19/2023  Primary Diagnosis: Aneurysmal subarachnoid hemorrhage (HCC) [I60.8]         Precautions:                    ASSESSMENT :  The patient is limited by decreased functional mobility, independence in ADLs, high-level IADLs, ROM, strength, body mechanics, activity tolerance, endurance, safety awareness, cognition, command following, attention/concentration, coordination, balance, vision/visual deficit, posture, fine-motor control.  Per chart review pt was admitted for SAH due to ruptured cerebral aneurysm with IPH, had a Catheter cerebral/cervical arteriogram and Flow diversion stent embolization

## 2023-12-19 NOTE — PROGRESS NOTES
SLP Contact Note    Consult received and appreciated. Patient chart reviewed. Pt with recent intubation (just was extubated) and R MCA. Given risk factors for silent aspiration, will plan to complete a Flexible Endoscopic Evaluation of Swallow (FEES) at bedside to objectively assess safety of swallow physiology. Recommendations to follow.    Kourtney Gay M.Ed, CCC-SLP  Speech-Language Pathologist

## 2023-12-19 NOTE — PROGRESS NOTES
4 Eyes Skin Assessment     NAME:  Soledad Geller  YOB: 1965  MEDICAL RECORD NUMBER:  [id number]    The patient is being assessed for  Other Tuesday     I agree that at least one RN has performed a thorough Head to Toe Skin Assessment on the patient. ALL assessment sites listed below have been assessed.      Areas assessed by both nurses:    Head, Face, Ears, Shoulders, Back, Chest, Arms, Elbows, Hands, Sacrum. Buttock, Coccyx, Ischium, Legs. Feet and Heels, and Under Medical Devices         Does the Patient have a Wound? No noted wound(s)       Eyad Prevention initiated by RN: Yes  Wound Care Orders initiated by RN: No    Pressure Injury (Stage 3,4, Unstageable, DTI, NWPT, and Complex wounds) if present, place Wound referral order by RN under : No    New Ostomies, if present place, Ostomy referral order under : No     Nurse 1 eSignature: Electronically signed by Law Shaikh RN on 12/19/23 at 2:26 AM EST    **SHARE this note so that the co-signing nurse can place an eSignature**    Nurse 2 eSignature: Electronically signed by Sandra Francisco RN on 12/19/23 at 2:28 AM EST

## 2023-12-19 NOTE — PLAN OF CARE
The patient's plan of care was discussed with: occupational therapist and registered nurse         Thank you for this referral.  Sandy Diggs, PT  Minutes: 24      Physical Therapy Evaluation Charge Determination   History Examination Presentation Decision-Making   HIGH Complexity :3+ comorbidities / personal factors will impact the outcome/ POC  LOW Complexity : 1-2 Standardized tests and measures addressing body structure, function, activity limitation and / or participation in recreation  MEDIUM Complexity : Evolving with changing characteristics  [place] Balance Test  HIGH    Based on the above components, the patient evaluation is determined to be of the following complexity level: High

## 2023-12-19 NOTE — PROGRESS NOTES
Spiritual Care Assessment/Progress Note  [facility]    Name: Soledad Geller MRN: [id number]    Age: 58 y.o.     Sex: female   Language: English     Date: 12/19/2023            Total Time Calculated: 15 min              Spiritual Assessment begun in [facility] 7S2 INTENSIVE CARE  Service Provided For:: Patient and family together  Referral/Consult From:: Rounding  Encounter Overview/Reason : Spiritual/Emotional Needs    Spiritual beliefs:      [x] Involved in a maría tradition/spiritual practice: Sikhism     [] Supported by a maría community:      [] Claims no spiritual orientation:      [] Seeking spiritual identity:           [] Adheres to an individual form of spirituality:      [] Not able to assess:                Identified resources for coping and support system:   Support System: Spouse, Children       [] Prayer                  [] Devotional reading               [] Music                  [] Guided Imagery     [] Pet visits                                        [] Other: (COMMENT)     Specific area/focus of visit   Encounter:    Crisis:    Spiritual/Emotional needs: Type: Spiritual Support  Ritual, Rites and Sacraments:    Grief, Loss, and Adjustments:    Ethics/Mediation:    Behavioral Health:    Palliative Care:    Advance Care Planning:      Plan/Referrals: Continue Support (comment) (As needed or requested)    Narrative:      initiated visit during rounds. I introduced myself and role, explored spiritual beliefs, and coping resources. I met with Soledad Geller's family, as she was in/out of sleep. Her  and daughters were present. They confirmed that Ms. Geller is affiliated spiritually with the [employer]. They stated that clergy from her community are involved and have visited. They were appreciative of my visit. No specific needs were identified during this encounter. I offered words of encouragement and reassurance of my prayers for her recovery.    For additional

## 2023-12-20 ENCOUNTER — APPOINTMENT (OUTPATIENT)
Facility: HOSPITAL | Age: 58
End: 2023-12-20
Attending: ANESTHESIOLOGY
Payer: OTHER GOVERNMENT

## 2023-12-20 LAB
ANION GAP SERPL CALC-SCNC: 8 MMOL/L (ref 5–15)
ASPIRIN: 378 ARU
BASOPHILS # BLD: 0 K/UL (ref 0–0.1)
BASOPHILS NFR BLD: 0 % (ref 0–1)
BUN SERPL-MCNC: 6 MG/DL (ref 6–20)
BUN/CREAT SERPL: 9 (ref 12–20)
CALCIUM SERPL-MCNC: 9.8 MG/DL (ref 8.5–10.1)
CHLORIDE SERPL-SCNC: 106 MMOL/L (ref 97–108)
CO2 SERPL-SCNC: 22 MMOL/L (ref 21–32)
CREAT SERPL-MCNC: 0.66 MG/DL (ref 0.55–1.02)
DIFFERENTIAL METHOD BLD: ABNORMAL
EOSINOPHIL # BLD: 0 K/UL (ref 0–0.4)
EOSINOPHIL NFR BLD: 0 % (ref 0–7)
ERYTHROCYTE [DISTWIDTH] IN BLOOD BY AUTOMATED COUNT: 12.7 % (ref 11.5–14.5)
GLUCOSE BLD STRIP.AUTO-MCNC: 103 MG/DL (ref 65–117)
GLUCOSE BLD STRIP.AUTO-MCNC: 108 MG/DL (ref 65–117)
GLUCOSE BLD STRIP.AUTO-MCNC: 123 MG/DL (ref 65–117)
GLUCOSE BLD STRIP.AUTO-MCNC: 93 MG/DL (ref 65–117)
GLUCOSE SERPL-MCNC: 107 MG/DL (ref 65–100)
HCT VFR BLD AUTO: 40.5 % (ref 35–47)
HGB BLD-MCNC: 13 G/DL (ref 11.5–16)
IMM GRANULOCYTES # BLD AUTO: 0 K/UL (ref 0–0.04)
IMM GRANULOCYTES NFR BLD AUTO: 0 % (ref 0–0.5)
LYMPHOCYTES # BLD: 0.7 K/UL (ref 0.8–3.5)
LYMPHOCYTES NFR BLD: 5 % (ref 12–49)
MAGNESIUM SERPL-MCNC: 2.2 MG/DL (ref 1.6–2.4)
MCH RBC QN AUTO: 28.8 PG (ref 26–34)
MCHC RBC AUTO-ENTMCNC: 32.1 G/DL (ref 30–36.5)
MCV RBC AUTO: 89.8 FL (ref 80–99)
MONOCYTES # BLD: 0.9 K/UL (ref 0–1)
MONOCYTES NFR BLD: 6 % (ref 5–13)
NEUTS SEG # BLD: 12.7 K/UL (ref 1.8–8)
NEUTS SEG NFR BLD: 89 % (ref 32–75)
NRBC # BLD: 0 K/UL (ref 0–0.01)
NRBC BLD-RTO: 0 PER 100 WBC
P2Y12 PLT RESPONSE: 57 PRU (ref 194–418)
PHOSPHATE SERPL-MCNC: 1.8 MG/DL (ref 2.6–4.7)
PLATELET # BLD AUTO: 280 K/UL (ref 150–400)
PMV BLD AUTO: 9.9 FL (ref 8.9–12.9)
POTASSIUM SERPL-SCNC: 3.3 MMOL/L (ref 3.5–5.1)
RBC # BLD AUTO: 4.51 M/UL (ref 3.8–5.2)
RBC MORPH BLD: ABNORMAL
SERVICE CMNT-IMP: ABNORMAL
SERVICE CMNT-IMP: NORMAL
SODIUM SERPL-SCNC: 136 MMOL/L (ref 136–145)
VAS BASILAR ARTERY EDV: 24.9 CM/S
VAS BASILAR ARTERY MEAN VEL: 41 CM/S
VAS BASILAR ARTERY PSV: 74.1 CM/S
VAS LEFT ACA EDV: 46.9 CM/S
VAS LEFT ACA MEAN VEL: 78.6 CM/S
VAS LEFT ACA PSV: 142 CM/S
VAS LEFT EX ICA MEAN VEL: 43 CM/S
VAS LEFT ICA DIST EDV: 24.4 CM/S
VAS LEFT ICA DIST PSV: 82.4 CM/S
VAS LEFT ICA EDV: 33.6 CM/S
VAS LEFT ICA MEAN VEL: 58.4 CM/S
VAS LEFT ICA PSV: 107.9 CM/S
VAS LEFT LINDEGAARD RATIO: 1.4
VAS LEFT MCA 1 EDV: 36 CM/S
VAS LEFT MCA 1 MEAN VEL: 62.3 CM/S
VAS LEFT MCA 1 PSV: 114.8 CM/S
VAS LEFT PCA 1 EDV: 26.7 CM/S
VAS LEFT PCA 1 MEAN VEL: 43.7 CM/S
VAS LEFT PCA 1 PSV: 77.7 CM/S
VAS LEFT VERTEBRAL EDV TCD: 17.8 CM/S
VAS LEFT VERTEBRAL MEAN VEL: 28.4 CM/S
VAS LEFT VERTEBRAL PSV TCD: 49.6 CM/S
VAS RIGHT ACA EDV: 59.4 CM/S
VAS RIGHT ACA MEAN VEL: 94.7 CM/S
VAS RIGHT ACA PSV: 165.3 CM/S
VAS RIGHT EX ICA MEAN VEL: 41 CM/S
VAS RIGHT ICA DIST EDV: 15.1 CM/S
VAS RIGHT ICA DIST PSV: 91.6 CM/S
VAS RIGHT ICA EDV: 27.1 CM/S
VAS RIGHT ICA MEAN VEL: 46 CM/S
VAS RIGHT ICA PSV: 83.9 CM/S
VAS RIGHT LINDEGAARD RATIO: 1
VAS RIGHT MCA 1 EDV: 25.1 CM/S
VAS RIGHT MCA 1 MEAN VEL: 40.7 CM/S
VAS RIGHT MCA 1 PSV: 72 CM/S
VAS RIGHT PCA 1 EDV: 27.1 CM/S
VAS RIGHT PCA 1 MEAN VEL: 41.5 CM/S
VAS RIGHT PCA 1 PSV: 70.2 CM/S
VAS RIGHT VERTEBRAL EDV TCD: 15.6 CM/S
VAS RIGHT VERTEBRAL MEAN VEL: 24.4 CM/S
VAS RIGHT VERTEBRAL PSV TCD: 41.9 CM/S
WBC # BLD AUTO: 14.3 K/UL (ref 3.6–11)

## 2023-12-20 PROCEDURE — 82962 GLUCOSE BLOOD TEST: CPT

## 2023-12-20 PROCEDURE — 2580000003 HC RX 258: Performed by: NURSE PRACTITIONER

## 2023-12-20 PROCEDURE — 2000000000 HC ICU R&B

## 2023-12-20 PROCEDURE — 6370000000 HC RX 637 (ALT 250 FOR IP): Performed by: STUDENT IN AN ORGANIZED HEALTH CARE EDUCATION/TRAINING PROGRAM

## 2023-12-20 PROCEDURE — 36415 COLL VENOUS BLD VENIPUNCTURE: CPT

## 2023-12-20 PROCEDURE — 2500000003 HC RX 250 WO HCPCS: Performed by: NURSE PRACTITIONER

## 2023-12-20 PROCEDURE — 93886 INTRACRANIAL COMPLETE STUDY: CPT

## 2023-12-20 PROCEDURE — 92523 SPEECH SOUND LANG COMPREHEN: CPT

## 2023-12-20 PROCEDURE — 6360000002 HC RX W HCPCS: Performed by: NURSE PRACTITIONER

## 2023-12-20 PROCEDURE — 70450 CT HEAD/BRAIN W/O DYE: CPT

## 2023-12-20 PROCEDURE — 6370000000 HC RX 637 (ALT 250 FOR IP): Performed by: ANESTHESIOLOGY

## 2023-12-20 PROCEDURE — 6360000002 HC RX W HCPCS: Performed by: STUDENT IN AN ORGANIZED HEALTH CARE EDUCATION/TRAINING PROGRAM

## 2023-12-20 PROCEDURE — 6370000000 HC RX 637 (ALT 250 FOR IP): Performed by: NURSE PRACTITIONER

## 2023-12-20 PROCEDURE — 84100 ASSAY OF PHOSPHORUS: CPT

## 2023-12-20 PROCEDURE — 85025 COMPLETE CBC W/AUTO DIFF WBC: CPT

## 2023-12-20 PROCEDURE — A4216 STERILE WATER/SALINE, 10 ML: HCPCS | Performed by: NURSE PRACTITIONER

## 2023-12-20 PROCEDURE — 80048 BASIC METABOLIC PNL TOTAL CA: CPT

## 2023-12-20 PROCEDURE — 6360000002 HC RX W HCPCS

## 2023-12-20 PROCEDURE — 92526 ORAL FUNCTION THERAPY: CPT

## 2023-12-20 PROCEDURE — 83735 ASSAY OF MAGNESIUM: CPT

## 2023-12-20 PROCEDURE — 85576 BLOOD PLATELET AGGREGATION: CPT

## 2023-12-20 PROCEDURE — 99233 SBSQ HOSP IP/OBS HIGH 50: CPT | Performed by: NURSE PRACTITIONER

## 2023-12-20 RX ORDER — NIFEDIPINE 30 MG/1
30 TABLET, EXTENDED RELEASE ORAL DAILY
Status: DISCONTINUED | OUTPATIENT
Start: 2023-12-20 | End: 2023-12-28

## 2023-12-20 RX ORDER — SODIUM CHLORIDE, SODIUM LACTATE, POTASSIUM CHLORIDE, AND CALCIUM CHLORIDE .6; .31; .03; .02 G/100ML; G/100ML; G/100ML; G/100ML
500 INJECTION, SOLUTION INTRAVENOUS ONCE
Status: COMPLETED | OUTPATIENT
Start: 2023-12-20 | End: 2023-12-20

## 2023-12-20 RX ORDER — LABETALOL HYDROCHLORIDE 5 MG/ML
5 INJECTION, SOLUTION INTRAVENOUS ONCE
Status: COMPLETED | OUTPATIENT
Start: 2023-12-20 | End: 2023-12-20

## 2023-12-20 RX ORDER — POTASSIUM CHLORIDE 7.45 MG/ML
10 INJECTION INTRAVENOUS
Status: DISCONTINUED | OUTPATIENT
Start: 2023-12-20 | End: 2023-12-20

## 2023-12-20 RX ADMIN — LABETALOL HYDROCHLORIDE 5 MG: 5 INJECTION, SOLUTION INTRAVENOUS at 10:26

## 2023-12-20 RX ADMIN — POTASSIUM BICARBONATE 40 MEQ: 782 TABLET, EFFERVESCENT ORAL at 06:26

## 2023-12-20 RX ADMIN — SODIUM CHLORIDE, PRESERVATIVE FREE 10 ML: 5 INJECTION INTRAVENOUS at 20:59

## 2023-12-20 RX ADMIN — TICAGRELOR 90 MG: 90 TABLET ORAL at 20:55

## 2023-12-20 RX ADMIN — NICARDIPINE HYDROCHLORIDE 10 MG/HR: 25 INJECTION, SOLUTION INTRAVENOUS at 21:58

## 2023-12-20 RX ADMIN — AZITHROMYCIN MONOHYDRATE 500 MG: 500 INJECTION, POWDER, LYOPHILIZED, FOR SOLUTION INTRAVENOUS at 02:30

## 2023-12-20 RX ADMIN — LEVETIRACETAM 500 MG: 100 INJECTION, SOLUTION, CONCENTRATE INTRAVENOUS at 15:01

## 2023-12-20 RX ADMIN — FAMOTIDINE 20 MG: 10 INJECTION, SOLUTION INTRAVENOUS at 08:09

## 2023-12-20 RX ADMIN — SENNOSIDES AND DOCUSATE SODIUM 1 TABLET: 50; 8.6 TABLET ORAL at 08:09

## 2023-12-20 RX ADMIN — Medication 60 MG: at 20:52

## 2023-12-20 RX ADMIN — NIFEDIPINE 30 MG: 30 TABLET, FILM COATED, EXTENDED RELEASE ORAL at 13:15

## 2023-12-20 RX ADMIN — Medication 60 MG: at 15:03

## 2023-12-20 RX ADMIN — NICARDIPINE HYDROCHLORIDE 7.5 MG/HR: 25 INJECTION, SOLUTION INTRAVENOUS at 18:32

## 2023-12-20 RX ADMIN — PRAVASTATIN SODIUM 40 MG: 40 TABLET ORAL at 21:00

## 2023-12-20 RX ADMIN — SODIUM CHLORIDE, PRESERVATIVE FREE 10 ML: 5 INJECTION INTRAVENOUS at 08:11

## 2023-12-20 RX ADMIN — ASPIRIN 81 MG CHEWABLE TABLET 81 MG: 81 TABLET CHEWABLE at 08:09

## 2023-12-20 RX ADMIN — SODIUM CHLORIDE, POTASSIUM CHLORIDE, SODIUM LACTATE AND CALCIUM CHLORIDE: 600; 310; 30; 20 INJECTION, SOLUTION INTRAVENOUS at 04:16

## 2023-12-20 RX ADMIN — Medication 60 MG: at 08:09

## 2023-12-20 RX ADMIN — FAMOTIDINE 20 MG: 10 INJECTION, SOLUTION INTRAVENOUS at 20:56

## 2023-12-20 RX ADMIN — TICAGRELOR 90 MG: 90 TABLET ORAL at 08:09

## 2023-12-20 RX ADMIN — Medication 60 MG: at 04:16

## 2023-12-20 RX ADMIN — NICARDIPINE HYDROCHLORIDE 2.5 MG/HR: 25 INJECTION, SOLUTION INTRAVENOUS at 12:25

## 2023-12-20 RX ADMIN — SENNOSIDES AND DOCUSATE SODIUM 1 TABLET: 50; 8.6 TABLET ORAL at 20:55

## 2023-12-20 RX ADMIN — Medication 60 MG: at 11:50

## 2023-12-20 RX ADMIN — WATER 1000 MG: 1 INJECTION INTRAMUSCULAR; INTRAVENOUS; SUBCUTANEOUS at 02:30

## 2023-12-20 RX ADMIN — SODIUM CHLORIDE, SODIUM LACTATE, POTASSIUM CHLORIDE, AND CALCIUM CHLORIDE 500 ML: 600; 310; 30; 20 INJECTION, SOLUTION INTRAVENOUS at 13:16

## 2023-12-20 RX ADMIN — SODIUM CHLORIDE, PRESERVATIVE FREE 10 ML: 5 INJECTION INTRAVENOUS at 08:12

## 2023-12-20 RX ADMIN — LEVETIRACETAM 500 MG: 100 INJECTION, SOLUTION, CONCENTRATE INTRAVENOUS at 02:30

## 2023-12-20 RX ADMIN — HYDRALAZINE HYDROCHLORIDE 10 MG: 20 INJECTION INTRAMUSCULAR; INTRAVENOUS at 09:10

## 2023-12-20 ASSESSMENT — PAIN SCALES - GENERAL
PAINLEVEL_OUTOF10: 0

## 2023-12-20 NOTE — PROGRESS NOTES
Neurointerventional Surgery Progress Note  Neurocritical Care NP    Admit Date: 12/18/2023   LOS: 2 days      Daily Progress Note: 12/20/2023    Assessment:     S/P SAH with stent embolization    Plan:                 - Day 3/21 of Nimotop to prevent delayed cerebral ischemia              - Keppra 500 mg BID for seizure ppx              - LR at 100 ml/hr to maintain euvolemia              - Strict I&O              - q 1 neuro checks              - TCDs daily               - SBP goal <140, Cardene/Labetalol PRN              - PT/OT/SLP evals, OOB as appropriate              - Start ASA 81 mg (daily) and Brilenta 90 mg bid        DVT ppx: SCD's    Plan d/w Dr. Hamilton      HPI:  58 y.o. -H Black /   female consulted for subarachnoid hemorrhage see on CT.  Patient was found down on the floor vomiting by spouse at home yesterday evening.  They stated that when they found her she was able to speak to them, was making sense and was even making jokes waiting for EMS to arrive.  When EMS arrived she was able to get up to a chair with assistance. EMS brought her into [facility] ED where she was found to have a right sided M1 aneurysm rupture with a large diffuse amount of SAH and some IPH noted on CT scan.  The ED physician immediately notified the Neurointerventional  surgeon on call, Dr. Venu Hamilton, and Neurosurgeon, Dr. Fonseca. She was then transferred to [facility] ICU for a higher level of care and intervention by Neurointerventional Surgery for possible aneurysm repair.       She had been feeling bad for a couple of weeks with a headache on the right side of her head and over her right eye.  She had been seen yesterday urgent care and treated for eustachian tube dysfunction.  She has a history of HTN and HLD.  She was not on any blood thinners but had been using a NSAID for the headache.    12/18/2023:  cerebral/cervical arteriogram and Flow diversion stent embolization of right supraclinoid ICA

## 2023-12-20 NOTE — PLAN OF CARE
Speech LAnguage Pathology TREATMENT    Patient: Soledad Geller (58 y.o. female)  Date: 12/20/2023  Primary Diagnosis: Aneurysmal subarachnoid hemorrhage (HCC) [I60.8]       Precautions:                     ASSESSMENT :  Based on the objective data described below, the patient presents with good tolerance of diet initiation after FEES yesterday. Pt requesting more regular diet texture items. Given results of FEES and no significant difficulty with solids, will advance diet as tolerated. Will follow-up for diet safety and tolerance.    Additionally, cognitive evaluation initiated on this date, although it was limited by pt participation. O-Log completed and pt with some difficulty with orientation as outlined below. Will benefit from further cognitive-linguistic intervention and evaluation as indicated.     Patient will benefit from skilled intervention to address the above impairments.     PLAN :  Recommendations and Planned Interventions:  Diet: Regular and thin liquids  General aspiration precautions    Prevention of ICU delirium:  -Frequent reorientation  -Cognitive stimulation  -Assess and treat pain  -Reduce sleep interruption  -Non-pharmacologic sleep enhancement  -Promote sleep/wake cycle (open blinds, turn lights on during the day)  -Early mobility per PT/OT  -If the patient wears glasses or hearing aids, make sure that they are on at appropriate times of day.  -If patient has dentures, please place in pt's mouth at appropriate times.  (Pravin, N. E., & Alo, CASTRO BENAVIDEZ. (2013). Preventing delirium in the intensive care unit. Critical Care Clinics, 29(1), 51-65. https://doi.org/10.1016/j.ccc.2012.10.007)       Acute SLP Services: Yes, SLP will continue to follow per plan of care.    Discharge Recommendations: Continue to assess pending progress     SUBJECTIVE:   Patient stated, “Yeah I do,\" when SLP stated that she heard that patient wanting an Egg McMuffin.     OBJECTIVE:     Past Medical History:   Diagnosis

## 2023-12-20 NOTE — PROGRESS NOTES
CRITICAL CARE NOTE    Name: Soledad Geller   : 1965   MRN: [id number]   Date: 2023      REASON FOR ICU ADMISSION:  SAH     PRINCIPAL ICU DIAGNOSIS   SAH, Aneurysmal  HTN    BRIEF PATIENT SUMMARY   This is a 58-year-old female with history significant for asthma, brachiocephalic artery injury, glaucoma, hyperlipidemia and hypertension that presented to [facility] after being found down by her  at home.  reports patient had been having headache for the last several days and was actually evaluated urgent care for headache and was told it was eustachian tube dysfunction. 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 Pt intubated at OSH for airway protection. Neuro interventional and neurosurgery were consulted with no consulted and neurosurgery with recommendations for four-vessel angiogram for diagnostic and possible intervention. Patient was transferred to the [facility] for further higher level of care and need of neurosurgery/neurointerventional and admitted to ICU. Pt underwent angio and flow diversion stent embolization of R ICA aneurysm . Self extubated .    COMPREHENSIVE ASSESSMENT & PLAN:SYSTEM BASED     24 HOUR EVENTS: NAEON, some confusion. TCDs yesterday w/ increased velocities w/o overt spasm. Good PO. Working w/ PT/OT    NEUROLOGICAL:   Neurochecks per protocol  STAT CT head if acute changes  Nimotop, keppra ppx  Daily TCDs  NIS following  GALINA per NIS, ASA, brillinta   Monitor mentation    PULMONOLOGY:   O2 per NC for spO2 92-98%  Complete 5 day CAP course    CARDIOVASCULAR:   SBP goal <140, MAP >65  PRN cardene for above, has remained controlled off  dry & intact 12/18/23 2000   Dressing Type Transparent 12/18/23 2000          Atrial Line:    Arterial Line 12/18/23 Right Radial (Active)   Site Assessment Clean, dry & intact 12/19/23 0800   Line Status Arterial fluids per protocol;Intact and in place;Blood return noted 12/19/23 0800   Art Line Interventions Zeroed and calibrated;Leveled;Connections checked and tightened 12/19/23 0800   Color/Movement/Sensation Capillary refill less than 3 sec 12/19/23 0800   Dressing Type Transparent w/CHG gel 12/19/23 0800   Dressing Status Clean, dry & intact 12/19/23 0800       SUBJECTIVE   Review of Systems  Negative except as noted above    OBJECTIVE   Physical Exam  Vitals and nursing note reviewed.   Constitutional:       General: She is not in acute distress.     Appearance: Normal appearance.   HENT:      Head: Normocephalic and atraumatic.      Nose: Nose normal. No congestion.      Mouth/Throat:      Mouth: Mucous membranes are moist.      Pharynx: Oropharynx is clear. No oropharyngeal exudate.   Eyes:      General: No scleral icterus.     Extraocular Movements: Extraocular movements intact.      Conjunctiva/sclera: Conjunctivae normal.      Pupils: Pupils are equal, round, and reactive to light.   Cardiovascular:      Rate and Rhythm: Normal rate and regular rhythm.      Pulses: Normal pulses.      Heart sounds: Normal heart sounds.   Pulmonary:      Effort: Pulmonary effort is normal. No respiratory distress.      Breath sounds: Normal breath sounds.   Abdominal:      General: Abdomen is flat. Bowel sounds are normal. There is no distension.   Musculoskeletal:         General: Normal range of motion.      Cervical back: Normal range of motion and neck supple. No rigidity.      Right lower leg: No edema.      Left lower leg: No edema.   Skin:     General: Skin is warm and dry.      Capillary Refill: Capillary refill takes less than 2 seconds.      Coloration: Skin is not jaundiced.   Neurological:      General: No

## 2023-12-20 NOTE — PROGRESS NOTES
Neurocritical Care Brief Progress Note:  Soledad Geller is 58 y.o female with pmh of Asthma, FMD, HTN, HLD, chronic brachiocephalic artery dissection who arrived from [facility] ED intubated to [facility] ICU early morning of 12/18/23 with SAH from a ruptured cerebral aneurysm. CTA showed a large fusiform aneurysm of the right supraclinoid ICA with eccentric broad-based component.    Pt is s/p cerebral/cervical arteriogram and Flow diversion stent embolization of right supraclinoid ICA aneurysm on 12/18/23 by Dr. Hamilton.    Pt self extubated this morning and currently doing well on RA. Appears drowsy but completely oriented. Pt daughter at the bedside and opportunities to ask/answer questions provided.     N+ 142 and Mag is 1.9. hypophos 2.2 and hypoK+ of 3.2 replaced per intensivist. Pending morning labs.     Repeat CTH this morning showed expected evolution of SAH, with decreased density. No clear new hemorrhage. Increased, mild, ventricular dilation.     Aspirin and p2y12 test are therapeutic.  Pt started on Asprin and brilinita 90 mg BID.     TTE with EF of 55 - 60% with normal wall motion.     TCD's today with  no evidence of intracranial arterial vasospasm. right CARLITOS velocity increased consistent with gearing towards vsp    Current I/o is +436.8 since admission and neg 185 daily total. Will incr maintenance IVF LR to 100 ml/hr and continue overnight which I discussed with intensivist.     Addendum @ 0600  Nicom shows fluids responsive. Current I/o for this shift is +146 and +708 since admission. N+ is 136, K+ remained low at 3.3, and mag is 2.2. will decrease IVF back to 75cc/hr.   Physical Exam:   Blood pressure 130/68, pulse 70, temperature 98.8 °F (37.1 °C), temperature source Bladder, resp. rate 23, weight 80 kg (176 lb 4.8 oz), SpO2 99 %.    GEN: Calm, well developed and nourished, patient in NAD, Flat affect  HEENT: Normocephalic. Non-icter, no congestion  Lungs: CTA bilaterally Ant, non-labored breathing,

## 2023-12-21 ENCOUNTER — APPOINTMENT (OUTPATIENT)
Facility: HOSPITAL | Age: 58
End: 2023-12-21
Attending: ANESTHESIOLOGY
Payer: OTHER GOVERNMENT

## 2023-12-21 LAB
ANION GAP SERPL CALC-SCNC: 13 MMOL/L (ref 5–15)
BASOPHILS # BLD: 0.1 K/UL (ref 0–0.1)
BASOPHILS NFR BLD: 1 % (ref 0–1)
BUN SERPL-MCNC: 11 MG/DL (ref 6–20)
BUN/CREAT SERPL: 18 (ref 12–20)
CALCIUM SERPL-MCNC: 9.8 MG/DL (ref 8.5–10.1)
CHLORIDE SERPL-SCNC: 107 MMOL/L (ref 97–108)
CO2 SERPL-SCNC: 19 MMOL/L (ref 21–32)
CREAT SERPL-MCNC: 0.62 MG/DL (ref 0.55–1.02)
DIFFERENTIAL METHOD BLD: ABNORMAL
ECHO AO ASC DIAM: 2.8 CM
ECHO AO ASCENDING AORTA INDEX: 1.47 CM/M2
ECHO AO ROOT DIAM: 2.7 CM
ECHO AO ROOT INDEX: 1.41 CM/M2
ECHO AV AREA PEAK VELOCITY: 2.4 CM2
ECHO AV AREA VTI: 2.4 CM2
ECHO AV AREA/BSA PEAK VELOCITY: 1.3 CM2/M2
ECHO AV AREA/BSA VTI: 1.3 CM2/M2
ECHO AV MEAN GRADIENT: 10 MMHG
ECHO AV MEAN VELOCITY: 1.5 M/S
ECHO AV PEAK GRADIENT: 21 MMHG
ECHO AV PEAK VELOCITY: 2.3 M/S
ECHO AV VELOCITY RATIO: 0.78
ECHO AV VTI: 39.7 CM
ECHO BSA: 1.93 M2
ECHO LA DIAMETER INDEX: 1.62 CM/M2
ECHO LA DIAMETER: 3.1 CM
ECHO LA TO AORTIC ROOT RATIO: 1.15
ECHO LA VOL A-L A2C: 55 ML (ref 22–52)
ECHO LA VOL A-L A4C: 31 ML (ref 22–52)
ECHO LA VOL MOD A2C: 51 ML (ref 22–52)
ECHO LA VOL MOD A4C: 28 ML (ref 22–52)
ECHO LA VOLUME AREA LENGTH: 44 ML
ECHO LA VOLUME INDEX A-L A2C: 29 ML/M2 (ref 16–34)
ECHO LA VOLUME INDEX A-L A4C: 16 ML/M2 (ref 16–34)
ECHO LA VOLUME INDEX AREA LENGTH: 23 ML/M2 (ref 16–34)
ECHO LA VOLUME INDEX MOD A2C: 27 ML/M2 (ref 16–34)
ECHO LA VOLUME INDEX MOD A4C: 15 ML/M2 (ref 16–34)
ECHO LV E' LATERAL VELOCITY: 9 CM/S
ECHO LV E' SEPTAL VELOCITY: 10 CM/S
ECHO LV EDV A2C: 50 ML
ECHO LV EDV A4C: 74 ML
ECHO LV EDV BP: 59 ML (ref 56–104)
ECHO LV EDV INDEX A4C: 39 ML/M2
ECHO LV EDV INDEX BP: 31 ML/M2
ECHO LV EDV NDEX A2C: 26 ML/M2
ECHO LV EJECTION FRACTION A2C: 79 %
ECHO LV EJECTION FRACTION A4C: 81 %
ECHO LV EJECTION FRACTION BIPLANE: 80 % (ref 55–100)
ECHO LV ESV A2C: 10 ML
ECHO LV ESV A4C: 14 ML
ECHO LV ESV BP: 12 ML (ref 19–49)
ECHO LV ESV INDEX A2C: 5 ML/M2
ECHO LV ESV INDEX A4C: 7 ML/M2
ECHO LV ESV INDEX BP: 6 ML/M2
ECHO LV FRACTIONAL SHORTENING: 38 % (ref 28–44)
ECHO LV INTERNAL DIMENSION DIASTOLE INDEX: 2.09 CM/M2
ECHO LV INTERNAL DIMENSION DIASTOLIC: 4 CM (ref 3.9–5.3)
ECHO LV INTERNAL DIMENSION SYSTOLIC INDEX: 1.31 CM/M2
ECHO LV INTERNAL DIMENSION SYSTOLIC: 2.5 CM
ECHO LV IVSD: 0.8 CM (ref 0.6–0.9)
ECHO LV MASS 2D: 101.4 G (ref 67–162)
ECHO LV MASS INDEX 2D: 53.1 G/M2 (ref 43–95)
ECHO LV POSTERIOR WALL DIASTOLIC: 0.9 CM (ref 0.6–0.9)
ECHO LV RELATIVE WALL THICKNESS RATIO: 0.45
ECHO LVOT AREA: 3.1 CM2
ECHO LVOT AV VTI INDEX: 0.79
ECHO LVOT DIAM: 2 CM
ECHO LVOT MEAN GRADIENT: 9 MMHG
ECHO LVOT PEAK GRADIENT: 13 MMHG
ECHO LVOT PEAK VELOCITY: 1.8 M/S
ECHO LVOT STROKE VOLUME INDEX: 51.6 ML/M2
ECHO LVOT SV: 98.6 ML
ECHO LVOT VTI: 31.4 CM
ECHO MV A VELOCITY: 0.81 M/S
ECHO MV AREA PHT: 3.8 CM2
ECHO MV E DECELERATION TIME (DT): 198.4 MS
ECHO MV E VELOCITY: 0.75 M/S
ECHO MV E/A RATIO: 0.93
ECHO MV E/E' LATERAL: 8.33
ECHO MV E/E' RATIO (AVERAGED): 7.92
ECHO MV PRESSURE HALF TIME (PHT): 57.5 MS
ECHO PV MAX VELOCITY: 1.3 M/S
ECHO PV PEAK GRADIENT: 6 MMHG
ECHO RV FREE WALL PEAK S': 25 CM/S
ECHO RV INTERNAL DIMENSION: 2.6 CM
ECHO RV LONGITUDINAL DIMENSION: 6.2 CM
ECHO RV MID DIMENSION: 2 CM
ECHO RV TAPSE: 3.1 CM (ref 1.7–?)
EOSINOPHIL # BLD: 0 K/UL (ref 0–0.4)
EOSINOPHIL NFR BLD: 0 % (ref 0–7)
ERYTHROCYTE [DISTWIDTH] IN BLOOD BY AUTOMATED COUNT: 12.6 % (ref 11.5–14.5)
GLUCOSE BLD STRIP.AUTO-MCNC: 109 MG/DL (ref 65–117)
GLUCOSE SERPL-MCNC: 117 MG/DL (ref 65–100)
HCT VFR BLD AUTO: 42.8 % (ref 35–47)
HGB BLD-MCNC: 13.1 G/DL (ref 11.5–16)
IMM GRANULOCYTES # BLD AUTO: 0.1 K/UL (ref 0–0.04)
IMM GRANULOCYTES NFR BLD AUTO: 1 % (ref 0–0.5)
LYMPHOCYTES # BLD: 0.5 K/UL (ref 0.8–3.5)
LYMPHOCYTES NFR BLD: 4 % (ref 12–49)
MAGNESIUM SERPL-MCNC: 2.2 MG/DL (ref 1.6–2.4)
MCH RBC QN AUTO: 28.2 PG (ref 26–34)
MCHC RBC AUTO-ENTMCNC: 30.6 G/DL (ref 30–36.5)
MCV RBC AUTO: 92.2 FL (ref 80–99)
MONOCYTES # BLD: 0.8 K/UL (ref 0–1)
MONOCYTES NFR BLD: 6 % (ref 5–13)
NEUTS SEG # BLD: 11.6 K/UL (ref 1.8–8)
NEUTS SEG NFR BLD: 88 % (ref 32–75)
NRBC # BLD: 0 K/UL (ref 0–0.01)
NRBC BLD-RTO: 0 PER 100 WBC
PHOSPHATE SERPL-MCNC: 2.1 MG/DL (ref 2.6–4.7)
PLATELET # BLD AUTO: 282 K/UL (ref 150–400)
PMV BLD AUTO: 9.5 FL (ref 8.9–12.9)
POTASSIUM SERPL-SCNC: 3.3 MMOL/L (ref 3.5–5.1)
RBC # BLD AUTO: 4.64 M/UL (ref 3.8–5.2)
RBC MORPH BLD: ABNORMAL
SERVICE CMNT-IMP: NORMAL
SODIUM SERPL-SCNC: 139 MMOL/L (ref 136–145)
VAS BASILAR ARTERY EDV: 11.2 CM/S
VAS BASILAR ARTERY MEAN VEL: 25 CM/S
VAS BASILAR ARTERY PSV: 52.3 CM/S
VAS LEFT ACA EDV: 26.8 CM/S
VAS LEFT ACA MEAN VEL: 54.3 CM/S
VAS LEFT ACA PSV: 109.3 CM/S
VAS LEFT EX ICA MEAN VEL: 31 CM/S
VAS LEFT ICA DIST EDV: 12.4 CM/S
VAS LEFT ICA DIST PSV: 69.5 CM/S
VAS LEFT ICA EDV: 14 CM/S
VAS LEFT ICA MEAN VEL: 32.9 CM/S
VAS LEFT ICA PSV: 70.6 CM/S
VAS LEFT LINDEGAARD RATIO: 1.2
VAS LEFT MCA 1 EDV: 16.1 CM/S
VAS LEFT MCA 1 MEAN VEL: 37.4 CM/S
VAS LEFT MCA 1 PSV: 80 CM/S
VAS LEFT PCA 1 EDV: 14.9 CM/S
VAS LEFT PCA 1 MEAN VEL: 28 CM/S
VAS LEFT PCA 1 PSV: 54.2 CM/S
VAS LEFT VERTEBRAL EDV TCD: 14 CM/S
VAS LEFT VERTEBRAL MEAN VEL: 28.3 CM/S
VAS LEFT VERTEBRAL PSV TCD: 56.9 CM/S
VAS RIGHT ACA EDV: 26.4 CM/S
VAS RIGHT ACA MEAN VEL: 61.4 CM/S
VAS RIGHT ACA PSV: 131.5 CM/S
VAS RIGHT EX ICA MEAN VEL: 35 CM/S
VAS RIGHT ICA DIST EDV: 9.7 CM/S
VAS RIGHT ICA DIST PSV: 85.5 CM/S
VAS RIGHT ICA EDV: 15.2 CM/S
VAS RIGHT ICA MEAN VEL: 35 CM/S
VAS RIGHT ICA PSV: 74.5 CM/S
VAS RIGHT LINDEGAARD RATIO: 1.4
VAS RIGHT MCA 1 EDV: 22.6 CM/S
VAS RIGHT MCA 1 MEAN VEL: 48.6 CM/S
VAS RIGHT MCA 1 PSV: 100.5 CM/S
VAS RIGHT PCA 1 EDV: 12.5 CM/S
VAS RIGHT PCA 1 MEAN VEL: 25.4 CM/S
VAS RIGHT PCA 1 PSV: 51.2 CM/S
VAS RIGHT VERTEBRAL EDV TCD: 13.1 CM/S
VAS RIGHT VERTEBRAL MEAN VEL: 23.7 CM/S
VAS RIGHT VERTEBRAL PSV TCD: 45 CM/S
WBC # BLD AUTO: 13.1 K/UL (ref 3.6–11)

## 2023-12-21 PROCEDURE — 2580000003 HC RX 258: Performed by: NURSE PRACTITIONER

## 2023-12-21 PROCEDURE — 82962 GLUCOSE BLOOD TEST: CPT

## 2023-12-21 PROCEDURE — 6370000000 HC RX 637 (ALT 250 FOR IP): Performed by: NURSE PRACTITIONER

## 2023-12-21 PROCEDURE — 85025 COMPLETE CBC W/AUTO DIFF WBC: CPT

## 2023-12-21 PROCEDURE — 6370000000 HC RX 637 (ALT 250 FOR IP): Performed by: ANESTHESIOLOGY

## 2023-12-21 PROCEDURE — 2000000000 HC ICU R&B

## 2023-12-21 PROCEDURE — 2500000003 HC RX 250 WO HCPCS: Performed by: NURSE PRACTITIONER

## 2023-12-21 PROCEDURE — 93306 TTE W/DOPPLER COMPLETE: CPT | Performed by: SPECIALIST

## 2023-12-21 PROCEDURE — 6360000002 HC RX W HCPCS

## 2023-12-21 PROCEDURE — 83735 ASSAY OF MAGNESIUM: CPT

## 2023-12-21 PROCEDURE — 97530 THERAPEUTIC ACTIVITIES: CPT

## 2023-12-21 PROCEDURE — 6370000000 HC RX 637 (ALT 250 FOR IP): Performed by: STUDENT IN AN ORGANIZED HEALTH CARE EDUCATION/TRAINING PROGRAM

## 2023-12-21 PROCEDURE — 80048 BASIC METABOLIC PNL TOTAL CA: CPT

## 2023-12-21 PROCEDURE — 36415 COLL VENOUS BLD VENIPUNCTURE: CPT

## 2023-12-21 PROCEDURE — 93886 INTRACRANIAL COMPLETE STUDY: CPT

## 2023-12-21 PROCEDURE — 6360000002 HC RX W HCPCS: Performed by: NURSE PRACTITIONER

## 2023-12-21 PROCEDURE — 84100 ASSAY OF PHOSPHORUS: CPT

## 2023-12-21 PROCEDURE — A4216 STERILE WATER/SALINE, 10 ML: HCPCS | Performed by: NURSE PRACTITIONER

## 2023-12-21 PROCEDURE — 93306 TTE W/DOPPLER COMPLETE: CPT

## 2023-12-21 RX ORDER — LEVETIRACETAM 500 MG/1
500 TABLET ORAL 2 TIMES DAILY
Status: DISCONTINUED | OUTPATIENT
Start: 2023-12-21 | End: 2024-01-08

## 2023-12-21 RX ORDER — HYDRALAZINE HYDROCHLORIDE 20 MG/ML
10 INJECTION INTRAMUSCULAR; INTRAVENOUS EVERY 4 HOURS PRN
Status: ACTIVE | OUTPATIENT
Start: 2023-12-21 | End: 2023-12-22

## 2023-12-21 RX ADMIN — Medication 60 MG: at 22:04

## 2023-12-21 RX ADMIN — ASPIRIN 81 MG CHEWABLE TABLET 81 MG: 81 TABLET CHEWABLE at 08:43

## 2023-12-21 RX ADMIN — SODIUM CHLORIDE, PRESERVATIVE FREE 10 ML: 5 INJECTION INTRAVENOUS at 08:44

## 2023-12-21 RX ADMIN — AZITHROMYCIN MONOHYDRATE 500 MG: 500 INJECTION, POWDER, LYOPHILIZED, FOR SOLUTION INTRAVENOUS at 02:48

## 2023-12-21 RX ADMIN — WATER 1000 MG: 1 INJECTION INTRAMUSCULAR; INTRAVENOUS; SUBCUTANEOUS at 02:40

## 2023-12-21 RX ADMIN — NICARDIPINE HYDROCHLORIDE 7.5 MG/HR: 25 INJECTION, SOLUTION INTRAVENOUS at 07:40

## 2023-12-21 RX ADMIN — SODIUM CHLORIDE, POTASSIUM CHLORIDE, SODIUM LACTATE AND CALCIUM CHLORIDE: 600; 310; 30; 20 INJECTION, SOLUTION INTRAVENOUS at 03:00

## 2023-12-21 RX ADMIN — NIFEDIPINE 30 MG: 30 TABLET, FILM COATED, EXTENDED RELEASE ORAL at 08:43

## 2023-12-21 RX ADMIN — LEVETIRACETAM 500 MG: 500 TABLET, FILM COATED ORAL at 17:36

## 2023-12-21 RX ADMIN — LEVETIRACETAM 500 MG: 100 INJECTION, SOLUTION, CONCENTRATE INTRAVENOUS at 02:39

## 2023-12-21 RX ADMIN — Medication 60 MG: at 17:36

## 2023-12-21 RX ADMIN — SODIUM CHLORIDE, PRESERVATIVE FREE 10 ML: 5 INJECTION INTRAVENOUS at 20:26

## 2023-12-21 RX ADMIN — NICARDIPINE HYDROCHLORIDE 10 MG/HR: 25 INJECTION, SOLUTION INTRAVENOUS at 00:59

## 2023-12-21 RX ADMIN — Medication 60 MG: at 14:11

## 2023-12-21 RX ADMIN — PRAVASTATIN SODIUM 40 MG: 40 TABLET ORAL at 20:26

## 2023-12-21 RX ADMIN — Medication 60 MG: at 00:06

## 2023-12-21 RX ADMIN — NICARDIPINE HYDROCHLORIDE 10 MG/HR: 25 INJECTION, SOLUTION INTRAVENOUS at 03:58

## 2023-12-21 RX ADMIN — SENNOSIDES AND DOCUSATE SODIUM 1 TABLET: 50; 8.6 TABLET ORAL at 08:43

## 2023-12-21 RX ADMIN — Medication 60 MG: at 08:44

## 2023-12-21 RX ADMIN — TICAGRELOR 90 MG: 90 TABLET ORAL at 20:26

## 2023-12-21 RX ADMIN — POTASSIUM BICARBONATE 40 MEQ: 782 TABLET, EFFERVESCENT ORAL at 08:43

## 2023-12-21 RX ADMIN — TICAGRELOR 90 MG: 90 TABLET ORAL at 08:43

## 2023-12-21 RX ADMIN — SENNOSIDES AND DOCUSATE SODIUM 1 TABLET: 50; 8.6 TABLET ORAL at 20:26

## 2023-12-21 RX ADMIN — FAMOTIDINE 20 MG: 10 INJECTION, SOLUTION INTRAVENOUS at 08:43

## 2023-12-21 RX ADMIN — Medication 60 MG: at 04:01

## 2023-12-21 ASSESSMENT — PAIN SCALES - GENERAL
PAINLEVEL_OUTOF10: 0

## 2023-12-21 NOTE — PROGRESS NOTES
Neurocritical Care Brief Progress Note:      Physical Exam:  Gen: NAD, calm, cooperative  Neuro: A&Ox4. Follows commands. Speech clear. Affect slightly agitated, verbalizes wanting to leave and go home. PERRL, 2 mm bilaterally. Blinks to threat. No disconjugate gaze present. Right gaze preference.  Eyes can cross midline, but with direction. Face symmetric. Left facial ptosis. Tongue midline. Yonis spontaneously. Sensation intact throughout. Strength 5/5 in RUE and RLE, 4/5 in the LUE and 3/5 to the LLE. Negative drift. Bulk and tone normal. No involuntary movements. Ataxia noted in the LUE and LLE, but no tremor. Negative babinski on the right, muted on the left. Gait deferred.    Skin: Warm, dry, color appropriate for ethnicity. Rt groin site C/D/I, no drainage.    Continue plan per NIS.    Christina Hernadez, APRN - NP  Neurocritical Care Nurse Practitioner  [phone]

## 2023-12-21 NOTE — PLAN OF CARE
Problem: Occupational Therapy - Adult  Goal: By Discharge: Performs self-care activities at highest level of function for planned discharge setting.  See evaluation for individualized goals.  Description: FUNCTIONAL STATUS PRIOR TO ADMISSION:  Patient was ambulatory without the use of AE  Receives Help From: Family, ADL Assistance: Independent,  ,  ,  ,  ,  ,  , Ambulation Assistance: Independent, Transfer Assistance: Independent, Active : Yes     HOME SUPPORT: Patient lived with spouse but didn't require assistance.    Occupational Therapy Goals:  Initiated 12/19/2023  1.  Patient will perform self-feeding with Modified [place] within 7 day(s).  2.  Patient will perform grooming with Minimal Assist within 7 day(s).  3.  Patient will perform bathing with Minimal Assist within 7 day(s).  4.  Patient will perform toilet transfers with Minimal Assist  within 7 day(s).  5.  Patient will perform all aspects of toileting with Minimal Assist within 7 day(s).  6.  Patient will participate in upper extremity therapeutic exercise/activities with Supervision for 5 minutes within 7 day(s).    7.  Patient will utilize energy conservation techniques during functional activities with verbal cues within 7 day(s).   Outcome: Progressing   OCCUPATIONAL THERAPY TREATMENT  Patient: Soledad Geller (58 y.o. female)  Date: 12/21/2023  Primary Diagnosis: Aneurysmal subarachnoid hemorrhage (HCC) [I60.8]       Precautions:                  Chart, occupational therapy assessment, plan of care, and goals were reviewed.    ASSESSMENT  Patient continues to benefit from skilled OT services and is progressing towards goals. Pt received semi-reclined in bed, agreeable to therapy, cleared by RN. Pt initally was lethargic and required cues to open eyes but eventually became alert and talkative with therapists. Pt was Max x2 for bed mobility, noted R lean and tc/vc cues to bring head into midline. Recliner handrail was utilized to assist

## 2023-12-21 NOTE — PROGRESS NOTES
Neurointerventional Surgery Progress Note  Neurocritical Care NP    Admit Date: 12/18/2023   LOS: 3 days      Daily Progress Note: 12/21/2023    Assessment:   S/P SAH with stent embolization       Plan:       - Day 4/21 of Nimotop to prevent delayed cerebral ischemia              - Keppra 500 mg BID for seizure ppx              - LR at 75 ml/hr to maintain euvolemia              - Strict I&O              - q 1 neuro checks              - TCDs daily               - SBP goal <160              - PT/OT/SLP evals, OOB as appropriate              - ASA 81 mg (daily) and Brilinta 90 mg bid   - Plan for diagnostic angio with Joy 12/26       Activity: OOB with PT  DVT ppx: SCDs    Plan d/w       HPI:  58 y.o. -H Black /   female consulted for subarachnoid hemorrhage see on CT.  Patient was found down on the floor vomiting by spouse at home yesterday evening.  They stated that when they found her she was able to speak to them, was making sense and was even making jokes waiting for EMS to arrive.  When EMS arrived she was able to get up to a chair with assistance. EMS brought her into [facility] ED where she was found to have a right sided M1 aneurysm rupture with a large diffuse amount of SAH and some IPH noted on CT scan.  The ED physician immediately notified the Neurointerventional  surgeon on call, Dr. Venu Hamilton, and Neurosurgeon, Dr. Fonseca. She was then transferred to [facility] ICU for a higher level of care and intervention by Neurointerventional Surgery for possible aneurysm repair.       She had been feeling bad for a couple of weeks with a headache on the right side of her head and over her right eye.  She had been seen yesterday urgent care and treated for eustachian tube dysfunction.  She has a history of HTN and HLD.  She was not on any blood thinners but had been using a NSAID for the headache.     12/18/2023:  cerebral/cervical arteriogram and Flow diversion stent  Petrous and cavernous carotid arteries are patent..M1 segments are patent. Symmetric arborization of M2 vessels is demonstrated. The left vertebral artery is dominant.. 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 ..There is no aneurysm. There are no sizable posterior communicating arteries. Subarachnoid hemorrhage. Lipoma at the falx. 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 Large associated subarachnoid hemorrhage. Fibromuscular dysplasia. 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 The findings were called to Dr. Pinto on 12/17/2023 at 11:48 PM by Dr. Lake. [phone]       CTA HEAD NECK W CONTRAST     Result Date: 12/17/2023     CTA NECK There is a beaded appearance of the distal internal carotid arteries bilaterally.. The bilateral subclavian, common carotid, and internal carotid arteries are patent with no flow-limiting stenosis. % of right carotid artery stenosis: 0 % of left carotid artery stenosis: 0 Measurements utilizing NASCET criteria. NASCET method was utilized for calculating stenosis. Left vertebral artery is dominant.. The cervical soft tissues are unremarkable. There are degenerative changes of the cervical spine. CTA HEAD There is a large fusiform aneurysm of the M1 segment on the right. Aneurysm measures 8.8 x 16 x 9.9 mm. Petrous and cavernous carotid arteries are patent..M1 segments are patent. Symmetric arborization of M2 vessels is demonstrated. The left vertebral artery is dominant.. 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 ..There is no aneurysm. There are no sizable posterior communicating arteries. Subarachnoid hemorrhage. Lipoma at the falx. 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 Large associated subarachnoid hemorrhage. Fibromuscular dysplasia. Nonflow-limiting dissection of the V4 segment of

## 2023-12-21 NOTE — PROGRESS NOTES
CRITICAL CARE NOTE    Name: Soledad Geller   : 1965   MRN: [id number]   Date: 2023      REASON FOR ICU ADMISSION:  SAH     PRINCIPAL ICU DIAGNOSIS   SAH, Aneurysmal  Possible ICU delirium  HTN    BRIEF PATIENT SUMMARY   This is a 58-year-old female with history significant for asthma, brachiocephalic artery injury, glaucoma, hyperlipidemia and hypertension that presented to [facility] after being found down by her  at home.  reports patient had been having headache for the last several days and was actually evaluated urgent care for headache and was told it was eustachian tube dysfunction. 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 Pt intubated at OSH for airway protection. Neuro interventional and neurosurgery were consulted with no consulted and neurosurgery with recommendations for four-vessel angiogram for diagnostic and possible intervention. Patient was transferred to the [facility] for further higher level of care and need of neurosurgery/neurointerventional and admitted to ICU. Pt underwent angio and flow diversion stent embolization of R ICA aneurysm . Self extubated .    COMPREHENSIVE ASSESSMENT & PLAN:SYSTEM BASED     24 HOUR EVENTS: NAEON, continues to have some confusion. TCDs yesterday suggestive of vasospasm. Tentative plan for repeat angio per NIS tomorrow. On Cardene gtt this AM.       NEUROLOGICAL:   Neurochecks per protocol  STAT CT head if acute changes  Nimotop, keppra ppx  Daily TCDs  NIS following, possible angio   GALINA per NIS, ASA, brillinta   Monitor mentation  Delirium precautions    PULMONOLOGY:   O2 per NC for spO2 92-98%  Complete 5 day CAP  separately.    Aurelio Govea MD   Critical Care Medicine  [facility]

## 2023-12-21 NOTE — PLAN OF CARE
while sitting, good L knee extension noted. Attempts to sit to stand from EOB to back of chair with use of backbar with RUE. Required blocking of L knee during stance. Patient tolerates standing balance with support well. R lateral lean initially but able to correct with verbal and tactile cues. No orthostatic BP noted. After seated rest, attempt to SPT from bed to recliner. Patient requiring max A x2 and vc for movement of limbs to pivot. Mid transfer, patient with decreased command following and is assisted to chair with max to total A x 2. Patient with strong R gaze and unresponsive for approximately 30 seconds. Required total A x 2-3 to position properly in chair, no active movement of limbs. RN present with deep sternal rub and limited response. Elevated patient's feet and she became more alert and responsive with soft voice and continued R gaze preference. BP obtained and WNL. RN alerted MD to unusual episode. Recommend use of [place] lift to return to bed. Patient remains significantly below functional baseline and would benefit from IPR at discharge, will continue to follow.       PLAN:  Patient continues to benefit from skilled intervention to address the above impairments.  Continue treatment per established plan of care.    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.\"    OBJECTIVE DATA SUMMARY:   Critical Behavior:  Orientation  Orientation Level: Oriented to place;Oriented to person;Oriented to time;Disoriented to  situation  Cognition  Arousal/Alertness: Delayed responses to stimuli;Inconsistent responses to stimuli  Following Commands: Follows one step commands with increased time  Attention Span: Difficulty dividing attention;Difficulty attending to directions  Memory: Decreased recall of recent events  Safety Judgement: Decreased awareness of need for assistance;Decreased awareness of need for safety  Initiation: Requires cues for all  Sequencing: Requires cues for all    Functional Mobility Training:  Bed Mobility:  Bed Mobility Training  Bed Mobility Training: Yes  Supine to Sit: Maximum assistance;Assist X2  Scooting: Maximum assistance  Transfers:  Transfer Training  Transfer Training: Yes  Sit to Stand: Maximum assistance;Assist X2  Stand to Sit: Maximum assistance;Assist X2  Stand Pivot Transfers: Maximum assistance;Total assistance;Assist X2  Bed to Chair: Maximum assistance;Total assistance;Assist X2  Balance:  Balance  Sitting: Impaired  Sitting - Static: Fair (occasional);Poor (constant support)  Sitting - Dynamic: Poor (constant support)  Standing: Impaired  Standing - Static: Constant support;Poor  Standing - Dynamic: Constant support;Poor       Pain Rating:  Denies pain    Activity Tolerance:   Fair , requires frequent rest breaks, and unresponsive episode for 30 seconds post SPT    After treatment:   Patient left in no apparent distress sitting up in chair, Call bell within reach, Bed/ chair alarm activated, and Caregiver / family present      COMMUNICATION/EDUCATION:   The patient's plan of care was discussed with: occupational therapist and registered nurse    Patient Education  Education Given To: Patient  Education Provided: Role of Therapy  Education Method: Verbal  Barriers to Learning: Cognition  Education Outcome: Continued education needed      Mary Carmen Mcnamara PT, DPT  Minutes: 30

## 2023-12-21 NOTE — PROGRESS NOTES
1020  During PT, this RN in room to assist getting patient into chair. After standing patient stopped responding and needed max assist into chair. Eyes deviated to the right, respirations even and unlabored. Patient sternal rubbed with little response. VS stable. This lasted for approximately 30 seconds. Patient started responding and appropriately answered questions and followed commands. Full neuro assessment completed with no changes noted. Dr. Govea and Dr. Hamilton informed of the event. No new orders at this time. Will monitor for repeat events and get head CT as needed.

## 2023-12-21 NOTE — CARE COORDINATION
Transition of Care Plan:    RUR: 11% Low  Prior Level of Functioning: Independent  Disposition: IPR  IPR: Date FOC offered: 12/21/23  Date FOC received: 12/21/23  Transportation at discharge: Stretcher vs family  IM/IMM Medicare/ letter given: NA  Is patient a [place] and connected with VA? No  Caregiver Contact: Spouse Ravinder Geller [phone]  Discharge Caregiver contacted prior to discharge? Yes  Care Conference needed? No  Barriers to discharge:    Medical stability  Therapy recommending IPR. Provided  a list and he has chosen [facility], referral made through [facility].   Heather Valentin RN,Care Management

## 2023-12-21 NOTE — PROGRESS NOTES
SLP Contact Note    Discussed pt with HERMILA Melo, appreciate her expertise. Pt tolerating regular diet and thin liquids but is noted to have some left sided pocketing. However, pt is sensate and independently utilizes a lingual sweep to remove residuals. Therefore, recommend pt continue diet but can certainly downgrade back to soft/bite sized diet if that is no longer functional. Also, pt tentatively planned for repeat angio tomorrow. Will hold further cognitive-linguistic intervention until next week as indicated.      Kourtney Gay M.Ed, CCC-SLP  Speech-Language Pathologist

## 2023-12-22 ENCOUNTER — APPOINTMENT (OUTPATIENT)
Facility: HOSPITAL | Age: 58
End: 2023-12-22
Attending: ANESTHESIOLOGY
Payer: OTHER GOVERNMENT

## 2023-12-22 LAB
ANION GAP SERPL CALC-SCNC: 6 MMOL/L (ref 5–15)
BASOPHILS # BLD: 0.1 K/UL (ref 0–0.1)
BASOPHILS NFR BLD: 1 % (ref 0–1)
BUN SERPL-MCNC: 9 MG/DL (ref 6–20)
BUN/CREAT SERPL: 18 (ref 12–20)
CALCIUM SERPL-MCNC: 9.3 MG/DL (ref 8.5–10.1)
CHLORIDE SERPL-SCNC: 112 MMOL/L (ref 97–108)
CO2 SERPL-SCNC: 22 MMOL/L (ref 21–32)
CREAT SERPL-MCNC: 0.5 MG/DL (ref 0.55–1.02)
DIFFERENTIAL METHOD BLD: ABNORMAL
ECHO BSA: 1.93 M2
EOSINOPHIL # BLD: 0 K/UL (ref 0–0.4)
EOSINOPHIL NFR BLD: 0 % (ref 0–7)
ERYTHROCYTE [DISTWIDTH] IN BLOOD BY AUTOMATED COUNT: 12.7 % (ref 11.5–14.5)
GLUCOSE SERPL-MCNC: 110 MG/DL (ref 65–100)
HCT VFR BLD AUTO: 39.9 % (ref 35–47)
HGB BLD-MCNC: 13.2 G/DL (ref 11.5–16)
IMM GRANULOCYTES # BLD AUTO: 0 K/UL (ref 0–0.04)
IMM GRANULOCYTES NFR BLD AUTO: 0 % (ref 0–0.5)
LYMPHOCYTES # BLD: 0.5 K/UL (ref 0.8–3.5)
LYMPHOCYTES NFR BLD: 5 % (ref 12–49)
MAGNESIUM SERPL-MCNC: 2.3 MG/DL (ref 1.6–2.4)
MCH RBC QN AUTO: 28.6 PG (ref 26–34)
MCHC RBC AUTO-ENTMCNC: 33.1 G/DL (ref 30–36.5)
MCV RBC AUTO: 86.6 FL (ref 80–99)
MONOCYTES # BLD: 0.6 K/UL (ref 0–1)
MONOCYTES NFR BLD: 7 % (ref 5–13)
NEUTS SEG # BLD: 7.9 K/UL (ref 1.8–8)
NEUTS SEG NFR BLD: 87 % (ref 32–75)
NRBC # BLD: 0 K/UL (ref 0–0.01)
NRBC BLD-RTO: 0 PER 100 WBC
PHOSPHATE SERPL-MCNC: 2.2 MG/DL (ref 2.6–4.7)
PLATELET # BLD AUTO: 341 K/UL (ref 150–400)
PMV BLD AUTO: 9.5 FL (ref 8.9–12.9)
POTASSIUM SERPL-SCNC: 3.8 MMOL/L (ref 3.5–5.1)
RBC # BLD AUTO: 4.61 M/UL (ref 3.8–5.2)
RBC MORPH BLD: ABNORMAL
SODIUM SERPL-SCNC: 140 MMOL/L (ref 136–145)
VAS BASILAR ARTERY EDV: 19.4 CM/S
VAS BASILAR ARTERY MEAN VEL: 34 CM/S
VAS BASILAR ARTERY PSV: 65.1 CM/S
VAS LEFT ACA EDV: 46.2 CM/S
VAS LEFT ACA MEAN VEL: 68.6 CM/S
VAS LEFT ACA PSV: 113.5 CM/S
VAS LEFT EX ICA MEAN VEL: 31 CM/S
VAS LEFT ICA DIST EDV: 13.1 CM/S
VAS LEFT ICA DIST PSV: 65.9 CM/S
VAS LEFT ICA EDV: 27.6 CM/S
VAS LEFT ICA MEAN VEL: 47.9 CM/S
VAS LEFT ICA PSV: 88.4 CM/S
VAS LEFT LINDEGAARD RATIO: 1.7
VAS LEFT MCA 1 EDV: 28 CM/S
VAS LEFT MCA 1 MEAN VEL: 51.8 CM/S
VAS LEFT MCA 1 PSV: 99.3 CM/S
VAS LEFT PCA 1 EDV: 25.6 CM/S
VAS LEFT PCA 1 MEAN VEL: 40.6 CM/S
VAS LEFT PCA 1 PSV: 70.6 CM/S
VAS LEFT VERTEBRAL EDV TCD: 17.6 CM/S
VAS LEFT VERTEBRAL MEAN VEL: 33.4 CM/S
VAS LEFT VERTEBRAL PSV TCD: 65.1 CM/S
VAS RIGHT ACA EDV: 52.2 CM/S
VAS RIGHT ACA MEAN VEL: 79.2 CM/S
VAS RIGHT ACA PSV: 133.1 CM/S
VAS RIGHT EX ICA MEAN VEL: 30 CM/S
VAS RIGHT ICA DIST EDV: 12.1 CM/S
VAS RIGHT ICA DIST PSV: 65.1 CM/S
VAS RIGHT ICA EDV: 23.7 CM/S
VAS RIGHT ICA MEAN VEL: 40.2 CM/S
VAS RIGHT ICA PSV: 73.1 CM/S
VAS RIGHT LINDEGAARD RATIO: 2.2
VAS RIGHT MCA 1 EDV: 40.9 CM/S
VAS RIGHT MCA 1 MEAN VEL: 66.8 CM/S
VAS RIGHT MCA 1 PSV: 118.7 CM/S
VAS RIGHT PCA 1 EDV: 19.7 CM/S
VAS RIGHT PCA 1 MEAN VEL: 32.2 CM/S
VAS RIGHT PCA 1 PSV: 57.2 CM/S
VAS RIGHT VERTEBRAL EDV TCD: 15.8 CM/S
VAS RIGHT VERTEBRAL MEAN VEL: 25.5 CM/S
VAS RIGHT VERTEBRAL PSV TCD: 45 CM/S
WBC # BLD AUTO: 9.1 K/UL (ref 3.6–11)

## 2023-12-22 PROCEDURE — 6370000000 HC RX 637 (ALT 250 FOR IP): Performed by: NURSE PRACTITIONER

## 2023-12-22 PROCEDURE — 6370000000 HC RX 637 (ALT 250 FOR IP): Performed by: ANESTHESIOLOGY

## 2023-12-22 PROCEDURE — 2580000003 HC RX 258: Performed by: NURSE PRACTITIONER

## 2023-12-22 PROCEDURE — 84100 ASSAY OF PHOSPHORUS: CPT

## 2023-12-22 PROCEDURE — 2000000000 HC ICU R&B

## 2023-12-22 PROCEDURE — 80048 BASIC METABOLIC PNL TOTAL CA: CPT

## 2023-12-22 PROCEDURE — 6370000000 HC RX 637 (ALT 250 FOR IP): Performed by: STUDENT IN AN ORGANIZED HEALTH CARE EDUCATION/TRAINING PROGRAM

## 2023-12-22 PROCEDURE — 97530 THERAPEUTIC ACTIVITIES: CPT

## 2023-12-22 PROCEDURE — 2580000003 HC RX 258: Performed by: STUDENT IN AN ORGANIZED HEALTH CARE EDUCATION/TRAINING PROGRAM

## 2023-12-22 PROCEDURE — 85025 COMPLETE CBC W/AUTO DIFF WBC: CPT

## 2023-12-22 PROCEDURE — 36415 COLL VENOUS BLD VENIPUNCTURE: CPT

## 2023-12-22 PROCEDURE — 93886 INTRACRANIAL COMPLETE STUDY: CPT

## 2023-12-22 PROCEDURE — 83735 ASSAY OF MAGNESIUM: CPT

## 2023-12-22 RX ORDER — SODIUM CHLORIDE, SODIUM LACTATE, POTASSIUM CHLORIDE, AND CALCIUM CHLORIDE .6; .31; .03; .02 G/100ML; G/100ML; G/100ML; G/100ML
500 INJECTION, SOLUTION INTRAVENOUS ONCE
Status: COMPLETED | OUTPATIENT
Start: 2023-12-22 | End: 2023-12-22

## 2023-12-22 RX ORDER — POTASSIUM CHLORIDE 750 MG/1
20 TABLET, FILM COATED, EXTENDED RELEASE ORAL ONCE
Status: COMPLETED | OUTPATIENT
Start: 2023-12-22 | End: 2023-12-22

## 2023-12-22 RX ADMIN — SODIUM CHLORIDE, POTASSIUM CHLORIDE, SODIUM LACTATE AND CALCIUM CHLORIDE: 600; 310; 30; 20 INJECTION, SOLUTION INTRAVENOUS at 19:41

## 2023-12-22 RX ADMIN — Medication 60 MG: at 21:56

## 2023-12-22 RX ADMIN — SODIUM CHLORIDE, PRESERVATIVE FREE 10 ML: 5 INJECTION INTRAVENOUS at 08:15

## 2023-12-22 RX ADMIN — Medication 60 MG: at 02:03

## 2023-12-22 RX ADMIN — SENNOSIDES AND DOCUSATE SODIUM 1 TABLET: 50; 8.6 TABLET ORAL at 08:14

## 2023-12-22 RX ADMIN — DIBASIC SODIUM PHOSPHATE, MONOBASIC POTASSIUM PHOSPHATE AND MONOBASIC SODIUM PHOSPHATE 2 TABLET: 852; 155; 130 TABLET ORAL at 13:27

## 2023-12-22 RX ADMIN — SODIUM CHLORIDE, SODIUM LACTATE, POTASSIUM CHLORIDE, AND CALCIUM CHLORIDE 500 ML: 600; 310; 30; 20 INJECTION, SOLUTION INTRAVENOUS at 04:31

## 2023-12-22 RX ADMIN — Medication 60 MG: at 13:27

## 2023-12-22 RX ADMIN — Medication 60 MG: at 08:14

## 2023-12-22 RX ADMIN — POTASSIUM CHLORIDE 20 MEQ: 750 TABLET, FILM COATED, EXTENDED RELEASE ORAL at 08:14

## 2023-12-22 RX ADMIN — Medication 60 MG: at 06:02

## 2023-12-22 RX ADMIN — SODIUM CHLORIDE, POTASSIUM CHLORIDE, SODIUM LACTATE AND CALCIUM CHLORIDE 500 ML: 600; 310; 30; 20 INJECTION, SOLUTION INTRAVENOUS at 10:00

## 2023-12-22 RX ADMIN — SENNOSIDES AND DOCUSATE SODIUM 1 TABLET: 50; 8.6 TABLET ORAL at 20:40

## 2023-12-22 RX ADMIN — Medication 60 MG: at 16:56

## 2023-12-22 RX ADMIN — SODIUM CHLORIDE, POTASSIUM CHLORIDE, SODIUM LACTATE AND CALCIUM CHLORIDE 500 ML: 600; 310; 30; 20 INJECTION, SOLUTION INTRAVENOUS at 21:56

## 2023-12-22 RX ADMIN — SODIUM CHLORIDE, PRESERVATIVE FREE 10 ML: 5 INJECTION INTRAVENOUS at 20:40

## 2023-12-22 RX ADMIN — LEVETIRACETAM 500 MG: 500 TABLET, FILM COATED ORAL at 20:40

## 2023-12-22 RX ADMIN — TICAGRELOR 90 MG: 90 TABLET ORAL at 20:40

## 2023-12-22 RX ADMIN — DIBASIC SODIUM PHOSPHATE, MONOBASIC POTASSIUM PHOSPHATE AND MONOBASIC SODIUM PHOSPHATE 2 TABLET: 852; 155; 130 TABLET ORAL at 20:40

## 2023-12-22 RX ADMIN — TICAGRELOR 90 MG: 90 TABLET ORAL at 08:14

## 2023-12-22 RX ADMIN — SODIUM CHLORIDE, POTASSIUM CHLORIDE, SODIUM LACTATE AND CALCIUM CHLORIDE: 600; 310; 30; 20 INJECTION, SOLUTION INTRAVENOUS at 03:30

## 2023-12-22 RX ADMIN — ASPIRIN 81 MG CHEWABLE TABLET 81 MG: 81 TABLET CHEWABLE at 08:15

## 2023-12-22 RX ADMIN — LEVETIRACETAM 500 MG: 500 TABLET, FILM COATED ORAL at 08:37

## 2023-12-22 RX ADMIN — DIBASIC SODIUM PHOSPHATE, MONOBASIC POTASSIUM PHOSPHATE AND MONOBASIC SODIUM PHOSPHATE 1 TABLET: 852; 155; 130 TABLET ORAL at 08:14

## 2023-12-22 RX ADMIN — NIFEDIPINE 30 MG: 30 TABLET, FILM COATED, EXTENDED RELEASE ORAL at 08:14

## 2023-12-22 RX ADMIN — PRAVASTATIN SODIUM 40 MG: 40 TABLET ORAL at 20:40

## 2023-12-22 ASSESSMENT — PAIN SCALES - GENERAL
PAINLEVEL_OUTOF10: 0

## 2023-12-22 NOTE — PLAN OF CARE
Problem: Occupational Therapy - Adult  Goal: By Discharge: Performs self-care activities at highest level of function for planned discharge setting.  See evaluation for individualized goals.  Description: FUNCTIONAL STATUS PRIOR TO ADMISSION:  Patient was ambulatory without the use of AE  Receives Help From: Family, ADL Assistance: Independent,  ,  ,  ,  ,  ,  , Ambulation Assistance: Independent, Transfer Assistance: Independent, Active : Yes     HOME SUPPORT: Patient lived with spouse but didn't require assistance.    Occupational Therapy Goals:  Initiated 12/19/2023  1.  Patient will perform self-feeding with Modified [place] within 7 day(s).  2.  Patient will perform grooming with Minimal Assist within 7 day(s).  3.  Patient will perform bathing with Minimal Assist within 7 day(s).  4.  Patient will perform toilet transfers with Minimal Assist  within 7 day(s).  5.  Patient will perform all aspects of toileting with Minimal Assist within 7 day(s).  6.  Patient will participate in upper extremity therapeutic exercise/activities with Supervision for 5 minutes within 7 day(s).    7.  Patient will utilize energy conservation techniques during functional activities with verbal cues within 7 day(s).   Outcome: Progressing   OCCUPATIONAL THERAPY TREATMENT  Patient: Soledad Geller (58 y.o. female)  Date: 12/22/2023  Primary Diagnosis: Aneurysmal subarachnoid hemorrhage (HCC) [I60.8]       Precautions:                  Chart, occupational therapy assessment, plan of care, and goals were reviewed.    ASSESSMENT  Patient continues to benefit from skilled OT services and is progressing towards goals more alert in today's session but still limited by decreased functional mobility, independence in ADLS, decreased safety awareness, L sided neglect requiring cues for head turns, positive for orthostatic hypotension.  Pt received semi-reclined in bed, agreeable to therapy, cleared by RN. Pt was Mod A x2 for  continuing to assess with progress       SUBJECTIVE:   Patient stated “I feel better today.”    OBJECTIVE DATA SUMMARY:   Cognitive/Behavioral Status:          Functional Mobility and Transfers for ADLs:  Bed Mobility:  Bed Mobility Training  Bed Mobility Training: Yes  Supine to Sit: Moderate assistance;Assist X2  Sit to Supine: Maximum assistance;Assist X2  Scooting: Moderate assistance;Maximum assistance     Transfers:   Transfer Training  Transfer Training: Yes  Sit to Stand: Moderate assistance;Assist X2  Stand to Sit: Moderate assistance;Assist X2           Balance:  Standing: Impaired  Balance  Sitting: Impaired  Sitting - Static: Fair (occasional)  Sitting - Dynamic: Fair (occasional);Poor (constant support)  Standing: Impaired  Standing - Static: Fair;Poor  Standing - Dynamic: Not tested      ADL Intervention:           Pain Rating:  Head and neck pain  Pain Intervention(s):   rest and repositioning      Activity Tolerance:   Fair , Poor, requires rest breaks, and signs and symptoms of orthostatic hypotension  Please refer to the flowsheet for vital signs taken during this treatment.    After treatment:   Patient left in no apparent distress in bed, Call bell within reach, Bed/ chair alarm activated, Caregiver / family present, and Side rails x3    COMMUNICATION/EDUCATION:   The patient's plan of care was discussed with: physical therapist and registered nurse         Thank you for this referral.  Chantale Dela Cruz OT  Minutes: 30

## 2023-12-22 NOTE — PROGRESS NOTES
Neurointerventional Surgery Progress Note  Neurocritical Care NP    Admit Date: 12/18/2023   LOS: 4 days      Daily Progress Note: 12/22/2023    Assessment:     S/P SAH with stent embolization       Plan:     Day 5/21 of Nimotop to prevent delayed cerebral ischemia  Keppra 500 mg BID for seizure ppx  LR at 75 ml/hr to maintain euvolemia  Strict I&O  q 1 neuro checks  TCDs daily - today resulted with no evidence of arterial vasospasm  SBP goal <160  PT/OT/SLP evals, OOB as appropriate  ASA 81 mg (daily) and Brilinta 90 mg bid  Plan for diagnostic angio with Joy 12/26       Activity: OOB with PT  DVT ppx: SCDs    Plan d/w , Dr. Govea, Patient, Patient's spouse at bedside, Primary nurse      HPI:  58 y.o. -H Black /   female consulted for subarachnoid hemorrhage see on CT.  Patient was found down on the floor vomiting by spouse at home yesterday evening.  They stated that when they found her she was able to speak to them, was making sense and was even making jokes waiting for EMS to arrive.  When EMS arrived she was able to get up to a chair with assistance. EMS brought her into [facility] ED where she was found to have a right sided M1 aneurysm rupture with a large diffuse amount of SAH and some IPH noted on CT scan.  The ED physician immediately notified the Neurointerventional  surgeon on call, Dr. Venu Hamilton, and Neurosurgeon, Dr. Fonseca. She was then transferred to [facility] ICU for a higher level of care and intervention by Neurointerventional Surgery for possible aneurysm repair.       She had been feeling bad for a couple of weeks with a headache on the right side of her head and over her right eye.  She had been seen yesterday urgent care and treated for eustachian tube dysfunction.  She has a history of HTN and HLD.  She was not on any blood thinners but had been using a NSAID for the headache.     12/18/2023:  cerebral/cervical arteriogram and Flow diversion stent

## 2023-12-22 NOTE — PLAN OF CARE
Problem: Physical Therapy - Adult  Goal: By Discharge: Performs mobility at highest level of function for planned discharge setting.  See evaluation for individualized goals.  Description: FUNCTIONAL STATUS PRIOR TO ADMISSION: Patient was independent and active without use of DME.    HOME SUPPORT PRIOR TO ADMISSION: The patient lived with spouse but did not require assistance.    Physical Therapy Goals  Initiated 12/19/2023  1.  Patient will move from supine to sit and sit to supine in bed with minimal assistance within 7 day(s).    2.  Patient will perform sit to stand with minimal assistance within 7 day(s).  3.  Patient will transfer from bed to chair and chair to bed with minimal assistance using the least restrictive device within 7 day(s).  4.  Patient will ambulate with moderate assistance for 10 feet with the least restrictive device within 7 day(s).   5.  Patient will ascend/descend 2 stairs with 1 handrail(s) with moderate assistance within 7 day(s).  6.  Patient will improve Thorpe Balance score by 7 points within 7 days.   Outcome: Progressing     PHYSICAL THERAPY TREATMENT    Patient: Soledad Geller (58 y.o. female)  Date: 12/22/2023  Diagnosis: Aneurysmal subarachnoid hemorrhage (HCC) [I60.8] Aneurysmal subarachnoid hemorrhage (HCC)      Precautions:                      ASSESSMENT:  Patient continues to benefit from skilled PT services and is slowly progressing towards goals. Patient received in bed lethargic but clearer cognitively today. She is agreeable to treatment. Overall requiring mod to max A x 2 for mobility, some transfers with min A x 2. Improvements noted with sitting balance on EOB. Noted to have significant L visual field cut, requiring extensive cues to turn head/eyes to left. Strong R gaze preference remains. Patient tolerates 2-3 sit to stands with use of back bar to pull up with RUE. Patient with improved static standing balance with support. Patient denies dizziness however noted  to be orthostatic when standing. Required blocking of L knee and cues for increased quad activation. Patient with improved upright standing. Second trial, patient became more orthostatic and stopped following commands however remained responsive and communicating. Patient witnessed attempting to push chair to walk. Educated patient to stay still and sit down, BP more orthostatic. HR tachycardiac during these events. Patient is not stable to transfer to chair at this time due to OH. Conferred with RN. Once BP improves, encourage patient to be up to chair 2-3 times per day with staff (max A x 2). Recommend use of sera steady lift for increased safety.      12/22/23 0900 12/22/23 0906 12/22/23 0909   Vital Signs   Pulse 97 (!) 111 (!) 136   Respirations 16  --   --    BP (!) 138/94 (!) 150/78 124/76   MAP (Calculated) 109 102 92   MAP (mmHg) 106  --   --    BP Location Left upper arm Left upper arm Left upper arm   BP Method Automatic Automatic Automatic   Patient Position Semi fowlers Sitting Standing        12/22/23 0910 12/22/23 0914 12/22/23 0915   Vital Signs   Pulse (!) 110 (!) 139 (!) 118   Respirations  --   --   --    /81 114/83 95/78   MAP (Calculated) 100 93 84   MAP (mmHg)  --   --   --    BP Location Left upper arm Left upper arm Left upper arm   BP Method Automatic Automatic Automatic   Patient Position Sitting Standing Sitting        12/22/23 0920   Vital Signs   Pulse 77   Respirations  --    /86   MAP (Calculated) 99   MAP (mmHg)  --    BP Location Left upper arm   BP Method Automatic   Patient Position Supine           PLAN:  Patient continues to benefit from skilled intervention to address the above impairments.  Continue treatment per established plan of care.    Recommendation for discharge: (in order for the patient to meet his/her long term goals): Therapy 3 hours/day 5-7 days/week    Other factors to consider for discharge: patient's current support system is unable to meet their

## 2023-12-22 NOTE — PROGRESS NOTES
CRITICAL CARE NOTE    Name: Soledad Geller   : 1965   MRN: [id number]   Date: 2023      REASON FOR ICU ADMISSION:  SAH     PRINCIPAL ICU DIAGNOSIS   SAH, Aneurysmal  Possible ICU delirium  HTN    BRIEF PATIENT SUMMARY   This is a 58-year-old female with history significant for asthma, brachiocephalic artery injury, glaucoma, hyperlipidemia and hypertension that presented to [facility] after being found down by her  at home.  reports patient had been having headache for the last several days and was actually evaluated urgent care for headache and was told it was eustachian tube dysfunction. 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 Pt intubated at OSH for airway protection. Neuro interventional and neurosurgery were consulted with no consulted and neurosurgery with recommendations for four-vessel angiogram for diagnostic and possible intervention. Patient was transferred to the [facility] for further higher level of care and need of neurosurgery/neurointerventional and admitted to ICU. Pt underwent angio and flow diversion stent embolization of R ICA aneurysm . Self extubated . Tentative plan for repeat angio     COMPREHENSIVE ASSESSMENT & PLAN:SYSTEM BASED     24 HOUR EVENTS: NAEON, clinically unchanged    NEUROLOGICAL:   Neurochecks per protocol, stretch out if possible to aid w/ confusion o/n  STAT CT head if acute changes  Nimotop, keppra ppx  Daily TCDs  NIS following, possible angio   GALINA per NIS, ASA, brillinta   Monitor mentation  Delirium precautions    PULMONOLOGY:   O2 per NC for spO2 92-98%    CARDIOVASCULAR:   SBP goal <140, MAP >65  C/w nifedipine  PRN cardene for

## 2023-12-22 NOTE — PROCEDURES
Neurocritical Care Brief Progress Note:  Soledad Geller is 58 y.o female with pmh of Asthma, FMD, HTN, HLD, chronic brachiocephalic artery dissection who arrived from [facility] ED intubated to [facility] ICU early morning of 12/18/23 with SAH from a ruptured cerebral aneurysm. CTA showed a large fusiform aneurysm of the right supraclinoid ICA with eccentric broad-based component.    Pt is s/p cerebral/cervical arteriogram and flow diversion stent embolization of right supraclinoid ICA aneurysm on12/18/23 by Dr. Hamilton. Started on Aspirin and Brilinta post procedure.     CT Head post procedure notable for mild ventricular dilation, expected evolution of SAH. Most recent CT Head on 12/20 with SAH and IVH again shown. Generalized cerebral edema again shown with greater symmetric narrowing of the lateral and third ventricles.       TTE with EF of 55 - 60% with normal wall motion.     TCD's today with no evidence of intracranial arterial vasospasm.     Current I/O is + 1,423.1    Currently, she is resting in bed with her son at bedside. Denies headache, numbness, double vision, blurry vision. Endorses headache when she moves positions.     Physical Exam:   Blood pressure (!) 146/77, pulse 90, temperature 97.5 °F (36.4 °C), temperature source Oral, resp. rate 18, height 1.727 m (5' 7.99\"), weight 77.6 kg (171 lb), SpO2 98 %.    Constitutional: Drowsy middle aged female resting in bed   Respiratory: Unlabored respirations       NEURO:  Mental status: Drowsy. Rouses to voice. AAOx4, following commands two show two fingers, give thumbs up, make fist/open   Speech: Soft fluent speech  Cranial Nerves: Pupils 3 mm, equal, reactive to light. Right gaze preference, crosses midline. Left visual field cut. Face seems symmetric. Tongue midline.   Motor: Normal bulk and tone, able to hold BUE antigravity extremities proximally and distally with drift to left-side, BLE weaker 2/5 strength.  No involuntary movements.  Coordination: FNF intact,

## 2023-12-22 NOTE — PROGRESS NOTES
Physical Therapy 12/22/23  Patient extremely orthostatic during treatment today. See below. Full note to follow.     12/22/23 0900 12/22/23 0906 12/22/23 0909   Vital Signs   Pulse 97 (!) 111 (!) 136   Respirations 16  --   --    BP (!) 138/94 (!) 150/78 124/76   MAP (Calculated) 109 102 92   MAP (mmHg) 106  --   --    BP Location Left upper arm Left upper arm Left upper arm   BP Method Automatic Automatic Automatic   Patient Position Semi fowlers Sitting Standing      12/22/23 0910 12/22/23 0914 12/22/23 0915   Vital Signs   Pulse (!) 110 (!) 139 (!) 118   Respirations  --   --   --    /81 114/83 95/78   MAP (Calculated) 100 93 84   MAP (mmHg)  --   --   --    BP Location Left upper arm Left upper arm Left upper arm   BP Method Automatic Automatic Automatic   Patient Position Sitting Standing Sitting      12/22/23 0920   Vital Signs   Pulse 77   Respirations  --    /86   MAP (Calculated) 99   MAP (mmHg)  --    BP Location Left upper arm   BP Method Automatic   Patient Position Supine     Thank you for your consideration,    Mary Carmen Mcnamara, PT, DPT

## 2023-12-23 ENCOUNTER — APPOINTMENT (OUTPATIENT)
Facility: HOSPITAL | Age: 58
End: 2023-12-23
Attending: ANESTHESIOLOGY
Payer: OTHER GOVERNMENT

## 2023-12-23 LAB
ANION GAP SERPL CALC-SCNC: 8 MMOL/L (ref 5–15)
BUN SERPL-MCNC: 8 MG/DL (ref 6–20)
BUN/CREAT SERPL: 15 (ref 12–20)
CALCIUM SERPL-MCNC: 9.6 MG/DL (ref 8.5–10.1)
CHLORIDE SERPL-SCNC: 108 MMOL/L (ref 97–108)
CO2 SERPL-SCNC: 22 MMOL/L (ref 21–32)
CREAT SERPL-MCNC: 0.52 MG/DL (ref 0.55–1.02)
ECHO BSA: 1.93 M2
ERYTHROCYTE [DISTWIDTH] IN BLOOD BY AUTOMATED COUNT: 12.5 % (ref 11.5–14.5)
GLUCOSE SERPL-MCNC: 109 MG/DL (ref 65–100)
HCT VFR BLD AUTO: 40.5 % (ref 35–47)
HGB BLD-MCNC: 13.3 G/DL (ref 11.5–16)
MAGNESIUM SERPL-MCNC: 1.9 MG/DL (ref 1.6–2.4)
MCH RBC QN AUTO: 28.2 PG (ref 26–34)
MCHC RBC AUTO-ENTMCNC: 32.8 G/DL (ref 30–36.5)
MCV RBC AUTO: 85.8 FL (ref 80–99)
NRBC # BLD: 0 K/UL (ref 0–0.01)
NRBC BLD-RTO: 0 PER 100 WBC
PHOSPHATE SERPL-MCNC: 2.9 MG/DL (ref 2.6–4.7)
PLATELET # BLD AUTO: 363 K/UL (ref 150–400)
PMV BLD AUTO: 9.6 FL (ref 8.9–12.9)
POTASSIUM SERPL-SCNC: 3.6 MMOL/L (ref 3.5–5.1)
RBC # BLD AUTO: 4.72 M/UL (ref 3.8–5.2)
SODIUM SERPL-SCNC: 138 MMOL/L (ref 136–145)
VAS BASILAR ARTERY EDV: 24.4 CM/S
VAS BASILAR ARTERY MEAN VEL: 40 CM/S
VAS BASILAR ARTERY PSV: 72.6 CM/S
VAS LEFT ACA EDV: 51.3 CM/S
VAS LEFT ACA MEAN VEL: 71.2 CM/S
VAS LEFT ACA PSV: 110.9 CM/S
VAS LEFT EX ICA MEAN VEL: 37 CM/S
VAS LEFT ICA DIST EDV: 22.2 CM/S
VAS LEFT ICA DIST PSV: 67.2 CM/S
VAS LEFT ICA EDV: 32.6 CM/S
VAS LEFT ICA MEAN VEL: 52 CM/S
VAS LEFT ICA PSV: 90.9 CM/S
VAS LEFT LINDEGAARD RATIO: 1.4
VAS LEFT MCA 1 EDV: 28.8 CM/S
VAS LEFT MCA 1 MEAN VEL: 49.5 CM/S
VAS LEFT MCA 1 PSV: 90.9 CM/S
VAS LEFT PCA 1 EDV: 21 CM/S
VAS LEFT PCA 1 MEAN VEL: 32.6 CM/S
VAS LEFT PCA 1 PSV: 55.9 CM/S
VAS LEFT VERTEBRAL EDV TCD: 20.3 CM/S
VAS LEFT VERTEBRAL MEAN VEL: 33.1 CM/S
VAS LEFT VERTEBRAL PSV TCD: 58.7 CM/S
VAS RIGHT ACA EDV: 67.3 CM/S
VAS RIGHT ACA MEAN VEL: 89.9 CM/S
VAS RIGHT ACA PSV: 135.2 CM/S
VAS RIGHT EX ICA MEAN VEL: 30 CM/S
VAS RIGHT ICA DIST EDV: 15.4 CM/S
VAS RIGHT ICA DIST PSV: 60.6 CM/S
VAS RIGHT ICA EDV: 28.8 CM/S
VAS RIGHT ICA MEAN VEL: 43.9 CM/S
VAS RIGHT ICA PSV: 74.1 CM/S
VAS RIGHT LINDEGAARD RATIO: 2.1
VAS RIGHT MCA 1 EDV: 42.4 CM/S
VAS RIGHT MCA 1 MEAN VEL: 62.3 CM/S
VAS RIGHT MCA 1 PSV: 102.2 CM/S
VAS RIGHT PCA 1 EDV: 23.4 CM/S
VAS RIGHT PCA 1 MEAN VEL: 33.7 CM/S
VAS RIGHT PCA 1 PSV: 54.2 CM/S
VAS RIGHT VERTEBRAL EDV TCD: 21.4 CM/S
VAS RIGHT VERTEBRAL MEAN VEL: 30.2 CM/S
VAS RIGHT VERTEBRAL PSV TCD: 47.7 CM/S
WBC # BLD AUTO: 8.9 K/UL (ref 3.6–11)

## 2023-12-23 PROCEDURE — 99233 SBSQ HOSP IP/OBS HIGH 50: CPT | Performed by: NURSE PRACTITIONER

## 2023-12-23 PROCEDURE — 80048 BASIC METABOLIC PNL TOTAL CA: CPT

## 2023-12-23 PROCEDURE — 85027 COMPLETE CBC AUTOMATED: CPT

## 2023-12-23 PROCEDURE — 84100 ASSAY OF PHOSPHORUS: CPT

## 2023-12-23 PROCEDURE — 2580000003 HC RX 258: Performed by: NURSE PRACTITIONER

## 2023-12-23 PROCEDURE — 83735 ASSAY OF MAGNESIUM: CPT

## 2023-12-23 PROCEDURE — 36415 COLL VENOUS BLD VENIPUNCTURE: CPT

## 2023-12-23 PROCEDURE — 6370000000 HC RX 637 (ALT 250 FOR IP): Performed by: STUDENT IN AN ORGANIZED HEALTH CARE EDUCATION/TRAINING PROGRAM

## 2023-12-23 PROCEDURE — 6370000000 HC RX 637 (ALT 250 FOR IP): Performed by: ANESTHESIOLOGY

## 2023-12-23 PROCEDURE — 2000000000 HC ICU R&B

## 2023-12-23 PROCEDURE — 93886 INTRACRANIAL COMPLETE STUDY: CPT

## 2023-12-23 PROCEDURE — 6370000000 HC RX 637 (ALT 250 FOR IP): Performed by: NURSE PRACTITIONER

## 2023-12-23 RX ADMIN — SENNOSIDES AND DOCUSATE SODIUM 1 TABLET: 50; 8.6 TABLET ORAL at 09:15

## 2023-12-23 RX ADMIN — TICAGRELOR 90 MG: 90 TABLET ORAL at 21:20

## 2023-12-23 RX ADMIN — TICAGRELOR 90 MG: 90 TABLET ORAL at 09:28

## 2023-12-23 RX ADMIN — LEVETIRACETAM 500 MG: 500 TABLET, FILM COATED ORAL at 21:19

## 2023-12-23 RX ADMIN — Medication 60 MG: at 02:24

## 2023-12-23 RX ADMIN — SODIUM CHLORIDE, PRESERVATIVE FREE 10 ML: 5 INJECTION INTRAVENOUS at 21:25

## 2023-12-23 RX ADMIN — NIFEDIPINE 30 MG: 30 TABLET, FILM COATED, EXTENDED RELEASE ORAL at 09:15

## 2023-12-23 RX ADMIN — DIBASIC SODIUM PHOSPHATE, MONOBASIC POTASSIUM PHOSPHATE AND MONOBASIC SODIUM PHOSPHATE 2 TABLET: 852; 155; 130 TABLET ORAL at 09:16

## 2023-12-23 RX ADMIN — Medication 60 MG: at 06:09

## 2023-12-23 RX ADMIN — SODIUM CHLORIDE, POTASSIUM CHLORIDE, SODIUM LACTATE AND CALCIUM CHLORIDE: 600; 310; 30; 20 INJECTION, SOLUTION INTRAVENOUS at 17:01

## 2023-12-23 RX ADMIN — ASPIRIN 81 MG CHEWABLE TABLET 81 MG: 81 TABLET CHEWABLE at 09:16

## 2023-12-23 RX ADMIN — SODIUM CHLORIDE, POTASSIUM CHLORIDE, SODIUM LACTATE AND CALCIUM CHLORIDE: 600; 310; 30; 20 INJECTION, SOLUTION INTRAVENOUS at 04:00

## 2023-12-23 RX ADMIN — DIBASIC SODIUM PHOSPHATE, MONOBASIC POTASSIUM PHOSPHATE AND MONOBASIC SODIUM PHOSPHATE 2 TABLET: 852; 155; 130 TABLET ORAL at 21:22

## 2023-12-23 RX ADMIN — LEVETIRACETAM 500 MG: 500 TABLET, FILM COATED ORAL at 09:15

## 2023-12-23 RX ADMIN — Medication 60 MG: at 09:37

## 2023-12-23 RX ADMIN — SODIUM CHLORIDE, PRESERVATIVE FREE 10 ML: 5 INJECTION INTRAVENOUS at 09:16

## 2023-12-23 RX ADMIN — Medication 60 MG: at 21:24

## 2023-12-23 RX ADMIN — PRAVASTATIN SODIUM 40 MG: 40 TABLET ORAL at 21:21

## 2023-12-23 RX ADMIN — Medication 60 MG: at 17:33

## 2023-12-23 RX ADMIN — Medication 60 MG: at 13:31

## 2023-12-23 ASSESSMENT — PAIN SCALES - GENERAL
PAINLEVEL_OUTOF10: 0
PAINLEVEL_OUTOF10: 0

## 2023-12-23 NOTE — PROGRESS NOTES
Neurointerventional Surgery Progress Note  Neurocritical Care NP    Admit Date: 12/18/2023   LOS: 5 days      Daily Progress Note: 12/23/2023    Assessment:     SAH due to ruptured fusform RMCA aneurysm, Morin Miller 3, Motley Grade 4, baseline mRS: 0, s/p cerebral angiogram and pipeline embo on 12/18/23 by Dr. Hamilton    Plan:      - Plans for repeat DSA with possible IA verapamil if vasospasm is present 12/26/23   - Cont DAPT with ASA 81 mg daily and Brilinta 90 mg BID   - Day 6/21 of Nimotop to prevent delayed cerebral ischemia   - Keppra 500 mg BID for seizure ppx   - LR at 75 ml/hr to maintain euvolemia   - Strict I&O   - Labs reviewed: , K 3.6, Mg 1.9, Phos 2.9   - q1 neuro checks, q2 at night for sleep hygiene   - TCDs daily    - SBP goal 100-160   - PT/OT/SLP evals    Plan d/w Dr. Hamilton.      HPI: Soledad Geller is a 57 yo black female with a pmh of asthma, HTN, and HDL who presented to [facility] ED on 12/17/23 with decreased LOC and vomiting. She had been reporting headache x several days prior to presentation, had been taking NSAIDs for pain but does not take any ASA/blood thinners. A stat CT head was obtained which showed a large SAH with IPH. CTA head/neck was then obtained which showed a large fusiform aneurysm of the M1 segment on the right measuring 8.8 x 16 x 9.9 mm. NSGY and NIS were consulted and pt was then transferred to [facility] for higher level of care.   12/21/2023: Pt with episode of staring during PT lasting approx 30 seconds. No evidence of tonic/clonic activity, twitching, incontinence, or nystagmus. Returned to baseline immediately following, felt to be vasovagal response.     Subjective:     No neuro events overnight.     Current Facility-Administered Medications   Medication Dose Route Frequency Provider Last Rate Last Admin    phosphorus (K PHOS NEUTRAL) 2 tablet  500 mg Oral BID Aurelio Govea MD   2 tablet at 12/23/23 0916    niCARdipine (CARDENE) 25 mg in sodium chloride 0.9 % 250 mL

## 2023-12-23 NOTE — PROGRESS NOTES
CRITICAL CARE NOTE    Name: Soledad Geller   : 1965   MRN: [id number]   Date: 2023      REASON FOR ICU ADMISSION:  SAH     PRINCIPAL ICU DIAGNOSIS   SAH, Aneurysmal  Possible ICU delirium  HTN  Glaucoma    BRIEF PATIENT SUMMARY   A 58-year-old female with PMH of asthma, brachiocephalic artery injury, glaucoma, hyperlipidemia and hypertension that presented to [facility] after being found down by her  at home on .  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 Pt intubated at OSH for airway protection. Pt underwent angio and flow diversion stent embolization of R ICA aneurysm  at [facility]. Self extubated . Tentative plan for repeat angio     COMPREHENSIVE ASSESSMENT & PLAN:SYSTEM BASED     24 HOUR EVENTS: No new changes overnight.  Patient is a little sleepy this morning but easily arousable.  She follows command.  Oriented.  Afebrile.  On IV fluids 75 cc an hour. No head ache.  Net fluid balance 3.5 L positive    NEUROLOGICAL:   Subarachnoid hemorrhage, aneurysmal  Status post stent embolization of right ICA aneurysm on     -Neuro-checks per protocol  -STAT CT head if acute changes  -On Nimotop, keppra ppx  -Daily TCDs  -NIS following, possible angio   -On ASA 81, brillinta 90 mg BID   -Delirium precautions    PULMONOLOGY:   O2 per NC for spO2 92-98%  Aspiration precaution    CARDIOVASCULAR:   History of hypertension  Hyperlipidemia    -SBP goal <140, MAP >65  -C/w nifedipine  -PRN cardene for above  -Statin  -Echocardiogram with left ventricular ejection fraction of 55%.  No regional wall motion abnormalities.    GASTROINTESTINAL   ADAT   Famotidine    RENAL/ELECTROLYTE/FLUIDS:    Strict I/Os  Gentle hydration  Mg/Phos/K>2/3/4  Will prevent from fluid overload    ENDOCRINE:   Goal euglycemia    HEMATOLOGY/ONCOLOGY:   SCDs    INFECTIOUS DISEASE:   Received rocephin/azithromycin for CAP coverage    ICU DAILY CHECKLIST   Code Status:Full  DVT Prophylaxis:SCDs  SUP: Pepcid  Diet: ADAT  Activity Level:Ad erll after PT/OT  ABCDEF Bundle/Checklist Completed:Yes  Disposition: ICU  Multidisciplinary Rounds Completed: NA  Goals of Care Discussion/Palliative: yes  Patient/Family Updated: yes       SUBJECTIVE   Review of Systems  All relevant systems were reviewed and were negative except mentioned above.    OBJECTIVE   Visit Vitals  BP (!) 142/85   Pulse 89   Temp 98.5 °F (36.9 °C)   Resp 18   Ht 1.727 m (5' 7.99\")   Wt 75.3 kg (166 lb 0.1 oz)   SpO2 97%   BMI 25.25 kg/m²        General: Sleepy but easily arousable, comfortable  HENT: Mucous membrane moist, No cervical lymphadenopathy, left facial droop  Eyes: Conjunctiva/sclera: Conjunctivae normal. Pupils: Pupils are equal, round  Cardiovascular: Rate and Rhythm: Normal rate and regular rhythm. No murmur  Pulmonary: Good airflow bilateral, no wheezing, rales or rhonchi  Abdominal: Bowel sounds are normal. There is no distension. Soft, NT  Musculoskeletal: No joint edema or erythema  Skin: Warm, dry, no ulcer or rash  Neurological:  A X O, moves all extremities, left facial droop, LUE 3/5 motor    Labs and Data: Reviewed 23  Medications: Reviewed 23  Imaging: Reviewed 23    BP (!) 152/95   Pulse 87   Temp 98.1 °F (36.7 °C) (Oral)   Resp 19   Ht 1.727 m (5' 7.99\")   Wt 75.3 kg (166 lb 0.1 oz)   LMP  (LMP Unknown)   SpO2 97%   BMI 25.25 kg/m²      Temp (24hrs), Av °F (36.7 °C), Min:97.6 °F (36.4 °C), Max:98.4 °F (36.9 °C)           Intake/Output:     Intake/Output Summary (Last 24 hours) at 2023 0658  Last data filed at 2023 0600  Gross per 24 hour   Intake 2800 ml   Output 2910 ml   Net -110 ml

## 2023-12-23 NOTE — PROGRESS NOTES
Neurocritical Care Brief Progress Note:  Soledad Geller is 58 y.o female with pmh of Asthma, FMD, HTN, HLD, chronic brachiocephalic artery dissection who arrived from [facility] ED intubated to [facility] ICU early morning of 12/18/23 with SAH from a ruptured cerebral aneurysm. CTA showed a large fusiform aneurysm of the right supraclinoid ICA with eccentric broad-based component.     Pt is s/p cerebral/cervical arteriogram and flow diversion stent embolization of right supraclinoid ICA aneurysm on12/18/23 by Dr. Hamilton. Started on Aspirin and Brilinta post procedure.      CT Head post procedure notable for mild ventricular dilation, expected evolution of SAH. Most recent CT Head on 12/20 with SAH and IVH again shown. Generalized cerebral edema again shown with greater symmetric narrowing of the lateral and third ventricles.        TTE with EF of 55 - 60% with normal wall motion.      TCD's today with no evidence of intracranial arterial vasospasm.      Current I/O is -310 at the start of the shift, will bolus as appropriate      Currently, she is resting in bed. No family at bedside. She has a posterior headache that has come and gone throughout the day. Denies numbness, tingling, blurry vision, double vision. Neuro exam is stable.     Addendum: At 2152, noted to be -460. Given 500 ml LR bolus over 2 hours.      Physical Examination    Blood pressure (!) 146/77, pulse 90, temperature 97.5 °F (36.4 °C), temperature source Oral, resp. rate 18, height 1.727 m (5' 7.99\"), weight 77.6 kg (171 lb), SpO2 98 %.     Constitutional: Drowsy middle aged female resting in bed   Respiratory: Unlabored respirations        NEURO:  Mental status: Drowsy. Rouses to voice. AAOx4, following commands two show two fingers, give thumbs up, make fist/open   Speech: Soft fluent speech  Cranial Nerves: Pupils 3 mm, equal, reactive to light. Right gaze preference, crosses midline. Left visual field cut. Face symmetric. Tongue midline.   Motor: Normal

## 2023-12-23 NOTE — PROGRESS NOTES
Neurocritical Care Brief Progress Note:  Soledad Geller is 58 y.o female with pmh of Asthma, FMD, HTN, HLD, chronic brachiocephalic artery dissection who arrived from [facility] ED intubated to [facility] ICU early morning of 12/18/23 with SAH from a ruptured cerebral aneurysm. CTA showed a large fusiform aneurysm of the right supraclinoid ICA with eccentric broad-based component.     Pt is s/p cerebral/cervical arteriogram and flow diversion stent embolization of right supraclinoid ICA aneurysm on12/18/23 by Dr. Hamilton. Started on Aspirin and Brilinta post procedure.      CT Head post procedure notable for mild ventricular dilation, expected evolution of SAH. Most recent CT Head on 12/20 with SAH and IVH again shown. Generalized cerebral edema again shown with greater symmetric narrowing of the lateral and third ventricles.        TTE with EF of 55 - 60% with normal wall motion.      TCD's today with no evidence of intracranial arterial vasospasm.      Current I/O is + 1,423.1     Currently, she is resting in bed with her son at bedside. Denies headache, numbness, double vision, blurry vision. Endorses headache when she moves positions.      Physical Exam:   Blood pressure (!) 146/77, pulse 90, temperature 97.5 °F (36.4 °C), temperature source Oral, resp. rate 18, height 1.727 m (5' 7.99\"), weight 77.6 kg (171 lb), SpO2 98 %.     Constitutional: Drowsy middle aged female resting in bed   Respiratory: Unlabored respirations         NEURO:  Mental status: Drowsy. Rouses to voice. AAOx4, following commands two show two fingers, give thumbs up, make fist/open   Speech: Soft fluent speech  Cranial Nerves: Pupils 3 mm, equal, reactive to light. Right gaze preference, crosses midline. Left visual field cut. Face seems symmetric. Tongue midline.   Motor: Normal bulk and tone, able to hold BUE antigravity extremities proximally and distally with drift to left-side, BLE weaker 2/5 strength.  No involuntary movements.  Coordination: FNF

## 2023-12-24 ENCOUNTER — APPOINTMENT (OUTPATIENT)
Facility: HOSPITAL | Age: 58
End: 2023-12-24
Attending: ANESTHESIOLOGY
Payer: OTHER GOVERNMENT

## 2023-12-24 LAB
ANION GAP SERPL CALC-SCNC: 5 MMOL/L (ref 5–15)
BASOPHILS # BLD: 0 K/UL (ref 0–0.1)
BASOPHILS NFR BLD: 1 % (ref 0–1)
BUN SERPL-MCNC: 12 MG/DL (ref 6–20)
BUN/CREAT SERPL: 21 (ref 12–20)
CALCIUM SERPL-MCNC: 8.8 MG/DL (ref 8.5–10.1)
CHLORIDE SERPL-SCNC: 108 MMOL/L (ref 97–108)
CO2 SERPL-SCNC: 21 MMOL/L (ref 21–32)
CREAT SERPL-MCNC: 0.57 MG/DL (ref 0.55–1.02)
DIFFERENTIAL METHOD BLD: ABNORMAL
ECHO BSA: 1.93 M2
EOSINOPHIL # BLD: 0 K/UL (ref 0–0.4)
EOSINOPHIL NFR BLD: 1 % (ref 0–7)
ERYTHROCYTE [DISTWIDTH] IN BLOOD BY AUTOMATED COUNT: 12.5 % (ref 11.5–14.5)
GLUCOSE SERPL-MCNC: 102 MG/DL (ref 65–100)
HCT VFR BLD AUTO: 38.2 % (ref 35–47)
HGB BLD-MCNC: 12.6 G/DL (ref 11.5–16)
IMM GRANULOCYTES # BLD AUTO: 0.1 K/UL (ref 0–0.04)
IMM GRANULOCYTES NFR BLD AUTO: 1 % (ref 0–0.5)
LYMPHOCYTES # BLD: 0.8 K/UL (ref 0.8–3.5)
LYMPHOCYTES NFR BLD: 12 % (ref 12–49)
MAGNESIUM SERPL-MCNC: 1.7 MG/DL (ref 1.6–2.4)
MCH RBC QN AUTO: 28.4 PG (ref 26–34)
MCHC RBC AUTO-ENTMCNC: 33 G/DL (ref 30–36.5)
MCV RBC AUTO: 86 FL (ref 80–99)
MONOCYTES # BLD: 0.5 K/UL (ref 0–1)
MONOCYTES NFR BLD: 8 % (ref 5–13)
NEUTS SEG # BLD: 5.2 K/UL (ref 1.8–8)
NEUTS SEG NFR BLD: 77 % (ref 32–75)
NRBC # BLD: 0 K/UL (ref 0–0.01)
NRBC BLD-RTO: 0 PER 100 WBC
PHOSPHATE SERPL-MCNC: 2.9 MG/DL (ref 2.6–4.7)
PLATELET # BLD AUTO: 318 K/UL (ref 150–400)
PMV BLD AUTO: 9 FL (ref 8.9–12.9)
POTASSIUM SERPL-SCNC: 3.4 MMOL/L (ref 3.5–5.1)
RBC # BLD AUTO: 4.44 M/UL (ref 3.8–5.2)
SODIUM SERPL-SCNC: 134 MMOL/L (ref 136–145)
VAS BASILAR ARTERY EDV: 30.7 CM/S
VAS BASILAR ARTERY MEAN VEL: 48 CM/S
VAS BASILAR ARTERY PSV: 82.8 CM/S
VAS LEFT ACA EDV: 49.6 CM/S
VAS LEFT ACA MEAN VEL: 68.5 CM/S
VAS LEFT ACA PSV: 106.2 CM/S
VAS LEFT EX ICA MEAN VEL: 43 CM/S
VAS LEFT ICA DIST EDV: 21.4 CM/S
VAS LEFT ICA DIST PSV: 88.1 CM/S
VAS LEFT ICA EDV: 28.9 CM/S
VAS LEFT ICA MEAN VEL: 46.7 CM/S
VAS LEFT ICA PSV: 82.3 CM/S
VAS LEFT LINDEGAARD RATIO: 1
VAS LEFT MCA 1 EDV: 26.2 CM/S
VAS LEFT MCA 1 MEAN VEL: 43 CM/S
VAS LEFT MCA 1 PSV: 76.7 CM/S
VAS LEFT PCA 1 EDV: 16.4 CM/S
VAS LEFT PCA 1 MEAN VEL: 27 CM/S
VAS LEFT PCA 1 PSV: 48.2 CM/S
VAS LEFT VERTEBRAL EDV TCD: 24.8 CM/S
VAS LEFT VERTEBRAL MEAN VEL: 40.5 CM/S
VAS LEFT VERTEBRAL PSV TCD: 72 CM/S
VAS RIGHT ACA EDV: 53.5 CM/S
VAS RIGHT ACA MEAN VEL: 75.5 CM/S
VAS RIGHT ACA PSV: 119.5 CM/S
VAS RIGHT EX ICA MEAN VEL: 27 CM/S
VAS RIGHT ICA DIST EDV: 14.2 CM/S
VAS RIGHT ICA DIST PSV: 53.7 CM/S
VAS RIGHT ICA EDV: 19.3 CM/S
VAS RIGHT ICA MEAN VEL: 33.1 CM/S
VAS RIGHT ICA PSV: 60.7 CM/S
VAS RIGHT LINDEGAARD RATIO: 2.3
VAS RIGHT MCA 1 EDV: 35.8 CM/S
VAS RIGHT MCA 1 MEAN VEL: 61.3 CM/S
VAS RIGHT MCA 1 PSV: 112.2 CM/S
VAS RIGHT PCA 1 EDV: 17.8 CM/S
VAS RIGHT PCA 1 MEAN VEL: 26.9 CM/S
VAS RIGHT PCA 1 PSV: 45.2 CM/S
VAS RIGHT VERTEBRAL EDV TCD: 18.2 CM/S
VAS RIGHT VERTEBRAL MEAN VEL: 26.6 CM/S
VAS RIGHT VERTEBRAL PSV TCD: 43.5 CM/S
WBC # BLD AUTO: 6.7 K/UL (ref 3.6–11)

## 2023-12-24 PROCEDURE — 6370000000 HC RX 637 (ALT 250 FOR IP): Performed by: NURSE PRACTITIONER

## 2023-12-24 PROCEDURE — 6370000000 HC RX 637 (ALT 250 FOR IP): Performed by: INTERNAL MEDICINE

## 2023-12-24 PROCEDURE — 6370000000 HC RX 637 (ALT 250 FOR IP): Performed by: STUDENT IN AN ORGANIZED HEALTH CARE EDUCATION/TRAINING PROGRAM

## 2023-12-24 PROCEDURE — 2580000003 HC RX 258: Performed by: NURSE PRACTITIONER

## 2023-12-24 PROCEDURE — 85025 COMPLETE CBC W/AUTO DIFF WBC: CPT

## 2023-12-24 PROCEDURE — 83735 ASSAY OF MAGNESIUM: CPT

## 2023-12-24 PROCEDURE — 99232 SBSQ HOSP IP/OBS MODERATE 35: CPT | Performed by: NURSE PRACTITIONER

## 2023-12-24 PROCEDURE — 6370000000 HC RX 637 (ALT 250 FOR IP): Performed by: ANESTHESIOLOGY

## 2023-12-24 PROCEDURE — 2000000000 HC ICU R&B

## 2023-12-24 PROCEDURE — 36415 COLL VENOUS BLD VENIPUNCTURE: CPT

## 2023-12-24 PROCEDURE — 84100 ASSAY OF PHOSPHORUS: CPT

## 2023-12-24 PROCEDURE — 93886 INTRACRANIAL COMPLETE STUDY: CPT

## 2023-12-24 PROCEDURE — 80048 BASIC METABOLIC PNL TOTAL CA: CPT

## 2023-12-24 RX ORDER — POTASSIUM CHLORIDE 750 MG/1
40 TABLET, FILM COATED, EXTENDED RELEASE ORAL ONCE
Status: COMPLETED | OUTPATIENT
Start: 2023-12-24 | End: 2023-12-24

## 2023-12-24 RX ORDER — SODIUM CHLORIDE 9 MG/ML
INJECTION, SOLUTION INTRAVENOUS CONTINUOUS
Status: DISCONTINUED | OUTPATIENT
Start: 2023-12-24 | End: 2023-12-28

## 2023-12-24 RX ORDER — POTASSIUM CHLORIDE 750 MG/1
40 TABLET, FILM COATED, EXTENDED RELEASE ORAL PRN
Status: DISCONTINUED | OUTPATIENT
Start: 2023-12-24 | End: 2024-01-12 | Stop reason: HOSPADM

## 2023-12-24 RX ORDER — POTASSIUM CHLORIDE 7.45 MG/ML
10 INJECTION INTRAVENOUS PRN
Status: DISCONTINUED | OUTPATIENT
Start: 2023-12-24 | End: 2024-01-12 | Stop reason: HOSPADM

## 2023-12-24 RX ORDER — MAGNESIUM SULFATE IN WATER 40 MG/ML
2000 INJECTION, SOLUTION INTRAVENOUS PRN
Status: DISCONTINUED | OUTPATIENT
Start: 2023-12-24 | End: 2024-01-12 | Stop reason: HOSPADM

## 2023-12-24 RX ADMIN — LEVETIRACETAM 500 MG: 500 TABLET, FILM COATED ORAL at 21:30

## 2023-12-24 RX ADMIN — SODIUM CHLORIDE, PRESERVATIVE FREE 10 ML: 5 INJECTION INTRAVENOUS at 09:09

## 2023-12-24 RX ADMIN — SODIUM CHLORIDE, PRESERVATIVE FREE 10 ML: 5 INJECTION INTRAVENOUS at 22:29

## 2023-12-24 RX ADMIN — Medication 60 MG: at 14:27

## 2023-12-24 RX ADMIN — Medication 60 MG: at 21:51

## 2023-12-24 RX ADMIN — TICAGRELOR 90 MG: 90 TABLET ORAL at 21:50

## 2023-12-24 RX ADMIN — SODIUM CHLORIDE: 9 INJECTION, SOLUTION INTRAVENOUS at 09:50

## 2023-12-24 RX ADMIN — ASPIRIN 81 MG CHEWABLE TABLET 81 MG: 81 TABLET CHEWABLE at 09:08

## 2023-12-24 RX ADMIN — Medication 60 MG: at 01:09

## 2023-12-24 RX ADMIN — POTASSIUM CHLORIDE 40 MEQ: 750 TABLET, FILM COATED, EXTENDED RELEASE ORAL at 12:05

## 2023-12-24 RX ADMIN — Medication 60 MG: at 09:51

## 2023-12-24 RX ADMIN — PRAVASTATIN SODIUM 40 MG: 40 TABLET ORAL at 21:36

## 2023-12-24 RX ADMIN — LEVETIRACETAM 500 MG: 500 TABLET, FILM COATED ORAL at 09:08

## 2023-12-24 RX ADMIN — SODIUM CHLORIDE, PRESERVATIVE FREE 5 ML: 5 INJECTION INTRAVENOUS at 21:00

## 2023-12-24 RX ADMIN — Medication 60 MG: at 06:25

## 2023-12-24 RX ADMIN — Medication 60 MG: at 18:39

## 2023-12-24 RX ADMIN — TICAGRELOR 90 MG: 90 TABLET ORAL at 09:08

## 2023-12-24 RX ADMIN — NIFEDIPINE 30 MG: 30 TABLET, FILM COATED, EXTENDED RELEASE ORAL at 09:08

## 2023-12-24 RX ADMIN — POTASSIUM CHLORIDE 40 MEQ: 750 TABLET, FILM COATED, EXTENDED RELEASE ORAL at 06:25

## 2023-12-24 NOTE — PROGRESS NOTES
CRITICAL CARE NOTE    Name: Soledad Geller   : 1965   MRN: [id number]   Date: 2023      REASON FOR ICU ADMISSION:  SAH     PRINCIPAL ICU DIAGNOSIS   -SAH, Aneurysmal  -HTN  -Glaucoma    BRIEF PATIENT SUMMARY   A 58-year-old female with PMH of asthma, brachiocephalic artery injury, glaucoma, hyperlipidemia and hypertension that presented to [facility] after being found down by her  at home on .  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 Pt intubated at OSH for airway protection. Pt underwent angio and flow diversion stent embolization of R ICA aneurysm  at [facility]. Self extubated . Tentative plan for repeat angio     COMPREHENSIVE ASSESSMENT & PLAN:SYSTEM BASED     24 HOUR EVENTS: No new changes overnight.  Patient remains sleepy this morning but easily arousable.  She follows command.  Oriented.  Afebrile.  On IV fluids 75 cc an hour. No head ache.  Net fluid balance 4.5 L positive. TCD without spasm    NEUROLOGICAL:   Subarachnoid hemorrhage, aneurysmal  Status post stent embolization of right ICA aneurysm on     -Neuro-checks per protocol  -STAT CT head if acute changes  -On Nimotop, keppra ppx  -Daily TCDs  -NIS following, possible angio   -On ASA 81, brillinta 90 mg BID   -Delirium precautions  -Pt is +4.5L fluid positive. May benefit from small dose of lasix    PULMONOLOGY:   O2 per NC for spO2 92-98%  Aspiration precaution    CARDIOVASCULAR:   History of hypertension  Hyperlipidemia    -SBP goal <140, MAP >65  -PRN cardene for above  -Statin and ASA  -Echocardiogram with left ventricular ejection fraction of 55%.  No diastolic dysfunction    GASTROINTESTINAL

## 2023-12-24 NOTE — PROGRESS NOTES
Neurocritical Care Brief Progress Note:  Soledad Geller is 58 y.o female with pmh of Asthma, FMD, HTN, HLD, chronic brachiocephalic artery dissection who arrived from [facility] ED intubated to [facility] ICU early morning of 12/18/23 with SAH from a ruptured cerebral aneurysm. CTA showed a large fusiform aneurysm of the right supraclinoid ICA with eccentric broad-based component.     Pt is s/p cerebral/cervical arteriogram and flow diversion stent embolization of right supraclinoid ICA aneurysm on12/18/23 by Dr. Hamilton. Started on Aspirin and Brilinta post procedure.      CT Head post procedure notable for mild ventricular dilation, expected evolution of SAH. Most recent CT Head on 12/20 with SAH and IVH again shown. Generalized cerebral edema again shown with greater symmetric narrowing of the lateral and third ventricles.        TTE with EF of 55 - 60% with normal wall motion.      TCD's today with no evidence of intracranial arterial vasospasm.      Current I/O is +920 at the start of the shift.     Currently, she is resting in bed. No family at bedside. She denies headache, double vision, blurry vision, numbness and tingling. Neuro exam stable as documented below.     Addendum: At 2152, noted to be -460. Given 500 ml LR bolus over 2 hours.      Physical Examination    Blood pressure (!) 146/77, pulse 90, temperature 97.5 °F (36.4 °C), temperature source Oral, resp. rate 18, height 1.727 m (5' 7.99\"), weight 77.6 kg (171 lb), SpO2 98 %.     Constitutional: Drowsy middle aged female resting in bed   Respiratory: Unlabored respirations        NEURO:  Mental status: Drowsy. Rouses to voice. AAOx4, following commands two show two fingers, give thumbs up, make fist/open   Speech: Soft fluent speech  Cranial Nerves: Pupils 3 mm, equal, reactive to light. Right gaze preference, crosses midline. Left visual field cut. Face symmetric. Tongue midline.   Motor: Normal bulk and tone. Antigravity to RUE and RLE with 5/5 strength. LUE

## 2023-12-25 ENCOUNTER — APPOINTMENT (OUTPATIENT)
Facility: HOSPITAL | Age: 58
End: 2023-12-25
Attending: ANESTHESIOLOGY
Payer: OTHER GOVERNMENT

## 2023-12-25 LAB
ANION GAP SERPL CALC-SCNC: 5 MMOL/L (ref 5–15)
BASOPHILS # BLD: 0.1 K/UL (ref 0–0.1)
BASOPHILS NFR BLD: 1 % (ref 0–1)
BUN SERPL-MCNC: 10 MG/DL (ref 6–20)
BUN/CREAT SERPL: 15 (ref 12–20)
CALCIUM SERPL-MCNC: 9 MG/DL (ref 8.5–10.1)
CHLORIDE SERPL-SCNC: 112 MMOL/L (ref 97–108)
CO2 SERPL-SCNC: 20 MMOL/L (ref 21–32)
CREAT SERPL-MCNC: 0.66 MG/DL (ref 0.55–1.02)
DIFFERENTIAL METHOD BLD: ABNORMAL
EOSINOPHIL # BLD: 0.1 K/UL (ref 0–0.4)
EOSINOPHIL NFR BLD: 1 % (ref 0–7)
ERYTHROCYTE [DISTWIDTH] IN BLOOD BY AUTOMATED COUNT: 12.3 % (ref 11.5–14.5)
GLUCOSE SERPL-MCNC: 88 MG/DL (ref 65–100)
HCT VFR BLD AUTO: 34.3 % (ref 35–47)
HGB BLD-MCNC: 10.5 G/DL (ref 11.5–16)
IMM GRANULOCYTES # BLD AUTO: 0.1 K/UL (ref 0–0.04)
IMM GRANULOCYTES NFR BLD AUTO: 1 % (ref 0–0.5)
LYMPHOCYTES # BLD: 0.6 K/UL (ref 0.8–3.5)
LYMPHOCYTES NFR BLD: 11 % (ref 12–49)
MAGNESIUM SERPL-MCNC: 1.9 MG/DL (ref 1.6–2.4)
MCH RBC QN AUTO: 28.9 PG (ref 26–34)
MCHC RBC AUTO-ENTMCNC: 30.6 G/DL (ref 30–36.5)
MCV RBC AUTO: 94.5 FL (ref 80–99)
MONOCYTES # BLD: 0.5 K/UL (ref 0–1)
MONOCYTES NFR BLD: 9 % (ref 5–13)
NEUTS SEG # BLD: 4.3 K/UL (ref 1.8–8)
NEUTS SEG NFR BLD: 77 % (ref 32–75)
NRBC # BLD: 0 K/UL (ref 0–0.01)
NRBC BLD-RTO: 0 PER 100 WBC
PHOSPHATE SERPL-MCNC: 2.6 MG/DL (ref 2.6–4.7)
PLATELET # BLD AUTO: 298 K/UL (ref 150–400)
PMV BLD AUTO: 9.1 FL (ref 8.9–12.9)
POTASSIUM SERPL-SCNC: 4.1 MMOL/L (ref 3.5–5.1)
RBC # BLD AUTO: 3.63 M/UL (ref 3.8–5.2)
RBC MORPH BLD: ABNORMAL
SODIUM SERPL-SCNC: 137 MMOL/L (ref 136–145)
WBC # BLD AUTO: 5.7 K/UL (ref 3.6–11)

## 2023-12-25 PROCEDURE — 2000000000 HC ICU R&B

## 2023-12-25 PROCEDURE — 2580000003 HC RX 258: Performed by: INTERNAL MEDICINE

## 2023-12-25 PROCEDURE — 6360000002 HC RX W HCPCS: Performed by: INTERNAL MEDICINE

## 2023-12-25 PROCEDURE — 6370000000 HC RX 637 (ALT 250 FOR IP): Performed by: ANESTHESIOLOGY

## 2023-12-25 PROCEDURE — 84100 ASSAY OF PHOSPHORUS: CPT

## 2023-12-25 PROCEDURE — 80048 BASIC METABOLIC PNL TOTAL CA: CPT

## 2023-12-25 PROCEDURE — 85025 COMPLETE CBC W/AUTO DIFF WBC: CPT

## 2023-12-25 PROCEDURE — 2580000003 HC RX 258: Performed by: NURSE PRACTITIONER

## 2023-12-25 PROCEDURE — 6370000000 HC RX 637 (ALT 250 FOR IP): Performed by: STUDENT IN AN ORGANIZED HEALTH CARE EDUCATION/TRAINING PROGRAM

## 2023-12-25 PROCEDURE — 83735 ASSAY OF MAGNESIUM: CPT

## 2023-12-25 PROCEDURE — 36415 COLL VENOUS BLD VENIPUNCTURE: CPT

## 2023-12-25 PROCEDURE — 93886 INTRACRANIAL COMPLETE STUDY: CPT

## 2023-12-25 PROCEDURE — 99232 SBSQ HOSP IP/OBS MODERATE 35: CPT | Performed by: NURSE PRACTITIONER

## 2023-12-25 RX ORDER — 0.9 % SODIUM CHLORIDE 0.9 %
1000 INTRAVENOUS SOLUTION INTRAVENOUS ONCE
Status: COMPLETED | OUTPATIENT
Start: 2023-12-25 | End: 2023-12-25

## 2023-12-25 RX ORDER — MAGNESIUM SULFATE 1 G/100ML
1000 INJECTION INTRAVENOUS ONCE
Status: COMPLETED | OUTPATIENT
Start: 2023-12-25 | End: 2023-12-25

## 2023-12-25 RX ADMIN — Medication 60 MG: at 10:20

## 2023-12-25 RX ADMIN — SODIUM CHLORIDE, PRESERVATIVE FREE 10 ML: 5 INJECTION INTRAVENOUS at 08:48

## 2023-12-25 RX ADMIN — LEVETIRACETAM 500 MG: 500 TABLET, FILM COATED ORAL at 21:07

## 2023-12-25 RX ADMIN — SODIUM CHLORIDE, PRESERVATIVE FREE 5 ML: 5 INJECTION INTRAVENOUS at 21:14

## 2023-12-25 RX ADMIN — TICAGRELOR 90 MG: 90 TABLET ORAL at 08:47

## 2023-12-25 RX ADMIN — MAGNESIUM SULFATE HEPTAHYDRATE 1000 MG: 1 INJECTION, SOLUTION INTRAVENOUS at 12:12

## 2023-12-25 RX ADMIN — Medication 60 MG: at 17:55

## 2023-12-25 RX ADMIN — Medication 60 MG: at 21:07

## 2023-12-25 RX ADMIN — PRAVASTATIN SODIUM 40 MG: 40 TABLET ORAL at 21:07

## 2023-12-25 RX ADMIN — NIFEDIPINE 30 MG: 30 TABLET, FILM COATED, EXTENDED RELEASE ORAL at 08:47

## 2023-12-25 RX ADMIN — ASPIRIN 81 MG CHEWABLE TABLET 81 MG: 81 TABLET CHEWABLE at 08:47

## 2023-12-25 RX ADMIN — SODIUM CHLORIDE: 9 INJECTION, SOLUTION INTRAVENOUS at 00:42

## 2023-12-25 RX ADMIN — Medication 60 MG: at 01:05

## 2023-12-25 RX ADMIN — SODIUM CHLORIDE: 9 INJECTION, SOLUTION INTRAVENOUS at 16:15

## 2023-12-25 RX ADMIN — SODIUM CHLORIDE 1000 ML: 9 INJECTION, SOLUTION INTRAVENOUS at 12:12

## 2023-12-25 RX ADMIN — Medication 60 MG: at 07:04

## 2023-12-25 RX ADMIN — LEVETIRACETAM 500 MG: 500 TABLET, FILM COATED ORAL at 08:47

## 2023-12-25 RX ADMIN — TICAGRELOR 90 MG: 90 TABLET ORAL at 21:07

## 2023-12-25 RX ADMIN — Medication 60 MG: at 13:36

## 2023-12-25 NOTE — PROGRESS NOTES
Neurocritical Care Brief Progress Note:  Soledad Geller is 58 y.o female with pmh of Asthma, FMD, HTN, HLD, chronic brachiocephalic artery dissection who arrived from [facility] ED intubated to [facility] ICU early morning of 12/18/23 with SAH from a ruptured cerebral aneurysm. CTA showed a large fusiform aneurysm of the right supraclinoid ICA with eccentric broad-based component.     Pt is s/p cerebral/cervical arteriogram and flow diversion stent embolization of right supraclinoid ICA aneurysm on12/18/23 by Dr. Hamilton. Started on Aspirin and Brilinta post procedure.      CT Head post procedure notable for mild ventricular dilation, expected evolution of SAH. Most recent CT Head on 12/20 with SAH and IVH again shown. Generalized cerebral edema again shown with greater symmetric narrowing of the lateral and third ventricles.        TTE with EF of 55 - 60% with normal wall motion.      TCD's today with no evidence of intracranial arterial vasospasm.      I/O + 1216 at the start of this shift.      Currently, she is resting in bed. No family at bedside. Denies headache, numbness, tingling, blurry vision, double vision. Neuro exam is stable.        Physical Examination    Blood pressure (!) 146/77, pulse 90, temperature 97.5 °F (36.4 °C), temperature source Oral, resp. rate 18, height 1.727 m (5' 7.99\"), weight 77.6 kg (171 lb), SpO2 98 %.     Constitutional: Drowsy middle aged female resting in bed   Respiratory: Unlabored respirations      NEURO:  Mental status: Drowsy. Rouses to voice. AAOx4, following commands two show two fingers, give thumbs up, make fist/open   Speech: Soft fluent speech  Cranial Nerves: Pupils 3 mm, equal, reactive to light. Right gaze preference, crosses midline. Left visual field cut. Face symmetric. Tongue midline.   Motor: Normal bulk and tone. Antigravity to RUE and RLE with 5/5 strength. LUE 3/5. LLE 2-3/5 with drift to bed   Coordination: FNF intact (difficulty due to left visual field cut) HTS

## 2023-12-25 NOTE — PROGRESS NOTES
Neurointerventional Surgery Progress Note  Neurocritical Care NP    Admit Date: 12/18/2023   LOS: 7 days      Daily Progress Note: 12/25/2023    Assessment:     SAH due to ruptured fusform RMCA aneurysm, Morin Miller 3, Motley Grade 4, baseline mRS: 0, s/p cerebral angiogram and pipeline embo on 12/18/23 by Dr. Hamilton    Plan:      - Plans for repeat DSA with possible IA verapamil 12/26/23, NPO except meds at 0000.    - Cont DAPT with ASA 81 mg daily and Brilinta 90 mg BID   - Day 8/21 of Nimotop to prevent delayed cerebral ischemia   - Keppra 500 mg BID for seizure ppx   - NS at 75 ml/hr to maintain euvolemia   - Labs reviewed: , K 4.1, Mg 1.9, Phos 2.6 (electrolytes replaced per Intensivist team)   - Strict I&O   - q1 neuro checks, q2 at night for sleep hygiene   - TCDs daily, negative for vasospasm thus far   - SBP goal 100-160   - PT/OT/SLP     Plan d/w Dr. Hamilton.      Cerebral angiogram and IA verapamil for vasospasm tx risks/benefits/alternatives has been fully reviewed with the patient's  at bedside and written informed consent has been obtained. I explained in detail the procedure itself as well as the the stephon-operative management before and after the procedure. Risks include but are not limited to bleeding at the puncture site, groin hematoma, infection at the puncture site, dissection, intracranial hemorrhage, coma, death, need for more surgery or interventions, blood vessel damage and/or blockage, kidney damage from contrast dye, allergic reaction to contrast dye, focal radiation reaction/hair loss. They verbalized understanding of procedure, all questions were answered, and have agreed to proceed.     Consent placed on chart.    HPI: Soledad Geller is a 59 yo black female with a pmh of asthma, HTN, and HDL who presented to [facility] ED on 12/17/23 with decreased LOC and vomiting. She had been reporting headache x several days prior to presentation, had been taking NSAIDs for pain but does not take

## 2023-12-25 NOTE — PROGRESS NOTES
Critical Care Progress Note    Awake.  Communicative.  Not particularly that drowsy.  Denies headaches or visual changes.  Denies nausea or vomiting, says she is quite thirsty.    Review of systems: Denies chest pain dyspnea orthopnea.  Denies abdominal pain.  Denies fever chills or sweats.  Past Medical History:   Diagnosis Date    Asthma     Brachiocephalic artery injury     chronic dissection    Glaucoma, low tension     Hyperlipidemia     Hypertension     Ill-defined condition     high cholesterol        Current Facility-Administered Medications   Medication Dose Route Frequency Provider Last Rate Last Admin    magnesium sulfate 1000 mg in dextrose 5% 100 mL IVPB  1,000 mg IntraVENous Once Raul Bruce MD        sodium chloride 0.9 % bolus 1,000 mL  1,000 mL IntraVENous Once Raul Bruce MD        0.9 % sodium chloride infusion   IntraVENous Continuous Chantale House APRN - NP 75 mL/hr at 12/25/23 0042 New Bag at 12/25/23 0042    potassium chloride (KLOR-CON) extended release tablet 40 mEq  40 mEq Oral PRN Amanda Ponce MD   40 mEq at 12/24/23 1205    Or    potassium bicarb-citric acid (EFFER-K) effervescent tablet 40 mEq  40 mEq Oral PRN Amanda Ponce MD        Or    potassium chloride 10 mEq/100 mL IVPB (Peripheral Line)  10 mEq IntraVENous PRN Amanda Ponce MD        magnesium sulfate 2000 mg in 50 mL IVPB premix  2,000 mg IntraVENous PRN Amanda Ponce MD        niCARdipine (CARDENE) 25 mg in sodium chloride 0.9 % 250 mL infusion ([id number])  2.5-15 mg/hr IntraVENous Continuous Nyasia Manjarrez APRN - CNP   Stopped at 12/21/23 1102    levETIRAcetam (KEPPRA) tablet 500 mg  500 mg Oral BID Aurelio Govea MD   500 mg at 12/25/23 0847    NIFEdipine (PROCARDIA XL) extended release tablet 30 mg  30 mg Oral Daily Aurelio Govea MD   30 mg at 12/25/23

## 2023-12-26 ENCOUNTER — ANESTHESIA EVENT (OUTPATIENT)
Facility: HOSPITAL | Age: 58
End: 2023-12-26
Payer: OTHER GOVERNMENT

## 2023-12-26 ENCOUNTER — ANESTHESIA (OUTPATIENT)
Facility: HOSPITAL | Age: 58
End: 2023-12-26
Payer: OTHER GOVERNMENT

## 2023-12-26 ENCOUNTER — HOSPITAL ENCOUNTER (INPATIENT)
Facility: HOSPITAL | Age: 58
Discharge: HOME | End: 2023-12-29
Attending: RADIOLOGY
Payer: OTHER GOVERNMENT

## 2023-12-26 VITALS
OXYGEN SATURATION: 95 % | RESPIRATION RATE: 18 BRPM | DIASTOLIC BLOOD PRESSURE: 88 MMHG | HEART RATE: 91 BPM | TEMPERATURE: 98.5 F | SYSTOLIC BLOOD PRESSURE: 133 MMHG

## 2023-12-26 PROBLEM — I67.848 VASOSPASM OF CEREBRAL ARTERY: Status: ACTIVE | Noted: 2023-12-26

## 2023-12-26 PROBLEM — I67.1 INTRACRANIAL ANEURYSM: Status: ACTIVE | Noted: 2023-12-26

## 2023-12-26 PROBLEM — I60.9 SAH (SUBARACHNOID HEMORRHAGE) (HCC): Status: ACTIVE | Noted: 2023-12-26

## 2023-12-26 LAB
ANION GAP SERPL CALC-SCNC: 5 MMOL/L (ref 5–15)
BASOPHILS # BLD: 0.1 K/UL (ref 0–0.1)
BASOPHILS NFR BLD: 1 % (ref 0–1)
BUN SERPL-MCNC: 9 MG/DL (ref 6–20)
BUN/CREAT SERPL: 15 (ref 12–20)
CALCIUM SERPL-MCNC: 8.3 MG/DL (ref 8.5–10.1)
CHLORIDE SERPL-SCNC: 113 MMOL/L (ref 97–108)
CO2 SERPL-SCNC: 21 MMOL/L (ref 21–32)
CREAT SERPL-MCNC: 0.62 MG/DL (ref 0.55–1.02)
DIFFERENTIAL METHOD BLD: ABNORMAL
EOSINOPHIL # BLD: 0.1 K/UL (ref 0–0.4)
EOSINOPHIL NFR BLD: 2 % (ref 0–7)
ERYTHROCYTE [DISTWIDTH] IN BLOOD BY AUTOMATED COUNT: 12.3 % (ref 11.5–14.5)
GLUCOSE SERPL-MCNC: 103 MG/DL (ref 65–100)
HCT VFR BLD AUTO: 35.7 % (ref 35–47)
HGB BLD-MCNC: 11.4 G/DL (ref 11.5–16)
IMM GRANULOCYTES # BLD AUTO: 0.1 K/UL (ref 0–0.04)
IMM GRANULOCYTES NFR BLD AUTO: 1 % (ref 0–0.5)
LYMPHOCYTES # BLD: 0.7 K/UL (ref 0.8–3.5)
LYMPHOCYTES NFR BLD: 12 % (ref 12–49)
MAGNESIUM SERPL-MCNC: 2 MG/DL (ref 1.6–2.4)
MCH RBC QN AUTO: 28.8 PG (ref 26–34)
MCHC RBC AUTO-ENTMCNC: 31.9 G/DL (ref 30–36.5)
MCV RBC AUTO: 90.2 FL (ref 80–99)
MONOCYTES # BLD: 0.6 K/UL (ref 0–1)
MONOCYTES NFR BLD: 9 % (ref 5–13)
NEUTS SEG # BLD: 4.6 K/UL (ref 1.8–8)
NEUTS SEG NFR BLD: 75 % (ref 32–75)
NRBC # BLD: 0 K/UL (ref 0–0.01)
NRBC BLD-RTO: 0 PER 100 WBC
PHOSPHATE SERPL-MCNC: 2.4 MG/DL (ref 2.6–4.7)
PLATELET # BLD AUTO: 368 K/UL (ref 150–400)
PMV BLD AUTO: 8.9 FL (ref 8.9–12.9)
POTASSIUM SERPL-SCNC: 3.8 MMOL/L (ref 3.5–5.1)
RBC # BLD AUTO: 3.96 M/UL (ref 3.8–5.2)
RBC MORPH BLD: ABNORMAL
SODIUM SERPL-SCNC: 139 MMOL/L (ref 136–145)
WBC # BLD AUTO: 6.2 K/UL (ref 3.6–11)

## 2023-12-26 PROCEDURE — 6370000000 HC RX 637 (ALT 250 FOR IP): Performed by: NURSE PRACTITIONER

## 2023-12-26 PROCEDURE — 2580000003 HC RX 258: Performed by: NURSE PRACTITIONER

## 2023-12-26 PROCEDURE — 6360000004 HC RX CONTRAST MEDICATION: Performed by: RADIOLOGY

## 2023-12-26 PROCEDURE — B3181ZZ FLUOROSCOPY OF BILATERAL INTERNAL CAROTID ARTERIES USING LOW OSMOLAR CONTRAST: ICD-10-PCS | Performed by: RADIOLOGY

## 2023-12-26 PROCEDURE — 2580000003 HC RX 258: Performed by: RADIOLOGY

## 2023-12-26 PROCEDURE — B31F1ZZ FLUOROSCOPY OF LEFT VERTEBRAL ARTERY USING LOW OSMOLAR CONTRAST: ICD-10-PCS | Performed by: RADIOLOGY

## 2023-12-26 PROCEDURE — 2580000003 HC RX 258: Performed by: INTERNAL MEDICINE

## 2023-12-26 PROCEDURE — B3151ZZ FLUOROSCOPY OF BILATERAL COMMON CAROTID ARTERIES USING LOW OSMOLAR CONTRAST: ICD-10-PCS | Performed by: RADIOLOGY

## 2023-12-26 PROCEDURE — 6370000000 HC RX 637 (ALT 250 FOR IP): Performed by: ANESTHESIOLOGY

## 2023-12-26 PROCEDURE — 80048 BASIC METABOLIC PNL TOTAL CA: CPT

## 2023-12-26 PROCEDURE — 3E0Q3GC INTRODUCTION OF OTHER THERAPEUTIC SUBSTANCE INTO CRANIAL CAVITY AND BRAIN, PERCUTANEOUS APPROACH: ICD-10-PCS | Performed by: RADIOLOGY

## 2023-12-26 PROCEDURE — 6370000000 HC RX 637 (ALT 250 FOR IP): Performed by: STUDENT IN AN ORGANIZED HEALTH CARE EDUCATION/TRAINING PROGRAM

## 2023-12-26 PROCEDURE — 2000000000 HC ICU R&B

## 2023-12-26 PROCEDURE — 36415 COLL VENOUS BLD VENIPUNCTURE: CPT

## 2023-12-26 PROCEDURE — C1769 GUIDE WIRE: HCPCS

## 2023-12-26 PROCEDURE — B31R1ZZ FLUOROSCOPY OF INTRACRANIAL ARTERIES USING LOW OSMOLAR CONTRAST: ICD-10-PCS | Performed by: RADIOLOGY

## 2023-12-26 PROCEDURE — 2500000003 HC RX 250 WO HCPCS: Performed by: NURSE ANESTHETIST, CERTIFIED REGISTERED

## 2023-12-26 PROCEDURE — 85025 COMPLETE CBC W/AUTO DIFF WBC: CPT

## 2023-12-26 PROCEDURE — 2500000003 HC RX 250 WO HCPCS: Performed by: RADIOLOGY

## 2023-12-26 PROCEDURE — 6360000002 HC RX W HCPCS: Performed by: RADIOLOGY

## 2023-12-26 PROCEDURE — 2580000003 HC RX 258: Performed by: NURSE ANESTHETIST, CERTIFIED REGISTERED

## 2023-12-26 PROCEDURE — 2500000003 HC RX 250 WO HCPCS: Performed by: INTERNAL MEDICINE

## 2023-12-26 PROCEDURE — 84100 ASSAY OF PHOSPHORUS: CPT

## 2023-12-26 PROCEDURE — 6360000002 HC RX W HCPCS: Performed by: NURSE ANESTHETIST, CERTIFIED REGISTERED

## 2023-12-26 PROCEDURE — 83735 ASSAY OF MAGNESIUM: CPT

## 2023-12-26 PROCEDURE — 99233 SBSQ HOSP IP/OBS HIGH 50: CPT | Performed by: NURSE PRACTITIONER

## 2023-12-26 RX ORDER — NIMODIPINE 30 MG/1
60 CAPSULE, LIQUID FILLED ORAL
Status: DISCONTINUED | OUTPATIENT
Start: 2023-12-27 | End: 2023-12-26

## 2023-12-26 RX ORDER — LIDOCAINE HYDROCHLORIDE 10 MG/ML
INJECTION, SOLUTION EPIDURAL; INFILTRATION; INTRACAUDAL; PERINEURAL PRN
Status: COMPLETED | OUTPATIENT
Start: 2023-12-26 | End: 2023-12-26

## 2023-12-26 RX ORDER — NIMODIPINE 30 MG/1
60 CAPSULE, LIQUID FILLED ORAL
Status: DISCONTINUED | OUTPATIENT
Start: 2023-12-26 | End: 2024-01-07

## 2023-12-26 RX ORDER — PHENYLEPHRINE HYDROCHLORIDE 10 MG/ML
INJECTION INTRAVENOUS PRN
Status: DISCONTINUED | OUTPATIENT
Start: 2023-12-26 | End: 2023-12-26 | Stop reason: SDUPTHER

## 2023-12-26 RX ORDER — MIDAZOLAM HYDROCHLORIDE 1 MG/ML
INJECTION INTRAMUSCULAR; INTRAVENOUS PRN
Status: DISCONTINUED | OUTPATIENT
Start: 2023-12-26 | End: 2023-12-26 | Stop reason: SDUPTHER

## 2023-12-26 RX ORDER — DEXMEDETOMIDINE HYDROCHLORIDE 100 UG/ML
INJECTION, SOLUTION INTRAVENOUS PRN
Status: DISCONTINUED | OUTPATIENT
Start: 2023-12-26 | End: 2023-12-26 | Stop reason: SDUPTHER

## 2023-12-26 RX ORDER — FENTANYL CITRATE 50 UG/ML
INJECTION, SOLUTION INTRAMUSCULAR; INTRAVENOUS PRN
Status: DISCONTINUED | OUTPATIENT
Start: 2023-12-26 | End: 2023-12-26 | Stop reason: SDUPTHER

## 2023-12-26 RX ORDER — SODIUM CHLORIDE 9 MG/ML
INJECTION, SOLUTION INTRAVENOUS CONTINUOUS PRN
Status: DISCONTINUED | OUTPATIENT
Start: 2023-12-26 | End: 2023-12-26 | Stop reason: SDUPTHER

## 2023-12-26 RX ADMIN — DEXMEDETOMIDINE 5 MCG: 100 INJECTION, SOLUTION INTRAVENOUS at 11:11

## 2023-12-26 RX ADMIN — PHENYLEPHRINE HYDROCHLORIDE 80 MCG: 10 INJECTION INTRAVENOUS at 11:28

## 2023-12-26 RX ADMIN — SODIUM CHLORIDE, PRESERVATIVE FREE 10 ML: 5 INJECTION INTRAVENOUS at 21:37

## 2023-12-26 RX ADMIN — Medication 60 MG: at 09:49

## 2023-12-26 RX ADMIN — PRAVASTATIN SODIUM 40 MG: 40 TABLET ORAL at 21:32

## 2023-12-26 RX ADMIN — DEXMEDETOMIDINE 5 MCG: 100 INJECTION, SOLUTION INTRAVENOUS at 11:07

## 2023-12-26 RX ADMIN — SODIUM CHLORIDE: 9 INJECTION, SOLUTION INTRAVENOUS at 05:12

## 2023-12-26 RX ADMIN — HEPARIN SODIUM: 1000 INJECTION INTRAVENOUS; SUBCUTANEOUS at 11:09

## 2023-12-26 RX ADMIN — SODIUM CHLORIDE: 9 INJECTION, SOLUTION INTRAVENOUS at 10:55

## 2023-12-26 RX ADMIN — MIDAZOLAM 0.5 MG: 1 INJECTION INTRAMUSCULAR; INTRAVENOUS at 11:33

## 2023-12-26 RX ADMIN — PHENYLEPHRINE HYDROCHLORIDE 80 MCG: 10 INJECTION INTRAVENOUS at 11:26

## 2023-12-26 RX ADMIN — PHENYLEPHRINE HYDROCHLORIDE 10 MCG/MIN: 10 INJECTION INTRAVENOUS at 10:56

## 2023-12-26 RX ADMIN — LIDOCAINE HYDROCHLORIDE 8 ML: 10 INJECTION, SOLUTION EPIDURAL; INFILTRATION; INTRACAUDAL; PERINEURAL at 11:12

## 2023-12-26 RX ADMIN — SENNOSIDES AND DOCUSATE SODIUM 1 TABLET: 50; 8.6 TABLET ORAL at 21:32

## 2023-12-26 RX ADMIN — LEVETIRACETAM 500 MG: 500 TABLET, FILM COATED ORAL at 08:14

## 2023-12-26 RX ADMIN — NICARDIPINE HYDROCHLORIDE: 25 INJECTION, SOLUTION INTRAVENOUS at 11:49

## 2023-12-26 RX ADMIN — Medication 60 MG: at 18:30

## 2023-12-26 RX ADMIN — TICAGRELOR 90 MG: 90 TABLET ORAL at 21:54

## 2023-12-26 RX ADMIN — PHENYLEPHRINE HYDROCHLORIDE 80 MCG: 10 INJECTION INTRAVENOUS at 11:50

## 2023-12-26 RX ADMIN — ASPIRIN 81 MG CHEWABLE TABLET 81 MG: 81 TABLET CHEWABLE at 08:15

## 2023-12-26 RX ADMIN — POTASSIUM PHOSPHATE, MONOBASIC POTASSIUM PHOSPHATE, DIBASIC INJECTION, 10 MMOL: 236; 224 SOLUTION, CONCENTRATE INTRAVENOUS at 12:28

## 2023-12-26 RX ADMIN — Medication 60 MG: at 14:11

## 2023-12-26 RX ADMIN — NIFEDIPINE 30 MG: 30 TABLET, FILM COATED, EXTENDED RELEASE ORAL at 08:15

## 2023-12-26 RX ADMIN — Medication 60 MG: at 07:00

## 2023-12-26 RX ADMIN — SODIUM CHLORIDE, PRESERVATIVE FREE 40 ML: 5 INJECTION INTRAVENOUS at 08:15

## 2023-12-26 RX ADMIN — LIDOCAINE HYDROCHLORIDE 2 ML: 10 INJECTION, SOLUTION EPIDURAL; INFILTRATION; INTRACAUDAL; PERINEURAL at 11:14

## 2023-12-26 RX ADMIN — LEVETIRACETAM 500 MG: 500 TABLET, FILM COATED ORAL at 21:32

## 2023-12-26 RX ADMIN — FENTANYL CITRATE 25 MCG: 50 INJECTION, SOLUTION INTRAMUSCULAR; INTRAVENOUS at 11:52

## 2023-12-26 RX ADMIN — TICAGRELOR 90 MG: 90 TABLET ORAL at 08:15

## 2023-12-26 RX ADMIN — NICARDIPINE HYDROCHLORIDE: 25 INJECTION, SOLUTION INTRAVENOUS at 11:28

## 2023-12-26 RX ADMIN — SODIUM CHLORIDE, PRESERVATIVE FREE 30 ML: 5 INJECTION INTRAVENOUS at 08:16

## 2023-12-26 RX ADMIN — SODIUM CHLORIDE: 9 INJECTION, SOLUTION INTRAVENOUS at 21:35

## 2023-12-26 RX ADMIN — Medication 60 MG: at 02:05

## 2023-12-26 RX ADMIN — IOPAMIDOL 56 ML: 612 INJECTION, SOLUTION INTRAVENOUS at 11:57

## 2023-12-26 RX ADMIN — MIDAZOLAM 0.5 MG: 1 INJECTION INTRAMUSCULAR; INTRAVENOUS at 11:29

## 2023-12-26 RX ADMIN — MIDAZOLAM 1 MG: 1 INJECTION INTRAMUSCULAR; INTRAVENOUS at 11:10

## 2023-12-26 RX ADMIN — NIMODIPINE 60 MG: 30 CAPSULE, LIQUID FILLED ORAL at 21:54

## 2023-12-26 ASSESSMENT — PAIN SCALES - GENERAL
PAINLEVEL_OUTOF10: 0
PAINLEVEL_OUTOF10: 0

## 2023-12-26 NOTE — PROGRESS NOTES
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 Hamilton     Patient is seen laying in bed comfortably eating peach cobbler, feeding herself.   is at bedside encouraging her to use her left hand.  Patient states \"he needs to stop bugging me, he is irritating me\".  Patient is able to smile while stating this however.  She denies pain, headache, nausea, vomiting and states \"I  just want to go home\".     I have discussed with the nurse the plans in the morning for patient's diagnostic angiogram  in the morning and that she is NPO after midnight with the exception of medication.  She will not need a TCD in the morning because she will be going for the angiogram.  Dr. Hamilton will treat vasospasm that time if it is present.    Physical Exam:  Gen: NAD, calm, cooperative  Neuro: A&Ox4. Follows commands. Speech clear. Affect normal. PERRL, 3 mm bilaterally. Blinks to threat. Patient has a right gaze preference but can turn her head and look to the left. EOMI. Face symmetric. Palate symmetric. Tongue midline. Yonis spontaneously. Strength 5/5 in RUE and RLE, 4/5 in the LUE and LLE. Bulk and tone normal. No involuntary movements. Gait deferred. FTN and HTS intact on right and she makes attempts on the left with noted weakness.  Skin: Warm, dry, color appropriate for ethnicity.     Continue plan with NIS.  NPO after midnight except for meds.  Diagnostic Cerebral Angiogram with Dr. Hamilton in the morning.  No need for TCD in the am.    Christina Hernadez, APRN - NP  Neurocritical Care Nurse Practitioner  [phone]

## 2023-12-26 NOTE — BRIEF OP NOTE
Neuro-Interventional Surgery - Brief procedure note      Patient: Soledad Geller MRN: [id number]     YOB: 1965  Age: 58 y.o.  Sex: female      Service: Neuro-Interventional Surgery    Date of Procedure: 12/26/2023    Pre-Procedure Diagnosis: Intracranial aneurysm, Vasospasm, and Subarachnoid hemorrhage    Post-Procedure Diagnosis: SAME    Procedure(s): Catheter cerebral/cervical arteriogram and IA Nicardipine infusion    Vessels selected: Right common carotid artery, Right internal carotid artery, Left common carotid artery, Left internal carotid artery, and Left vertebral artery    Brief Description of Procedure: Mild to moderate vasospasm of the Right MCA and B/L ACAs seen. IA Nicardipine infusion performed in B/L ICAs with improvement in vasospasm  on FU angiogram.    Residual filling of the flow diversion embolized right ICA aneurysm noted.    Right common femoral arterial puncture site hemostasis achieved by deploying 6 F Angio-Seal closure device without complications.  No hematoma or oozing noted.  Distal R lower extremity pulses remain unchanged post hemostasis.  Sterile dressing applied over the puncture site.     Anesthesia:TIVS/MAC    Estimated Blood Loss:  10 ml.    Specimens:  None    Implants:  None    Apparent Intraoperative Complications:  None immediate    Patient Condition:  Stable    Disposition:  Intensive care unit    Attestation:  I performed the procedure.        Signed By: Venu Hamilton MD     December 26, 2023     [facility] NEUROINTERVENTIONAL SURGERY

## 2023-12-26 NOTE — PROGRESS NOTES
Report received from Sumeet CLEAN[employer].    1015: Transported pt to angio for cerebral angiogram with possible intra-arterial treatment for vasospasm, arteriotomy closure device with anesthesia. Transported on monitor with RN.    1055: Patient placed on angio table with assistance. IR Staff and anesthesia present at bedside. Anesthesia to remain at bedside to manage pt airway, medications, VS and pt status.     1128: 5 mg of nicardipine given IA into the right ICA by Joy PASTOR- see MAR.    1149: 5 mg of nicardipine given IA into the left ICA by Joy PASTOR - see MAR.    1220: TRANSFER - OUT REPORT:    Verbal report given to Sumeet on Soledad Geller  being transferred to ICU 12 for routine post-op       Report consisted of patient's Situation, Background, Assessment and   Recommendations(SBAR).     Information from the following report(s) Adult Overview, Intake/Output, MAR, Cardiac Rhythm NSR, and Alarm Parameters was reviewed with the receiving nurse.           Lines:   Peripheral IV 12/20/23 Left;Posterior Hand (Active)   Site Assessment Clean, dry & intact 12/26/23 0800   Line Status Capped 12/26/23 0800   Line Care Connections checked and tightened 12/26/23 0800   Phlebitis Assessment No symptoms 12/26/23 0800   Infiltration Assessment 0 12/26/23 0800   Alcohol Cap Used Yes 12/26/23 0800   Dressing Status Clean, dry & intact 12/26/23 0800   Dressing Type Transparent 12/26/23 0800   Dressing Intervention Dressing changed 12/24/23 0000       Peripheral IV 12/24/23 Posterior;Right Hand (Active)   Site Assessment Clean, dry & intact 12/26/23 0800   Line Status Infusing 12/26/23 0800   Line Care Connections checked and tightened 12/26/23 0800   Phlebitis Assessment No symptoms 12/26/23 0800   Infiltration Assessment 0 12/26/23 0800   Alcohol Cap Used Yes 12/26/23 0800   Dressing Status Clean, dry & intact 12/26/23 0800   Dressing Type Transparent 12/26/23 0800        Opportunity for questions and clarification was provided.

## 2023-12-26 NOTE — PROGRESS NOTES
Critical Care Progress Note  Sleepy this morning but arousable, fully arousable and communicative.  Had a good night without problems.  Denies headaches or visual changes this morning.  A little thirsty.    Review of systems: No headaches no acute visual changes.  No nausea or vomiting.  No chest pain dyspnea or orthopnea.    Past Medical History:   Diagnosis Date    Asthma     Brachiocephalic artery injury     chronic dissection    Glaucoma, low tension     Hyperlipidemia     Hypertension     Ill-defined condition     high cholesterol        Current Facility-Administered Medications   Medication Dose Route Frequency Provider Last Rate Last Admin    potassium phosphate 10 mmol in sodium chloride 0.9 % 250 mL IVPB  10 mmol IntraVENous Once Raul Bruce MD        0.9 % sodium chloride infusion   IntraVENous Continuous Chantale House APRN - NP 75 mL/hr at 12/26/23 0512 New Bag at 12/26/23 0512    potassium chloride (KLOR-CON) extended release tablet 40 mEq  40 mEq Oral PRN Amanda Ponce MD   40 mEq at 12/24/23 1205    Or    potassium bicarb-citric acid (EFFER-K) effervescent tablet 40 mEq  40 mEq Oral PRN Amanda Ponce MD        Or    potassium chloride 10 mEq/100 mL IVPB (Peripheral Line)  10 mEq IntraVENous PRN Amanda Ponce MD        magnesium sulfate 2000 mg in 50 mL IVPB premix  2,000 mg IntraVENous PRN Amanda Ponce MD        niCARdipine (CARDENE) 25 mg in sodium chloride 0.9 % 250 mL infusion ([id number])  2.5-15 mg/hr IntraVENous Continuous Nyasia Manjarrez APRN - CNP   Stopped at 12/21/23 1102    levETIRAcetam (KEPPRA) tablet 500 mg  500 mg Oral BID Aurelio Govea MD   500 mg at 12/26/23 0814    NIFEdipine (PROCARDIA XL) extended release tablet 30 mg  30 mg Oral Daily Aurelio Govea MD   30 mg at 12/26/23 0815    pravastatin (PRAVACHOL) tablet 40

## 2023-12-26 NOTE — PROGRESS NOTES
Neurocritical Care Brief Progress Note:    1400:  Post procedure for DSA with IA Nicardipine    Physical Exam:  Gen: NAD, calm, cooperative, drowsy  Neuro: A&Ox4. Follows commands. Speech clear. Affect normal. PERRL, 3 mm bilaterally. Blinks to threat. No disconjugate gaze present. EOMI. Face symmetric. Palate symmetric. Tongue midline. Yonis spontaneously. Strength 5/5 on right and 4/5 on left. Negative drift. Bulk and tone normal. No involuntary movements. Gait deferred.  Skin: Warm, dry, color appropriate for ethnicity. Right groin arteriotomy site soft and non-tender on palpation, dressing is C/D/I, with no active bleeding or drainage noted.       BETZY Holt - NP  Neurocritical Care Nurse Practitioner  [phone]

## 2023-12-26 NOTE — ANESTHESIA POSTPROCEDURE EVALUATION
Department of Anesthesiology  Postprocedure Note    Patient: Hailey Stern  MRN: [id number]  YOB: 1965  Date of evaluation: 12/26/2023    Procedure Summary     Date: 12/26/23 Room / Location: [address] [place] [address] ANGIO IR    Anesthesia Start: 1055 Anesthesia Stop: 7393    Procedure: IR NORAH CATH PLACE INT CAROTID INTRACRANIAL RIGHT W ANGIO Diagnosis: (vasospasm)    Scheduled Providers: Jodee Malcolm MD Responsible Provider: Jodee Malcolm MD    Anesthesia Type: MAC ASA Status: 4          Anesthesia Type: No value filed. Nguyễn Phase I:      Nguyễn Phase II:      Anesthesia Post Evaluation    Patient location during evaluation: PACU  Patient participation: complete - patient participated  Level of consciousness: awake  Pain score: 0  Airway patency: patent  Nausea & Vomiting: no nausea  Cardiovascular status: blood pressure returned to baseline and hemodynamically stable  Respiratory status: acceptable  Hydration status: stable  Multimodal analgesia pain management approach        No notable events documented.

## 2023-12-26 NOTE — PROGRESS NOTES
Physical Therapy Note  12/26/2023    Chart reviewed in prep for PT tx session; attempted to see however currently off unit for repeat diagnostic angio. Will defer and continue to follow up as able/appropriate.    Thank you,  Priscila Ramos, PT, DPT

## 2023-12-26 NOTE — PROGRESS NOTES
SLP CONTACT NOTE:    Patient is NPO for a procedure today therefore cannot participate with assessment to determine tolerance for diet. ST will continue to follow as indicated.     Farheen Peters M.Ed, CCC-SLP  Speech Language Pathologist

## 2023-12-26 NOTE — PROGRESS NOTES
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 Hamilton    Plan:      - Plans for repeat DSA with possible IA verapamil today   - Cont DAPT with ASA 81 mg daily and Brilinta 90 mg BID   - Day 9/21 of Nimotop to prevent delayed cerebral ischemia   - Keppra 500 mg BID for seizure ppx   - NS at 75 ml/hr to maintain euvolemia   - Labs reviewed: , K 3.8, Mg 2.0 Phos 2.4 (electrolytes replaced per Intensivist team)   - Strict I&O (+1675 last 24 hrs   - q1 neuro checks, q2 at night for sleep hygiene   - TCDs negative for vasospasm thus far, hold for today due to planned DSA   - SBP goal 100-160   - PT/OT/SLP     Plan d/w Dr. Hamilton and Dr. Bruce.        HPI: Soledad Geller is a 57 yo black female with a pmh of asthma, HTN, and HDL who presented to [facility] ED on 12/17/23 with decreased LOC and vomiting. She had been reporting headache x several days prior to presentation, had been taking NSAIDs for pain but does not take any ASA/blood thinners. A stat CT head was obtained which showed a large SAH with IPH. CTA head/neck was then obtained which showed a large fusiform aneurysm of the M1 segment on the right measuring 8.8 x 16 x 9.9 mm. NSGY and NIS were consulted and pt was then transferred to [facility] for higher level of care.   12/21/2023: Pt with episode of staring during PT lasting approx 30 seconds. No evidence of tonic/clonic activity, twitching, incontinence, or nystagmus. Returned to baseline immediately following, felt to be vasovagal response.     Subjective:     No neuro events overnight. Pt continues to be drowsy but was more alert this am, able to feed herself breakfast.     Current Facility-Administered Medications   Medication Dose Route Frequency Provider

## 2023-12-27 ENCOUNTER — APPOINTMENT (OUTPATIENT)
Facility: HOSPITAL | Age: 58
End: 2023-12-27
Attending: ANESTHESIOLOGY
Payer: OTHER GOVERNMENT

## 2023-12-27 LAB
ANION GAP SERPL CALC-SCNC: 6 MMOL/L (ref 5–15)
BASOPHILS # BLD: 0.1 K/UL (ref 0–0.1)
BASOPHILS NFR BLD: 1 % (ref 0–1)
BUN SERPL-MCNC: 8 MG/DL (ref 6–20)
BUN/CREAT SERPL: 14 (ref 12–20)
CALCIUM SERPL-MCNC: 9.7 MG/DL (ref 8.5–10.1)
CHLORIDE SERPL-SCNC: 110 MMOL/L (ref 97–108)
CO2 SERPL-SCNC: 23 MMOL/L (ref 21–32)
CREAT SERPL-MCNC: 0.58 MG/DL (ref 0.55–1.02)
DIFFERENTIAL METHOD BLD: ABNORMAL
ECHO BSA: 1.93 M2
ECHO BSA: 1.93 M2
EOSINOPHIL # BLD: 0.1 K/UL (ref 0–0.4)
EOSINOPHIL NFR BLD: 1 % (ref 0–7)
ERYTHROCYTE [DISTWIDTH] IN BLOOD BY AUTOMATED COUNT: 12.2 % (ref 11.5–14.5)
GLUCOSE SERPL-MCNC: 108 MG/DL (ref 65–100)
HCT VFR BLD AUTO: 36.1 % (ref 35–47)
HGB BLD-MCNC: 11.5 G/DL (ref 11.5–16)
IMM GRANULOCYTES # BLD AUTO: 0 K/UL (ref 0–0.04)
IMM GRANULOCYTES NFR BLD AUTO: 0 % (ref 0–0.5)
LYMPHOCYTES # BLD: 0.8 K/UL (ref 0.8–3.5)
LYMPHOCYTES NFR BLD: 12 % (ref 12–49)
MAGNESIUM SERPL-MCNC: 2.1 MG/DL (ref 1.6–2.4)
MCH RBC QN AUTO: 28.7 PG (ref 26–34)
MCHC RBC AUTO-ENTMCNC: 31.9 G/DL (ref 30–36.5)
MCV RBC AUTO: 90 FL (ref 80–99)
MONOCYTES # BLD: 0.6 K/UL (ref 0–1)
MONOCYTES NFR BLD: 10 % (ref 5–13)
NEUTS SEG # BLD: 4.8 K/UL (ref 1.8–8)
NEUTS SEG NFR BLD: 76 % (ref 32–75)
NRBC # BLD: 0 K/UL (ref 0–0.01)
NRBC BLD-RTO: 0 PER 100 WBC
PHOSPHATE SERPL-MCNC: 3.3 MG/DL (ref 2.6–4.7)
PLATELET # BLD AUTO: 407 K/UL (ref 150–400)
PMV BLD AUTO: 8.8 FL (ref 8.9–12.9)
POTASSIUM SERPL-SCNC: 4.3 MMOL/L (ref 3.5–5.1)
RBC # BLD AUTO: 4.01 M/UL (ref 3.8–5.2)
RBC MORPH BLD: ABNORMAL
SODIUM SERPL-SCNC: 139 MMOL/L (ref 136–145)
VAS BASILAR ARTERY EDV: 20.9 CM/S
VAS BASILAR ARTERY EDV: 30.4 CM/S
VAS BASILAR ARTERY MEAN VEL: 35 CM/S
VAS BASILAR ARTERY MEAN VEL: 47 CM/S
VAS BASILAR ARTERY PSV: 63.8 CM/S
VAS BASILAR ARTERY PSV: 80.6 CM/S
VAS LEFT ACA EDV: 62.5 CM/S
VAS LEFT ACA EDV: 67.3 CM/S
VAS LEFT ACA MEAN VEL: 82.8 CM/S
VAS LEFT ACA MEAN VEL: 92.1 CM/S
VAS LEFT ACA PSV: 123.4 CM/S
VAS LEFT ACA PSV: 141.7 CM/S
VAS LEFT EX ICA MEAN VEL: 39 CM/S
VAS LEFT EX ICA MEAN VEL: 43 CM/S
VAS LEFT ICA DIST EDV: 20.7 CM/S
VAS LEFT ICA DIST EDV: 24.9 CM/S
VAS LEFT ICA DIST PSV: 67.6 CM/S
VAS LEFT ICA DIST PSV: 88.3 CM/S
VAS LEFT ICA EDV: 35.2 CM/S
VAS LEFT ICA EDV: 36.5 CM/S
VAS LEFT ICA MEAN VEL: 49.9 CM/S
VAS LEFT ICA MEAN VEL: 53.8 CM/S
VAS LEFT ICA PSV: 79.2 CM/S
VAS LEFT ICA PSV: 88.3 CM/S
VAS LEFT LINDEGAARD RATIO: 1.1
VAS LEFT LINDEGAARD RATIO: 1.1
VAS LEFT MCA 1 EDV: 30 CM/S
VAS LEFT MCA 1 EDV: 31.9 CM/S
VAS LEFT MCA 1 MEAN VEL: 44.3 CM/S
VAS LEFT MCA 1 MEAN VEL: 47 CM/S
VAS LEFT MCA 1 PSV: 72.8 CM/S
VAS LEFT MCA 1 PSV: 77.2 CM/S
VAS LEFT PCA 1 EDV: 23.6 CM/S
VAS LEFT PCA 1 EDV: 23.7 CM/S
VAS LEFT PCA 1 MEAN VEL: 35 CM/S
VAS LEFT PCA 1 MEAN VEL: 36.5 CM/S
VAS LEFT PCA 1 PSV: 57.7 CM/S
VAS LEFT PCA 1 PSV: 62.4 CM/S
VAS LEFT VERTEBRAL EDV TCD: 18.5 CM/S
VAS LEFT VERTEBRAL EDV TCD: 22.7 CM/S
VAS LEFT VERTEBRAL MEAN VEL: 32.2 CM/S
VAS LEFT VERTEBRAL MEAN VEL: 37.9 CM/S
VAS LEFT VERTEBRAL PSV TCD: 59.6 CM/S
VAS LEFT VERTEBRAL PSV TCD: 68.4 CM/S
VAS RIGHT ACA EDV: 38.7 CM/S
VAS RIGHT ACA EDV: 61.2 CM/S
VAS RIGHT ACA MEAN VEL: 56.4 CM/S
VAS RIGHT ACA MEAN VEL: 84.1 CM/S
VAS RIGHT ACA PSV: 129.9 CM/S
VAS RIGHT ACA PSV: 91.8 CM/S
VAS RIGHT EX ICA MEAN VEL: 26 CM/S
VAS RIGHT EX ICA MEAN VEL: 46 CM/S
VAS RIGHT ICA DIST EDV: 13.2 CM/S
VAS RIGHT ICA DIST EDV: 19.8 CM/S
VAS RIGHT ICA DIST PSV: 50.8 CM/S
VAS RIGHT ICA DIST PSV: 96.5 CM/S
VAS RIGHT ICA EDV: 21.3 CM/S
VAS RIGHT ICA EDV: 23.7 CM/S
VAS RIGHT ICA MEAN VEL: 35.3 CM/S
VAS RIGHT ICA MEAN VEL: 38 CM/S
VAS RIGHT ICA PSV: 63.3 CM/S
VAS RIGHT ICA PSV: 66.6 CM/S
VAS RIGHT LINDEGAARD RATIO: 1.6
VAS RIGHT LINDEGAARD RATIO: 1.8
VAS RIGHT MCA 1 EDV: 33.9 CM/S
VAS RIGHT MCA 1 EDV: 47 CM/S
VAS RIGHT MCA 1 MEAN VEL: 48.1 CM/S
VAS RIGHT MCA 1 MEAN VEL: 72.5 CM/S
VAS RIGHT MCA 1 PSV: 123.5 CM/S
VAS RIGHT MCA 1 PSV: 76.6 CM/S
VAS RIGHT PCA 1 EDV: 18.8 CM/S
VAS RIGHT PCA 1 EDV: 22.7 CM/S
VAS RIGHT PCA 1 MEAN VEL: 30.1 CM/S
VAS RIGHT PCA 1 MEAN VEL: 34 CM/S
VAS RIGHT PCA 1 PSV: 52.6 CM/S
VAS RIGHT PCA 1 PSV: 56.5 CM/S
VAS RIGHT VERTEBRAL EDV TCD: 11.8 CM/S
VAS RIGHT VERTEBRAL EDV TCD: 13.1 CM/S
VAS RIGHT VERTEBRAL MEAN VEL: 19.4 CM/S
VAS RIGHT VERTEBRAL MEAN VEL: 21.2 CM/S
VAS RIGHT VERTEBRAL PSV TCD: 32.1 CM/S
VAS RIGHT VERTEBRAL PSV TCD: 40.1 CM/S
WBC # BLD AUTO: 6.4 K/UL (ref 3.6–11)

## 2023-12-27 PROCEDURE — 6370000000 HC RX 637 (ALT 250 FOR IP): Performed by: NURSE PRACTITIONER

## 2023-12-27 PROCEDURE — 83735 ASSAY OF MAGNESIUM: CPT

## 2023-12-27 PROCEDURE — 97112 NEUROMUSCULAR REEDUCATION: CPT | Performed by: OCCUPATIONAL THERAPIST

## 2023-12-27 PROCEDURE — 2580000003 HC RX 258: Performed by: NURSE PRACTITIONER

## 2023-12-27 PROCEDURE — 2580000003 HC RX 258: Performed by: INTERNAL MEDICINE

## 2023-12-27 PROCEDURE — 97530 THERAPEUTIC ACTIVITIES: CPT

## 2023-12-27 PROCEDURE — 93886 INTRACRANIAL COMPLETE STUDY: CPT

## 2023-12-27 PROCEDURE — 84100 ASSAY OF PHOSPHORUS: CPT

## 2023-12-27 PROCEDURE — 80048 BASIC METABOLIC PNL TOTAL CA: CPT

## 2023-12-27 PROCEDURE — 97116 GAIT TRAINING THERAPY: CPT

## 2023-12-27 PROCEDURE — 6370000000 HC RX 637 (ALT 250 FOR IP): Performed by: STUDENT IN AN ORGANIZED HEALTH CARE EDUCATION/TRAINING PROGRAM

## 2023-12-27 PROCEDURE — 85025 COMPLETE CBC W/AUTO DIFF WBC: CPT

## 2023-12-27 PROCEDURE — 36415 COLL VENOUS BLD VENIPUNCTURE: CPT

## 2023-12-27 PROCEDURE — 2000000000 HC ICU R&B

## 2023-12-27 RX ORDER — 0.9 % SODIUM CHLORIDE 0.9 %
500 INTRAVENOUS SOLUTION INTRAVENOUS ONCE
Status: COMPLETED | OUTPATIENT
Start: 2023-12-27 | End: 2023-12-27

## 2023-12-27 RX ADMIN — NIMODIPINE 60 MG: 30 CAPSULE, LIQUID FILLED ORAL at 21:17

## 2023-12-27 RX ADMIN — SODIUM CHLORIDE, PRESERVATIVE FREE 30 ML: 5 INJECTION INTRAVENOUS at 07:48

## 2023-12-27 RX ADMIN — NIMODIPINE 60 MG: 30 CAPSULE, LIQUID FILLED ORAL at 13:51

## 2023-12-27 RX ADMIN — TICAGRELOR 90 MG: 90 TABLET ORAL at 07:47

## 2023-12-27 RX ADMIN — LEVETIRACETAM 500 MG: 500 TABLET, FILM COATED ORAL at 07:47

## 2023-12-27 RX ADMIN — LEVETIRACETAM 500 MG: 500 TABLET, FILM COATED ORAL at 21:15

## 2023-12-27 RX ADMIN — NIMODIPINE 60 MG: 30 CAPSULE, LIQUID FILLED ORAL at 17:16

## 2023-12-27 RX ADMIN — NIMODIPINE 60 MG: 30 CAPSULE, LIQUID FILLED ORAL at 02:17

## 2023-12-27 RX ADMIN — SENNOSIDES AND DOCUSATE SODIUM 1 TABLET: 50; 8.6 TABLET ORAL at 07:47

## 2023-12-27 RX ADMIN — SODIUM CHLORIDE: 9 INJECTION, SOLUTION INTRAVENOUS at 20:09

## 2023-12-27 RX ADMIN — SODIUM CHLORIDE, PRESERVATIVE FREE 40 ML: 5 INJECTION INTRAVENOUS at 07:48

## 2023-12-27 RX ADMIN — TICAGRELOR 90 MG: 90 TABLET ORAL at 21:17

## 2023-12-27 RX ADMIN — PRAVASTATIN SODIUM 40 MG: 40 TABLET ORAL at 21:15

## 2023-12-27 RX ADMIN — NIFEDIPINE 30 MG: 30 TABLET, FILM COATED, EXTENDED RELEASE ORAL at 07:47

## 2023-12-27 RX ADMIN — SODIUM CHLORIDE, PRESERVATIVE FREE 10 ML: 5 INJECTION INTRAVENOUS at 21:15

## 2023-12-27 RX ADMIN — NIMODIPINE 60 MG: 30 CAPSULE, LIQUID FILLED ORAL at 10:21

## 2023-12-27 RX ADMIN — NIMODIPINE 60 MG: 30 CAPSULE, LIQUID FILLED ORAL at 05:48

## 2023-12-27 RX ADMIN — SODIUM CHLORIDE 500 ML: 9 INJECTION, SOLUTION INTRAVENOUS at 15:15

## 2023-12-27 RX ADMIN — ASPIRIN 81 MG CHEWABLE TABLET 81 MG: 81 TABLET CHEWABLE at 07:47

## 2023-12-27 RX ADMIN — SENNOSIDES AND DOCUSATE SODIUM 1 TABLET: 50; 8.6 TABLET ORAL at 21:15

## 2023-12-27 ASSESSMENT — PAIN SCALES - GENERAL
PAINLEVEL_OUTOF10: 0

## 2023-12-27 NOTE — CARE COORDINATION
Transition of Care Plan:     RUR: 12% Low  Prior Level of Functioning: Independent  Disposition: IPR. Referred to [facility] on 12/21/23.  IPR: Date FOC offered: 12/21/23  Date FOC received: 12/21/23  Transportation at discharge: Stretcher vs family  IM/IMM Medicare/ letter given: NA  Is patient a [place] and connected with VA? No  Caregiver Contact: Spouse Ravinder Geller [phone]  Discharge Caregiver contacted prior to discharge? Yes  Care Conference needed? No  Barriers to discharge: Continues in ICU        Subarachnoid hemorrhage with s/p diverting stent right ICA     CM reviewed case with treatment team during ICU Interdisciplinary Rounds. Patient continues with clinical improvement, now talking and eating. Plan is for IPR.

## 2023-12-27 NOTE — PLAN OF CARE
currently need... (If the patient hasn't done an activity recently, how much help from another person do you think they would need if they tried?) Total A Lot A Little None   1.  Turning from your back to your side while in a flat bed without using bedrails? []  1 [x]  2 []  3  []  4   2.  Moving from lying on your back to sitting on the side of a flat bed without using bedrails? []  1 [x]  2 []  3  []  4   3.  Moving to and from a bed to a chair (including a wheelchair)? []  1 [x]  2 []  3  []  4   4. Standing up from a chair using your arms (e.g. wheelchair or bedside chair)? []  1 [x]  2 []  3  []  4   5.  Walking in hospital room? []  1 [x]  2 []  3  []  4   6.  Climbing 3-5 steps with a railing? [x]  1 []  2 []  3  []  4     Raw Score: 11/24                            Cutoff score ?171,2,3 had higher odds of discharging home with home health or need of SNF/IPR.    1. Lyn Connolly, Eloisa Youssef, Justino Garcia, Gia Dudley, Janak Blevins, Schuyler Connolly.  Validity of the AM-PAC “6-Clicks” Inpatient Daily Activity and Basic Mobility Short Forms. Physical Therapy Mar 2014, [phone]) 379-391; DOI: 10.2522/ptj.16496145  2. Jonas HUFF, Dary J, Milena J, Daria TADEO. Association of AM-PAC \"6-Clicks\" Basic Mobility and Daily Activity Scores With Discharge Destination. Phys Ther. 2021 Apr 4;101(4):bvbw772. doi: 10.1093/ptj/vury547. PMID: 33692809.  3. Annette TADEO, Zaheer BENAVIDEZ, Laura S, Tab K, Indy S. Activity Measure for Post-Acute Care \"6-Clicks\" Basic Mobility Scores Predict Discharge Destination After Acute Care Hospitalization in Select Patient Groups: A Retrospective, Observational Study. Arch Rehabil Res Clin Transl. 2022 Jul 16;4(3):421219. doi: 10.1016/j.arrct.2022.366496. PMID: 57713269; PMCID: [id number].  4. Mohsen DELGADO, Veronica S, Olu W, Stefania P. AM-PAC Short Forms Manual 4.0. Revised 2/2020.                                                                                                                                                                                                                               Pain Rating:  No complaints of pain   Pain Intervention(s):   pain is at a level acceptable to the patient    Activity Tolerance:   Good    After treatment:   Patient left in no apparent distress sitting up in chair, Call bell within reach, and Bed/ chair alarm activated    COMMUNICATION/EDUCATION:   The patient's plan of care was discussed with: registered nurse    Patient Education  Education Given To: Patient;Family  Education Provided: Role of Therapy;Plan of Care  Education Method: Verbal  Barriers to Learning: Vision  Education Outcome: Verbalized understanding;Continued education needed    Thank you for this referral.  Zhou Bui, PT  Minutes: 53

## 2023-12-27 NOTE — PLAN OF CARE
attention  Memory: Decreased recall of recent events  Safety Judgement: Decreased awareness of need for assistance;Decreased awareness of need for safety  Problem Solving: Assistance required to implement solutions;Assistance required to identify errors made;Decreased awareness of errors;Assistance required to generate solutions;Assistance required to correct errors made  Insights: Decreased awareness of deficits  Initiation: Requires cues for some  Sequencing: Requires cues for some    Neuro Re-Education and Functional Mobility and Transfers for ADLs:  Bed Mobility:  Bed Mobility Training  Bed Mobility Training: Yes  Overall Level of Assistance: Moderate assistance;Additional time;Assist X1  Interventions: Manual cues;Safety awareness training;Tactile cues;Verbal cues;Weight shifting training/pressure relief  Rolling: Assist X1;Moderate assistance  Supine to Sit: Moderate assistance;Assist X1  Scooting: Moderate assistance;Additional time;Assist X1     Transfers:   Transfer Training  Transfer Training: Yes  Overall Level of Assistance: Moderate assistance;Additional time;Assist X2  Interventions: Manual cues;Safety awareness training;Tactile cues;Verbal cues;Visual cues;Weight shifting training/pressure relief  Sit to Stand: Moderate assistance;Additional time;Assist X2  Stand to Sit: Moderate assistance;Additional time;Assist X2  Stand Pivot Transfers: Moderate assistance;Additional time;Assist X2  Bed to Chair: Moderate assistance;Additional time;Assist X2  Toilet Transfer: Moderate assistance;Additional time;Assist X2    Balance:  Standing: Impaired  Balance  Sitting: Impaired  Sitting - Static: Fair (occasional)  Sitting - Dynamic: Fair (occasional)  Standing: Impaired  Standing - Static: Fair;Constant support  Standing - Dynamic: Poor;Constant support    Neuro Re-Education and ADL Intervention:    Grooming: Maximum assistance;Increased time to complete;Setup  Grooming Skilled Clinical Factors: standing at  sink- mod A x2 for standing balance.  Pt intiated every part of task utilizing her LUE as a gross and fine assist then used RUE as dominate    UE Bathing: Minimal assistance  UE Bathing Skilled Clinical Factors: seated using CHG wipes- assist for cleanliness    LE Bathing: Maximum assistance  LE Bathing Skilled Clinical Factors: seated- assist to reach lower legs and feet as well as buttocks    UE Dressing: Maximum assistance;Increased time to complete  UE Dressing Skilled Clinical Factors: seated with hospital gown    LE Dressing: Dependent/Total  LE Dressing Skilled Clinical Factors: pt unable to reach feet and poor standing balance    Toileting: Dependent/Total     Functional Mobility: Dependent/Total  Functional Mobility Skilled Clinical Factors: mod A x2.  Lside hemiparesis with poor WB through LLE     Skin Care: Chlorhexidine wipes;Bath wipes      Pain Rating:  Pt c/o neck pain with ROM, however no number given  Pain Intervention(s):   rest    Activity Tolerance:   Fair  and requires frequent rest breaks  Please refer to the flowsheet for vital signs taken during this treatment.    After treatment:   Patient left in no apparent distress sitting up in chair, Call bell within reach, Bed/ chair alarm activated, and Caregiver / family present    COMMUNICATION/EDUCATION:   The patient's plan of care was discussed with: physical therapist and registered nurse    Patient Education  Education Given To: Patient  Education Provided: Role of Therapy;ADL Adaptive Strategies;Transfer Training;Energy Conservation;Fall Prevention Strategies  Education Provided Comments: head turning and attention to L  Education Method: Demonstration;Verbal;Teach Back  Barriers to Learning: Cognition  Education Outcome: Verbalized understanding;Demonstrated understanding;Continued education needed    Thank you for this referral.  Cheryl Wilkerson OT  Minutes: 53

## 2023-12-27 NOTE — PROGRESS NOTES
Neurocritical Care Brief Progress Note:  Soledad Geller is 58 y.o female with pmh of Asthma, FMD, HTN, HLD, chronic brachiocephalic artery dissection who arrived from [facility] ED intubated to [facility] ICU early morning of 12/18/23 with SAH from a ruptured cerebral aneurysm. CTA showed a large fusiform aneurysm of the right supraclinoid ICA with eccentric broad-based component.     Pt is s/p cerebral/cervical arteriogram and flow diversion stent embolization of right supraclinoid ICA aneurysm on12/18/23 by Dr. Hamilton. Started on Aspirin and Brilinta post procedure.      CT Head post procedure notable for mild ventricular dilation, expected evolution of SAH. Most recent CT Head on 12/20 with SAH and IVH again shown. Generalized cerebral edema again shown with greater symmetric narrowing of the lateral and third ventricles.        TTE with EF of 55 - 60% with normal wall motion.     On 12/27 she underwent diagnostic cerebral angiogram w/IA Nicardipine infusion.      I/O + 50 at the start of this shift.      Currently, she is resting in bed. No family at bedside. Denies headache, numbness, tingling, blurry vision, double vision. Neuro exam is stable.        Physical Examination    Blood pressure (!) 146/77, pulse 90, temperature 97.5 °F (36.4 °C), temperature source Oral, resp. rate 18, height 1.727 m (5' 7.99\"), weight 77.6 kg (171 lb), SpO2 98 %.     Constitutional: Drowsy middle aged female resting in bed   Respiratory: Unlabored respirations      NEURO:  Mental status: Drowsy. Rouses to voice. AAOx4, following commands two show two fingers, give thumbs up, make fist/open   Speech: Soft fluent speech  Cranial Nerves: Pupils 3 mm, equal, reactive to light. Right gaze preference, crosses midline. Left visual field cut. Face symmetric. Tongue midline.   Motor: Normal bulk and tone. Antigravity to RUE and RLE with 5/5 strength. LUE 3/5. LLE 2-3/5 with drift to bed   Coordination: FNF intact (difficulty due to left visual field  cut) HTS deferred due to weakness   Sensation: Intact x 4 extremities to light touch  Gait:  Deferred      Continue current plan per NIS.     Clarissa Staples, NP-BC  Neurocritical Care Nurse Practitioner

## 2023-12-27 NOTE — PROGRESS NOTES
Critical Care Progress Note    Comfortable this morning.  No headaches or visual changes.  Slept good overnight without problems.  Communicative and oriented.    Review of systems: Denies dyspnea orthopnea chest pain nausea or vomiting.    Past Medical History:   Diagnosis Date    Asthma     Brachiocephalic artery injury     chronic dissection    Glaucoma, low tension     Hyperlipidemia     Hypertension     Ill-defined condition     high cholesterol        Current Facility-Administered Medications   Medication Dose Route Frequency Provider Last Rate Last Admin    sodium chloride 0.9 % bolus 500 mL  500 mL IntraVENous Once Raul Bruce MD        niMODipine (NIMOTOP) capsule 60 mg  60 mg Oral 6 times per day Clarissa Staples APRN - CNP   60 mg at 12/27/23 1351    0.9 % sodium chloride infusion   IntraVENous Continuous Chantale House APRN - NP 75 mL/hr at 12/26/23 2135 New Bag at 12/26/23 2135    potassium chloride (KLOR-CON) extended release tablet 40 mEq  40 mEq Oral PRN Amanda Ponce MD   40 mEq at 12/24/23 1205    Or    potassium bicarb-citric acid (EFFER-K) effervescent tablet 40 mEq  40 mEq Oral PRN Amanda Ponce MD        Or    potassium chloride 10 mEq/100 mL IVPB (Peripheral Line)  10 mEq IntraVENous PRN Amanda Ponce MD        magnesium sulfate 2000 mg in 50 mL IVPB premix  2,000 mg IntraVENous PRN Amanda Ponce MD        niCARdipine (CARDENE) 25 mg in sodium chloride 0.9 % 250 mL infusion ([id number])  2.5-15 mg/hr IntraVENous Continuous Nyasia Manjarrez APRN - CNP   Stopped at 12/21/23 1102    levETIRAcetam (KEPPRA) tablet 500 mg  500 mg Oral BID Aurelio Govea MD   500 mg at 12/27/23 0747    NIFEdipine (PROCARDIA XL) extended release tablet 30 mg  30 mg Oral Daily Aurelio Govea MD   30 mg at 12/27/23 0747    pravastatin (PRAVACHOL) tablet 40  mg  40 mg Oral Nightly Aurelio Govea MD   40 mg at 12/26/23 2132    aspirin chewable tablet 81 mg  81 mg Oral Daily Aurelio Govea MD   81 mg at 12/27/23 0747    ticagrelor (BRILINTA) tablet 90 mg  90 mg Oral BID Aurelio Govea MD   90 mg at 12/27/23 0747    sodium chloride flush 0.9 % injection 5-40 mL  5-40 mL IntraVENous 2 times per day Emily Silva APRN - CNP   30 mL at 12/27/23 0748    sodium chloride flush 0.9 % injection 5-40 mL  5-40 mL IntraVENous PRN Emily Silva APRN - CNP        0.9 % sodium chloride infusion   IntraVENous PRN Emily Silva APRN - CNP        ondansetron (ZOFRAN-ODT) disintegrating tablet 4 mg  4 mg Oral Q8H PRN Emily Silva APRN - CNP        Or    ondansetron (ZOFRAN) injection 4 mg  4 mg IntraVENous Q6H PRN Emily Silva APRN - CNP        polyethylene glycol (GLYCOLAX) packet 17 g  17 g Oral Daily PRN Emily Silva APRN - CNP        acetaminophen (TYLENOL) tablet 650 mg  650 mg Oral Q6H PRN Emily Silva APRN - CNP   650 mg at 12/19/23 2346    Or    acetaminophen (TYLENOL) suppository 650 mg  650 mg Rectal Q6H PRN Emily Silva APRN - CNP        sennosides-docusate sodium (SENOKOT-S) 8.6-50 MG tablet 1 tablet  1 tablet Oral BID Emily Silva APRN - CNP   1 tablet at 12/27/23 0747    dextrose bolus 10% 125 mL  125 mL IntraVENous PRN Emily Silva APRN - CNP        Or    dextrose bolus 10% 250 mL  250 mL IntraVENous PRN Emily Silva APRN - CNP        glucagon injection 1 mg  1 mg SubCUTAneous PRN Emily Silva APRN - CNP        dextrose 10 % infusion   IntraVENous Continuous PRN Emily Silva APRN - CNP        fentaNYL (SUBLIMAZE) injection 25 mcg  25 mcg IntraVENous Q2H PRN Shira, Emily M, APRN - CNP        sodium chloride flush 0.9 % injection 5-40 mL  5-40 mL IntraVENous 2 times per day Skylar Shi APRN - NP   40 mL at 12/27/23 0748    sodium chloride flush 0.9

## 2023-12-28 LAB
ANION GAP SERPL CALC-SCNC: 7 MMOL/L (ref 5–15)
BASOPHILS # BLD: 0.1 K/UL (ref 0–0.1)
BASOPHILS NFR BLD: 1 % (ref 0–1)
BUN SERPL-MCNC: 8 MG/DL (ref 6–20)
BUN/CREAT SERPL: 15 (ref 12–20)
CALCIUM SERPL-MCNC: 8.4 MG/DL (ref 8.5–10.1)
CHLORIDE SERPL-SCNC: 113 MMOL/L (ref 97–108)
CO2 SERPL-SCNC: 22 MMOL/L (ref 21–32)
CREAT SERPL-MCNC: 0.55 MG/DL (ref 0.55–1.02)
DIFFERENTIAL METHOD BLD: ABNORMAL
EOSINOPHIL # BLD: 0.1 K/UL (ref 0–0.4)
EOSINOPHIL NFR BLD: 2 % (ref 0–7)
ERYTHROCYTE [DISTWIDTH] IN BLOOD BY AUTOMATED COUNT: 12.5 % (ref 11.5–14.5)
GLUCOSE SERPL-MCNC: 103 MG/DL (ref 65–100)
HCT VFR BLD AUTO: 32.4 % (ref 35–47)
HGB BLD-MCNC: 10.4 G/DL (ref 11.5–16)
IMM GRANULOCYTES # BLD AUTO: 0.1 K/UL (ref 0–0.04)
IMM GRANULOCYTES NFR BLD AUTO: 1 % (ref 0–0.5)
LYMPHOCYTES # BLD: 0.8 K/UL (ref 0.8–3.5)
LYMPHOCYTES NFR BLD: 14 % (ref 12–49)
MAGNESIUM SERPL-MCNC: 2 MG/DL (ref 1.6–2.4)
MCH RBC QN AUTO: 28.2 PG (ref 26–34)
MCHC RBC AUTO-ENTMCNC: 32.1 G/DL (ref 30–36.5)
MCV RBC AUTO: 87.8 FL (ref 80–99)
MONOCYTES # BLD: 0.5 K/UL (ref 0–1)
MONOCYTES NFR BLD: 10 % (ref 5–13)
NEUTS SEG # BLD: 3.8 K/UL (ref 1.8–8)
NEUTS SEG NFR BLD: 72 % (ref 32–75)
NRBC # BLD: 0 K/UL (ref 0–0.01)
NRBC BLD-RTO: 0 PER 100 WBC
PHOSPHATE SERPL-MCNC: 2.2 MG/DL (ref 2.6–4.7)
PLATELET # BLD AUTO: 396 K/UL (ref 150–400)
PMV BLD AUTO: 8.8 FL (ref 8.9–12.9)
POTASSIUM SERPL-SCNC: 3.4 MMOL/L (ref 3.5–5.1)
RBC # BLD AUTO: 3.69 M/UL (ref 3.8–5.2)
RBC MORPH BLD: ABNORMAL
SODIUM SERPL-SCNC: 142 MMOL/L (ref 136–145)
WBC # BLD AUTO: 5.4 K/UL (ref 3.6–11)

## 2023-12-28 PROCEDURE — 97112 NEUROMUSCULAR REEDUCATION: CPT | Performed by: OCCUPATIONAL THERAPIST

## 2023-12-28 PROCEDURE — 80048 BASIC METABOLIC PNL TOTAL CA: CPT

## 2023-12-28 PROCEDURE — 85025 COMPLETE CBC W/AUTO DIFF WBC: CPT

## 2023-12-28 PROCEDURE — 97116 GAIT TRAINING THERAPY: CPT

## 2023-12-28 PROCEDURE — 99233 SBSQ HOSP IP/OBS HIGH 50: CPT | Performed by: NURSE PRACTITIONER

## 2023-12-28 PROCEDURE — 83735 ASSAY OF MAGNESIUM: CPT

## 2023-12-28 PROCEDURE — 2580000003 HC RX 258: Performed by: NURSE PRACTITIONER

## 2023-12-28 PROCEDURE — 36415 COLL VENOUS BLD VENIPUNCTURE: CPT

## 2023-12-28 PROCEDURE — 84100 ASSAY OF PHOSPHORUS: CPT

## 2023-12-28 PROCEDURE — 97535 SELF CARE MNGMENT TRAINING: CPT | Performed by: OCCUPATIONAL THERAPIST

## 2023-12-28 PROCEDURE — 2000000000 HC ICU R&B

## 2023-12-28 PROCEDURE — 6370000000 HC RX 637 (ALT 250 FOR IP): Performed by: STUDENT IN AN ORGANIZED HEALTH CARE EDUCATION/TRAINING PROGRAM

## 2023-12-28 PROCEDURE — 97112 NEUROMUSCULAR REEDUCATION: CPT

## 2023-12-28 PROCEDURE — 6370000000 HC RX 637 (ALT 250 FOR IP): Performed by: INTERNAL MEDICINE

## 2023-12-28 PROCEDURE — 6370000000 HC RX 637 (ALT 250 FOR IP): Performed by: NURSE PRACTITIONER

## 2023-12-28 PROCEDURE — 2500000003 HC RX 250 WO HCPCS: Performed by: NURSE PRACTITIONER

## 2023-12-28 PROCEDURE — 2580000003 HC RX 258: Performed by: INTERNAL MEDICINE

## 2023-12-28 RX ORDER — 0.9 % SODIUM CHLORIDE 0.9 %
1000 INTRAVENOUS SOLUTION INTRAVENOUS ONCE
Status: COMPLETED | OUTPATIENT
Start: 2023-12-28 | End: 2023-12-28

## 2023-12-28 RX ORDER — SODIUM CHLORIDE, SODIUM LACTATE, POTASSIUM CHLORIDE, CALCIUM CHLORIDE 600; 310; 30; 20 MG/100ML; MG/100ML; MG/100ML; MG/100ML
INJECTION, SOLUTION INTRAVENOUS CONTINUOUS
Status: DISCONTINUED | OUTPATIENT
Start: 2023-12-28 | End: 2024-01-05

## 2023-12-28 RX ADMIN — SENNOSIDES AND DOCUSATE SODIUM 1 TABLET: 50; 8.6 TABLET ORAL at 07:59

## 2023-12-28 RX ADMIN — METOPROLOL 25 MG: 25 TABLET ORAL at 21:05

## 2023-12-28 RX ADMIN — NIFEDIPINE 30 MG: 30 TABLET, FILM COATED, EXTENDED RELEASE ORAL at 07:59

## 2023-12-28 RX ADMIN — METOPROLOL 25 MG: 25 TABLET ORAL at 15:46

## 2023-12-28 RX ADMIN — SENNOSIDES AND DOCUSATE SODIUM 1 TABLET: 50; 8.6 TABLET ORAL at 21:05

## 2023-12-28 RX ADMIN — NIMODIPINE 60 MG: 30 CAPSULE, LIQUID FILLED ORAL at 17:53

## 2023-12-28 RX ADMIN — TICAGRELOR 90 MG: 90 TABLET ORAL at 08:00

## 2023-12-28 RX ADMIN — LEVETIRACETAM 500 MG: 500 TABLET, FILM COATED ORAL at 21:05

## 2023-12-28 RX ADMIN — NIMODIPINE 60 MG: 30 CAPSULE, LIQUID FILLED ORAL at 10:31

## 2023-12-28 RX ADMIN — ASPIRIN 81 MG CHEWABLE TABLET 81 MG: 81 TABLET CHEWABLE at 07:59

## 2023-12-28 RX ADMIN — SODIUM CHLORIDE 1000 ML: 9 INJECTION, SOLUTION INTRAVENOUS at 15:46

## 2023-12-28 RX ADMIN — SODIUM CHLORIDE, PRESERVATIVE FREE 10 ML: 5 INJECTION INTRAVENOUS at 21:16

## 2023-12-28 RX ADMIN — NIMODIPINE 60 MG: 30 CAPSULE, LIQUID FILLED ORAL at 14:42

## 2023-12-28 RX ADMIN — TICAGRELOR 90 MG: 90 TABLET ORAL at 21:14

## 2023-12-28 RX ADMIN — SODIUM CHLORIDE, PRESERVATIVE FREE 30 ML: 5 INJECTION INTRAVENOUS at 08:00

## 2023-12-28 RX ADMIN — SODIUM CHLORIDE, POTASSIUM CHLORIDE, SODIUM LACTATE AND CALCIUM CHLORIDE: 600; 310; 30; 20 INJECTION, SOLUTION INTRAVENOUS at 17:50

## 2023-12-28 RX ADMIN — NIMODIPINE 60 MG: 30 CAPSULE, LIQUID FILLED ORAL at 05:37

## 2023-12-28 RX ADMIN — SODIUM CHLORIDE: 9 INJECTION, SOLUTION INTRAVENOUS at 08:08

## 2023-12-28 RX ADMIN — NIMODIPINE 60 MG: 30 CAPSULE, LIQUID FILLED ORAL at 21:05

## 2023-12-28 RX ADMIN — SODIUM CHLORIDE, PRESERVATIVE FREE 40 ML: 5 INJECTION INTRAVENOUS at 08:00

## 2023-12-28 RX ADMIN — LEVETIRACETAM 500 MG: 500 TABLET, FILM COATED ORAL at 07:59

## 2023-12-28 RX ADMIN — POTASSIUM PHOSPHATE, MONOBASIC POTASSIUM PHOSPHATE, DIBASIC INJECTION, 30 MMOL: 236; 224 SOLUTION, CONCENTRATE INTRAVENOUS at 06:31

## 2023-12-28 RX ADMIN — PRAVASTATIN SODIUM 40 MG: 40 TABLET ORAL at 21:05

## 2023-12-28 RX ADMIN — NIMODIPINE 60 MG: 30 CAPSULE, LIQUID FILLED ORAL at 01:55

## 2023-12-28 ASSESSMENT — PAIN SCALES - GENERAL
PAINLEVEL_OUTOF10: 0

## 2023-12-28 NOTE — PLAN OF CARE
Problem: Physical Therapy - Adult  Goal: By Discharge: Performs mobility at highest level of function for planned discharge setting.  See evaluation for individualized goals.  Description: FUNCTIONAL STATUS PRIOR TO ADMISSION: Patient was independent and active without use of DME.    HOME SUPPORT PRIOR TO ADMISSION: The patient lived with spouse but did not require assistance.    Physical Therapy Goals  Initiated 12/19/2023, continued 12/27/2023  1.  Patient will move from supine to sit and sit to supine in bed with minimal assistance within 7 day(s).    2.  Patient will perform sit to stand with minimal assistance within 7 day(s).  3.  Patient will transfer from bed to chair and chair to bed with minimal assistance using the least restrictive device within 7 day(s).  4.  Patient will ambulate with moderate assistance for 10 feet with the least restrictive device within 7 day(s).   5.  Patient will ascend/descend 2 stairs with 1 handrail(s) with moderate assistance within 7 day(s).  6.  Patient will improve Thorpe Balance score by 7 points within 7 days.   Outcome: Progressing     PHYSICAL THERAPY TREATMENT    Patient: Soledad Geller (58 y.o. female)  Date: 12/28/2023  Diagnosis: Aneurysmal subarachnoid hemorrhage (HCC) [I60.8] Aneurysmal subarachnoid hemorrhage (HCC)      Precautions: Fall Risk                    ASSESSMENT:  Patient continues to benefit from skilled PT services and is slowly progressing towards goals. Patient received in chair agreeable to treatment. Remains with strong R gaze preference requiring max cues to look L. Overall requires min-mod A x 2 for transfers however 1-2 trials with CGA. Patient tolerates gait training with RW today. Required facilitation of L quad during each stance phase and push off of RLE. Narrow TANYA/scissoring noted, requiring frequent VC to abduct LLE as she advances forward. Patient required mod to max A x 2 to slow RW as patient was placing significant weight through  present      COMMUNICATION/EDUCATION:   The patient's plan of care was discussed with: occupational therapist and registered nurse           Mary Carmen Mcnamara PT, DPT  Minutes: 40

## 2023-12-28 NOTE — PROGRESS NOTES
Neurointerventional Surgery Progress Note  Neurocritical Care NP    Admit Date: 12/18/2023   LOS: 10 days      Daily Progress Note: 12/28/2023    Assessment:     SAH due to ruptured fusiform right MCA aneurysm, Morin Miller 3, Motley Grade 4, baseline mRS: 0, s/p cerebral angiogram and pipeline embo on 12/18/23 by Dr. Hamilton    Cerebral vasospasm S/p cerebral angiogram and IA nicardipine infusion on 12/26/2023 for cerebral vasospasm  Plan:      - Cont DAPT with ASA 81 mg daily and Brilinta 90 mg BID   - Day 11/21 of Nimotop to prevent delayed cerebral ischemia   - Keppra 500 mg BID for seizure ppx   - change fluids from NS to LR at 75 ml/hr to maintain euvolemia due to hypercholeremia   - Labs reviewed: , K 3.4, Mg 2.0 Phos 2.2 (patient given a dose KPhos today,  check BMP, mag, phos daily    - Strict I&O, + fluid balance on I's and O's currently    - change neuro checks to every 2 hours    - TCDs negative for vasospasm on 12/27, check TCDs tomorrow    - SBP goal 100-160   - PT/OT/SLP as tolerated    Plan d/w Dr. Hamilton, Intensivist, RN, patient, and patient's .        HPI: Soledad Geller is a 58 y.o. AA female with a PMH of asthma, HTN, and HDL who presented to [facility] ED on 12/17/23 with decreased LOC and vomiting. She had been reporting headache x several days prior to presentation, had been taking NSAIDs for pain but does not take any ASA/blood thinners. A stat CT head was obtained which showed a large SAH with IPH. CTA head/neck was then obtained which showed a large fusiform aneurysm of the M1 segment on the right measuring 8.8 x 16 x 9.9 mm. NSGY and NIS were consulted and pt was then transferred to [facility] for higher level of care.   12/21/2023: Pt with episode of staring during PT lasting approx 30 seconds. No evidence of tonic/clonic activity, twitching, incontinence, or nystagmus. Returned to baseline immediately following, felt to be vasovagal response.     Subjective:   No neuro events overnight.

## 2023-12-28 NOTE — PROGRESS NOTES
M/uL    Hemoglobin 11.5 11.5 - 16.0 g/dL    Hematocrit 36.1 35.0 - 47.0 %    MCV 90.0 80.0 - 99.0 FL    MCH 28.7 26.0 - 34.0 PG    MCHC 31.9 30.0 - 36.5 g/dL    RDW 12.2 11.5 - 14.5 %    Platelets 407 (H) 150 - 400 K/uL    MPV 8.8 (L) 8.9 - 12.9 FL    Nucleated RBCs 0.0 0  WBC    nRBC 0.00 0.00 - 0.01 K/uL    Neutrophils % 76 (H) 32 - 75 %    Lymphocytes % 12 12 - 49 %    Monocytes % 10 5 - 13 %    Eosinophils % 1 0 - 7 %    Basophils % 1 0 - 1 %    Immature Granulocytes 0 0.0 - 0.5 %    Neutrophils Absolute 4.8 1.8 - 8.0 K/UL    Lymphocytes Absolute 0.8 0.8 - 3.5 K/UL    Monocytes Absolute 0.6 0.0 - 1.0 K/UL    Eosinophils Absolute 0.1 0.0 - 0.4 K/UL    Basophils Absolute 0.1 0.0 - 0.1 K/UL    Absolute Immature Granulocyte 0.0 0.00 - 0.04 K/UL    Differential Type SMEAR SCANNED      RBC Comment NORMOCYTIC, NORMOCHROMIC     Vascular transcranial Doppler (TCD) complete    Collection Time: 12/27/23  8:50 AM   Result Value Ref Range    Right MCA 1 PSV 76.6 cm/s    Right MCA 1 EDV 33.9 cm/s    Right CARLITOS PSV 91.8 cm/s    Right CARLITOS EDV 38.7 cm/s    Right ICA PSV 66.6 cm/s    Right ICA EDV 23.7 cm/s    Right PCA 1 PSV 56.5 cm/s    Right PCA 1 EDV 22.7 cm/s    Right Vertebral PSV 40.1 cm/s    Right Vertebral EDV 11.8 cm/s    Left MCA 1 PSV 72.8 cm/s    Left MCA 1 EDV 30.0 cm/s    Left CARLITOS .4 cm/s    Left CARLITOS EDV 62.5 cm/s    Left ICA PSV 79.2 cm/s    Left ICA EDV 35.2 cm/s    Left PCA 1 PSV 62.4 cm/s    Left PCA 1 EDV 23.6 cm/s    Left Vertebral PSV 68.4 cm/s    Left Vertebral EDV 22.7 cm/s    Basilar Artery PSV 63.8 cm/s    Basilar Artery EDV 20.9 cm/s    Right ICA dist PSV 50.8 cm/s    Right ICA dist EDV 13.2 cm/s    Left ICA dist PSV 67.6 cm/s    Left ICA dist EDV 24.9 cm/s    Body Surface Area 1.93 m2    Right MCA 1 Mean Velocity 48.1 cm/s    Right CARLITOS Mean Velocity 56.4 cm/s    Right ICA Mean Velocity 38.0 cm/s    Right PCA 1 Mean Velocity 34.0 cm/s    Right Vertebral Mean Velocity 21.2 cm/s    Left MCA 1

## 2023-12-28 NOTE — PROGRESS NOTES
1930: Bedside and Verbal shift change report given to Omega RN (oncoming nurse) by Sumeet RN (offgoing nurse). Report included the following information Nurse Handoff Report, ED Encounter Summary, ED SBAR, Surgery Report, Intake/Output, MAR, Recent Results, Cardiac Rhythm NSR-Sinus Tach, Quality Measures, and Neuro Assessment.     0730: Bedside and Verbal shift change report given to Sumeet RN (oncoming nurse) by Omega RN (offgoing nurse). Report included the following information Nurse Handoff Report, ED Encounter Summary, ED SBAR, Adult Overview, Intake/Output, MAR, Recent Results, Cardiac Rhythm NSR, Quality Measures, and Neuro Assessment.

## 2023-12-28 NOTE — PROGRESS NOTES
Neurocritical Care Brief Progress Note:  Soledad Geller is 58 y.o female with pmh of Asthma, FMD, HTN, HLD, chronic brachiocephalic artery dissection who arrived from [facility] ED intubated to [facility] ICU early morning of 12/18/23 with SAH from a ruptured cerebral aneurysm. CTA showed a large fusiform aneurysm of the right supraclinoid ICA with eccentric broad-based component.     Pt is s/p cerebral/cervical arteriogram and flow diversion stent embolization of right supraclinoid ICA aneurysm on12/18/23 by Dr. Hamilton. Started on Aspirin and Brilinta post procedure.      CT Head post procedure notable for mild ventricular dilation, expected evolution of SAH. Most recent CT Head on 12/20 with SAH and IVH again shown. Generalized cerebral edema again shown with greater symmetric narrowing of the lateral and third ventricles.        TTE with EF of 55 - 60% with normal wall motion.     On 12/26 she underwent diagnostic cerebral angiogram. Mild to moderate vasospasm of the Right MCA and B/L ACAs seen. IA Nicardipine infusion performed in B/L ICAs with improvement in vasospasm  on FU angiogram.      I/O + 1,600 at the start of this shift.      Currently, she is resting in bed. Daughter and granddaughter are at bedside. Denies headache, numbness, tingling, blurry vision, double vision. Neuro exam is stable.       Physical Examination    Blood pressure (!) 146/77, pulse 90, temperature 97.5 °F (36.4 °C), temperature source Oral, resp. rate 18, height 1.727 m (5' 7.99\"), weight 77.6 kg (171 lb), SpO2 98 %.     Constitutional: Drowsy middle aged female resting in bed   Respiratory: Unlabored respirations      NEURO:  Mental status: Drowsy. Rouses to voice. AAOx4, following commands two show two fingers, give thumbs up, make fist/open   Speech: Soft fluent speech  Cranial Nerves: Pupils 3 mm, equal, reactive to light. Right gaze preference, crosses midline. Left visual field cut. Face symmetric. Tongue midline.   Motor: Normal bulk and

## 2023-12-28 NOTE — PLAN OF CARE
Problem: Occupational Therapy - Adult  Goal: By Discharge: Performs self-care activities at highest level of function for planned discharge setting.  See evaluation for individualized goals.  Description: FUNCTIONAL STATUS PRIOR TO ADMISSION:  Patient was ambulatory without the use of AE  Receives Help From: Family, ADL Assistance: Independent, Ambulation Assistance: Independent, Transfer Assistance: Independent, Active : Yes     HOME SUPPORT: Patient lived with spouse but didn't require assistance.    Occupational Therapy Goals:  Initiated 12/19/2023.  Reviewed 12/27/23 and goals modified below:  1.  Patient will perform self-feeding with Modified [place] within 7 day(s).  Modify to set-up  2.  Patient will perform grooming with Minimal Assist within 7 day(s). Continue in sitting  3.  Patient will perform bathing with Minimal Assist within 7 day(s). Modify to mod A  4.  Patient will perform toilet transfers with Minimal Assist  within 7 day(s). Modify to max A  5.  Patient will perform all aspects of toileting with Minimal Assist within 7 day(s). Modify to max A  6.  Patient will participate in upper extremity therapeutic exercise/activities with Supervision for 5 minutes within 7 day(s).  Continue  7.  Patient will utilize energy conservation techniques during functional activities with verbal cues within 7 day(s).   Outcome: Progressing     OCCUPATIONAL THERAPY TREATMENT  Patient: Soledad Geller (58 y.o. female)  Date: 12/28/2023  Primary Diagnosis: Aneurysmal subarachnoid hemorrhage (HCC) [I60.8]       Precautions: Fall Risk                Chart, occupational therapy assessment, plan of care, and goals were reviewed.    ASSESSMENT  Patient continues to benefit from skilled OT services and is progressing towards goals. Pt seen in am for ADL re-training and pm for functional mobility with good participation.  Pt demonstrated improved neck ROM and able to turn head to L past midline, improved ADL  performance and able to perform CHG bath with mod A, UE dressing with mod A and LE slipper socks management with mod A and improved functional mobility and able to amb this session with min A x1 and mod A x1.  Pt remains limited by impaired cognition, decreased attention to task, L side inattention and hemiparesis, eyes closed when not actively engaged in a task, decreased endurance, mobility, coordination, balance and safety.  Pt remains an excellent IPR candidate.           PLAN :  Patient continues to benefit from skilled intervention to address the above impairments.  Continue treatment per established plan of care to address goals.    Recommend with staff: OOB with bunny saldana    Recommend next OT session: LE ADLs, standing balance, LUE neuro re-ed    Recommendation for discharge: (in order for the patient to meet his/her long term goals): Therapy 3 hours/day 5-7 days/week    Other factors to consider for discharge: poor safety awareness, impaired cognition, high risk for falls, not safe to be alone, and concern for safely navigating or managing the home environment    IF patient discharges home will need the following DME: continuing to assess with progress       SUBJECTIVE:   Patient stated “I am so tired.”    OBJECTIVE DATA SUMMARY:   Cognitive/Behavioral Status:  Orientation  Overall Orientation Status: Within Normal Limits  Orientation Level: Oriented X4  Cognition  Overall Cognitive Status: Exceptions  Arousal/Alertness: Delayed responses to stimuli  Following Commands: Follows one step commands with increased time;Follows one step commands with repetition  Attention Span: Difficulty attending to directions;Difficulty dividing attention  Memory: Decreased recall of recent events  Safety Judgement: Decreased awareness of need for assistance;Decreased awareness of need for safety  Problem Solving: Assistance required to implement solutions;Assistance required to identify errors made;Decreased awareness of

## 2023-12-28 NOTE — PROGRESS NOTES
Critical Care Progress Note    Comfortable this morning.  Asleep but easily arousable, no headaches or visual changes.  Slept good overnight without problems.  Communicative and oriented.    Review of systems: Denies dyspnea orthopnea chest pain nausea or vomiting.  Appetite  Minimally improved but still marginal.    Past Medical History:   Diagnosis Date    Asthma     Brachiocephalic artery injury     chronic dissection    Glaucoma, low tension     Hyperlipidemia     Hypertension     Ill-defined condition     high cholesterol        Current Facility-Administered Medications   Medication Dose Route Frequency Provider Last Rate Last Admin    potassium phosphate 30 mmol in sodium chloride 0.9 % 500 mL IVPB  30 mmol IntraVENous Once Travis Angeles APRN - NP 83.3 mL/hr at 12/28/23 0631 30 mmol at 12/28/23 0631    niMODipine (NIMOTOP) capsule 60 mg  60 mg Oral 6 times per day Clarissa Staples APRN - CNP   60 mg at 12/28/23 0537    0.9 % sodium chloride infusion   IntraVENous Continuous Chantale House APRN - NP 75 mL/hr at 12/28/23 0808 New Bag at 12/28/23 0808    potassium chloride (KLOR-CON) extended release tablet 40 mEq  40 mEq Oral PRN Amanda Ponce MD   40 mEq at 12/24/23 1205    Or    potassium bicarb-citric acid (EFFER-K) effervescent tablet 40 mEq  40 mEq Oral PRN Amanda Ponce MD        Or    potassium chloride 10 mEq/100 mL IVPB (Peripheral Line)  10 mEq IntraVENous PRN Amanda Ponce MD        magnesium sulfate 2000 mg in 50 mL IVPB premix  2,000 mg IntraVENous PRN Amanda Ponce MD        niCARdipine (CARDENE) 25 mg in sodium chloride 0.9 % 250 mL infusion ([id number])  2.5-15 mg/hr IntraVENous Continuous Nyasia Manjarrez APRN - CNP   Stopped at 12/21/23 1102    levETIRAcetam (KEPPRA) tablet 500 mg  500 mg Oral BID Aurelio Govea MD   500 mg at 12/28/23  close to being balanced.     Plan:  1.  Continue to follow-up mental status.  2.  Continue nimodipine.  Assure blood pressure within desirable range.  3.  Intermittent normal saline boluses in addition to the maintenance infusion as needed to maintain fluid balance..  4.  Continue antiplatelets therapy.  Watch for any neurological changes and repeat workup and imaging as appropriate.

## 2023-12-28 NOTE — CARE COORDINATION
RUR: 12% Low  Prior Level of Functioning: Independent    Admission   SAH from ruptured cerebral aneurysm     Disposition: IPR. [facility] denied as the facility is not in network with patient's insurance GEHA which is a [employer] Product  Referral sent to [facility] and accepted  Patient will require an authorization  from insurance    IPR: Date FOC offered: 12/21/23  and 12/28/23    Date FOC received: 12/21/23 and 12/28/23    Referral sent to [facility] via [facility]    Transportation at discharge: BLS    IM/IMM Medicare/ letter given: NA    Is patient a  and connected with VA? No    Caregiver Contact:   Spouse Ravinder Geller [phone]    Discharge Caregiver contacted prior to discharge? Yes    Care Conference needed? No    Barriers to discharge: Continues in ICU  CM will send referral to [facility] when choice is secured    Cm noted that referral sent to [facility] 12/21/23-- Cm spoke with ehsan Patel, [phone].. She informed  CM that the referral as closed as [facility] does not accept patient's insurance which is a [employer] product-- GEHA    Cm met with family in patient's room and explained-- Daughter  Kylie asked for a list of IPR's that accept patient's insurance-- List provided--- [facility] and [facility] and [facility] accept insurance.   She is going to talk with patient's  (her father) and give CM choice and CM will send referral once secured..      Patient is not medically ready for discharge but therapy recommending IPR and family in agreement.     UPDATE  5 pm   called CM and [facility]  [phone]   CM sent referral via careport to the facility    Patient accepted   CM will follow and inform facility liaison when to start authorization    TR FERRER

## 2023-12-29 ENCOUNTER — APPOINTMENT (OUTPATIENT)
Facility: HOSPITAL | Age: 58
End: 2023-12-29
Attending: ANESTHESIOLOGY
Payer: OTHER GOVERNMENT

## 2023-12-29 LAB
ANION GAP SERPL CALC-SCNC: 8 MMOL/L (ref 5–15)
BASOPHILS # BLD: 0.1 K/UL (ref 0–0.1)
BASOPHILS NFR BLD: 1 % (ref 0–1)
BUN SERPL-MCNC: 7 MG/DL (ref 6–20)
BUN/CREAT SERPL: 12 (ref 12–20)
CALCIUM SERPL-MCNC: 9.9 MG/DL (ref 8.5–10.1)
CHLORIDE SERPL-SCNC: 109 MMOL/L (ref 97–108)
CO2 SERPL-SCNC: 20 MMOL/L (ref 21–32)
CREAT SERPL-MCNC: 0.57 MG/DL (ref 0.55–1.02)
DIFFERENTIAL METHOD BLD: ABNORMAL
EOSINOPHIL # BLD: 0.1 K/UL (ref 0–0.4)
EOSINOPHIL NFR BLD: 2 % (ref 0–7)
ERYTHROCYTE [DISTWIDTH] IN BLOOD BY AUTOMATED COUNT: 12.5 % (ref 11.5–14.5)
GLUCOSE SERPL-MCNC: 98 MG/DL (ref 65–100)
HCT VFR BLD AUTO: 35.2 % (ref 35–47)
HGB BLD-MCNC: 11.6 G/DL (ref 11.5–16)
IMM GRANULOCYTES # BLD AUTO: 0 K/UL (ref 0–0.04)
IMM GRANULOCYTES NFR BLD AUTO: 0 % (ref 0–0.5)
LYMPHOCYTES # BLD: 0.8 K/UL (ref 0.8–3.5)
LYMPHOCYTES NFR BLD: 12 % (ref 12–49)
MAGNESIUM SERPL-MCNC: 2.2 MG/DL (ref 1.6–2.4)
MCH RBC QN AUTO: 28.6 PG (ref 26–34)
MCHC RBC AUTO-ENTMCNC: 33 G/DL (ref 30–36.5)
MCV RBC AUTO: 86.9 FL (ref 80–99)
MONOCYTES # BLD: 0.5 K/UL (ref 0–1)
MONOCYTES NFR BLD: 8 % (ref 5–13)
NEUTS SEG # BLD: 5.2 K/UL (ref 1.8–8)
NEUTS SEG NFR BLD: 77 % (ref 32–75)
NRBC # BLD: 0 K/UL (ref 0–0.01)
NRBC BLD-RTO: 0 PER 100 WBC
PHOSPHATE SERPL-MCNC: 3 MG/DL (ref 2.6–4.7)
PLATELET # BLD AUTO: 480 K/UL (ref 150–400)
PMV BLD AUTO: 8.8 FL (ref 8.9–12.9)
POTASSIUM SERPL-SCNC: 4 MMOL/L (ref 3.5–5.1)
RBC # BLD AUTO: 4.05 M/UL (ref 3.8–5.2)
SODIUM SERPL-SCNC: 137 MMOL/L (ref 136–145)
WBC # BLD AUTO: 6.7 K/UL (ref 3.6–11)

## 2023-12-29 PROCEDURE — 6370000000 HC RX 637 (ALT 250 FOR IP): Performed by: NURSE PRACTITIONER

## 2023-12-29 PROCEDURE — 85025 COMPLETE CBC W/AUTO DIFF WBC: CPT

## 2023-12-29 PROCEDURE — 2580000003 HC RX 258: Performed by: NURSE PRACTITIONER

## 2023-12-29 PROCEDURE — 80048 BASIC METABOLIC PNL TOTAL CA: CPT

## 2023-12-29 PROCEDURE — 2060000000 HC ICU INTERMEDIATE R&B

## 2023-12-29 PROCEDURE — 6370000000 HC RX 637 (ALT 250 FOR IP): Performed by: INTERNAL MEDICINE

## 2023-12-29 PROCEDURE — 6370000000 HC RX 637 (ALT 250 FOR IP): Performed by: STUDENT IN AN ORGANIZED HEALTH CARE EDUCATION/TRAINING PROGRAM

## 2023-12-29 PROCEDURE — 83735 ASSAY OF MAGNESIUM: CPT

## 2023-12-29 PROCEDURE — 99222 1ST HOSP IP/OBS MODERATE 55: CPT | Performed by: NURSE PRACTITIONER

## 2023-12-29 PROCEDURE — 84100 ASSAY OF PHOSPHORUS: CPT

## 2023-12-29 PROCEDURE — 97535 SELF CARE MNGMENT TRAINING: CPT

## 2023-12-29 PROCEDURE — 93886 INTRACRANIAL COMPLETE STUDY: CPT

## 2023-12-29 PROCEDURE — 36415 COLL VENOUS BLD VENIPUNCTURE: CPT

## 2023-12-29 RX ORDER — POTASSIUM CHLORIDE 7.45 MG/ML
10 INJECTION INTRAVENOUS
Status: DISCONTINUED | OUTPATIENT
Start: 2023-12-29 | End: 2023-12-29

## 2023-12-29 RX ADMIN — NIMODIPINE 60 MG: 30 CAPSULE, LIQUID FILLED ORAL at 21:22

## 2023-12-29 RX ADMIN — NIMODIPINE 60 MG: 30 CAPSULE, LIQUID FILLED ORAL at 14:05

## 2023-12-29 RX ADMIN — SODIUM CHLORIDE, PRESERVATIVE FREE 10 ML: 5 INJECTION INTRAVENOUS at 08:54

## 2023-12-29 RX ADMIN — SODIUM CHLORIDE, POTASSIUM CHLORIDE, SODIUM LACTATE AND CALCIUM CHLORIDE: 600; 310; 30; 20 INJECTION, SOLUTION INTRAVENOUS at 05:54

## 2023-12-29 RX ADMIN — PRAVASTATIN SODIUM 40 MG: 40 TABLET ORAL at 21:22

## 2023-12-29 RX ADMIN — LEVETIRACETAM 500 MG: 500 TABLET, FILM COATED ORAL at 21:22

## 2023-12-29 RX ADMIN — LEVETIRACETAM 500 MG: 500 TABLET, FILM COATED ORAL at 08:53

## 2023-12-29 RX ADMIN — METOPROLOL 25 MG: 25 TABLET ORAL at 21:22

## 2023-12-29 RX ADMIN — METOPROLOL 25 MG: 25 TABLET ORAL at 08:53

## 2023-12-29 RX ADMIN — TICAGRELOR 90 MG: 90 TABLET ORAL at 21:22

## 2023-12-29 RX ADMIN — TICAGRELOR 90 MG: 90 TABLET ORAL at 08:58

## 2023-12-29 RX ADMIN — SODIUM CHLORIDE, POTASSIUM CHLORIDE, SODIUM LACTATE AND CALCIUM CHLORIDE: 600; 310; 30; 20 INJECTION, SOLUTION INTRAVENOUS at 21:26

## 2023-12-29 RX ADMIN — NIMODIPINE 60 MG: 30 CAPSULE, LIQUID FILLED ORAL at 01:58

## 2023-12-29 RX ADMIN — SENNOSIDES AND DOCUSATE SODIUM 1 TABLET: 50; 8.6 TABLET ORAL at 08:53

## 2023-12-29 RX ADMIN — ASPIRIN 81 MG CHEWABLE TABLET 81 MG: 81 TABLET CHEWABLE at 08:53

## 2023-12-29 RX ADMIN — NIMODIPINE 60 MG: 30 CAPSULE, LIQUID FILLED ORAL at 10:06

## 2023-12-29 RX ADMIN — SENNOSIDES AND DOCUSATE SODIUM 1 TABLET: 50; 8.6 TABLET ORAL at 21:22

## 2023-12-29 RX ADMIN — NIMODIPINE 60 MG: 30 CAPSULE, LIQUID FILLED ORAL at 17:48

## 2023-12-29 RX ADMIN — NIMODIPINE 60 MG: 30 CAPSULE, LIQUID FILLED ORAL at 05:43

## 2023-12-29 ASSESSMENT — PAIN SCALES - GENERAL
PAINLEVEL_OUTOF10: 0
PAINLEVEL_OUTOF10: 0

## 2023-12-29 NOTE — PROGRESS NOTES
Vertebral PSV 41.6 cm/s    Right Vertebral EDV 19.7 cm/s    Left MCA 1 PSV 85.2 cm/s    Left MCA 1 EDV 33.6 cm/s    Left CARLITOS .6 cm/s    Left CARLITOS EDV 60.0 cm/s    Left ICA PSV 81.0 cm/s    Left ICA EDV 33.1 cm/s    Left PCA 1 PSV 62.9 cm/s    Left PCA 1 EDV 29.3 cm/s    Left Vertebral PSV 50.8 cm/s    Left Vertebral EDV 22.5 cm/s    Basilar Artery PSV 73.6 cm/s    Basilar Artery EDV 29.8 cm/s    Right ICA dist PSV 51.9 cm/s    Right ICA dist EDV 18.6 cm/s    Left ICA dist PSV 49.3 cm/s    Left ICA dist EDV 18.6 cm/s    Body Surface Area 1.93 m2    Right MCA 1 Mean Velocity 78.6 cm/s    Right CARLITOS Mean Velocity 93.2 cm/s    Right ICA Mean Velocity 43.5 cm/s    Right PCA 1 Mean Velocity 31.6 cm/s    Right Vertebral Mean Velocity 27.0 cm/s    Left MCA 1 Mean Velocity 50.8 cm/s    Left CARLITOS Mean Velocity 79.9 cm/s    Left ICA Mean Velocity 49.1 cm/s    Left PCA 1 Mean Velocity 40.5 cm/s    Left Vertebral Mean Velocity 31.9 cm/s    Right External ICA Mean Velocity 30.0 cm/s    Right [place] Ratio 2.60     Basilar Artery Mean Elie 45.0 cm/s    Left External ICA Mean Velocity 29.0 cm/s    Left [place] Ratio 1.80      Imaging (personally reviewed by myself):  CTH 12/17/23: large SAH with IPH  CTH 12/18/23 at 0615: new IVH but no hydro  CTH 12/18/23 at 1225: stable  CTH 12/19/23: mild ventricular dilation, stable SAH  CTH 12/20/23: stable    CTA: large fusiform aneurysm of the M1 segment on the right measuring 8.8 x 16 x 9.9 mm    ECHO with EF 75-80%.     TCDs 12/18/23: No vasospasm.  TCDs 12/19/23: No vasospasm.  TCDs 12/20/23: Possible right CARLITOS vasospasm.  No further intracranial vasospasm identified bilaterally   TCDs 12/21/23: No vasospasm.   TCDs 12/22/23: No vasospasm.   TCDs 12/23/23: No vasospasm.  TCDs 12/24/23: No vasospasm.  TCDs 12/25/23: Distal left A1 CARLITOS velocity is elevated to a level consistent with possible vasospasm.No evidence of vasospasm on the right.  TCDs 12/27/2023: No evidence of  intracranial vasospasm.  TCDS 12/29/2023: Imaging is consistent with possible bilateral distal A1 CARLITOS vasospasm.     I have spent 35 minutes of time involved in chart review, lab review, imaging review, consultations with specialists and attendings, family discussion/decision making and documentation.     Signed By: BETZY Ortega NP, Neurocritical Care Nurse Practitioner    December 29, 2023

## 2023-12-29 NOTE — PROGRESS NOTES
1930: Bedside and Verbal shift change report given to Omega RN (oncoming nurse) by geoff RN (offgoing nurse). Report included the following information Nurse Handoff Report, ED Encounter Summary, ED SBAR, Adult Overview, Surgery Report, Intake/Output, MAR, Recent Results, Cardiac Rhythm NSR-Sinus Tach, Quality Measures, and Neuro Assessment.       0730: Bedside and Verbal shift change report given to Bruce RN (oncoming nurse) by Omega RN (offgoing nurse). Report included the following information Nurse Handoff Report, ED Encounter Summary, ED SBAR, Adult Overview, Intake/Output, MAR, Recent Results, Cardiac Rhythm nsr, Quality Measures, and Neuro Assessment.

## 2023-12-29 NOTE — PROGRESS NOTES
Neurocritical Care Brief Progress Note:  Soledad Geller is 58 y.o female with pmh of Asthma, FMD, HTN, HLD, chronic brachiocephalic artery dissection who arrived from [facility] ED intubated to [facility] ICU early morning of 12/18/23 with SAH from a ruptured cerebral aneurysm. CTA showed a large fusiform aneurysm of the right supraclinoid ICA with eccentric broad-based component.    Pt is s/p cerebral/cervical arteriogram and Flow diversion stent embolization of right supraclinoid ICA aneurysm on 12/18/23 by Dr. Hamilton.    Pt with mild to moderate vasospasm of the RMCA and B/L ACAs s/p cerebral angiogram and IA nicardipine infusion on 12/26/2023 by Dr. Hamilton.     Pt is drowsy but arouse to follow commands and drift back to sleep. Pt denied pain or any headaches. Pt son at the bedside.    Physical Exam:   Blood pressure 105/65, pulse 78, temperature 97.9 °F (36.6 °C), temperature source Oral, resp. rate 15, height 1.727 m (5' 7.99\"), weight 79.4 kg (175 lb 0.7 oz), SpO2 97 %.    GEN: Calm, cooperative with exams, well developed and nourished, patient in NAD, Flat affect  HEENT: Normocephalic. Non-icter, no congestion  Lungs: CTA bilaterally Ant, non-labored breathing, RA  Cardiac: S1,S2, normal rate and rhythm, no gallops  Abdomen: Normal bowel sounds, no distention, soft, non-tender  Extremities: 2+ Radial pulses, no clubbing, cyanosis, or edema  Skin: no rashes or lesions noted, right groin is C/D/I, no hematoma, bruising, redness, or tenderness to palpation. + distal pulses        NEURO:  Mental status: Pt is drowsy but oriented to time, situation, place and person. Able to follow commands and drift back to sleep   Cranial Nerves: Speech/Language is clear and intact but soft. R gaze pref improving, attempts to cross midline, Left visual fields cut, PERRL, no nystagmus, no ptosis. No dysarthria or aphasia. Full facial strength with mild asymmetry smile. Hearing intact bilaterally. Tongue protrudes to midline, palate elevates

## 2023-12-29 NOTE — H&P
Abnormal; Notable for the following components:    Chloride 109 (*)     CO2 20 (*)     All other components within normal limits   CBC WITH AUTO DIFFERENTIAL - Abnormal; Notable for the following components:    Platelets 480 (*)     MPV 8.8 (*)     Neutrophils % 77 (*)     All other components within normal limits   POCT GLUCOSE - Abnormal; Notable for the following components:    POC Glucose 177 (*)     All other components within normal limits   POC BLOOD GAS & IONIZED CALCIUM - Abnormal; Notable for the following components:    POC pH 7.53 (*)     POC pCO2 32.4 (*)     POC PO2 189 (*)     POC HCO3 27.2 (*)     POC O2 SAT 99.8 (*)     All other components within normal limits   POCT GLUCOSE - Abnormal; Notable for the following components:    POC Glucose 159 (*)     All other components within normal limits   POCT GLUCOSE - Abnormal; Notable for the following components:    POC Glucose 125 (*)     All other components within normal limits   POCT ACTIVATED CLOTTING TIME - Abnormal; Notable for the following components:    Activated Clotting Time 163 (*)     All other components within normal limits   POCT ACTIVATED CLOTTING TIME - Abnormal; Notable for the following components:    Activated Clotting Time 244 (*)     All other components within normal limits   POCT ACTIVATED CLOTTING TIME - Abnormal; Notable for the following components:    Activated Clotting Time 255 (*)     All other components within normal limits   POCT GLUCOSE - Abnormal; Notable for the following components:    POC Glucose 122 (*)     All other components within normal limits   POCT GLUCOSE - Abnormal; Notable for the following components:    POC Glucose 123 (*)     All other components within normal limits       [unfilled]    IMAGING:   Vascular transcranial Doppler (TCD) complete         Vascular transcranial Doppler (TCD) complete   Final Result      Vascular transcranial Doppler (TCD) complete   Final Result      Vascular transcranial  Doppler (TCD) complete   Final Result      Vascular transcranial Doppler (TCD) complete   Final Result      Vascular transcranial Doppler (TCD) complete   Final Result      Vascular transcranial Doppler (TCD) complete   Final Result      CT HEAD WO CONTRAST   Final Result   Subarachnoid and intraventricular hemorrhage again shown with   surgical artifacts involving right MCA. Generalized cerebral edema is again   demonstrated with greater symmetric narrowing of the lateral and third   ventricles.      Vascular transcranial Doppler (TCD) complete   Final Result      Vascular transcranial Doppler (TCD) complete   Final Result      CT HEAD WO CONTRAST   Final Result   1. Increased, mild, ventricular dilation.   2. Expected evolution of subarachnoid hemorrhage. No clear new hemorrhage.      CT HEAD WO CONTRAST   Final Result   1. Stable extensive subarachnoid hemorrhage and stable intraventricular   hemorrhage. Stable ventricular size without hydrocephalus.         Vascular transcranial Doppler (TCD) complete   Final Result      CT HEAD WO CONTRAST   Final Result   New intraventricular extension with blood in the bilateral and third ventricles.   Ongoing large degree of diffuse subarachnoid hemorrhage. No hydrocephalus.      Discussed with Skylar Shi NP at 9:35am on 12/18/2023 via secure text message   ([employer]).         XR CHEST PORTABLE   Final Result   ET tube and NG tube in appropriate position             XR ABDOMEN (KUB) (SINGLE AP VIEW)   Final Result   Gastric tube tip is in appropriate position for use.          IR ARCH UNI CAR CERV CEREBRAL    (Results Pending)   IR NORAH CATH PLACE INT CAROTID INTRACRANIAL RIGHT W ANGIO    (Results Pending)        ECG/ECHO:  [unfilled]       Notes reviewed from all clinical/nonclinical/nursing services involved in patient's clinical care. Care coordination discussions were held with appropriate clinical/nonclinical/ nursing providers based on care coordination

## 2023-12-29 NOTE — PROGRESS NOTES
Critical Care Progress Note    Comfortable this morning.  Asleep but easily arousable, no headaches or visual changes.  Slept good overnight without problems.  Communicative and oriented.    Review of systems: Denies dyspnea orthopnea chest pain nausea or vomiting.  Appetite  Minimally improved but still marginal.    Past Medical History:   Diagnosis Date    Asthma     Brachiocephalic artery injury     chronic dissection    Glaucoma, low tension     Hyperlipidemia     Hypertension     Ill-defined condition     high cholesterol        Current Facility-Administered Medications   Medication Dose Route Frequency Provider Last Rate Last Admin    potassium chloride 10 mEq/100 mL IVPB (Peripheral Line)  10 mEq IntraVENous Q2H Raul Bruce MD        potassium bicarb-citric acid (EFFER-K) effervescent tablet 40 mEq  40 mEq Oral Once Raul Bruce MD        metoprolol tartrate (LOPRESSOR) tablet 25 mg  25 mg Oral BID Raul Bruce MD   25 mg at 12/29/23 0853    lactated ringers IV soln infusion   IntraVENous Continuous Skylar Shi APRN - NP 75 mL/hr at 12/29/23 0554 New Bag at 12/29/23 0554    niMODipine (NIMOTOP) capsule 60 mg  60 mg Oral 6 times per day Clarissa Staples, APRN - CNP   60 mg at 12/29/23 0543    potassium chloride (KLOR-CON) extended release tablet 40 mEq  40 mEq Oral PRN Amanda Ponce MD   40 mEq at 12/24/23 1205    Or    potassium bicarb-citric acid (EFFER-K) effervescent tablet 40 mEq  40 mEq Oral PRN Amanda Ponce MD        Or    potassium chloride 10 mEq/100 mL IVPB (Peripheral Line)  10 mEq IntraVENous PRN Amanda Ponce MD        magnesium sulfate 2000 mg in 50 mL IVPB premix  2,000 mg IntraVENous PRN Amanda Ponce MD        niCARdipine (CARDENE) 25 mg in sodium chloride 0.9 % 250 mL infusion ([id number])  2.5-15 mg/hr IntraVENous Continuous

## 2023-12-29 NOTE — PLAN OF CARE
Problem: Occupational Therapy - Adult  Goal: By Discharge: Performs self-care activities at highest level of function for planned discharge setting.  See evaluation for individualized goals.  Description: FUNCTIONAL STATUS PRIOR TO ADMISSION:  Patient was ambulatory without the use of AE  Receives Help From: Family, ADL Assistance: Independent, Ambulation Assistance: Independent, Transfer Assistance: Independent, Active : Yes     HOME SUPPORT: Patient lived with spouse but didn't require assistance.    Occupational Therapy Goals:  Initiated 12/19/2023.  Reviewed 12/27/23 and goals modified below:  1.  Patient will perform self-feeding with Modified [place] within 7 day(s).  Modify to set-up  2.  Patient will perform grooming with Minimal Assist within 7 day(s). Continue in sitting  3.  Patient will perform bathing with Minimal Assist within 7 day(s). Modify to mod A  4.  Patient will perform toilet transfers with Minimal Assist  within 7 day(s). Modify to max A  5.  Patient will perform all aspects of toileting with Minimal Assist within 7 day(s). Modify to max A  6.  Patient will participate in upper extremity therapeutic exercise/activities with Supervision for 5 minutes within 7 day(s).  Continue  7.  Patient will utilize energy conservation techniques during functional activities with verbal cues within 7 day(s).   Outcome: Progressing    OCCUPATIONAL THERAPY TREATMENT  Patient: Soledad Geller (58 y.o. female)  Date: 12/29/2023  Primary Diagnosis: Aneurysmal subarachnoid hemorrhage (HCC) [I60.8]       Precautions: Fall Risk                Chart, occupational therapy assessment, plan of care, and goals were reviewed.    ASSESSMENT  Patient continues to benefit from skilled OT services and is progressing towards goals. Pt presented supported sitting in bed self feeding with difficulty. Martha to use bilateral hands to cut up food and self feed. Pt progressing with functional transfers Martha x1 with cues

## 2023-12-30 ENCOUNTER — APPOINTMENT (OUTPATIENT)
Facility: HOSPITAL | Age: 58
End: 2023-12-30
Attending: ANESTHESIOLOGY
Payer: OTHER GOVERNMENT

## 2023-12-30 LAB
ALBUMIN SERPL-MCNC: 2.8 G/DL (ref 3.5–5)
ALBUMIN/GLOB SERPL: 0.7 (ref 1.1–2.2)
ALP SERPL-CCNC: 63 U/L (ref 45–117)
ALT SERPL-CCNC: 28 U/L (ref 12–78)
ANION GAP SERPL CALC-SCNC: 4 MMOL/L (ref 5–15)
ANION GAP SERPL CALC-SCNC: 5 MMOL/L (ref 5–15)
AST SERPL-CCNC: 14 U/L (ref 15–37)
BILIRUB SERPL-MCNC: 0.4 MG/DL (ref 0.2–1)
BUN SERPL-MCNC: 9 MG/DL (ref 6–20)
BUN SERPL-MCNC: 9 MG/DL (ref 6–20)
BUN/CREAT SERPL: 12 (ref 12–20)
BUN/CREAT SERPL: 13 (ref 12–20)
CALCIUM SERPL-MCNC: 10 MG/DL (ref 8.5–10.1)
CALCIUM SERPL-MCNC: 10.1 MG/DL (ref 8.5–10.1)
CHLORIDE SERPL-SCNC: 107 MMOL/L (ref 97–108)
CHLORIDE SERPL-SCNC: 109 MMOL/L (ref 97–108)
CO2 SERPL-SCNC: 25 MMOL/L (ref 21–32)
CO2 SERPL-SCNC: 27 MMOL/L (ref 21–32)
CREAT SERPL-MCNC: 0.71 MG/DL (ref 0.55–1.02)
CREAT SERPL-MCNC: 0.75 MG/DL (ref 0.55–1.02)
ERYTHROCYTE [DISTWIDTH] IN BLOOD BY AUTOMATED COUNT: 12.4 % (ref 11.5–14.5)
GLOBULIN SER CALC-MCNC: 3.8 G/DL (ref 2–4)
GLUCOSE SERPL-MCNC: 103 MG/DL (ref 65–100)
GLUCOSE SERPL-MCNC: 98 MG/DL (ref 65–100)
HCT VFR BLD AUTO: 36.8 % (ref 35–47)
HGB BLD-MCNC: 11.9 G/DL (ref 11.5–16)
MAGNESIUM SERPL-MCNC: 2.1 MG/DL (ref 1.6–2.4)
MCH RBC QN AUTO: 28.7 PG (ref 26–34)
MCHC RBC AUTO-ENTMCNC: 32.3 G/DL (ref 30–36.5)
MCV RBC AUTO: 88.9 FL (ref 80–99)
NRBC # BLD: 0 K/UL (ref 0–0.01)
NRBC BLD-RTO: 0 PER 100 WBC
PHOSPHATE SERPL-MCNC: 3.3 MG/DL (ref 2.6–4.7)
PLATELET # BLD AUTO: 488 K/UL (ref 150–400)
PMV BLD AUTO: 8.9 FL (ref 8.9–12.9)
POTASSIUM SERPL-SCNC: 4.2 MMOL/L (ref 3.5–5.1)
POTASSIUM SERPL-SCNC: 4.2 MMOL/L (ref 3.5–5.1)
PROT SERPL-MCNC: 6.6 G/DL (ref 6.4–8.2)
RBC # BLD AUTO: 4.14 M/UL (ref 3.8–5.2)
SODIUM SERPL-SCNC: 138 MMOL/L (ref 136–145)
SODIUM SERPL-SCNC: 139 MMOL/L (ref 136–145)
WBC # BLD AUTO: 5.8 K/UL (ref 3.6–11)

## 2023-12-30 PROCEDURE — 85027 COMPLETE CBC AUTOMATED: CPT

## 2023-12-30 PROCEDURE — 2580000003 HC RX 258: Performed by: NURSE PRACTITIONER

## 2023-12-30 PROCEDURE — 6370000000 HC RX 637 (ALT 250 FOR IP): Performed by: INTERNAL MEDICINE

## 2023-12-30 PROCEDURE — 93886 INTRACRANIAL COMPLETE STUDY: CPT

## 2023-12-30 PROCEDURE — 6370000000 HC RX 637 (ALT 250 FOR IP): Performed by: NURSE PRACTITIONER

## 2023-12-30 PROCEDURE — 83735 ASSAY OF MAGNESIUM: CPT

## 2023-12-30 PROCEDURE — 99232 SBSQ HOSP IP/OBS MODERATE 35: CPT | Performed by: NURSE PRACTITIONER

## 2023-12-30 PROCEDURE — 6370000000 HC RX 637 (ALT 250 FOR IP): Performed by: STUDENT IN AN ORGANIZED HEALTH CARE EDUCATION/TRAINING PROGRAM

## 2023-12-30 PROCEDURE — 84100 ASSAY OF PHOSPHORUS: CPT

## 2023-12-30 PROCEDURE — 2060000000 HC ICU INTERMEDIATE R&B

## 2023-12-30 PROCEDURE — 36415 COLL VENOUS BLD VENIPUNCTURE: CPT

## 2023-12-30 PROCEDURE — 80053 COMPREHEN METABOLIC PANEL: CPT

## 2023-12-30 PROCEDURE — 70450 CT HEAD/BRAIN W/O DYE: CPT

## 2023-12-30 RX ADMIN — NIMODIPINE 60 MG: 30 CAPSULE, LIQUID FILLED ORAL at 07:05

## 2023-12-30 RX ADMIN — SODIUM CHLORIDE, POTASSIUM CHLORIDE, SODIUM LACTATE AND CALCIUM CHLORIDE: 600; 310; 30; 20 INJECTION, SOLUTION INTRAVENOUS at 07:24

## 2023-12-30 RX ADMIN — LEVETIRACETAM 500 MG: 500 TABLET, FILM COATED ORAL at 09:30

## 2023-12-30 RX ADMIN — SODIUM CHLORIDE, PRESERVATIVE FREE 10 ML: 5 INJECTION INTRAVENOUS at 10:03

## 2023-12-30 RX ADMIN — ASPIRIN 81 MG CHEWABLE TABLET 81 MG: 81 TABLET CHEWABLE at 09:30

## 2023-12-30 RX ADMIN — NIMODIPINE 60 MG: 30 CAPSULE, LIQUID FILLED ORAL at 21:23

## 2023-12-30 RX ADMIN — NIMODIPINE 60 MG: 30 CAPSULE, LIQUID FILLED ORAL at 02:51

## 2023-12-30 RX ADMIN — SENNOSIDES AND DOCUSATE SODIUM 1 TABLET: 50; 8.6 TABLET ORAL at 09:30

## 2023-12-30 RX ADMIN — PRAVASTATIN SODIUM 40 MG: 40 TABLET ORAL at 21:23

## 2023-12-30 RX ADMIN — TICAGRELOR 90 MG: 90 TABLET ORAL at 09:30

## 2023-12-30 RX ADMIN — NIMODIPINE 60 MG: 30 CAPSULE, LIQUID FILLED ORAL at 09:30

## 2023-12-30 RX ADMIN — METOPROLOL 25 MG: 25 TABLET ORAL at 21:23

## 2023-12-30 RX ADMIN — NIMODIPINE 60 MG: 30 CAPSULE, LIQUID FILLED ORAL at 19:03

## 2023-12-30 RX ADMIN — TICAGRELOR 90 MG: 90 TABLET ORAL at 21:23

## 2023-12-30 RX ADMIN — METOPROLOL 25 MG: 25 TABLET ORAL at 09:30

## 2023-12-30 RX ADMIN — SENNOSIDES AND DOCUSATE SODIUM 1 TABLET: 50; 8.6 TABLET ORAL at 21:23

## 2023-12-30 RX ADMIN — NIMODIPINE 60 MG: 30 CAPSULE, LIQUID FILLED ORAL at 14:00

## 2023-12-30 RX ADMIN — LEVETIRACETAM 500 MG: 500 TABLET, FILM COATED ORAL at 21:23

## 2023-12-30 RX ADMIN — SODIUM CHLORIDE, PRESERVATIVE FREE 10 ML: 5 INJECTION INTRAVENOUS at 09:33

## 2023-12-30 NOTE — PROGRESS NOTES
Neurocritical Care Brief Progress Note:  Soledad Geller is 58 y.o female with pmh of Asthma, FMD, HTN, HLD, chronic brachiocephalic artery dissection who arrived from [facility] ED intubated to [facility] ICU early morning of 12/18/23 with SAH from a ruptured cerebral aneurysm. CTA showed a large fusiform aneurysm of the right supraclinoid ICA with eccentric broad-based component.    Pt is s/p cerebral/cervical arteriogram and Flow diversion stent embolization of right supraclinoid ICA aneurysm on 12/18/23 by Dr. Hamilton.    Pt was noted with mild to moderate vasospasm of the RMCA and B/L ACAs s/p cerebral angiogram and IA nicardipine infusion on 12/26/2023 by Dr. Hamilton.     12/29/23 TCD's consistent with possible bilateral distal A1 CARLITOS vasospasm.    LR increased to 100ml/hr. Current I/o is +840 daily total euvolemia. Continue current IVF rate. Pending TCD's in the morning.     Pt remained drowsy and neuro exams remained the same. Pt will arouse to follow commands and drift back to sleep. Pt denied pain or any headaches. Pt daughter at the bedside.    Physical Exam:   Blood pressure (!) 137/91, pulse (!) 102, temperature 99.3 °F (37.4 °C), temperature source Oral, resp. rate 16, height 1.727 m (5' 7.99\"), weight 75.4 kg (166 lb 3.6 oz), SpO2 99 %.    GEN: Calm, cooperative with exams, well developed and nourished, patient in NAD, Flat affect  HEENT: Normocephalic. Non-icter, no congestion  Lungs: CTA bilaterally Ant, non-labored breathing, RA  Cardiac: S1,S2, normal rate and rhythm, no gallops  Abdomen: Normal bowel sounds, no distention, soft, non-tender  Extremities: 2+ Radial pulses, no clubbing, cyanosis, or edema  Skin: no rashes or lesions noted      NEURO:  Mental status: Pt is drowsy but oriented to time, situation, place and person. Able to follow commands and drift back to sleep   Cranial Nerves: Speech/Language is clear and intact but soft. R gaze pref continues to improve, now able to cross midline, Left visual

## 2023-12-30 NOTE — PLAN OF CARE
Problem: Discharge Planning  Goal: Discharge to home or other facility with appropriate resources  Outcome: Progressing  Flowsheets (Taken 12/29/2023 2000)  Discharge to home or other facility with appropriate resources: Identify barriers to discharge with patient and caregiver     Problem: Pain  Goal: Verbalizes/displays adequate comfort level or baseline comfort level  Outcome: Progressing     Problem: Safety - Adult  Goal: Free from fall injury  Outcome: Progressing  Flowsheets (Taken 12/29/2023 2000)  Free From Fall Injury: Instruct family/caregiver on patient safety     Problem: Skin/Tissue Integrity  Goal: Absence of new skin breakdown  Description: 1.  Monitor for areas of redness and/or skin breakdown  2.  Assess vascular access sites hourly  3.  Every 4-6 hours minimum:  Change oxygen saturation probe site  4.  Every 4-6 hours:  If on nasal continuous positive airway pressure, respiratory therapy assess nares and determine need for appliance change or resting period.  Outcome: Progressing     Problem: ABCDS Injury Assessment  Goal: Absence of physical injury  Outcome: Progressing  Flowsheets (Taken 12/29/2023 2000)  Absence of Physical Injury: Implement safety measures based on patient assessment     Problem: Occupational Therapy - Adult  Goal: By Discharge: Performs self-care activities at highest level of function for planned discharge setting.  See evaluation for individualized goals.  Description: FUNCTIONAL STATUS PRIOR TO ADMISSION:  Patient was ambulatory without the use of AE  Receives Help From: Family, ADL Assistance: Independent, Ambulation Assistance: Independent, Transfer Assistance: Independent, Active : Yes     HOME SUPPORT: Patient lived with spouse but didn't require assistance.    Occupational Therapy Goals:  Initiated 12/19/2023.  Reviewed 12/27/23 and goals modified below:  1.  Patient will perform self-feeding with Modified [place] within 7 day(s).  Modify to set-up  2.

## 2023-12-30 NOTE — PROGRESS NOTES
Neurointerventional Surgery Progress Note  Neurocritical Care NP    Admit Date: 12/18/2023   LOS: 12 days      Daily Progress Note: 12/30/2023    Assessment:     SAH due to ruptured fusiform right MCA aneurysm, Morin Miller 3, Motley Grade 4, baseline mRS: 0, s/p cerebral angiogram and pipeline embo on 12/18/23 by Dr. Hamilton    Cerebral vasospasm S/p cerebral angiogram and IA nicardipine infusion on 12/26/2023 for cerebral vasospasm  Plan:      - Cont DAPT with ASA 81 mg daily and Brilinta 90 mg BID   - Day 13/21 of Nimotop to prevent delayed cerebral ischemia   - Keppra 500 mg BID for seizure ppx   - continue LR to 100 ml/hr to maintain euvolemia    - Labs reviewed: , K 4.2, Mg 2.1, Phos 3.3, check BMP, mag, phos daily    - Strict I&O   - change neuro checks to every 2 hours during the day and every 4 hours at night    - TCDs daily, TCDs today show possible right CARLITOS vasospasm   - SBP goal 100-160   - PT/OT/SLP as tolerated    Plan d/w Dr. Meza, RN, patient, and patient's . Ok to transfer to [facility].      HPI: Soledad Geller is a 58 y.o. AA female with a PMH of asthma, HTN, and HDL who presented to [facility] ED on 12/17/23 with decreased LOC and vomiting. She had been reporting headache x several days prior to presentation, had been taking NSAIDs for pain but does not take any ASA/blood thinners. A stat CT head was obtained which showed a large SAH with IPH. CTA head/neck was then obtained which showed a large fusiform aneurysm of the M1 segment on the right measuring 8.8 x 16 x 9.9 mm. NSGY and NIS were consulted and pt was then transferred to [facility] for higher level of care.   12/21/2023: Pt with episode of staring during PT lasting approx 30 seconds. No evidence of tonic/clonic activity, twitching, incontinence, or nystagmus. Returned to baseline immediately following, felt to be vasovagal response.     Subjective:   No neuro events overnight. Patient continues to be drowsy, but awakes to voice. She is following  I.V.:1985.8]  Out: 2810 [Urine:2810]    BP (!) 131/93   Pulse 80   Temp 97 °F (36.1 °C) (Axillary)   Resp 16   Ht 1.727 m (5' 7.99\")   Wt 77.9 kg (171 lb 11.8 oz)   LMP  (LMP Unknown)   SpO2 98%   BMI 26.12 kg/m²            Intake/Output Summary (Last 24 hours) at 12/30/2023 0839  Last data filed at 12/29/2023 1700  Gross per 24 hour   Intake 1025.83 ml   Output 150 ml   Net 875.83 ml      Physical Exam:  Gen: NAD, calm, cooperative  CV: RRR, S1, S2 present, no click, rub, or gallop   Resp: Lungs CTA anteriorly   Skin: Warm, dry, color appropriate for ethnicity.    Neurologic Exam:  Mental Status:    Drowsy, but eyes open to voice. Oriented x 4.  Appropriate affect, mood and behavior.       Speech and Language:      Able to name objects and repeat sentences. Follows commands.     Cranial Nerves:     PERRL.  Visual fields intact on the right. Left peripheral visual field cut (left partial hemianopia). Left visual neglect.   Extraocular movements intact.  Facial sensation intact.  No significant facial asymmetry, but Right eyelid appears lazy at times when opening eyes.  No dysarthria. Tongue protrudes to midline, palate elevates symmetrically.      Motor:      5/5 power in RUE, RLE 4+/5, 4/5 with drift in LUE, 3+/5 strength in LLE. Has some muscle deconditioning in lower legs  Bulk and tone normal.   No involuntary movements.    Sensation:      Sensation intact throughout to light touch. No neglect with tactile stimulation.    Coordination:  FTN, HTS intact on right side. Difficult to fully assess on the left due to weakness. Patient attempts FTN on the left.    Gait:   Deferred.      24 hour results:  Recent Results (from the past 24 hour(s))   Vascular transcranial Doppler (TCD) complete    Collection Time: 12/30/23 12:05 AM   Result Value Ref Range    Right MCA 1 .5 cm/s    Right MCA 1 EDV 60.3 cm/s    Right CARLITOS .3 cm/s    Right CARLITOS EDV 66.8 cm/s    Right ICA PSV 67.1 cm/s    Right ICA EDV

## 2023-12-30 NOTE — PLAN OF CARE
Problem: Discharge Planning  Goal: Discharge to home or other facility with appropriate resources  Outcome: Progressing  Flowsheets (Taken 12/30/2023 0800)  Discharge to home or other facility with appropriate resources: Identify barriers to discharge with patient and caregiver     Problem: Pain  Goal: Verbalizes/displays adequate comfort level or baseline comfort level  Outcome: Progressing     Problem: Safety - Adult  Goal: Free from fall injury  Outcome: Progressing  Flowsheets (Taken 12/30/2023 1000)  Free From Fall Injury: Instruct family/caregiver on patient safety     Problem: Skin/Tissue Integrity  Goal: Absence of new skin breakdown  Description: 1.  Monitor for areas of redness and/or skin breakdown  2.  Assess vascular access sites hourly  3.  Every 4-6 hours minimum:  Change oxygen saturation probe site  4.  Every 4-6 hours:  If on nasal continuous positive airway pressure, respiratory therapy assess nares and determine need for appliance change or resting period.  Outcome: Progressing     Problem: ABCDS Injury Assessment  Goal: Absence of physical injury  Outcome: Progressing  Flowsheets (Taken 12/30/2023 1000)  Absence of Physical Injury: Implement safety measures based on patient assessment

## 2023-12-30 NOTE — PROGRESS NOTES
Hospitalist Progress Note  Ivette Espana MD  Answering service: [phone]        Date of Service:  2023  NAME:  Soledad Geller  :  1965  MRN:  [id number]      Admission Summary:   Soledad Geller is a 58 y.o. AA female with a PMH of asthma, HTN, and HDL who presented to [facility] ED on 23 with decreased LOC and vomiting. She had been reporting headache x several days prior to presentation, had been taking NSAIDs for pain but does not take any ASA/blood thinners. A stat CT head was obtained which showed a large SAH with IPH. CTA head/neck was then obtained which showed a large fusiform aneurysm of the M1 segment on the right measuring 8.8 x 16 x 9.9 mm. NSGY and NIS were consulted and pt was then transferred to [facility] for higher level of care.   PT was   intubated  in [facility] ER and transferred to [facility] ICU early morning of 23 .   Pt is s/p cerebral/cervical arteriogram and Flow diversion stent embolization of right supraclinoid ICA aneurysm on 23 by Dr. Hamilton.   she was then closely monitored in ICU   2023: Pt with episode of staring during PT lasting approx 30 seconds. No evidence of tonic/clonic activity, twitching, incontinence, or nystagmus.  Pt with mild to moderate vasospasm of the RMCA and B/L ACAs s/p cerebral angiogram and IA nicardipine infusion on 2023 by Dr. Hamilton.   She was ramining drawly but arousae to follow commands and tolerate diet and ICU/ NIS ok to downgrade pt from ICU  .        Interval history / Subjective:   Pt more alert this morning, had good breakfast      Assessment & Plan:      SAH due to ruptured fusiform right MCA aneurysm, Morin Miller 3, Motley Grade 4, baseline mRS: 0, s/p cerebral angiogram and pipeline embo on 23 by Dr. Hamilton   Cerebral vasospasm S/p cerebral angiogram and IA nicardipine infusion on 2023 for cerebral vasospasm  -NIS  5-40 mL IntraVENous 2 times per day    sodium chloride flush 0.9 % injection 5-40 mL  5-40 mL IntraVENous PRN    0.9 % sodium chloride infusion   IntraVENous PRN    ondansetron (ZOFRAN-ODT) disintegrating tablet 4 mg  4 mg Oral Q8H PRN    Or    ondansetron (ZOFRAN) injection 4 mg  4 mg IntraVENous Q6H PRN    polyethylene glycol (GLYCOLAX) packet 17 g  17 g Oral Daily PRN    acetaminophen (TYLENOL) tablet 650 mg  650 mg Oral Q6H PRN    Or    acetaminophen (TYLENOL) suppository 650 mg  650 mg Rectal Q6H PRN    sennosides-docusate sodium (SENOKOT-S) 8.6-50 MG tablet 1 tablet  1 tablet Oral BID    dextrose bolus 10% 125 mL  125 mL IntraVENous PRN    Or    dextrose bolus 10% 250 mL  250 mL IntraVENous PRN    glucagon injection 1 mg  1 mg SubCUTAneous PRN    dextrose 10 % infusion   IntraVENous Continuous PRN    fentaNYL (SUBLIMAZE) injection 25 mcg  25 mcg IntraVENous Q2H PRN    sodium chloride flush 0.9 % injection 5-40 mL  5-40 mL IntraVENous 2 times per day    sodium chloride flush 0.9 % injection 5-40 mL  5-40 mL IntraVENous PRN    0.9 % sodium chloride infusion   IntraVENous PRN     ______________________________________________________________________  EXPECTED LENGTH OF STAY: 10  ACTUAL LENGTH OF STAY:          12                 Ivette Espana MD

## 2023-12-31 ENCOUNTER — APPOINTMENT (OUTPATIENT)
Facility: HOSPITAL | Age: 58
End: 2023-12-31
Attending: ANESTHESIOLOGY
Payer: OTHER GOVERNMENT

## 2023-12-31 ENCOUNTER — ANESTHESIA EVENT (OUTPATIENT)
Facility: HOSPITAL | Age: 58
End: 2023-12-31
Payer: OTHER GOVERNMENT

## 2023-12-31 ENCOUNTER — HOSPITAL ENCOUNTER (INPATIENT)
Facility: HOSPITAL | Age: 58
Discharge: HOME | End: 2024-01-03
Attending: ANESTHESIOLOGY
Payer: OTHER GOVERNMENT

## 2023-12-31 ENCOUNTER — ANESTHESIA (OUTPATIENT)
Facility: HOSPITAL | Age: 58
End: 2023-12-31
Payer: OTHER GOVERNMENT

## 2023-12-31 VITALS
DIASTOLIC BLOOD PRESSURE: 78 MMHG | TEMPERATURE: 98.4 F | OXYGEN SATURATION: 97 % | RESPIRATION RATE: 14 BRPM | SYSTOLIC BLOOD PRESSURE: 138 MMHG | HEART RATE: 64 BPM

## 2023-12-31 LAB
ACT BLD: 174 SECS (ref 79–138)
ACT BLD: 223 SECS (ref 79–138)
ACT BLD: 228 SECS (ref 79–138)
ACT BLD: 254 SECS (ref 79–138)
ASPIRIN: 350 ARU
ECHO BSA: 1.93 M2
P2Y12 PLT RESPONSE: 62 PRU (ref 194–418)
VAS BASILAR ARTERY EDV: 29.4 CM/S
VAS BASILAR ARTERY EDV: 29.8 CM/S
VAS BASILAR ARTERY EDV: 30.4 CM/S
VAS BASILAR ARTERY MEAN VEL: 45 CM/S
VAS BASILAR ARTERY MEAN VEL: 45 CM/S
VAS BASILAR ARTERY MEAN VEL: 47 CM/S
VAS BASILAR ARTERY PSV: 73.6 CM/S
VAS BASILAR ARTERY PSV: 74.3 CM/S
VAS BASILAR ARTERY PSV: 84.4 CM/S
VAS LEFT ACA EDV: 53.8 CM/S
VAS LEFT ACA EDV: 60 CM/S
VAS LEFT ACA EDV: 67.9 CM/S
VAS LEFT ACA MEAN VEL: 79.9 CM/S
VAS LEFT ACA MEAN VEL: 81.3 CM/S
VAS LEFT ACA MEAN VEL: 94 CM/S
VAS LEFT ACA PSV: 119.6 CM/S
VAS LEFT ACA PSV: 136.3 CM/S
VAS LEFT ACA PSV: 146.3 CM/S
VAS LEFT EX ICA MEAN VEL: 29 CM/S
VAS LEFT EX ICA MEAN VEL: 34 CM/S
VAS LEFT EX ICA MEAN VEL: 36 CM/S
VAS LEFT ICA DIST EDV: 17.6 CM/S
VAS LEFT ICA DIST EDV: 18.1 CM/S
VAS LEFT ICA DIST EDV: 18.6 CM/S
VAS LEFT ICA DIST PSV: 49.3 CM/S
VAS LEFT ICA DIST PSV: 66.6 CM/S
VAS LEFT ICA DIST PSV: 72 CM/S
VAS LEFT ICA EDV: 33.1 CM/S
VAS LEFT ICA EDV: 34.3 CM/S
VAS LEFT ICA EDV: 37.7 CM/S
VAS LEFT ICA MEAN VEL: 49.1 CM/S
VAS LEFT ICA MEAN VEL: 51.5 CM/S
VAS LEFT ICA MEAN VEL: 58 CM/S
VAS LEFT ICA PSV: 81 CM/S
VAS LEFT ICA PSV: 86 CM/S
VAS LEFT ICA PSV: 98.5 CM/S
VAS LEFT LINDEGAARD RATIO: 1.3
VAS LEFT LINDEGAARD RATIO: 1.5
VAS LEFT LINDEGAARD RATIO: 1.8
VAS LEFT MCA 1 EDV: 26.2 CM/S
VAS LEFT MCA 1 EDV: 33.6 CM/S
VAS LEFT MCA 1 EDV: 33.7 CM/S
VAS LEFT MCA 1 MEAN VEL: 45.1 CM/S
VAS LEFT MCA 1 MEAN VEL: 50.1 CM/S
VAS LEFT MCA 1 MEAN VEL: 50.8 CM/S
VAS LEFT MCA 1 PSV: 82.8 CM/S
VAS LEFT MCA 1 PSV: 82.8 CM/S
VAS LEFT MCA 1 PSV: 85.2 CM/S
VAS LEFT PCA 1 EDV: 21.5 CM/S
VAS LEFT PCA 1 EDV: 26.2 CM/S
VAS LEFT PCA 1 EDV: 29.3 CM/S
VAS LEFT PCA 1 MEAN VEL: 32.8 CM/S
VAS LEFT PCA 1 MEAN VEL: 39.1 CM/S
VAS LEFT PCA 1 MEAN VEL: 40.5 CM/S
VAS LEFT PCA 1 PSV: 55.5 CM/S
VAS LEFT PCA 1 PSV: 62.9 CM/S
VAS LEFT PCA 1 PSV: 65 CM/S
VAS LEFT VERTEBRAL EDV TCD: 19.1 CM/S
VAS LEFT VERTEBRAL EDV TCD: 22.5 CM/S
VAS LEFT VERTEBRAL EDV TCD: 24.9 CM/S
VAS LEFT VERTEBRAL MEAN VEL: 29.3 CM/S
VAS LEFT VERTEBRAL MEAN VEL: 31.9 CM/S
VAS LEFT VERTEBRAL MEAN VEL: 44.4 CM/S
VAS LEFT VERTEBRAL PSV TCD: 49.8 CM/S
VAS LEFT VERTEBRAL PSV TCD: 50.8 CM/S
VAS LEFT VERTEBRAL PSV TCD: 83.4 CM/S
VAS RIGHT ACA EDV: 64.1 CM/S
VAS RIGHT ACA EDV: 66.8 CM/S
VAS RIGHT ACA EDV: 74.2 CM/S
VAS RIGHT ACA MEAN VEL: 90 CM/S
VAS RIGHT ACA MEAN VEL: 93.2 CM/S
VAS RIGHT ACA MEAN VEL: 94.3 CM/S
VAS RIGHT ACA PSV: 131.2 CM/S
VAS RIGHT ACA PSV: 136.3 CM/S
VAS RIGHT ACA PSV: 154.7 CM/S
VAS RIGHT EX ICA MEAN VEL: 29 CM/S
VAS RIGHT EX ICA MEAN VEL: 30 CM/S
VAS RIGHT EX ICA MEAN VEL: 45 CM/S
VAS RIGHT ICA DIST EDV: 14.2 CM/S
VAS RIGHT ICA DIST EDV: 18.6 CM/S
VAS RIGHT ICA DIST EDV: 29.4 CM/S
VAS RIGHT ICA DIST PSV: 51.9 CM/S
VAS RIGHT ICA DIST PSV: 58.1 CM/S
VAS RIGHT ICA DIST PSV: 76.3 CM/S
VAS RIGHT ICA EDV: 19.2 CM/S
VAS RIGHT ICA EDV: 26.7 CM/S
VAS RIGHT ICA EDV: 27.8 CM/S
VAS RIGHT ICA MEAN VEL: 38.6 CM/S
VAS RIGHT ICA MEAN VEL: 40.2 CM/S
VAS RIGHT ICA MEAN VEL: 43.5 CM/S
VAS RIGHT ICA PSV: 67.1 CM/S
VAS RIGHT ICA PSV: 75 CM/S
VAS RIGHT ICA PSV: 77.5 CM/S
VAS RIGHT LINDEGAARD RATIO: 1.9
VAS RIGHT LINDEGAARD RATIO: 2.5
VAS RIGHT LINDEGAARD RATIO: 2.6
VAS RIGHT MCA 1 EDV: 43 CM/S
VAS RIGHT MCA 1 EDV: 56.1 CM/S
VAS RIGHT MCA 1 EDV: 60.3 CM/S
VAS RIGHT MCA 1 MEAN VEL: 71.6 CM/S
VAS RIGHT MCA 1 MEAN VEL: 78.6 CM/S
VAS RIGHT MCA 1 MEAN VEL: 86.7 CM/S
VAS RIGHT MCA 1 PSV: 123.5 CM/S
VAS RIGHT MCA 1 PSV: 128.7 CM/S
VAS RIGHT MCA 1 PSV: 139.5 CM/S
VAS RIGHT PCA 1 EDV: 18.6 CM/S
VAS RIGHT PCA 1 EDV: 21.3 CM/S
VAS RIGHT PCA 1 EDV: 25.1 CM/S
VAS RIGHT PCA 1 MEAN VEL: 30.3 CM/S
VAS RIGHT PCA 1 MEAN VEL: 31.6 CM/S
VAS RIGHT PCA 1 MEAN VEL: 37.5 CM/S
VAS RIGHT PCA 1 PSV: 52.3 CM/S
VAS RIGHT PCA 1 PSV: 53.7 CM/S
VAS RIGHT PCA 1 PSV: 62.3 CM/S
VAS RIGHT VERTEBRAL EDV TCD: 16.1 CM/S
VAS RIGHT VERTEBRAL EDV TCD: 19.1 CM/S
VAS RIGHT VERTEBRAL EDV TCD: 19.7 CM/S
VAS RIGHT VERTEBRAL MEAN VEL: 24.6 CM/S
VAS RIGHT VERTEBRAL MEAN VEL: 26.6 CM/S
VAS RIGHT VERTEBRAL MEAN VEL: 27 CM/S
VAS RIGHT VERTEBRAL PSV TCD: 41.6 CM/S
VAS RIGHT VERTEBRAL PSV TCD: 41.6 CM/S
VAS RIGHT VERTEBRAL PSV TCD: 41.7 CM/S

## 2023-12-31 PROCEDURE — 6360000002 HC RX W HCPCS: Performed by: ANESTHESIOLOGY

## 2023-12-31 PROCEDURE — 2500000003 HC RX 250 WO HCPCS: Performed by: ANESTHESIOLOGY

## 2023-12-31 PROCEDURE — 6370000000 HC RX 637 (ALT 250 FOR IP): Performed by: NURSE PRACTITIONER

## 2023-12-31 PROCEDURE — 85576 BLOOD PLATELET AGGREGATION: CPT

## 2023-12-31 PROCEDURE — 2580000003 HC RX 258: Performed by: INTERNAL MEDICINE

## 2023-12-31 PROCEDURE — 2580000003 HC RX 258: Performed by: NURSE PRACTITIONER

## 2023-12-31 PROCEDURE — 2500000003 HC RX 250 WO HCPCS: Performed by: RADIOLOGY

## 2023-12-31 PROCEDURE — 70498 CT ANGIOGRAPHY NECK: CPT

## 2023-12-31 PROCEDURE — 03HY32Z INSERTION OF MONITORING DEVICE INTO UPPER ARTERY, PERCUTANEOUS APPROACH: ICD-10-PCS | Performed by: ANESTHESIOLOGY

## 2023-12-31 PROCEDURE — 70450 CT HEAD/BRAIN W/O DYE: CPT

## 2023-12-31 PROCEDURE — 3E0Q3GC INTRODUCTION OF OTHER THERAPEUTIC SUBSTANCE INTO CRANIAL CAVITY AND BRAIN, PERCUTANEOUS APPROACH: ICD-10-PCS | Performed by: RADIOLOGY

## 2023-12-31 PROCEDURE — B3151ZZ FLUOROSCOPY OF BILATERAL COMMON CAROTID ARTERIES USING LOW OSMOLAR CONTRAST: ICD-10-PCS | Performed by: RADIOLOGY

## 2023-12-31 PROCEDURE — 6360000004 HC RX CONTRAST MEDICATION: Performed by: RADIOLOGY

## 2023-12-31 PROCEDURE — 36415 COLL VENOUS BLD VENIPUNCTURE: CPT

## 2023-12-31 PROCEDURE — B3181ZZ FLUOROSCOPY OF BILATERAL INTERNAL CAROTID ARTERIES USING LOW OSMOLAR CONTRAST: ICD-10-PCS | Performed by: RADIOLOGY

## 2023-12-31 PROCEDURE — 6370000000 HC RX 637 (ALT 250 FOR IP): Performed by: INTERNAL MEDICINE

## 2023-12-31 PROCEDURE — 85347 COAGULATION TIME ACTIVATED: CPT

## 2023-12-31 PROCEDURE — 03VK3HZ RESTRICTION OF RIGHT INTERNAL CAROTID ARTERY WITH INTRALUMINAL DEVICE, FLOW DIVERTER, PERCUTANEOUS APPROACH: ICD-10-PCS | Performed by: RADIOLOGY

## 2023-12-31 PROCEDURE — C1769 GUIDE WIRE: HCPCS

## 2023-12-31 PROCEDURE — 6360000002 HC RX W HCPCS: Performed by: RADIOLOGY

## 2023-12-31 PROCEDURE — 3700000001 HC ADD 15 MINUTES (ANESTHESIA)

## 2023-12-31 PROCEDURE — 3700000000 HC ANESTHESIA ATTENDED CARE

## 2023-12-31 PROCEDURE — C1876 STENT, NON-COA/NON-COV W/DEL: HCPCS

## 2023-12-31 PROCEDURE — 2580000003 HC RX 258: Performed by: ANESTHESIOLOGY

## 2023-12-31 PROCEDURE — 6370000000 HC RX 637 (ALT 250 FOR IP): Performed by: STUDENT IN AN ORGANIZED HEALTH CARE EDUCATION/TRAINING PROGRAM

## 2023-12-31 PROCEDURE — 2000000000 HC ICU R&B

## 2023-12-31 PROCEDURE — 93886 INTRACRANIAL COMPLETE STUDY: CPT

## 2023-12-31 PROCEDURE — 99222 1ST HOSP IP/OBS MODERATE 55: CPT

## 2023-12-31 RX ORDER — PROPOFOL 10 MG/ML
INJECTION, EMULSION INTRAVENOUS PRN
Status: DISCONTINUED | OUTPATIENT
Start: 2023-12-31 | End: 2023-12-31 | Stop reason: SDUPTHER

## 2023-12-31 RX ORDER — SODIUM CHLORIDE 9 MG/ML
INJECTION, SOLUTION INTRAVENOUS CONTINUOUS PRN
Status: DISCONTINUED | OUTPATIENT
Start: 2023-12-31 | End: 2023-12-31 | Stop reason: SDUPTHER

## 2023-12-31 RX ORDER — VERAPAMIL HYDROCHLORIDE 2.5 MG/ML
INJECTION, SOLUTION INTRAVENOUS PRN
Status: COMPLETED | OUTPATIENT
Start: 2023-12-31 | End: 2023-12-31

## 2023-12-31 RX ORDER — DEXAMETHASONE SODIUM PHOSPHATE 4 MG/ML
INJECTION, SOLUTION INTRA-ARTICULAR; INTRALESIONAL; INTRAMUSCULAR; INTRAVENOUS; SOFT TISSUE PRN
Status: DISCONTINUED | OUTPATIENT
Start: 2023-12-31 | End: 2023-12-31 | Stop reason: SDUPTHER

## 2023-12-31 RX ORDER — FENTANYL CITRATE 50 UG/ML
INJECTION, SOLUTION INTRAMUSCULAR; INTRAVENOUS PRN
Status: DISCONTINUED | OUTPATIENT
Start: 2023-12-31 | End: 2023-12-31 | Stop reason: SDUPTHER

## 2023-12-31 RX ORDER — TRAZODONE HYDROCHLORIDE 50 MG/1
100 TABLET ORAL NIGHTLY
Status: DISCONTINUED | OUTPATIENT
Start: 2023-12-31 | End: 2023-12-31

## 2023-12-31 RX ORDER — SUCCINYLCHOLINE/SOD CL,ISO/PF 200MG/10ML
SYRINGE (ML) INTRAVENOUS PRN
Status: DISCONTINUED | OUTPATIENT
Start: 2023-12-31 | End: 2023-12-31 | Stop reason: SDUPTHER

## 2023-12-31 RX ORDER — ONDANSETRON 2 MG/ML
INJECTION INTRAMUSCULAR; INTRAVENOUS PRN
Status: DISCONTINUED | OUTPATIENT
Start: 2023-12-31 | End: 2023-12-31 | Stop reason: SDUPTHER

## 2023-12-31 RX ORDER — ROCURONIUM BROMIDE 10 MG/ML
INJECTION, SOLUTION INTRAVENOUS PRN
Status: DISCONTINUED | OUTPATIENT
Start: 2023-12-31 | End: 2023-12-31 | Stop reason: SDUPTHER

## 2023-12-31 RX ORDER — LIDOCAINE HYDROCHLORIDE 20 MG/ML
INJECTION, SOLUTION EPIDURAL; INFILTRATION; INTRACAUDAL; PERINEURAL PRN
Status: DISCONTINUED | OUTPATIENT
Start: 2023-12-31 | End: 2023-12-31 | Stop reason: SDUPTHER

## 2023-12-31 RX ORDER — LIDOCAINE HYDROCHLORIDE 10 MG/ML
INJECTION, SOLUTION EPIDURAL; INFILTRATION; INTRACAUDAL; PERINEURAL PRN
Status: COMPLETED | OUTPATIENT
Start: 2023-12-31 | End: 2023-12-31

## 2023-12-31 RX ORDER — LABETALOL HYDROCHLORIDE 5 MG/ML
10 INJECTION, SOLUTION INTRAVENOUS EVERY 4 HOURS PRN
Status: DISCONTINUED | OUTPATIENT
Start: 2023-12-31 | End: 2024-01-12 | Stop reason: HOSPADM

## 2023-12-31 RX ORDER — HEPARIN SODIUM 1000 [USP'U]/ML
INJECTION, SOLUTION INTRAVENOUS; SUBCUTANEOUS PRN
Status: DISCONTINUED | OUTPATIENT
Start: 2023-12-31 | End: 2023-12-31 | Stop reason: SDUPTHER

## 2023-12-31 RX ORDER — HYDRALAZINE HYDROCHLORIDE 20 MG/ML
10 INJECTION INTRAMUSCULAR; INTRAVENOUS EVERY 6 HOURS PRN
Status: DISCONTINUED | OUTPATIENT
Start: 2023-12-31 | End: 2024-01-12 | Stop reason: HOSPADM

## 2023-12-31 RX ORDER — HYDRALAZINE HYDROCHLORIDE 20 MG/ML
5 INJECTION INTRAMUSCULAR; INTRAVENOUS EVERY 6 HOURS PRN
Status: DISCONTINUED | OUTPATIENT
Start: 2023-12-31 | End: 2023-12-31

## 2023-12-31 RX ADMIN — ROCURONIUM BROMIDE 40 MG: 10 SOLUTION INTRAVENOUS at 14:45

## 2023-12-31 RX ADMIN — NIMODIPINE 60 MG: 30 CAPSULE, LIQUID FILLED ORAL at 02:04

## 2023-12-31 RX ADMIN — VERAPAMIL HYDROCHLORIDE 6 MG: 2.5 INJECTION, SOLUTION INTRAVENOUS at 16:18

## 2023-12-31 RX ADMIN — FENTANYL CITRATE 25 MCG: 50 INJECTION, SOLUTION INTRAMUSCULAR; INTRAVENOUS at 16:14

## 2023-12-31 RX ADMIN — SODIUM CHLORIDE: 9 INJECTION, SOLUTION INTRAVENOUS at 14:30

## 2023-12-31 RX ADMIN — Medication 4000 ML/HR: at 16:30

## 2023-12-31 RX ADMIN — TICAGRELOR 90 MG: 90 TABLET ORAL at 08:38

## 2023-12-31 RX ADMIN — DEXAMETHASONE SODIUM PHOSPHATE 4 MG: 4 INJECTION, SOLUTION INTRAMUSCULAR; INTRAVENOUS at 15:26

## 2023-12-31 RX ADMIN — PHENYLEPHRINE HYDROCHLORIDE 40 MCG/MIN: 10 INJECTION INTRAVENOUS at 14:48

## 2023-12-31 RX ADMIN — SENNOSIDES AND DOCUSATE SODIUM 1 TABLET: 50; 8.6 TABLET ORAL at 20:30

## 2023-12-31 RX ADMIN — Medication 120 MG: at 14:39

## 2023-12-31 RX ADMIN — LEVETIRACETAM 500 MG: 500 TABLET, FILM COATED ORAL at 08:38

## 2023-12-31 RX ADMIN — Medication 4000 ML/HR: at 16:31

## 2023-12-31 RX ADMIN — HEPARIN SODIUM 5000 UNITS: 1000 INJECTION, SOLUTION INTRAVENOUS; SUBCUTANEOUS at 15:13

## 2023-12-31 RX ADMIN — ACETAMINOPHEN 650 MG: 325 TABLET ORAL at 08:38

## 2023-12-31 RX ADMIN — SUGAMMADEX 200 MG: 100 INJECTION, SOLUTION INTRAVENOUS at 16:23

## 2023-12-31 RX ADMIN — SENNOSIDES AND DOCUSATE SODIUM 1 TABLET: 50; 8.6 TABLET ORAL at 08:38

## 2023-12-31 RX ADMIN — ONDANSETRON 4 MG: 2 INJECTION INTRAMUSCULAR; INTRAVENOUS at 16:18

## 2023-12-31 RX ADMIN — LIDOCAINE HYDROCHLORIDE 5 ML: 10 INJECTION, SOLUTION EPIDURAL; INFILTRATION; INTRACAUDAL; PERINEURAL at 15:02

## 2023-12-31 RX ADMIN — ASPIRIN 81 MG CHEWABLE TABLET 81 MG: 81 TABLET CHEWABLE at 08:38

## 2023-12-31 RX ADMIN — NIMODIPINE 60 MG: 30 CAPSULE, LIQUID FILLED ORAL at 17:57

## 2023-12-31 RX ADMIN — IOPAMIDOL 100 ML: 755 INJECTION, SOLUTION INTRAVENOUS at 10:34

## 2023-12-31 RX ADMIN — NIMODIPINE 60 MG: 30 CAPSULE, LIQUID FILLED ORAL at 22:51

## 2023-12-31 RX ADMIN — ROCURONIUM BROMIDE 10 MG: 10 SOLUTION INTRAVENOUS at 14:36

## 2023-12-31 RX ADMIN — SODIUM CHLORIDE, PRESERVATIVE FREE 10 ML: 5 INJECTION INTRAVENOUS at 20:41

## 2023-12-31 RX ADMIN — HEPARIN SODIUM 2000 UNITS: 1000 INJECTION, SOLUTION INTRAVENOUS; SUBCUTANEOUS at 15:31

## 2023-12-31 RX ADMIN — VERAPAMIL HYDROCHLORIDE 6 MG: 2.5 INJECTION, SOLUTION INTRAVENOUS at 15:16

## 2023-12-31 RX ADMIN — TICAGRELOR 90 MG: 90 TABLET ORAL at 20:31

## 2023-12-31 RX ADMIN — NIMODIPINE 60 MG: 30 CAPSULE, LIQUID FILLED ORAL at 05:53

## 2023-12-31 RX ADMIN — ACETAMINOPHEN 650 MG: 325 TABLET ORAL at 18:33

## 2023-12-31 RX ADMIN — METOPROLOL 25 MG: 25 TABLET ORAL at 20:31

## 2023-12-31 RX ADMIN — IOPAMIDOL 81 ML: 612 INJECTION, SOLUTION INTRAVENOUS at 16:22

## 2023-12-31 RX ADMIN — SODIUM CHLORIDE, POTASSIUM CHLORIDE, SODIUM LACTATE AND CALCIUM CHLORIDE: 600; 310; 30; 20 INJECTION, SOLUTION INTRAVENOUS at 02:50

## 2023-12-31 RX ADMIN — FENTANYL CITRATE 50 MCG: 50 INJECTION, SOLUTION INTRAMUSCULAR; INTRAVENOUS at 14:36

## 2023-12-31 RX ADMIN — LIDOCAINE HYDROCHLORIDE 50 MG: 20 INJECTION, SOLUTION EPIDURAL; INFILTRATION; INTRACAUDAL; PERINEURAL at 14:36

## 2023-12-31 RX ADMIN — VERAPAMIL HYDROCHLORIDE 4 MG: 2.5 INJECTION, SOLUTION INTRAVENOUS at 16:15

## 2023-12-31 RX ADMIN — Medication 4000 ML/HR: at 16:32

## 2023-12-31 RX ADMIN — SODIUM CHLORIDE, PRESERVATIVE FREE 10 ML: 5 INJECTION INTRAVENOUS at 09:36

## 2023-12-31 RX ADMIN — FENTANYL CITRATE 25 MCG: 50 INJECTION, SOLUTION INTRAMUSCULAR; INTRAVENOUS at 14:52

## 2023-12-31 RX ADMIN — METOPROLOL 25 MG: 25 TABLET ORAL at 09:34

## 2023-12-31 RX ADMIN — SODIUM CHLORIDE, PRESERVATIVE FREE 10 ML: 5 INJECTION INTRAVENOUS at 20:42

## 2023-12-31 RX ADMIN — PROPOFOL 100 MG: 10 INJECTION, EMULSION INTRAVENOUS at 14:38

## 2023-12-31 RX ADMIN — LEVETIRACETAM 500 MG: 500 TABLET, FILM COATED ORAL at 20:30

## 2023-12-31 RX ADMIN — PRAVASTATIN SODIUM 40 MG: 40 TABLET ORAL at 20:30

## 2023-12-31 RX ADMIN — HEPARIN SODIUM 2000 UNITS: 1000 INJECTION, SOLUTION INTRAVENOUS; SUBCUTANEOUS at 16:00

## 2023-12-31 RX ADMIN — NIMODIPINE 60 MG: 30 CAPSULE, LIQUID FILLED ORAL at 10:35

## 2023-12-31 ASSESSMENT — PAIN SCALES - GENERAL
PAINLEVEL_OUTOF10: 2
PAINLEVEL_OUTOF10: 5
PAINLEVEL_OUTOF10: 10

## 2023-12-31 ASSESSMENT — PAIN DESCRIPTION - DESCRIPTORS
DESCRIPTORS: ACHING
DESCRIPTORS: ACHING

## 2023-12-31 ASSESSMENT — PAIN DESCRIPTION - LOCATION
LOCATION: HEAD
LOCATION: HEAD

## 2023-12-31 ASSESSMENT — PAIN DESCRIPTION - ORIENTATION: ORIENTATION: ANTERIOR;RIGHT

## 2023-12-31 NOTE — PROGRESS NOTES
Neurocritical Care Brief Progress Note:  Soledad Geller is 58 y.o female with pmh of Asthma, FMD, HTN, HLD, chronic brachiocephalic artery dissection who arrived from [facility] ED intubated to [facility] ICU early morning of 12/18/23 with SAH from a ruptured cerebral aneurysm. CTA showed a large fusiform aneurysm of the right supraclinoid ICA with eccentric broad-based component.    Pt is s/p cerebral/cervical arteriogram and Flow diversion stent embolization of right supraclinoid ICA aneurysm on 12/18/23 by Dr. Hamilton.    Pt was noted with mild to moderate vasospasm of the RMCA and B/L ACAs s/p cerebral angiogram and IA nicardipine infusion on 12/26/2023 by Dr. Hamilton.     12/29/23 TCD's consistent with possible bilateral distal A1 CARLITOS vasospasm.    12/30/23 TCD's showed possible right CARLITOS vasospasm.   Pt noted with worsening left sided weakness with decreased sensation. STAT CTH obtained showed no worsening bleed or acute infarct. Small volume right sylvian fissure SAH and right posterior  horn lateral ventricle IVH, decreased from prior imaging.    Current I/o is +720 this shift and +840 daily total euvolemia. Continue current IVF rate 100ml/hr. Mag 2.1, K+ 4.2, N+ 139.   Physical Exam:   Blood pressure 135/81, pulse 93, temperature 99.4 °F (37.4 °C), temperature source Oral, resp. rate 13, height 1.727 m (5' 7.99\"), weight 77.9 kg (171 lb 11.8 oz), SpO2 98 %.    GEN: Calm, cooperative with exams, well developed and nourished, patient in NAD, Flat affect  HEENT: Normocephalic. Non-icter, no congestion  Lungs: CTA bilaterally Ant, non-labored breathing, RA  Cardiac: S1,S2, normal rate and rhythm, no gallops  Abdomen: Normal bowel sounds, no distention, soft, non-tender  Extremities: 2+ Radial pulses, no clubbing, cyanosis, or edema  Skin: no rashes or lesions noted      NEURO:  Mental status: Pt is sleeping and easily arousable. oriented to time, situation, place and person. Able to follow commands  Cranial Nerves:

## 2023-12-31 NOTE — PROGRESS NOTES
Neurocritical Care Brief Progress Note:    Pt is s/p repeat cerebral angiogram which revealed enlarged right ICA aneurysm. Flow-diverting stent placed within previously placed right ICA stent.  Moderate diffuse intracranial vasospasm w/ IA verapamil administration to bilateral ICAs.  Pt seen lying in bed, drowsy.  Denies headache, vision changes, dizziness, numbness or tingling, shortness of breath, and chest pain.    Plan  Pt transferred to ICU  SBP goal 100-140, labetalol and hydralazine prn  Q1hr neuro checks  Repeat CTH at 2100. Low threshold for repeat imaging w/ any significant neuro changes  Post IR orders in place  Assays today: , P2Y12 62.    Continue DAPT asa 81mg daily and ticagrelor 90mg BID  Repeat TCDs in AM      Physical Exam:  Gen: NAD, drowsy, cooperative  Neuro: A&O to person, place, time. Follows commands. Speech dysarthric. PERRL, 3 mm bilaterally. Blinks to threat. Right gaze preference, but eyes cross midline. EOMI. Mild left facial asymmetry. Palate symmetric. Tongue midline. Strength 5/5 in RUE, 4/5 in RLE, 2/5 in LUE, 1/5 in LLE. Bulk and tone normal. No involuntary movements. Gait deferred.   Skin: Warm, dry, color appropriate for ethnicity. Groin arteriotomy site soft and non-tender on palpation, dressing is C/D/I, with no active bleeding or drainage noted.       BETZY Xavier - NP  Neurocritical Care Nurse Practitioner

## 2023-12-31 NOTE — ANESTHESIA PROCEDURE NOTES
Arterial Line:    An arterial line was placed using surface landmarks, in the pre-op for the following indication(s): continuous blood pressure monitoring and blood sampling needed.    A 20 gauge (size) (length), Arrow (type) catheter was placed, Seldinger technique used, into the left radial artery, secured by Tegaderm.  Anesthesia type: Local  Local infiltration: Injection    Events:  patient tolerated procedure well with no complications.12/31/2023 2:39 PM12/31/2023 2:42 PM  Anesthesiologist: Russel Briceno Jr., MD  Performed: Anesthesiologist   Preanesthetic Checklist  Completed: patient identified, IV checked, site marked, risks and benefits discussed, surgical/procedural consents, equipment checked, pre-op evaluation, timeout performed, anesthesia consent given, oxygen available, monitors applied/VS acknowledged and fire risk safety assessment completed and verbalized

## 2023-12-31 NOTE — PROGRESS NOTES
TRANSFER - OUT REPORT:    Verbal report given to HERMILA Ferrer on Soledad Geller  being transferred to ICU-9 for routine progression of patient care       Report consisted of patient's Situation, Background, Assessment and   Recommendations(SBAR).     Information from the following report(s) Nurse Handoff Report, Index, Surgery Report, Intake/Output, MAR, Neuro Assessment, and Event Log was reviewed with the receiving nurse.           Lines:   Peripheral IV 12/28/23 Left Forearm (Active)   Site Assessment Clean, dry & intact 12/31/23 1700   Line Status Flushed;Capped 12/31/23 1700   Line Care Connections checked and tightened 12/31/23 1700   Phlebitis Assessment No symptoms 12/31/23 1700   Infiltration Assessment 0 12/31/23 1700   Alcohol Cap Used Yes 12/31/23 1700   Dressing Status Clean, dry & intact 12/31/23 1700   Dressing Type Transparent 12/31/23 1700       Arterial Line 12/31/23 Radial (Active)   Site Assessment Clean, dry & intact 12/31/23 1700   Line Status Intact and in place 12/31/23 1700   Art Line Interventions Zeroed and calibrated;Leveled 12/31/23 1700   Color/Movement/Sensation Capillary refill less than 3 sec 12/31/23 1700   Dressing Type Transparent w/CHG gel 12/31/23 1700   Dressing Status Clean, dry & intact 12/31/23 1700        Opportunity for questions and clarification was provided.      Patient transported with:  Monitor and Registered Nurse  DONNA Murrell

## 2023-12-31 NOTE — PROGRESS NOTES
Critical Care Progress Note  Back to the ICU from the angio lab.  She was taken to the angio lab due to cerebral vasospasm and increasing subarachnoid hemorrhage.  She had a stent placed through the old stent, verapamil was injected into the cerebral arteries.  When seen, she was quite drowsy.  She did not follow any commands or open her eyes.  She with Sterling on noxious stimulation to all 4 extremities, apparently to the same degree, she winced moved her head and moaned very momentarily.    Review of systems could not be obtained.    Past Medical History:   Diagnosis Date    Asthma     Brachiocephalic artery injury     chronic dissection    Glaucoma, low tension     Hyperlipidemia     Hypertension     Ill-defined condition     high cholesterol        Current Facility-Administered Medications   Medication Dose Route Frequency Provider Last Rate Last Admin    niCARdipine (CARDENE) 25 mg in sodium chloride 0.9 % 15 mL IV syringe   IntraVENous PRN Venu Hamilton MD        labetalol (NORMODYNE;TRANDATE) injection 10 mg  10 mg IntraVENous Q4H PRN LobdelAngle castañeda APRN - NP        hydrALAZINE (APRESOLINE) injection 10 mg  10 mg IntraVENous Q6H PRN Raul Bruce MD        metoprolol tartrate (LOPRESSOR) tablet 25 mg  25 mg Oral BID Leeanne Gentile MD   25 mg at 12/31/23 0934    lactated ringers IV soln infusion   IntraVENous Continuous Leeanne Gentile  mL/hr at 12/31/23 0250 New Bag at 12/31/23 0250    niMODipine (NIMOTOP) capsule 60 mg  60 mg Oral 6 times per day Leeanne Gentile MD   60 mg at 12/31/23 1757    potassium chloride (KLOR-CON) extended release tablet 40 mEq  40 mEq Oral PRN Leeanne Gentile MD   40 mEq at 12/24/23 1205    Or    potassium bicarb-citric acid (EFFER-K) effervescent tablet 40 mEq  40 mEq Oral PRN Leeanne Gentile MD        Or    potassium chloride 10 mEq/100 mL IVPB

## 2023-12-31 NOTE — PLAN OF CARE
Problem: Discharge Planning  Goal: Discharge to home or other facility with appropriate resources  12/30/2023 2240 by Jaqueline Faulkner RN  Outcome: Progressing  Flowsheets (Taken 12/30/2023 2000)  Discharge to home or other facility with appropriate resources: Identify barriers to discharge with patient and caregiver  12/30/2023 1900 by Jaquleine Reyes RN  Outcome: Progressing  12/30/2023 1858 by Jaqueline Reyes RN  Outcome: Progressing  Flowsheets (Taken 12/30/2023 0800)  Discharge to home or other facility with appropriate resources: Identify barriers to discharge with patient and caregiver     Problem: Pain  Goal: Verbalizes/displays adequate comfort level or baseline comfort level  12/30/2023 2240 by Jaqueline Faulkner RN  Outcome: Progressing  12/30/2023 1900 by Jaqueline Reyes RN  Outcome: Progressing  12/30/2023 1858 by Jaqueline Reyes RN  Outcome: Progressing     Problem: Safety - Adult  Goal: Free from fall injury  12/30/2023 2240 by Jaqueline Faulkner RN  Outcome: Progressing  Flowsheets (Taken 12/30/2023 2000)  Free From Fall Injury: Instruct family/caregiver on patient safety  12/30/2023 1900 by Jaqueline Reyes RN  Outcome: Progressing  12/30/2023 1858 by Jaqueline Reyes RN  Outcome: Progressing  Flowsheets (Taken 12/30/2023 1000)  Free From Fall Injury: Instruct family/caregiver on patient safety     Problem: Skin/Tissue Integrity  Goal: Absence of new skin breakdown  Description: 1.  Monitor for areas of redness and/or skin breakdown  2.  Assess vascular access sites hourly  3.  Every 4-6 hours minimum:  Change oxygen saturation probe site  4.  Every 4-6 hours:  If on nasal continuous positive airway pressure, respiratory therapy assess nares and determine need for appliance change or resting period.  12/30/2023 2240 by Jaqueline Faulkner RN  Outcome: Progressing  12/30/2023 1900 by Jaqueline Reyes RN  Outcome: Progressing  12/30/2023 1858 by Jaqueline Reyes RN  Outcome: Progressing     Problem: ABCDS Injury  Assessment  Goal: Absence of physical injury  12/30/2023 2240 by Jaqueline Faulkner RN  Outcome: Progressing  Flowsheets (Taken 12/30/2023 2000)  Absence of Physical Injury: Implement safety measures based on patient assessment  12/30/2023 1900 by Jaqueline Reyes RN  Outcome: Progressing  12/30/2023 1858 by Jaqueline Reyes RN  Outcome: Progressing  Flowsheets (Taken 12/30/2023 1000)  Absence of Physical Injury: Implement safety measures based on patient assessment

## 2023-12-31 NOTE — PROGRESS NOTES
TRANSFER - IN REPORT:    Verbal report received from [facility] RN on Soledad Geller  being received from [facility] for ordered procedure      Report consisted of patient's Situation, Background, Assessment and   Recommendations(SBAR).     Information from the following report(s) Nurse Handoff Report, Index, Adult Overview, Intake/Output, Med Rec Status, Neuro Assessment, and Event Log was reviewed with the receiving nurse.    Opportunity for questions and clarification was provided.      Assessment completed upon patient's arrival to unit and care assumed.

## 2023-12-31 NOTE — ANESTHESIA PRE PROCEDURE
Department of Anesthesiology  Preprocedure Note       Name:  Soledad Geller   Age:  58 y.o.  :  1965                                          MRN:  [id number]         Date:  2023      Surgeon: * No surgeons listed *    Procedure: * No procedures listed *    Medications prior to admission:   Prior to Admission medications    Medication Sig Start Date End Date Taking? Authorizing Provider   ondansetron (ZOFRAN) 4 MG tablet Take 1 tablet by mouth 3 times daily as needed for Nausea or Vomiting 23   Shantal Buck APRN - NP   hydroCHLOROthiazide (HYDRODIURIL) 25 MG tablet Take 0.5 tablets by mouth daily 23   Serena Correia APRN - NP   latanoprost (XALATAN) 0.005 % ophthalmic solution  23   Provider, MD Gladys   fluocinolone 0.01 % cream Apply topically 2 times daily As needed for rash on arm and feet 10/6/23   Serena Correia APRN - NP   albuterol sulfate HFA (PROVENTIL;VENTOLIN;PROAIR) 108 (90 Base) MCG/ACT inhaler Inhale into the lungs every 4 hours as needed    Automatic Reconciliation, Ar   pravastatin (PRAVACHOL) 40 MG tablet TAKE 1 TABLET BY MOUTH EVERY NIGHT 3/19/22   Automatic Reconciliation, Ar   triamcinolone (KENALOG) 0.1 % cream Apply topically 2 times daily 10/11/22   Automatic Reconciliation, Ar       Current medications:    No current facility-administered medications for this encounter.     No current outpatient medications on file.     Facility-Administered Medications Ordered in Other Encounters   Medication Dose Route Frequency Provider Last Rate Last Admin   • niCARdipine (CARDENE) 25 mg in sodium chloride 0.9 % 15 mL IV syringe   IntraVENous PRN Venu Hamilton MD       • metoprolol tartrate (LOPRESSOR) tablet 25 mg  25 mg Oral BID Raul Bruce MD   25 mg at 23 0934   • lactated ringers IV soln infusion   IntraVENous Continuous Skylar Shi APRN -  mL/hr at 23 0250 New Bag at 23 0250   • niMODipine (NIMOTOP) capsule 60 mg

## 2023-12-31 NOTE — PROGRESS NOTES
Neurointerventional Surgery Progress Note  Neurocritical Care NP    Admit Date: 12/18/2023   LOS: 13 days      Daily Progress Note: 12/31/2023    Assessment & Plan     Cerebral Hemorrhage - SAH due to ruptured fusiform right MCA aneurysm, Morin Miller 3, Motley Grade 4, baseline mRS: 0. S/p cerebral angiogram w/ flow diversion stent embolization on 12/18/23 by Dr. Hamilton    Cerebral vasospasm - S/p cerebral angiogram and IA nicardipine infusion on 12/26/2023 for cerebral vasospasm    SBP Goal 100-160  Q2hr neuro checks during daytime  Decreased LLE strength: Check ARU and P2Y12, obtain CTA head-neck  Cont DAPT - ASA 81mg daily and ticagrelor 90mg BID  Day 14/21 of Nimodipine to prevent delayed cerebral ischemia  Keppra 500 mg BID for seizure ppx  Continue LR 100mL/hr to maintain euvolemia  Strict I&O, + 610mL overnight  Labs reviewed: , K 4.2, Mg 2.1 Phos 3.3.  BMP, mg, phos daily   TCDs on 12/30 consistent w/ right CARLITOS vasospasm  PT/OT/SLP evals      Plan d/w Dr. Hamilton, primary RN, pt and her .      HPI: Soledad Geller is a 58 y.o. AA female with a PMH of asthma, HTN, and HDL who presented to [facility] ED on 12/17/23 with decreased LOC and vomiting. She had been reporting headache for several days prior to presentation. Pt reported taking NSAIDs for pain but denied use of ASA or blood thinners. A stat CTH was obtained which showed a large SAH with IPH. CTA head/neck was then obtained which showed a large fusiform aneurysm of the M1 segment on the right measuring 8.8 x 16 x 9.9 mm. NSGY and NIS were consulted and pt was then transferred to [facility] for higher level of care. S/p cerebral angiogram w/ flow diversion stent embolization of right supraclinoid ICA aneurysm by Dr. Hamilton on 12/18/23.    12/21/23: Pt with episode of staring during PT lasting approx 30 seconds. No evidence of tonic/clonic activity, twitching, incontinence, or nystagmus. Returned to baseline immediately following, felt to be vasovagal

## 2023-12-31 NOTE — BRIEF OP NOTE
Neuro-Interventional Surgery - Brief procedure note      Patient: Soledad Geller MRN: [id number]     YOB: 1965  Age: 58 y.o.  Sex: female      Service: Neuro-Interventional Surgery    Date of Procedure: 12/31/2023    Pre-Procedure Diagnosis: Intracranial aneurysm    Post-Procedure Diagnosis: SAME    Procedure(s): Flow diversion stent embolization of HERNÁN aneurysm, IA Verapamil infusion for intracranial vasospasm    Vessels selected: Right common carotid artery, Right internal carotid artery, Left common carotid artery, Left internal carotid artery, and Right Middle cerebral artery    Brief Description of Procedure: Interval enlargement of the previously embolized supraclinoid HERNÁN aneurysm noted. A telescoping flow diverting stent placed within the previously placed HERNÁN stent. No immediate complications. Stagnation of contrast within the HERNÁN aneurysm noted.    Moderate diffuse intracranial vasospasm.    10 mg Verapamil given IA in HERNÁN circulation.    6 mg Verapamil given IA in LICA circulation.    Right common femoral arterial puncture site hemostasis achieved by deploying 8 F Angio-Seal closure device without complications.  No hematoma or oozing noted.  Distal R lower extremity pulses remain unchanged post hemostasis.  Sterile dressing applied over the puncture site.     Anesthesia:General    Estimated Blood Loss:  20 ml.    Specimens:  None    Implants:  4.5 mm x 34 mm RENEE X stent in HERNÁN    Apparent Intraoperative Complications:  None immediate    Patient Condition:  Stable    Disposition:  Intensive care unit    Attestation:  I performed the procedure.        Signed By: Venu Hamilton MD     December 31, 2023     [facility] NEUROINTERVENTIONAL SURGERY

## 2023-12-31 NOTE — PROGRESS NOTES
Neurocritical Care Brief Progress Note:    Dr. Hamilton notified this morning d/t concern for worsening LLE weakness.  Repeat CTAh-n at 1034 showed right MCA, Distal M1 and M2 stenosis likely from vasospasm as well as worsening right side IVH and SAH.  Plan for cerebral angiogram.    Plan  NPO execpt meds w/ sips  Consent obtained  Transfer to ICU post procedure  ARU and P2Y12 tests pending    Discussed w/ Dr. Hamilton, Dr. Gentile, Intensivist, primary RN, pt, and pt's family    I have discussed the risks, benefits and alternatives of the cerebral angiogram with the patient in detail. The risks discussed included but were not limited to bleeding, infection, blood vessel damage/dissection, stroke with transient or permanent weakness, numbness or other deficit, speech and language dysfunction, vision loss, brain damage and death. Risk of renal failure from contrast or allergic reaction to contrast dye was also discussed. All questions were answered thoroughly. The patient expressed understanding and elected to proceed with endovascular diagnostic angiogram. Written informed consent was obtained.       Physical Exam:  Gen: NAD, drowsy, cooperative  Neuro: A&Ox4. Follows commands. Mild dysarthria. PERRL, 3 mm bilaterally. Blinks to threat. Right gaze preference, but eyes cross midline. EOMI. Face symmetric. Palate symmetric. Tongue midline. Strength 5/5 in RUE, 4/5 in RLE, 3/5 in LUE, 2/5 in LLE. Bulk and tone normal. No involuntary movements. Gait deferred.  Skin: Warm, dry, color appropriate for ethnicity.     BETZY Xavier - NP  Neurocritical Care Nurse Practitioner

## 2023-12-31 NOTE — PROGRESS NOTES
Pt arrives via bed to angio department accompanied by [facility] RN and tech for Diagnostic cerebral angiogram with possible vasospasm treatment, possible aneurysm treatment and possible groin closure device procedure. All assessments completed and consent was reviewed.  Education given was regarding procedure, GA sedation, post-procedure care and  management/follow-up. Opportunity for questions was provided and all questions and concerns were addressed.      Anesthesia at bedside to manage pt airway, pt medication, pt VS and pt status. Pt VSS at time of transfer. Pt arrives lethargic but following commands.

## 2023-12-31 NOTE — PROGRESS NOTES
Hospitalist Progress Note  Leeanne Gentile MD  Answering service: [phone]        Date of Service:  2023  NAME:  Soledad Geller  :  1965  MRN:  [id number]      Admission Summary:   Soledad Geller is a 58 y.o. AA female with a PMH of asthma, HTN, and HDL who presented to [facility] ED on 23 with decreased LOC and vomiting. She had been reporting headache x several days prior to presentation, had been taking NSAIDs for pain but does not take any ASA/blood thinners. A stat CT head was obtained which showed a large SAH with IPH. CTA head/neck was then obtained which showed a large fusiform aneurysm of the M1 segment on the right measuring 8.8 x 16 x 9.9 mm. NSGY and NIS were consulted and pt was then transferred to [facility] for higher level of care.   PT was   intubated  in [facility] ER and transferred to [facility] ICU early morning of 23 .   Pt is s/p cerebral/cervical arteriogram and Flow diversion stent embolization of right supraclinoid ICA aneurysm on 23 by Dr. Hamilton.   she was then closely monitored in ICU   2023: Pt with episode of staring during PT lasting approx 30 seconds. No evidence of tonic/clonic activity, twitching, incontinence, or nystagmus.  Pt with mild to moderate vasospasm of the RMCA and B/L ACAs s/p cerebral angiogram and IA nicardipine infusion on 2023 by Dr. Hamilton.   She was ramining drawly but arousae to follow commands and tolerate diet and ICU/ NIS ok to downgrade pt from ICU  .        Interval history / Subjective:   Overnight events reviewed.    Patient developed left-sided weakness.   CT head last night showed no change.   CTA ordered by NIS this morning, showed likely vasospasm of right distal MCA.   NIS plans to take patient to IR, then ICU.      Assessment & Plan:        SAH:  -due to ruptured fusiform right MCA aneurysm, Morin Miller 3, Motley Grade 4, baseline

## 2024-01-01 ENCOUNTER — APPOINTMENT (OUTPATIENT)
Facility: HOSPITAL | Age: 59
End: 2024-01-01
Attending: ANESTHESIOLOGY
Payer: OTHER GOVERNMENT

## 2024-01-01 LAB
ANION GAP SERPL CALC-SCNC: 5 MMOL/L (ref 5–15)
BUN SERPL-MCNC: 8 MG/DL (ref 6–20)
BUN/CREAT SERPL: 13 (ref 12–20)
CALCIUM SERPL-MCNC: 10.5 MG/DL (ref 8.5–10.1)
CHLORIDE SERPL-SCNC: 107 MMOL/L (ref 97–108)
CO2 SERPL-SCNC: 26 MMOL/L (ref 21–32)
CREAT SERPL-MCNC: 0.62 MG/DL (ref 0.55–1.02)
ERYTHROCYTE [DISTWIDTH] IN BLOOD BY AUTOMATED COUNT: 12.5 % (ref 11.5–14.5)
GLUCOSE SERPL-MCNC: 105 MG/DL (ref 65–100)
HCT VFR BLD AUTO: 32.8 % (ref 35–47)
HGB BLD-MCNC: 10.8 G/DL (ref 11.5–16)
MAGNESIUM SERPL-MCNC: 2.2 MG/DL (ref 1.6–2.4)
MCH RBC QN AUTO: 28.6 PG (ref 26–34)
MCHC RBC AUTO-ENTMCNC: 32.9 G/DL (ref 30–36.5)
MCV RBC AUTO: 87 FL (ref 80–99)
NRBC # BLD: 0 K/UL (ref 0–0.01)
NRBC BLD-RTO: 0 PER 100 WBC
PHOSPHATE SERPL-MCNC: 3.1 MG/DL (ref 2.6–4.7)
PLATELET # BLD AUTO: 538 K/UL (ref 150–400)
PMV BLD AUTO: 8.6 FL (ref 8.9–12.9)
POTASSIUM SERPL-SCNC: 3.8 MMOL/L (ref 3.5–5.1)
RBC # BLD AUTO: 3.77 M/UL (ref 3.8–5.2)
SODIUM SERPL-SCNC: 138 MMOL/L (ref 136–145)
WBC # BLD AUTO: 8.4 K/UL (ref 3.6–11)

## 2024-01-01 PROCEDURE — 6370000000 HC RX 637 (ALT 250 FOR IP): Performed by: INTERNAL MEDICINE

## 2024-01-01 PROCEDURE — 99232 SBSQ HOSP IP/OBS MODERATE 35: CPT

## 2024-01-01 PROCEDURE — 84100 ASSAY OF PHOSPHORUS: CPT

## 2024-01-01 PROCEDURE — 80048 BASIC METABOLIC PNL TOTAL CA: CPT

## 2024-01-01 PROCEDURE — 2000000000 HC ICU R&B

## 2024-01-01 PROCEDURE — 2580000003 HC RX 258

## 2024-01-01 PROCEDURE — 93886 INTRACRANIAL COMPLETE STUDY: CPT

## 2024-01-01 PROCEDURE — 2580000003 HC RX 258: Performed by: INTERNAL MEDICINE

## 2024-01-01 PROCEDURE — 36415 COLL VENOUS BLD VENIPUNCTURE: CPT

## 2024-01-01 PROCEDURE — 85027 COMPLETE CBC AUTOMATED: CPT

## 2024-01-01 PROCEDURE — 83735 ASSAY OF MAGNESIUM: CPT

## 2024-01-01 RX ORDER — SODIUM CHLORIDE, SODIUM LACTATE, POTASSIUM CHLORIDE, AND CALCIUM CHLORIDE .6; .31; .03; .02 G/100ML; G/100ML; G/100ML; G/100ML
500 INJECTION, SOLUTION INTRAVENOUS ONCE
Status: COMPLETED | OUTPATIENT
Start: 2024-01-01 | End: 2024-01-01

## 2024-01-01 RX ADMIN — ASPIRIN 81 MG CHEWABLE TABLET 81 MG: 81 TABLET CHEWABLE at 07:57

## 2024-01-01 RX ADMIN — SODIUM CHLORIDE, POTASSIUM CHLORIDE, SODIUM LACTATE AND CALCIUM CHLORIDE 500 ML: 600; 310; 30; 20 INJECTION, SOLUTION INTRAVENOUS at 08:50

## 2024-01-01 RX ADMIN — NIMODIPINE 60 MG: 30 CAPSULE, LIQUID FILLED ORAL at 14:35

## 2024-01-01 RX ADMIN — SENNOSIDES AND DOCUSATE SODIUM 1 TABLET: 50; 8.6 TABLET ORAL at 07:57

## 2024-01-01 RX ADMIN — METOPROLOL 25 MG: 25 TABLET ORAL at 07:57

## 2024-01-01 RX ADMIN — NIMODIPINE 60 MG: 30 CAPSULE, LIQUID FILLED ORAL at 05:59

## 2024-01-01 RX ADMIN — LEVETIRACETAM 500 MG: 500 TABLET, FILM COATED ORAL at 07:57

## 2024-01-01 RX ADMIN — TICAGRELOR 90 MG: 90 TABLET ORAL at 07:57

## 2024-01-01 RX ADMIN — NIMODIPINE 60 MG: 30 CAPSULE, LIQUID FILLED ORAL at 02:07

## 2024-01-01 RX ADMIN — METOPROLOL 25 MG: 25 TABLET ORAL at 20:37

## 2024-01-01 RX ADMIN — NIMODIPINE 60 MG: 30 CAPSULE, LIQUID FILLED ORAL at 18:03

## 2024-01-01 RX ADMIN — LEVETIRACETAM 500 MG: 500 TABLET, FILM COATED ORAL at 20:37

## 2024-01-01 RX ADMIN — SODIUM CHLORIDE, PRESERVATIVE FREE 10 ML: 5 INJECTION INTRAVENOUS at 07:58

## 2024-01-01 RX ADMIN — PRAVASTATIN SODIUM 40 MG: 40 TABLET ORAL at 20:37

## 2024-01-01 RX ADMIN — SODIUM CHLORIDE, PRESERVATIVE FREE 10 ML: 5 INJECTION INTRAVENOUS at 07:57

## 2024-01-01 RX ADMIN — SODIUM CHLORIDE, PRESERVATIVE FREE 10 ML: 5 INJECTION INTRAVENOUS at 20:40

## 2024-01-01 RX ADMIN — NIMODIPINE 60 MG: 30 CAPSULE, LIQUID FILLED ORAL at 09:54

## 2024-01-01 RX ADMIN — NIMODIPINE 60 MG: 30 CAPSULE, LIQUID FILLED ORAL at 20:27

## 2024-01-01 RX ADMIN — TICAGRELOR 90 MG: 90 TABLET ORAL at 20:37

## 2024-01-01 NOTE — ANESTHESIA POSTPROCEDURE EVALUATION
Department of Anesthesiology  Postprocedure Note    Patient: Soledad Geller  MRN: [id number]  YOB: 1965  Date of evaluation: 1/1/2024    Procedure Summary     Date: 12/31/23 Room / Location: [facility] ANGIO IR    Anesthesia Start: 1430 Anesthesia Stop: 1710    Procedure: IR ARCH UNI CAR CERV CEREBRAL Diagnosis: (vasospasm)    Scheduled Providers:  Responsible Provider: Russel Briceno Jr., MD    Anesthesia Type: general ASA Status: 3          Anesthesia Type: No value filed.    Nguyễn Phase I:      Nguyễn Phase II:      Anesthesia Post Evaluation    Patient location during evaluation: PACU  Patient participation: complete - patient participated  Level of consciousness: awake  Airway patency: patent  Nausea & Vomiting: no nausea  Cardiovascular status: blood pressure returned to baseline and hemodynamically stable  Respiratory status: acceptable  Hydration status: stable        No notable events documented.

## 2024-01-01 NOTE — PROGRESS NOTES
Neurointerventional Surgery Progress Note  Neurocritical Care NP    Admit Date: 12/18/2023   LOS: 14 days      Daily Progress Note: 1/1/2024    Assessment & Plan     Cerebral Hemorrhage - SAH due to ruptured fusiform right MCA aneurysm, Morin Miller 3, Motley Grade 4, baseline mRS: 0. S/p cerebral angiogram w/ flow diversion stent embolization on 12/18/23 by Dr. Hamilton. Increase in SAH, IVH seen on repeat CTAh-n 12/31/23. Pt taken for cerebral angiogram revealing enlargement of previously embolized right ICA aneurysm thus additional flow-diverting stent placed by Dr. Hamilton on 12/31/23.    Cerebral vasospasm - S/p cerebral angiogram and IA nicardipine infusion on 12/26/2023 for cerebral vasospasm. 12/31/23: TCDs showed evidence of bilateral CARLITOS vasospasm. Pt treated w/ IA verapamil during angiogram.    SBP Goal 100-140  Q1hr neuro checks. Q2hr neuro checks ok overnight  CTH post repeat cerebral angiogram stable  Cont DAPT - ASA 81mg daily and ticagrelor 90mg BID  Day 15/21 of Nimodipine to prevent delayed cerebral ischemia  Keppra 500 mg BID for seizure ppx  Continue LR 100mL/hr to maintain euvolemia  Strict I&O, -700 mL overnight - Give onetime 500mL LR bolus  Labs reviewed: , K 3.8, Mg 2.2 Phos 3.1.  BMP, mg, phos daily   TCDs daily  PT/OT/SLP evals      Plan d/w Dr. Hamilton, Intensivist, primary RN, pt and her .      HPI: Soledad Geller is a 58 y.o. AA female with a PMH of asthma, HTN, and HDL who presented to [facility] ED on 12/17/23 with decreased LOC and vomiting. She had been reporting headache for several days prior to presentation. Pt reported taking NSAIDs for pain but denied use of ASA or blood thinners. A stat CTH was obtained which showed a large SAH with IPH. CTA head/neck was then obtained which showed a large fusiform aneurysm of the M1 segment on the right measuring 8.8 x 16 x 9.9 mm. NSGY and NIS were consulted and pt was then transferred to [facility] for higher level of care. S/p cerebral angiogram w/

## 2024-01-01 NOTE — PROGRESS NOTES
Critical Care Progress Note   She was taken to the angio lab 31 December 2023 due to cerebral vasospasm and increasing subarachnoid hemorrhage.  She had a stent placed through the old stent, verapamil was injected into the cerebral arteries.  With January 2024 she is easily arousable, opens her eyes and follows commands but remains drowsy.       Past Medical History:   Diagnosis Date    Asthma     Brachiocephalic artery injury     chronic dissection    Glaucoma, low tension     Hyperlipidemia     Hypertension     Ill-defined condition     high cholesterol        Current Facility-Administered Medications   Medication Dose Route Frequency Provider Last Rate Last Admin    niCARdipine (CARDENE) 25 mg in sodium chloride 0.9 % 15 mL IV syringe   IntraVENous PRN Venu Hamilton MD        labetalol (NORMODYNE;TRANDATE) injection 10 mg  10 mg IntraVENous Q4H PRN Angle Huang APRN - YURIDIA        hydrALAZINE (APRESOLINE) injection 10 mg  10 mg IntraVENous Q6H PRN Raul Bruce MD        metoprolol tartrate (LOPRESSOR) tablet 25 mg  25 mg Oral BID Leeanne Gentile MD   25 mg at 01/01/24 0757    lactated ringers IV soln infusion   IntraVENous Continuous Leeanne Gentile  mL/hr at 12/31/23 0250 New Bag at 12/31/23 0250    niMODipine (NIMOTOP) capsule 60 mg  60 mg Oral 6 times per day Leeanne Gentile MD   60 mg at 01/01/24 0559    potassium chloride (KLOR-CON) extended release tablet 40 mEq  40 mEq Oral PRN Leeanne Gentile MD   40 mEq at 12/24/23 1205    Or    potassium bicarb-citric acid (EFFER-K) effervescent tablet 40 mEq  40 mEq Oral PRN Leeanne Gentile MD        Or    potassium chloride 10 mEq/100 mL IVPB (Peripheral Line)  10 mEq IntraVENous PRN Leeanne Gentile MD        magnesium sulfate 2000 mg in 50 mL IVPB premix  2,000 mg IntraVENous PRN Leeanne Gentile MD        levETIRAcetam (KEPPRA) tablet

## 2024-01-01 NOTE — PROGRESS NOTES
Neurocritical Care Brief Progress Note:    Patient is post cerebral angio earlier today with Dr. Hamilton.  Her previously stented aneurysm had increased in size and there was increasing vasospasm that she was symptomatic with. She is seen resting comfortably.    Physical Exam:  Gen: NAD, calm, cooperative  Neuro: Patient is very drowsy, but opens eyes to voice. Follows commands. Speech dysarthric. Affect normal. PERRL, 3 mm bilaterally. Blinks to threat. She has a right gaze preference, but easily tracts and crosses midline to the left. She has a slight left facial droop with a wide smile on command. Palate symmetric. Tongue midline. She moves right side spontaneously. Strength 5/5 in RUE, 4/5 in the RLE, 1/5 on the LUE and LLE. Bulk and tone normal. No involuntary movements. Gait deferred.  Skin: Warm, dry, color appropriate for ethnicity. Groin arteriotomy site soft and non-tender on palpation, dressing is C/D/I, with no active bleeding or drainage noted. Pulses are intact to the groin, popliteal and pedal sites.    Continue plan per NIS.      Christina Hernadez, APRN - NP  Neurocritical Care Nurse Practitioner  [phone]

## 2024-01-02 ENCOUNTER — APPOINTMENT (OUTPATIENT)
Facility: HOSPITAL | Age: 59
End: 2024-01-02
Attending: ANESTHESIOLOGY
Payer: OTHER GOVERNMENT

## 2024-01-02 LAB
ANION GAP SERPL CALC-SCNC: 6 MMOL/L (ref 5–15)
BUN SERPL-MCNC: 8 MG/DL (ref 6–20)
BUN/CREAT SERPL: 12 (ref 12–20)
CALCIUM SERPL-MCNC: 9.7 MG/DL (ref 8.5–10.1)
CHLORIDE SERPL-SCNC: 108 MMOL/L (ref 97–108)
CO2 SERPL-SCNC: 21 MMOL/L (ref 21–32)
CREAT SERPL-MCNC: 0.67 MG/DL (ref 0.55–1.02)
ERYTHROCYTE [DISTWIDTH] IN BLOOD BY AUTOMATED COUNT: 12.6 % (ref 11.5–14.5)
GLUCOSE SERPL-MCNC: 108 MG/DL (ref 65–100)
HCT VFR BLD AUTO: 33.4 % (ref 35–47)
HGB BLD-MCNC: 11 G/DL (ref 11.5–16)
MAGNESIUM SERPL-MCNC: 2 MG/DL (ref 1.6–2.4)
MCH RBC QN AUTO: 28.8 PG (ref 26–34)
MCHC RBC AUTO-ENTMCNC: 32.9 G/DL (ref 30–36.5)
MCV RBC AUTO: 87.4 FL (ref 80–99)
NRBC # BLD: 0 K/UL (ref 0–0.01)
NRBC BLD-RTO: 0 PER 100 WBC
PHOSPHATE SERPL-MCNC: 2.4 MG/DL (ref 2.6–4.7)
PLATELET # BLD AUTO: 470 K/UL (ref 150–400)
PMV BLD AUTO: 9 FL (ref 8.9–12.9)
POTASSIUM SERPL-SCNC: 4.2 MMOL/L (ref 3.5–5.1)
RBC # BLD AUTO: 3.82 M/UL (ref 3.8–5.2)
SODIUM SERPL-SCNC: 135 MMOL/L (ref 136–145)
WBC # BLD AUTO: 8.4 K/UL (ref 3.6–11)

## 2024-01-02 PROCEDURE — 2000000000 HC ICU R&B

## 2024-01-02 PROCEDURE — 97112 NEUROMUSCULAR REEDUCATION: CPT

## 2024-01-02 PROCEDURE — 6370000000 HC RX 637 (ALT 250 FOR IP): Performed by: INTERNAL MEDICINE

## 2024-01-02 PROCEDURE — 80048 BASIC METABOLIC PNL TOTAL CA: CPT

## 2024-01-02 PROCEDURE — 36415 COLL VENOUS BLD VENIPUNCTURE: CPT

## 2024-01-02 PROCEDURE — 85027 COMPLETE CBC AUTOMATED: CPT

## 2024-01-02 PROCEDURE — 2580000003 HC RX 258: Performed by: INTERNAL MEDICINE

## 2024-01-02 PROCEDURE — 92523 SPEECH SOUND LANG COMPREHEN: CPT

## 2024-01-02 PROCEDURE — 92526 ORAL FUNCTION THERAPY: CPT

## 2024-01-02 PROCEDURE — 97535 SELF CARE MNGMENT TRAINING: CPT

## 2024-01-02 PROCEDURE — 93886 INTRACRANIAL COMPLETE STUDY: CPT

## 2024-01-02 PROCEDURE — 84100 ASSAY OF PHOSPHORUS: CPT

## 2024-01-02 PROCEDURE — 97530 THERAPEUTIC ACTIVITIES: CPT

## 2024-01-02 PROCEDURE — 6370000000 HC RX 637 (ALT 250 FOR IP): Performed by: ANESTHESIOLOGY

## 2024-01-02 PROCEDURE — 83735 ASSAY OF MAGNESIUM: CPT

## 2024-01-02 RX ADMIN — NIMODIPINE 60 MG: 30 CAPSULE, LIQUID FILLED ORAL at 17:47

## 2024-01-02 RX ADMIN — ASPIRIN 81 MG CHEWABLE TABLET 81 MG: 81 TABLET CHEWABLE at 09:10

## 2024-01-02 RX ADMIN — SODIUM CHLORIDE, PRESERVATIVE FREE 10 ML: 5 INJECTION INTRAVENOUS at 20:18

## 2024-01-02 RX ADMIN — SODIUM CHLORIDE, PRESERVATIVE FREE 10 ML: 5 INJECTION INTRAVENOUS at 09:28

## 2024-01-02 RX ADMIN — LEVETIRACETAM 500 MG: 500 TABLET, FILM COATED ORAL at 09:10

## 2024-01-02 RX ADMIN — TICAGRELOR 90 MG: 90 TABLET ORAL at 09:10

## 2024-01-02 RX ADMIN — SODIUM CHLORIDE, POTASSIUM CHLORIDE, SODIUM LACTATE AND CALCIUM CHLORIDE: 600; 310; 30; 20 INJECTION, SOLUTION INTRAVENOUS at 09:17

## 2024-01-02 RX ADMIN — NIMODIPINE 60 MG: 30 CAPSULE, LIQUID FILLED ORAL at 22:01

## 2024-01-02 RX ADMIN — NIMODIPINE 60 MG: 30 CAPSULE, LIQUID FILLED ORAL at 09:10

## 2024-01-02 RX ADMIN — DIBASIC SODIUM PHOSPHATE, MONOBASIC POTASSIUM PHOSPHATE AND MONOBASIC SODIUM PHOSPHATE 1 TABLET: 852; 155; 130 TABLET ORAL at 17:51

## 2024-01-02 RX ADMIN — NIMODIPINE 60 MG: 30 CAPSULE, LIQUID FILLED ORAL at 06:48

## 2024-01-02 RX ADMIN — PRAVASTATIN SODIUM 40 MG: 40 TABLET ORAL at 20:17

## 2024-01-02 RX ADMIN — NIMODIPINE 60 MG: 30 CAPSULE, LIQUID FILLED ORAL at 00:59

## 2024-01-02 RX ADMIN — LEVETIRACETAM 500 MG: 500 TABLET, FILM COATED ORAL at 20:16

## 2024-01-02 RX ADMIN — METOPROLOL 25 MG: 25 TABLET ORAL at 20:16

## 2024-01-02 RX ADMIN — SODIUM CHLORIDE, PRESERVATIVE FREE 10 ML: 5 INJECTION INTRAVENOUS at 09:10

## 2024-01-02 RX ADMIN — TICAGRELOR 90 MG: 90 TABLET ORAL at 20:17

## 2024-01-02 RX ADMIN — METOPROLOL 25 MG: 25 TABLET ORAL at 09:10

## 2024-01-02 RX ADMIN — SODIUM CHLORIDE, POTASSIUM CHLORIDE, SODIUM LACTATE AND CALCIUM CHLORIDE: 600; 310; 30; 20 INJECTION, SOLUTION INTRAVENOUS at 19:13

## 2024-01-02 RX ADMIN — DIBASIC SODIUM PHOSPHATE, MONOBASIC POTASSIUM PHOSPHATE AND MONOBASIC SODIUM PHOSPHATE 1 TABLET: 852; 155; 130 TABLET ORAL at 22:01

## 2024-01-02 ASSESSMENT — PAIN SCALES - GENERAL
PAINLEVEL_OUTOF10: 0
PAINLEVEL_OUTOF10: 0

## 2024-01-02 NOTE — PROGRESS NOTES
Neurointerventional Surgery Progress Note  Neurocritical Care NP    Admit Date: 12/18/2023   LOS: 15 days      Daily Progress Note: 1/2/2024    Assessment & Plan     Cerebral Hemorrhage - SAH due to ruptured fusiform right MCA aneurysm, Morin Miller 3, Motley Grade 4, baseline mRS: 0. S/p cerebral angiogram w/ flow diversion stent embolization on 12/18/23 by Dr. Hamilton. Increase in SAH, IVH seen on repeat CTAh-n 12/31/23. Pt taken for cerebral angiogram revealing enlargement of previously embolized right ICA aneurysm thus additional flow-diverting stent placed by Dr. Hamilton on 12/31/23.     Cerebral vasospasm - S/p cerebral angiogram and IA nicardipine infusion on 12/26/2023 for cerebral vasospasm. 12/31/23: TCDs showed evidence of bilateral CARLITOS vasospasm. Pt treated w/  16 mg of IA verapamil during angiogram.    SBP Goal 100-140, prn Labetalol/Hydralazine  Q1hr neuro checks. Q2hr neuro checks ok overnight  Cont DAPT - ASA 81mg daily and ticagrelor 90mg BID  Day 16/21 of Nimodipine to prevent delayed cerebral ischemia  Keppra 500 mg BID for seizure ppx  Continue LR 100mL/hr to maintain euvolemia  Strict I&O,-100.1 for the past 24 hrs  Labs reviewed: , K 4.2, Mg 2.0 Phos 2.4.  BMP, mg, phos daily   TCDs daily  PT/OT/SLP evals      Plan d/w Dr. Hamilton, Intensivist, primary RN, pt and her .      HPI: Soledad Geller is a 58 y.o. AA female with a PMH of asthma, HTN, and HDL who presented to [facility] ED on 12/17/23 with decreased LOC and vomiting. She had been reporting headache for several days prior to presentation. Pt reported taking NSAIDs for pain but denied use of ASA or blood thinners. A stat CTH was obtained which showed a large SAH with IPH. CTA head/neck was then obtained which showed a large fusiform aneurysm of the M1 segment on the right measuring 8.8 x 16 x 9.9 mm. NSGY and NIS were consulted and pt was then transferred to [facility] for higher level of care. S/p cerebral angiogram w/ flow diversion stent

## 2024-01-02 NOTE — PROGRESS NOTES
Neurocritical Care Brief Progress Note:    S/P cerebral angio on 12/31 with Dr Hamilton following increasing weakness on left side and concern for worsening vasospasm on TCD. Currently day 15/21 of Nimodipine. Pt seen resting comfortably without complaints of headache, nausea or vomiting.       Physical Exam:  Gen: NAD, calm, cooperative  Neuro: Pt  very drowsy, opens eyes to loud voice. Oriented to name, place. Not oriented to month (reported \"twelfth\") or president (stated \"Tucker'). Follows commands. Speech dysarthric. Affect normal. PERRL, 3 mm bilaterally. Visual fields intact on the right. Left peripheral visual field cut (left partial hemianopia). Left visual neglect.   Extraocular movements with right gaze preference, but can cross midline. Face symmetric. Palate symmetric. Tongue midline. Yonis spontaneously. Strength 5/5 in RUE, 4/5 RLE, LUE 3/5, LLE 2/5. Bulk and tone normal. No involuntary movements. Gait deferred.  Skin: Warm, dry, color appropriate for ethnicity. Groin arteriotomy site soft and non-tender on palpation,no hematoma, active bleeding or drainage noted.     Continue plan per IVORY Manjarrez, APRN - CNP  Neurocritical Care Nurse Practitioner  [phone]

## 2024-01-02 NOTE — PLAN OF CARE
Speech LAnguage Pathology SWALLOWING RE-EVALUATION and MOTOR SPEECH AND COGNITIVE-LINGUISTIC EVALUATIONS    Patient: Soledad Geller (58 y.o. female)  Date: 1/2/2024  Primary Diagnosis: Aneurysmal subarachnoid hemorrhage (HCC) [I60.8]     Precautions: Aspiration, Fall Risk                  ASSESSMENT :  Therapy targeted dysphagia re-assessment as well as cognitive-linguistic and motor speech evaluations.     Dysphagia: Swallow re-assessed with thin liquids and solids. Patient self-fed using RUE with set-up assist. Prolonged mastication of solid with L oral pocketing which cleared with cued liquid wash. No overt s/s of aspiration were appreciated. Low threshold to downgrade PO diet texture should clinical presentation warrant (e.g., significant oral pocketing, poor intake related to effort/time required for meals). Patient's spouse present at bedside and stated he is present to assist patient with meals.     Cognition/Motor Speech: Cognitive deficits appreciated for attention, initiation, immediate and working memory, insight/awareness, problem solving, and L visuoperception. Cognition negatively impacted by patient's lethargy due to reported lack of sleep. Dysarthria present but speech intelligibility well-preserved. Patient with reduced breath support and prosody which impact naturalness of speech. She will benefit from skilled SLP services to address functional deficits at this and next levels of care. Recommend a high intensity and structured rehabilitation environment to optimize functional outcomes at next level of care.     Patient will benefit from skilled intervention to address the above impairments.     PLAN :  Recommendations and Planned Interventions:  Diet: Regular and thin liquids  -Oral medications whole with liquids or as tolerated  -Aspiration precautions: Must be fully alert, upright position, small bites and sips, slow rate of intake, alternate solids and liquids, check for L oral

## 2024-01-02 NOTE — PLAN OF CARE
Problem: Occupational Therapy - Adult  Goal: By Discharge: Performs self-care activities at highest level of function for planned discharge setting.  See evaluation for individualized goals.  Description: FUNCTIONAL STATUS PRIOR TO ADMISSION:  Patient was ambulatory without the use of AE  Receives Help From: Family, ADL Assistance: Independent, Ambulation Assistance: Independent, Transfer Assistance: Independent, Active : Yes     HOME SUPPORT: Patient lived with spouse but didn't require assistance.    Occupational Therapy Goals:  Initiated 12/19/2023.  Reviewed 12/27/23 and goals modified below:  1.  Patient will perform self-feeding with Modified [place] within 7 day(s).  Modify to set-up  2.  Patient will perform grooming with Minimal Assist within 7 day(s). Continue in sitting  3.  Patient will perform bathing with Minimal Assist within 7 day(s). Modify to mod A  4.  Patient will perform toilet transfers with Minimal Assist  within 7 day(s). Modify to max A  5.  Patient will perform all aspects of toileting with Minimal Assist within 7 day(s). Modify to max A  6.  Patient will participate in upper extremity therapeutic exercise/activities with Supervision for 5 minutes within 7 day(s).  Continue  7.  Patient will utilize energy conservation techniques during functional activities with verbal cues within 7 day(s).   Outcome: Progressing   OCCUPATIONAL THERAPY TREATMENT  Patient: Soledad Geller (58 y.o. female)  Date: 1/2/2024  Primary Diagnosis: Aneurysmal subarachnoid hemorrhage (HCC) [I60.8]       Precautions: Fall Risk                Chart, occupational therapy assessment, plan of care, and goals were reviewed.    ASSESSMENT  Patient continues to benefit from skilled OT services and is progressing towards goals. Patient received semi supine in bed, agreeable to working with therapy, although with increased lethargy. Initially with difficulty answering orientation questions but assisted to sitting

## 2024-01-02 NOTE — PROGRESS NOTES
I participated in the IDT meeting where the plan of care for Soledad Geller was discussed on [facility] 7S2 INTENSIVE CARE. I reviewed the patient's medical record prior to this meeting.    We will continue to follow and assess for spiritual/emotional support during this hospitalization.    Please contact  paging service for any immediate needs.      _____________________________  Sonia Sifuentes M.Div., M.S., B.C.C.  Staff

## 2024-01-02 NOTE — PLAN OF CARE
Problem: Physical Therapy - Adult  Goal: By Discharge: Performs mobility at highest level of function for planned discharge setting.  See evaluation for individualized goals.  Description: FUNCTIONAL STATUS PRIOR TO ADMISSION: Patient was independent and active without use of DME.    HOME SUPPORT PRIOR TO ADMISSION: The patient lived with spouse but did not require assistance.    Physical Therapy Goals  Initiated 12/19/2023, continued 12/27/2023  1.  Patient will move from supine to sit and sit to supine in bed with minimal assistance within 7 day(s).    2.  Patient will perform sit to stand with minimal assistance within 7 day(s).  3.  Patient will transfer from bed to chair and chair to bed with minimal assistance using the least restrictive device within 7 day(s).  4.  Patient will ambulate with moderate assistance for 10 feet with the least restrictive device within 7 day(s).   5.  Patient will ascend/descend 2 stairs with 1 handrail(s) with moderate assistance within 7 day(s).  6.  Patient will improve [place] Balance score by 7 points within 7 days.   Outcome: Progressing       PHYSICAL THERAPY TREATMENT    Patient: Soledad Geller (58 y.o. female)  Date: 1/2/2024  Diagnosis: Aneurysmal subarachnoid hemorrhage (HCC) [I60.8] Aneurysmal subarachnoid hemorrhage (HCC)      Precautions: Fall Risk                    ASSESSMENT:  Patient continues to benefit from skilled PT services and is progressing towards goals. Patient very lethargic during treatment - needing vestibular stimulation of being sat edge of bed to alert. Patient demonstrating improved left extremities movement but fatigues quickly. Patient able to participate with standing balance activities with min to mod assist at left hip and knee to facilitated extension. Patient with needed max to total assist for cervical rom to left - patient at rest with right rotation and right lateral neck flexion.   Recommend inpatient rehab         PLAN:  Patient

## 2024-01-02 NOTE — PROGRESS NOTES
MD Daniel        niCARdipine (CARDENE) 25 mg in sodium chloride 0.9 % 15 mL IV syringe   IntraVENous PRN Venu Hamilton MD        labetalol (NORMODYNE;TRANDATE) injection 10 mg  10 mg IntraVENous Q4H PRN Angle Huang APRN - NP        hydrALAZINE (APRESOLINE) injection 10 mg  10 mg IntraVENous Q6H PRN Raul Bruce MD        metoprolol tartrate (LOPRESSOR) tablet 25 mg  25 mg Oral BID Leeanne Gentile MD   25 mg at 01/02/24 0910    lactated ringers IV soln infusion   IntraVENous Continuous Leeanne Gentile  mL/hr at 01/02/24 0917 New Bag at 01/02/24 0917    niMODipine (NIMOTOP) capsule 60 mg  60 mg Oral 6 times per day Leeanne Gentile MD   60 mg at 01/02/24 0910    potassium chloride (KLOR-CON) extended release tablet 40 mEq  40 mEq Oral PRN Leeanne Gentile MD   40 mEq at 12/24/23 1205    Or    potassium bicarb-citric acid (EFFER-K) effervescent tablet 40 mEq  40 mEq Oral PRN Leeanne Gentile MD        Or    potassium chloride 10 mEq/100 mL IVPB (Peripheral Line)  10 mEq IntraVENous PRN Leeanne Gentile MD        magnesium sulfate 2000 mg in 50 mL IVPB premix  2,000 mg IntraVENous PRN Leeanne Gentile MD        levETIRAcetam (KEPPRA) tablet 500 mg  500 mg Oral BID Leeanne Gentile MD   500 mg at 01/02/24 0910    pravastatin (PRAVACHOL) tablet 40 mg  40 mg Oral Nightly Leeanne Gentile MD   40 mg at 01/01/24 2037    aspirin chewable tablet 81 mg  81 mg Oral Daily Leeanne Gentile MD   81 mg at 01/02/24 0910    ticagrelor (BRILINTA) tablet 90 mg  90 mg Oral BID Leeanne Gentile MD   90 mg at 01/02/24 0910    sodium chloride flush 0.9 % injection 5-40 mL  5-40 mL IntraVENous 2 times per day Leeanne Gentile MD   10 mL at 01/02/24 0928    sodium chloride flush 0.9 % injection 5-40 mL  5-40 mL IntraVENous PRN Leeanne Gentile MD        0.9 % sodium chloride infusion   IntraVENous PRN Leeanne Gentile MD        ondansetron (ZOFRAN-ODT) disintegrating tablet 4 mg  4 mg Oral Q8H PRN Leeanne Gentile MD        Or     ondansetron (ZOFRAN) injection 4 mg  4 mg IntraVENous Q6H PRN Leeanne Gentile MD        polyethylene glycol (GLYCOLAX) packet 17 g  17 g Oral Daily PRN Leeanne Gentile MD        acetaminophen (TYLENOL) tablet 650 mg  650 mg Oral Q6H PRN Leeanne Gentile MD   650 mg at 12/31/23 1833    Or    acetaminophen (TYLENOL) suppository 650 mg  650 mg Rectal Q6H PRN Leeanne Gentile MD        sennosides-docusate sodium (SENOKOT-S) 8.6-50 MG tablet 1 tablet  1 tablet Oral BID Leeanne Gentile MD   1 tablet at 01/01/24 0757    dextrose bolus 10% 125 mL  125 mL IntraVENous PRN Leeanne Gentile MD        Or    dextrose bolus 10% 250 mL  250 mL IntraVENous PRN Leeanne Gentile MD        glucagon injection 1 mg  1 mg SubCUTAneous PRN Leeanne Gentile MD        dextrose 10 % infusion   IntraVENous Continuous PRN Leeanne Gentile MD        fentaNYL (SUBLIMAZE) injection 25 mcg  25 mcg IntraVENous Q2H PRN Leeanne Gentile MD        sodium chloride flush 0.9 % injection 5-40 mL  5-40 mL IntraVENous 2 times per day Leeanne Gentile MD   10 mL at 01/02/24 0910    sodium chloride flush 0.9 % injection 5-40 mL  5-40 mL IntraVENous PRN Leeanne Gentile MD        0.9 % sodium chloride infusion   IntraVENous PRN Leeanne Gentile MD            Vitals:    01/02/24 1200   BP: (!) 136/99   Pulse: 65   Resp: 21   Temp: 98.7 °F (37.1 °C)   SpO2: 98%      NAD,  slow to anser  RRR  Soft abdomen  Lower extremities show trace ankle edema.  Neurological examination easily arousable, follows commands moves all 4 extremities, strength on right greater than left    Recent Labs     01/01/24  0425 01/02/24  0508   WBC 8.4 8.4   HGB 10.8* 11.0*   HCT 32.8* 33.4*   * 470*       Recent Labs     01/01/24  0425 01/02/24  0508    135*   K 3.8 4.2    108   CO2 26 21   BUN 8 8   MG 2.2 2.0   PHOS 3.1 2.4*       No results for input(s): \"PH\", \"PCO2\", \"PO2\", \"HCO3\", \"FIO2\" in the last 72 hours.    Vascular transcranial Doppler (TCD) complete         Vascular

## 2024-01-03 ENCOUNTER — APPOINTMENT (OUTPATIENT)
Facility: HOSPITAL | Age: 59
End: 2024-01-03
Attending: ANESTHESIOLOGY
Payer: OTHER GOVERNMENT

## 2024-01-03 LAB
ANION GAP SERPL CALC-SCNC: 4 MMOL/L (ref 5–15)
BUN SERPL-MCNC: 7 MG/DL (ref 6–20)
BUN/CREAT SERPL: 9 (ref 12–20)
CALCIUM SERPL-MCNC: 10.5 MG/DL (ref 8.5–10.1)
CHLORIDE SERPL-SCNC: 106 MMOL/L (ref 97–108)
CO2 SERPL-SCNC: 26 MMOL/L (ref 21–32)
CREAT SERPL-MCNC: 0.75 MG/DL (ref 0.55–1.02)
ERYTHROCYTE [DISTWIDTH] IN BLOOD BY AUTOMATED COUNT: 12.5 % (ref 11.5–14.5)
GLUCOSE SERPL-MCNC: 99 MG/DL (ref 65–100)
HCT VFR BLD AUTO: 41.1 % (ref 35–47)
HGB BLD-MCNC: 13 G/DL (ref 11.5–16)
MAGNESIUM SERPL-MCNC: 2.1 MG/DL (ref 1.6–2.4)
MCH RBC QN AUTO: 28.7 PG (ref 26–34)
MCHC RBC AUTO-ENTMCNC: 31.6 G/DL (ref 30–36.5)
MCV RBC AUTO: 90.7 FL (ref 80–99)
NRBC # BLD: 0 K/UL (ref 0–0.01)
NRBC BLD-RTO: 0 PER 100 WBC
PHOSPHATE SERPL-MCNC: 3.2 MG/DL (ref 2.6–4.7)
PLATELET # BLD AUTO: 544 K/UL (ref 150–400)
PMV BLD AUTO: 8.5 FL (ref 8.9–12.9)
POTASSIUM SERPL-SCNC: 3.8 MMOL/L (ref 3.5–5.1)
RBC # BLD AUTO: 4.53 M/UL (ref 3.8–5.2)
SODIUM SERPL-SCNC: 136 MMOL/L (ref 136–145)
WBC # BLD AUTO: 7 K/UL (ref 3.6–11)

## 2024-01-03 PROCEDURE — 93886 INTRACRANIAL COMPLETE STUDY: CPT

## 2024-01-03 PROCEDURE — 6370000000 HC RX 637 (ALT 250 FOR IP): Performed by: INTERNAL MEDICINE

## 2024-01-03 PROCEDURE — 97530 THERAPEUTIC ACTIVITIES: CPT

## 2024-01-03 PROCEDURE — 2580000003 HC RX 258: Performed by: INTERNAL MEDICINE

## 2024-01-03 PROCEDURE — 85027 COMPLETE CBC AUTOMATED: CPT

## 2024-01-03 PROCEDURE — 36415 COLL VENOUS BLD VENIPUNCTURE: CPT

## 2024-01-03 PROCEDURE — 99232 SBSQ HOSP IP/OBS MODERATE 35: CPT

## 2024-01-03 PROCEDURE — 2000000000 HC ICU R&B

## 2024-01-03 PROCEDURE — 83735 ASSAY OF MAGNESIUM: CPT

## 2024-01-03 PROCEDURE — 80048 BASIC METABOLIC PNL TOTAL CA: CPT

## 2024-01-03 PROCEDURE — 97535 SELF CARE MNGMENT TRAINING: CPT

## 2024-01-03 PROCEDURE — 84100 ASSAY OF PHOSPHORUS: CPT

## 2024-01-03 PROCEDURE — 70450 CT HEAD/BRAIN W/O DYE: CPT

## 2024-01-03 RX ORDER — METHYLPHENIDATE HYDROCHLORIDE 5 MG/1
5 TABLET ORAL 2 TIMES DAILY
Status: DISCONTINUED | OUTPATIENT
Start: 2024-01-03 | End: 2024-01-11

## 2024-01-03 RX ADMIN — NIMODIPINE 60 MG: 30 CAPSULE, LIQUID FILLED ORAL at 06:05

## 2024-01-03 RX ADMIN — METHYLPHENIDATE HYDROCHLORIDE 5 MG: 5 TABLET ORAL at 12:40

## 2024-01-03 RX ADMIN — METOPROLOL 25 MG: 25 TABLET ORAL at 20:08

## 2024-01-03 RX ADMIN — LEVETIRACETAM 500 MG: 500 TABLET, FILM COATED ORAL at 08:47

## 2024-01-03 RX ADMIN — METOPROLOL 25 MG: 25 TABLET ORAL at 08:11

## 2024-01-03 RX ADMIN — PRAVASTATIN SODIUM 40 MG: 40 TABLET ORAL at 20:09

## 2024-01-03 RX ADMIN — NIMODIPINE 60 MG: 30 CAPSULE, LIQUID FILLED ORAL at 08:11

## 2024-01-03 RX ADMIN — NIMODIPINE 60 MG: 30 CAPSULE, LIQUID FILLED ORAL at 13:19

## 2024-01-03 RX ADMIN — NIMODIPINE 60 MG: 30 CAPSULE, LIQUID FILLED ORAL at 21:51

## 2024-01-03 RX ADMIN — SODIUM CHLORIDE, PRESERVATIVE FREE 10 ML: 5 INJECTION INTRAVENOUS at 08:11

## 2024-01-03 RX ADMIN — LEVETIRACETAM 500 MG: 500 TABLET, FILM COATED ORAL at 20:09

## 2024-01-03 RX ADMIN — SODIUM CHLORIDE, POTASSIUM CHLORIDE, SODIUM LACTATE AND CALCIUM CHLORIDE: 600; 310; 30; 20 INJECTION, SOLUTION INTRAVENOUS at 14:58

## 2024-01-03 RX ADMIN — SODIUM CHLORIDE, POTASSIUM CHLORIDE, SODIUM LACTATE AND CALCIUM CHLORIDE: 600; 310; 30; 20 INJECTION, SOLUTION INTRAVENOUS at 04:09

## 2024-01-03 RX ADMIN — SODIUM CHLORIDE, PRESERVATIVE FREE 10 ML: 5 INJECTION INTRAVENOUS at 20:09

## 2024-01-03 RX ADMIN — ASPIRIN 81 MG CHEWABLE TABLET 81 MG: 81 TABLET CHEWABLE at 08:11

## 2024-01-03 RX ADMIN — NIMODIPINE 60 MG: 30 CAPSULE, LIQUID FILLED ORAL at 17:02

## 2024-01-03 RX ADMIN — TICAGRELOR 90 MG: 90 TABLET ORAL at 08:11

## 2024-01-03 RX ADMIN — NIMODIPINE 60 MG: 30 CAPSULE, LIQUID FILLED ORAL at 02:00

## 2024-01-03 RX ADMIN — TICAGRELOR 90 MG: 90 TABLET ORAL at 20:08

## 2024-01-03 ASSESSMENT — PAIN SCALES - GENERAL
PAINLEVEL_OUTOF10: 0
PAINLEVEL_OUTOF10: 0

## 2024-01-03 NOTE — PROGRESS NOTES
Neurocritical Care Brief Progress Note:    Repeat CTH done: Evidence of right MCA stent, mild edema in area of right internal capsule, stable appearance of SAH and IVH.  Plan for repeat cerebral angiogram later this week.        Physical Exam:  Gen: NAD, drowsy, cooperative  Neuro: A&O to person, place, disoriented to month. Follows commands. Speech dysarthric. PERRL, 3 mm bilaterally. Blinks to threat. Right gaze preference, but EOMI. Moderate dysarthria. Palate symmetric. Tongue midline. Strength 5/5 in RUE, 4/5 in RLE, 4/5 in LUE, 2/5 in LLE. Bulk and tone normal. No involuntary movements. Gait deferred.   Skin: Warm, dry, color appropriate for ethnicity.       BETZY Xavier - YURIDIA  Neurocritical Care Nurse Practitioner

## 2024-01-03 NOTE — PROGRESS NOTES
Neurointerventional Surgery Progress Note  Neurocritical Care NP    Admit Date: 12/18/2023   LOS: 16 days      Daily Progress Note: 1/3/2024    Assessment & Plan     Cerebral Hemorrhage - SAH due to ruptured fusiform right MCA aneurysm, Morin Miller 3, Motley Grade 4, baseline mRS: 0. S/p cerebral angiogram w/ flow diversion stent embolization on 12/18/23 by Dr. Hamilton. Increase in SAH, IVH seen on repeat CTAh-n 12/31/23. Pt taken for cerebral angiogram revealing enlargement of previously embolized right ICA aneurysm thus additional flow-diverting stent placed by Dr. Hamilton on 12/31/23.     Cerebral vasospasm - S/p cerebral angiogram and IA nicardipine infusion on 12/26/2023 for cerebral vasospasm. 12/31/23: TCDs showed evidence of bilateral CARLITOS vasospasm. Pt treated w/  16 mg of IA verapamil during angiogram.    Persistent drowsiness continues. Concern for hypoactive delirium  Obtain CTH  SBP Goal 100-140, prn Labetalol/Hydralazine  Q1hr neuro checks. Q2hr neuro checks ok overnight  Cont DAPT - ASA 81mg daily and ticagrelor 90mg BID  Day 17/21 of Nimodipine to prevent delayed cerebral ischemia  Keppra 500 mg BID for seizure ppx  Continue LR 100mL/hr to maintain euvolemia  Strict I&O.  +790mL for the past 24 hrs  Labs reviewed: , K 3.8, Mg 2.1 Phos 3.2.  BMP, mg, phos daily   TCDs daily. 1/2/31: evidence of right MCA and bilateral CARLITOS vasospasm  PT/OT/SLP evals      Plan d/w Dr. Hamilton, Intensivist, primary RN, pt and her .      HPI: Soledad Geller is a 58 y.o. AA female with a PMH of asthma, HTN, and HDL who presented to [facility] ED on 12/17/23 with decreased LOC and vomiting. She had been reporting headache for several days prior to presentation. Pt reported taking NSAIDs for pain but denied use of ASA or blood thinners. A stat CTH was obtained which showed a large SAH with IPH. CTA head/neck was then obtained which showed a large fusiform aneurysm of the M1 segment on the right measuring 8.8 x 16 x 9.9 mm.

## 2024-01-03 NOTE — PROGRESS NOTES
[facility] Progress Note        Soledad Geller  1965  [id number]  1/3/2024      Assessment and plan:  Cerebral Hemorrhage due to SAH due to ruptured fusiform right MCA aneurysm  -post flow diversion stent embo done on 12/18  -bleeding worse after first intervention due to pseudoaneurysm or worsening right ICA aneurysm   -repeat angio with additional flow diverting stent placed to right ICA aneurysm   -complicated by acute CARLITOS vasospasm despite nimodipine ppx  -cont TCDs daily   - post intracerebral verapamil and cardene by NIS    -target SBP < 160  -cont keppra for szr ppx, 500 IV BID       Acute encephalopathy:  -very lethargic  -? Hypoactive delirium  -trial ritalin 5 bID at 0800 and 1300    HTN:  -prn labetalol and hydralzine    -target SBP < 160    Discussed with neurointerventional team.  Discussed with case management      HPI:  Stable.  Very lethargic.  HCT this am  shows  stable SAH and IVH.        ICU DAILY CHECKLIST     Code Status:full   DVT Prophylaxis: SCDs  T/L/D: PIVs, garcia   SUP: yes  Diet: yes  Activity Level:as tolerated  ABCDEF Bundle/Checklist Completed:Yes  Disposition: cont ICU care  Multidisciplinary Rounds Completed: yes  Goals of Care Discussion/Palliative: yes  Patient/Family Updated: yes      OBJECTIVE  Vitals:    01/03/24 1100 01/03/24 1109 01/03/24 1200 01/03/24 1300   BP: 107/75  (!) 122/104 (!) 128/95   Pulse: 71  76 71   Resp: 16  15 17   Temp:   99.1 °F (37.3 °C)    TempSrc:   Oral    SpO2: 99%  100% 100%   Weight:       Height:  1.727 m (5' 7.99\")       EXAM:   GEN: sleepy, lethargic, difficult to arouse.  Has to be woken to be fed. Not interactive    HEENT  -Head: NC/AT;  -Eyes: PERRL, EOMI. No discharge or redness;  -Ears: External ears are normal. Normal TMs.  -Nose: Normal nares.  -Mouth and throat: MMM. Normal gums, mucosa, palate,. Good dentition.  NECK: Supple, with no masses. No JVD   CV: RRR, no m/r/g.  LUNGS: CTAB, no w/r/c.  ABD: Soft, NT/ND, no

## 2024-01-03 NOTE — PROGRESS NOTES
4 Eyes Skin Assessment     NAME:  Soledad Geller  YOB: 1965  MEDICAL RECORD NUMBER:  [id number]    The patient is being assessed for  Shift Handoff    I agree that at least one RN has performed a thorough Head to Toe Skin Assessment on the patient. ALL assessment sites listed below have been assessed.      Areas assessed by both nurses:    Head, Face, Ears, Shoulders, Back, Chest, Arms, Elbows, Hands, Sacrum. Buttock, Coccyx, Ischium, Legs. Feet and Heels, and Under Medical Devices         Does the Patient have a Wound? No noted wound(s)       Eyad Prevention initiated by RN: Yes  Wound Care Orders initiated by RN: No    Pressure Injury (Stage 3,4, Unstageable, DTI, NWPT, and Complex wounds) if present, place Wound referral order by RN under : No    New Ostomies, if present place, Ostomy referral order under : No     Nurse 1 eSignature: Electronically signed by Astrid Alfaro RN on 1/3/24 at 12:27 AM EST    **SHARE this note so that the co-signing nurse can place an eSignature**    Nurse 2 eSignature: {Esignature:[id number]}

## 2024-01-03 NOTE — PLAN OF CARE
Problem: Occupational Therapy - Adult  Goal: By Discharge: Performs self-care activities at highest level of function for planned discharge setting.  See evaluation for individualized goals.  Description: FUNCTIONAL STATUS PRIOR TO ADMISSION:  Patient was ambulatory without the use of AE  Receives Help From: Family, ADL Assistance: Independent, Ambulation Assistance: Independent, Transfer Assistance: Independent, Active : Yes     HOME SUPPORT: Patient lived with spouse but didn't require assistance.    Occupational Therapy Goals:  Initiated 12/19/2023.  Reviewed 12/27/23 and goals modified.    Weekly Re-assessment 1/3/24:  1.  Patient will perform self-feeding with Modified [place] within 7 day(s).  CONTINUE  2.  Patient will perform grooming in standing with ModA within 7 day(s). Downgrade from Michael to ModA.  3.  Patient will perform bathing with ModA within 7 day(s). Downgrade from Michael to ModA  4.  Patient will perform toilet transfers with Max Assist  within 7 day(s). CONTINUE  5.  Patient will perform all aspects of toileting with Max Assist within 7 day(s). CONTINUE  6.  Patient will participate in upper extremity therapeutic exercise/activities with Supervision for 5 minutes within 7 day(s).  Continue  7.  Patient will utilize energy conservation techniques during functional activities with verbal cues within 7 day(s).   Outcome: Progressing   OCCUPATIONAL THERAPY TREATMENT  Patient: Soledad Geller (58 y.o. female)  Date: 1/3/2024  Primary Diagnosis: Aneurysmal subarachnoid hemorrhage (HCC) [I60.8]       Precautions: Fall Risk                Chart, occupational therapy assessment, plan of care, and goals were reviewed.    ASSESSMENT  Patient continues to benefit from skilled OT services and is progressing towards goals. Pt transferred to EOB MaxA. Pt found incontinent of bowel, Pt reporting she was aware, educated Pt on notifying staff to get cleaned to avoid infection. Pt requiring totalA for  anterior flexion to reach with RUE to participate in doffing socks.    Toileting: Dependent/Total  Toileting Skilled Clinical Factors: Pt requiring totalA for perineal hygiene while standing MaxA.    Functional Mobility: Dependent/Total  Functional Mobility Skilled Clinical Factors: MaxA x3 for functional mobility. Max cues for sequencing stepping, R lateral lean, and cues to attend to task.     Skin Care: Chlorhexidine wipes    Pain Ratin/10   Pain Intervention(s):   pain is at a level acceptable to the patient      Activity Tolerance:   Fair , HR max during session 139 bpm  Please refer to the flowsheet for vital signs taken during this treatment.    After treatment:   Patient left in no apparent distress sitting up in chair, Call bell within reach, and Bed/ chair alarm activated, Pt then returned to bed after sitting up for approx 30 min 2 person assist (OT/PT). with bed alarm on    COMMUNICATION/EDUCATION:   The patient's plan of care was discussed with: physical therapist and registered nurse    Patient Education  Education Given To: Patient  Education Provided: Role of Therapy;Plan of Care;Transfer Training;ADL Adaptive Strategies  Education Provided Comments: Educated on functionally using LUE and attending to L side.  Education Method: Verbal  Barriers to Learning: Cognition;Other (Comment) (L inattention)  Education Outcome: Continued education needed    Thank you for this referral.  Cheryl Guerrero OT  Minutes: 36

## 2024-01-03 NOTE — CARE COORDINATION
Transition of Care Plan:    RUR: 11 %   Prior Level of Functioning: Independent   Disposition: IPR   IPR: Date FOC offered: 12/21/23  Date FOC received: 12/21/23  Accepting facility: [facility]  Transportation at discharge: S  /IMM [place] letter given: NA  Is patient a [place] and connected with [facility]? No  Caregiver Contact: Ravinder Geller   Discharge Caregiver contacted prior to discharge? Yes  Care Conference needed? No  Barriers to discharge:    Medical stability   Patient day 17/21/ of [facility]  Daily TCD's    Plan for repeat cerebral angiogram later this week.   Heather Valentin RN,Care Management

## 2024-01-03 NOTE — PLAN OF CARE
level  Home Access: Stairs to enter with rails  Entrance Stairs - Number of Steps: 2  Entrance Stairs - Rails: Both  Bathroom Shower/Tub: Tub/Shower unit  Bathroom Equipment: Grab bars in shower, Shower chair  Home Equipment: None  Receives Help From: Family  ADL Assistance: Independent  Ambulation Assistance: Independent  Transfer Assistance: Independent  Active : Yes  Mode of Transportation: Car  Type of Occupation: [facility]  Critical Behavior:     Cognition  Overall Cognitive Status: Exceptions  Following Commands: Follows one step commands with repetition;Follows one step commands with increased time  Attention Span: Difficulty attending to directions;Difficulty dividing attention  Safety Judgement: Decreased awareness of need for assistance;Decreased awareness of need for safety  Problem Solving: Assistance required to implement solutions;Assistance required to identify errors made;Decreased awareness of errors;Assistance required to generate solutions;Assistance required to correct errors made  Insights: Decreased awareness of deficits  Initiation: Requires cues for some  Sequencing: Requires cues for some        Vision/Perceptual:           Patient with left inattention; patient with right cervical rotation with right lateral cervical flexion  Patient needing max manual and verbal cuing to attempt to attend to left       Strength:    Strength: Grossly decreased, non-functional    Tone & Sensation:   Tone: Normal       Range Of Motion:  AROM: Generally decreased, functional  PROM: Generally decreased, functional    Functional Mobility:  Bed Mobility:     Bed Mobility Training  Bed Mobility Training: Yes  Interventions: Tactile cues;Verbal cues  Supine to Sit: Maximum assistance;Assist X2  Sit to Supine: Moderate assistance;Assist X2  Scooting: Minimum assistance  Transfers:     Transfer Training  Transfer Training: Yes  Interventions: Tactile cues;Verbal cues  Sit to Stand: Moderate  referral.  Zhou Bui, PT  Minutes: 39

## 2024-01-03 NOTE — PROGRESS NOTES
Neurocritical Care Brief Progress Note:    S/P cerebral angio on 12/31 with Dr Hamilton following increasing weakness on left side and concern for worsening vasospasm on TCD. Currently day 83918 of Nimodipine. Pt seen resting comfortably without complaints of headache, nausea or vomiting. TCDs from 1/2/24 stable and comparable to 1/1/24 findings.       Physical Exam:  Gen: NAD, calm, cooperative  Neuro: Eyes open spontaneously when she hears voices in the room.  Oriented to name, place, month and president. Follows commands. Speech dysarthric. Affect normal. PERRL, 3 mm bilaterally. Visual fields intact on the right. Left peripheral visual field cut (left partial hemianopia). Left visual neglect.   Extraocular movements with right gaze preference, but can cross midline. Face symmetric. Palate symmetric. Tongue midline. Yonis spontaneously. Strength 5/5 in RUE, 4/5 RLE, LUE 4/5, LLE 1/5. Bulk and tone normal. No involuntary movements. Gait deferred.  Skin: Warm, dry, color appropriate for ethnicity.       0622:  Reviewed I/O with bedside RN. No maintenance charted from  on 1/2/24, nurse confirms that maintenance at 100ml/hour was running. Pt is +500 when considering this addition.     Continue plan per IVORY Manjarrez, BETZY - CNP  Neurocritical Care Nurse Practitioner  [phone]

## 2024-01-04 ENCOUNTER — APPOINTMENT (OUTPATIENT)
Facility: HOSPITAL | Age: 59
End: 2024-01-04
Attending: ANESTHESIOLOGY
Payer: OTHER GOVERNMENT

## 2024-01-04 LAB
ANION GAP SERPL CALC-SCNC: 6 MMOL/L (ref 5–15)
BUN SERPL-MCNC: 8 MG/DL (ref 6–20)
BUN/CREAT SERPL: 11 (ref 12–20)
CALCIUM SERPL-MCNC: 10.5 MG/DL (ref 8.5–10.1)
CHLORIDE SERPL-SCNC: 106 MMOL/L (ref 97–108)
CO2 SERPL-SCNC: 23 MMOL/L (ref 21–32)
CREAT SERPL-MCNC: 0.71 MG/DL (ref 0.55–1.02)
ECHO BSA: 1.93 M2
ERYTHROCYTE [DISTWIDTH] IN BLOOD BY AUTOMATED COUNT: 12.8 % (ref 11.5–14.5)
GLUCOSE SERPL-MCNC: 115 MG/DL (ref 65–100)
HCT VFR BLD AUTO: 37.9 % (ref 35–47)
HGB BLD-MCNC: 12.2 G/DL (ref 11.5–16)
MAGNESIUM SERPL-MCNC: 2.1 MG/DL (ref 1.6–2.4)
MCH RBC QN AUTO: 28.8 PG (ref 26–34)
MCHC RBC AUTO-ENTMCNC: 32.2 G/DL (ref 30–36.5)
MCV RBC AUTO: 89.4 FL (ref 80–99)
NRBC # BLD: 0 K/UL (ref 0–0.01)
NRBC BLD-RTO: 0 PER 100 WBC
PHOSPHATE SERPL-MCNC: 3.4 MG/DL (ref 2.6–4.7)
PLATELET # BLD AUTO: 530 K/UL (ref 150–400)
PMV BLD AUTO: 8.6 FL (ref 8.9–12.9)
POTASSIUM SERPL-SCNC: 4.3 MMOL/L (ref 3.5–5.1)
RBC # BLD AUTO: 4.24 M/UL (ref 3.8–5.2)
SODIUM SERPL-SCNC: 135 MMOL/L (ref 136–145)
VAS BASILAR ARTERY EDV: 26.1 CM/S
VAS BASILAR ARTERY EDV: 27.6 CM/S
VAS BASILAR ARTERY EDV: 27.9 CM/S
VAS BASILAR ARTERY EDV: 29.5 CM/S
VAS BASILAR ARTERY MEAN VEL: 39 CM/S
VAS BASILAR ARTERY MEAN VEL: 40 CM/S
VAS BASILAR ARTERY MEAN VEL: 43 CM/S
VAS BASILAR ARTERY MEAN VEL: 44 CM/S
VAS BASILAR ARTERY PSV: 65.4 CM/S
VAS BASILAR ARTERY PSV: 65.4 CM/S
VAS BASILAR ARTERY PSV: 72.4 CM/S
VAS BASILAR ARTERY PSV: 72.9 CM/S
VAS LEFT ACA EDV: 65.7 CM/S
VAS LEFT ACA EDV: 70.6 CM/S
VAS LEFT ACA EDV: 75.1 CM/S
VAS LEFT ACA EDV: 76.5 CM/S
VAS LEFT ACA MEAN VEL: 101.8 CM/S
VAS LEFT ACA MEAN VEL: 103.2 CM/S
VAS LEFT ACA MEAN VEL: 92.1 CM/S
VAS LEFT ACA MEAN VEL: 95.4 CM/S
VAS LEFT ACA PSV: 145 CM/S
VAS LEFT ACA PSV: 145 CM/S
VAS LEFT ACA PSV: 152.5 CM/S
VAS LEFT ACA PSV: 159.5 CM/S
VAS LEFT EX ICA MEAN VEL: 24 CM/S
VAS LEFT EX ICA MEAN VEL: 29 CM/S
VAS LEFT EX ICA MEAN VEL: 32 CM/S
VAS LEFT EX ICA MEAN VEL: 32 CM/S
VAS LEFT ICA DIST EDV: 14.5 CM/S
VAS LEFT ICA DIST EDV: 18.4 CM/S
VAS LEFT ICA DIST EDV: 18.8 CM/S
VAS LEFT ICA DIST EDV: 19.4 CM/S
VAS LEFT ICA DIST PSV: 40.8 CM/S
VAS LEFT ICA DIST PSV: 52.1 CM/S
VAS LEFT ICA DIST PSV: 57.8 CM/S
VAS LEFT ICA DIST PSV: 58.1 CM/S
VAS LEFT ICA EDV: 33.2 CM/S
VAS LEFT ICA EDV: 33.3 CM/S
VAS LEFT ICA EDV: 35.4 CM/S
VAS LEFT ICA EDV: 37 CM/S
VAS LEFT ICA MEAN VEL: 52.2 CM/S
VAS LEFT ICA MEAN VEL: 52.7 CM/S
VAS LEFT ICA MEAN VEL: 53.1 CM/S
VAS LEFT ICA MEAN VEL: 53.7 CM/S
VAS LEFT ICA PSV: 87.2 CM/S
VAS LEFT ICA PSV: 87.2 CM/S
VAS LEFT ICA PSV: 89.9 CM/S
VAS LEFT ICA PSV: 92.8 CM/S
VAS LEFT LINDEGAARD RATIO: 1.4
VAS LEFT LINDEGAARD RATIO: 1.5
VAS LEFT LINDEGAARD RATIO: 2.1
VAS LEFT LINDEGAARD RATIO: 2.2
VAS LEFT MCA 1 EDV: 29.2 CM/S
VAS LEFT MCA 1 EDV: 29.3 CM/S
VAS LEFT MCA 1 EDV: 31.4 CM/S
VAS LEFT MCA 1 EDV: 45.1 CM/S
VAS LEFT MCA 1 MEAN VEL: 45.3 CM/S
VAS LEFT MCA 1 MEAN VEL: 49 CM/S
VAS LEFT MCA 1 MEAN VEL: 50 CM/S
VAS LEFT MCA 1 MEAN VEL: 65 CM/S
VAS LEFT MCA 1 PSV: 104.9 CM/S
VAS LEFT MCA 1 PSV: 77.5 CM/S
VAS LEFT MCA 1 PSV: 84.1 CM/S
VAS LEFT MCA 1 PSV: 91.5 CM/S
VAS LEFT PCA 1 EDV: 24 CM/S
VAS LEFT PCA 1 EDV: 24.9 CM/S
VAS LEFT PCA 1 EDV: 25.2 CM/S
VAS LEFT PCA 1 EDV: 34.9 CM/S
VAS LEFT PCA 1 MEAN VEL: 35.6 CM/S
VAS LEFT PCA 1 MEAN VEL: 36.5 CM/S
VAS LEFT PCA 1 MEAN VEL: 38.3 CM/S
VAS LEFT PCA 1 MEAN VEL: 46.5 CM/S
VAS LEFT PCA 1 PSV: 56.9 CM/S
VAS LEFT PCA 1 PSV: 61.5 CM/S
VAS LEFT PCA 1 PSV: 64.5 CM/S
VAS LEFT PCA 1 PSV: 69.8 CM/S
VAS LEFT VERTEBRAL EDV TCD: 17.9 CM/S
VAS LEFT VERTEBRAL EDV TCD: 19.7 CM/S
VAS LEFT VERTEBRAL EDV TCD: 21.8 CM/S
VAS LEFT VERTEBRAL EDV TCD: 23.1 CM/S
VAS LEFT VERTEBRAL MEAN VEL: 27.9 CM/S
VAS LEFT VERTEBRAL MEAN VEL: 30.3 CM/S
VAS LEFT VERTEBRAL MEAN VEL: 32.8 CM/S
VAS LEFT VERTEBRAL MEAN VEL: 35.3 CM/S
VAS LEFT VERTEBRAL PSV TCD: 47.4 CM/S
VAS LEFT VERTEBRAL PSV TCD: 48 CM/S
VAS LEFT VERTEBRAL PSV TCD: 59 CM/S
VAS LEFT VERTEBRAL PSV TCD: 59.6 CM/S
VAS RIGHT ACA EDV: 60 CM/S
VAS RIGHT ACA EDV: 69.2 CM/S
VAS RIGHT ACA EDV: 71.2 CM/S
VAS RIGHT ACA EDV: 74.9 CM/S
VAS RIGHT ACA MEAN VEL: 107.4 CM/S
VAS RIGHT ACA MEAN VEL: 87.5 CM/S
VAS RIGHT ACA MEAN VEL: 94.7 CM/S
VAS RIGHT ACA MEAN VEL: 98.7 CM/S
VAS RIGHT ACA PSV: 142.4 CM/S
VAS RIGHT ACA PSV: 145.7 CM/S
VAS RIGHT ACA PSV: 153.6 CM/S
VAS RIGHT ACA PSV: 172.4 CM/S
VAS RIGHT EX ICA MEAN VEL: 26 CM/S
VAS RIGHT EX ICA MEAN VEL: 30 CM/S
VAS RIGHT EX ICA MEAN VEL: 33 CM/S
VAS RIGHT EX ICA MEAN VEL: 35 CM/S
VAS RIGHT ICA DIST EDV: 14.4 CM/S
VAS RIGHT ICA DIST EDV: 16.1 CM/S
VAS RIGHT ICA DIST EDV: 16.3 CM/S
VAS RIGHT ICA DIST EDV: 16.7 CM/S
VAS RIGHT ICA DIST PSV: 48.5 CM/S
VAS RIGHT ICA DIST PSV: 59 CM/S
VAS RIGHT ICA DIST PSV: 65.1 CM/S
VAS RIGHT ICA DIST PSV: 73.3 CM/S
VAS RIGHT ICA EDV: 21.1 CM/S
VAS RIGHT ICA EDV: 26.8 CM/S
VAS RIGHT ICA EDV: 27 CM/S
VAS RIGHT ICA EDV: 30.6 CM/S
VAS RIGHT ICA MEAN VEL: 36.5 CM/S
VAS RIGHT ICA MEAN VEL: 39.4 CM/S
VAS RIGHT ICA MEAN VEL: 46.2 CM/S
VAS RIGHT ICA MEAN VEL: 48.7 CM/S
VAS RIGHT ICA PSV: 64.3 CM/S
VAS RIGHT ICA PSV: 67.2 CM/S
VAS RIGHT ICA PSV: 85 CM/S
VAS RIGHT ICA PSV: 85.1 CM/S
VAS RIGHT LINDEGAARD RATIO: 2.8
VAS RIGHT LINDEGAARD RATIO: 3.3
VAS RIGHT LINDEGAARD RATIO: 3.7
VAS RIGHT LINDEGAARD RATIO: 4.1
VAS RIGHT MCA 1 EDV: 58.1 CM/S
VAS RIGHT MCA 1 EDV: 67.3 CM/S
VAS RIGHT MCA 1 EDV: 80.3 CM/S
VAS RIGHT MCA 1 EDV: 89.4 CM/S
VAS RIGHT MCA 1 MEAN VEL: 121.3 CM/S
VAS RIGHT MCA 1 MEAN VEL: 122.8 CM/S
VAS RIGHT MCA 1 MEAN VEL: 84.9 CM/S
VAS RIGHT MCA 1 MEAN VEL: 96.7 CM/S
VAS RIGHT MCA 1 PSV: 138.5 CM/S
VAS RIGHT MCA 1 PSV: 155.5 CM/S
VAS RIGHT MCA 1 PSV: 189.5 CM/S
VAS RIGHT MCA 1 PSV: 203.2 CM/S
VAS RIGHT PCA 1 EDV: 19.6 CM/S
VAS RIGHT PCA 1 EDV: 23.7 CM/S
VAS RIGHT PCA 1 EDV: 25.2 CM/S
VAS RIGHT PCA 1 EDV: 28.1 CM/S
VAS RIGHT PCA 1 MEAN VEL: 31 CM/S
VAS RIGHT PCA 1 MEAN VEL: 36.1 CM/S
VAS RIGHT PCA 1 MEAN VEL: 38.3 CM/S
VAS RIGHT PCA 1 MEAN VEL: 39.4 CM/S
VAS RIGHT PCA 1 PSV: 53.7 CM/S
VAS RIGHT PCA 1 PSV: 61 CM/S
VAS RIGHT PCA 1 PSV: 62.1 CM/S
VAS RIGHT PCA 1 PSV: 64.5 CM/S
VAS RIGHT VERTEBRAL EDV TCD: 15.2 CM/S
VAS RIGHT VERTEBRAL EDV TCD: 17.3 CM/S
VAS RIGHT VERTEBRAL EDV TCD: 19.7 CM/S
VAS RIGHT VERTEBRAL EDV TCD: 21.3 CM/S
VAS RIGHT VERTEBRAL MEAN VEL: 22.8 CM/S
VAS RIGHT VERTEBRAL MEAN VEL: 24 CM/S
VAS RIGHT VERTEBRAL MEAN VEL: 27 CM/S
VAS RIGHT VERTEBRAL MEAN VEL: 30.7 CM/S
VAS RIGHT VERTEBRAL PSV TCD: 33.7 CM/S
VAS RIGHT VERTEBRAL PSV TCD: 41.6 CM/S
VAS RIGHT VERTEBRAL PSV TCD: 41.6 CM/S
VAS RIGHT VERTEBRAL PSV TCD: 49.6 CM/S
WBC # BLD AUTO: 6.8 K/UL (ref 3.6–11)

## 2024-01-04 PROCEDURE — 80048 BASIC METABOLIC PNL TOTAL CA: CPT

## 2024-01-04 PROCEDURE — 6370000000 HC RX 637 (ALT 250 FOR IP): Performed by: INTERNAL MEDICINE

## 2024-01-04 PROCEDURE — 92526 ORAL FUNCTION THERAPY: CPT

## 2024-01-04 PROCEDURE — 2580000003 HC RX 258: Performed by: INTERNAL MEDICINE

## 2024-01-04 PROCEDURE — 93886 INTRACRANIAL COMPLETE STUDY: CPT

## 2024-01-04 PROCEDURE — 97535 SELF CARE MNGMENT TRAINING: CPT

## 2024-01-04 PROCEDURE — 97530 THERAPEUTIC ACTIVITIES: CPT

## 2024-01-04 PROCEDURE — 2580000003 HC RX 258: Performed by: NURSE PRACTITIONER

## 2024-01-04 PROCEDURE — 85027 COMPLETE CBC AUTOMATED: CPT

## 2024-01-04 PROCEDURE — 6360000002 HC RX W HCPCS: Performed by: INTERNAL MEDICINE

## 2024-01-04 PROCEDURE — 84100 ASSAY OF PHOSPHORUS: CPT

## 2024-01-04 PROCEDURE — 83735 ASSAY OF MAGNESIUM: CPT

## 2024-01-04 PROCEDURE — 2000000000 HC ICU R&B

## 2024-01-04 PROCEDURE — 36415 COLL VENOUS BLD VENIPUNCTURE: CPT

## 2024-01-04 PROCEDURE — 99232 SBSQ HOSP IP/OBS MODERATE 35: CPT

## 2024-01-04 RX ORDER — MILRINONE LACTATE 0.2 MG/ML
0.5 INJECTION, SOLUTION INTRAVENOUS CONTINUOUS
Status: DISCONTINUED | OUTPATIENT
Start: 2024-01-04 | End: 2024-01-04

## 2024-01-04 RX ORDER — MILRINONE LACTATE 0.2 MG/ML
0.12 INJECTION, SOLUTION INTRAVENOUS CONTINUOUS
Status: DISCONTINUED | OUTPATIENT
Start: 2024-01-04 | End: 2024-01-05

## 2024-01-04 RX ADMIN — NIMODIPINE 60 MG: 30 CAPSULE, LIQUID FILLED ORAL at 10:33

## 2024-01-04 RX ADMIN — TICAGRELOR 90 MG: 90 TABLET ORAL at 20:02

## 2024-01-04 RX ADMIN — NIMODIPINE 60 MG: 30 CAPSULE, LIQUID FILLED ORAL at 18:43

## 2024-01-04 RX ADMIN — SODIUM CHLORIDE, PRESERVATIVE FREE 10 ML: 5 INJECTION INTRAVENOUS at 20:02

## 2024-01-04 RX ADMIN — ASPIRIN 81 MG CHEWABLE TABLET 81 MG: 81 TABLET CHEWABLE at 08:53

## 2024-01-04 RX ADMIN — METOPROLOL 25 MG: 25 TABLET ORAL at 08:53

## 2024-01-04 RX ADMIN — MILRINONE LACTATE 0.5 MCG/KG/MIN: 0.2 INJECTION, SOLUTION INTRAVENOUS at 11:43

## 2024-01-04 RX ADMIN — TICAGRELOR 90 MG: 90 TABLET ORAL at 08:53

## 2024-01-04 RX ADMIN — METHYLPHENIDATE HYDROCHLORIDE 5 MG: 5 TABLET ORAL at 08:53

## 2024-01-04 RX ADMIN — LEVETIRACETAM 500 MG: 500 TABLET, FILM COATED ORAL at 20:01

## 2024-01-04 RX ADMIN — MILRINONE LACTATE IN DEXTROSE 0.12 MCG/KG/MIN: 200 INJECTION, SOLUTION INTRAVENOUS at 19:03

## 2024-01-04 RX ADMIN — NIMODIPINE 60 MG: 30 CAPSULE, LIQUID FILLED ORAL at 21:53

## 2024-01-04 RX ADMIN — SODIUM CHLORIDE, POTASSIUM CHLORIDE, SODIUM LACTATE AND CALCIUM CHLORIDE: 600; 310; 30; 20 INJECTION, SOLUTION INTRAVENOUS at 00:10

## 2024-01-04 RX ADMIN — SODIUM CHLORIDE, POTASSIUM CHLORIDE, SODIUM LACTATE AND CALCIUM CHLORIDE: 600; 310; 30; 20 INJECTION, SOLUTION INTRAVENOUS at 09:03

## 2024-01-04 RX ADMIN — NIMODIPINE 60 MG: 30 CAPSULE, LIQUID FILLED ORAL at 14:07

## 2024-01-04 RX ADMIN — METHYLPHENIDATE HYDROCHLORIDE 5 MG: 5 TABLET ORAL at 14:07

## 2024-01-04 RX ADMIN — SODIUM CHLORIDE, POTASSIUM CHLORIDE, SODIUM LACTATE AND CALCIUM CHLORIDE: 600; 310; 30; 20 INJECTION, SOLUTION INTRAVENOUS at 19:03

## 2024-01-04 RX ADMIN — NIMODIPINE 60 MG: 30 CAPSULE, LIQUID FILLED ORAL at 06:14

## 2024-01-04 RX ADMIN — SODIUM CHLORIDE, PRESERVATIVE FREE 10 ML: 5 INJECTION INTRAVENOUS at 08:53

## 2024-01-04 RX ADMIN — LEVETIRACETAM 500 MG: 500 TABLET, FILM COATED ORAL at 08:53

## 2024-01-04 RX ADMIN — NIMODIPINE 60 MG: 30 CAPSULE, LIQUID FILLED ORAL at 02:18

## 2024-01-04 RX ADMIN — PRAVASTATIN SODIUM 40 MG: 40 TABLET ORAL at 20:02

## 2024-01-04 NOTE — PLAN OF CARE
when fatigued and/or distracted)  Sitting - Dynamic: Poor (constant support)  Standing: Impaired  Standing - Static: Constant support;Fair  Standing - Dynamic: Poor;Constant support   Ambulation/Gait Training:     Gait  Overall Level of Assistance: Moderate assistance;Assist X2  Distance (ft): 2 Feet  Assistive Device: Other (comment) (hand held assist x 2)            Pain Rating:  No complaints of pain   Pain Intervention(s):   pain is at a level acceptable to the patient    Activity Tolerance:   requires rest breaks    After treatment:   Patient left in no apparent distress sitting up in chair, Call bell within reach, and Caregiver / family present      COMMUNICATION/EDUCATION:   The patient's plan of care was discussed with: registered nurse    Patient Education  Education Given To: Patient  Education Provided: Role of Therapy;Plan of Care  Education Provided Comments: importance of mobility  Education Method: Verbal  Barriers to Learning: Cognition;Vision  Education Outcome: Continued education needed      Zhou Bui, PT  Minutes: 38

## 2024-01-04 NOTE — PROGRESS NOTES
Neurocritical Care Brief Progress Note:  Soledad Geller is 58 y.o female with pmh of Asthma, FMD, HTN, HLD, chronic brachiocephalic artery dissection who arrived from [facility] ED intubated to [facility] ICU early morning of 12/18/23 with SAH from a ruptured cerebral aneurysm. CTA showed a large fusiform aneurysm of the right supraclinoid ICA with eccentric broad-based component.    Pt is s/p cerebral/cervical arteriogram and Flow diversion stent embolization of right supraclinoid ICA aneurysm on 12/18/23 by Dr. Hamilton.    Pt was noted with mild to moderate vasospasm of the RMCA and B/L ACAs s/p cerebral angiogram and IA nicardipine infusion on 12/26/2023 by Dr. Hamilton.     12/29/23 TCD's consistent with possible bilateral distal A1 CARLITOS vasospasm.    12/30/23 TCD's showed possible right CARLITOS vasospasm.   Pt noted with worsening left sided weakness with decreased sensation. STAT CTH obtained showed no worsening bleed or acute infarct. Small volume right sylvian fissure SAH and right posterior  horn lateral ventricle IVH, decreased from prior imaging.    12/31/23 continues to have worsening left-sided weakness and stat CTA h/n showed worsening right sided intraventricular and subarachnoid hemorrhage. Generalized cerebral edema.    The pt was taken to IR for flow diversion stent embolization of HERNÁN aneurysm, IA Verapamil infusion for intracranial vasospasm    Repeat p2y12 was therapeutic 62.      01/03/24 Pt continues with drowsiness and persistent left-sided weakness without improvement. Repeat CTH obtained showed unchanged subarachnoid and intraventricular hemorrhages.    TCD's today showed possible left CARLITOS vasospasm. Elevated velocities in the right MCA, more likely hyperemia than vasospasm. IVC collapse greater than 50%. Will increase IVF rate LR to 125 ml/hr overnight and reduce back to 100ml/hr. May need to raise MAP goal and possibly start milrinone if no improvement with TCD's    Planned for repeat dsa possible end of the  [facility]  [phone]

## 2024-01-04 NOTE — PROGRESS NOTES
[facility] Progress Note        Soledad Geller  1965  [id number]  1/4/2024      Assessment and plan:  Cerebral Hemorrhage due to SAH due to ruptured fusiform right MCA aneurysm  -post flow diversion stent embo done on 12/18  -bleeding worse after first intervention due to pseudoaneurysm or worsening right ICA aneurysm   -repeat angio with additional flow diverting stent placed to right ICA aneurysm   -complicated by acute CARLITOS vasospasm despite nimodipine ppx  -cont TCDs daily   - post intracerebral verapamil and cardene by NIS    -target SBP < 160  -cont keppra for szr ppx, 500 IV BID    -add milrinone low does as a peripheral vasodilator--->  watch for pulm edema (as she is 8L positive) and hypotension  -bp dropped once we started milrinone but MAPs are still well above goal so continue for now       Acute encephalopathy:  better  -improved today.  More awake and active with stimulant dose    -? Hypoactive delirium +  vasospasm   -trial ritalin 5 bID at 0800 and 1300     HTN:  -prn labetalol and hydralzine    -target SBP < 160    Mobilize daily  She is tolerating PO       Discussed with neurointerventional team.  Discussed with case management       HPI:  MS improved today.        ICU DAILY CHECKLIST     Code Status:full   DVT Prophylaxis: SCDs on   T/L/D: PIVs  SUP: not indicated   Diet: tolerating PO    Activity Level:mobilize daily    ABCDEF Bundle/Checklist Completed:Yes  Disposition: cont ICU care    Multidisciplinary Rounds Completed: yes  Goals of Care Discussion/Palliative: yes  Patient/Family Updated: yes      OBJECTIVE  Vitals:    01/04/24 1100 01/04/24 1200 01/04/24 1300 01/04/24 1400   BP:  117/86 125/80 106/79   Pulse: 80 85 90 (!) 120   Resp: 18 17 18 20   Temp:  98.5 °F (36.9 °C)     TempSrc:  Axillary     SpO2: 100% 100% 100%    Weight:       Height:         EXAM:   GEN: awake alert and oriented sometimes and sometimes confused . Much more arousable and responsive

## 2024-01-04 NOTE — PROGRESS NOTES
Speech LAnguage Pathology TREATMENT    Patient: Soledad Geller (58 y.o. female)  Date: 1/4/2024  Primary Diagnosis: Aneurysmal subarachnoid hemorrhage (HCC) [I60.8]       Precautions:  Fall Risk                  ASSESSMENT :  SLP treatment complete. Pt tolerating regular diet/thin liquids. Pt is taking medications with thin liquids without difficulty. Recommend pt continue diet as outlined below. Suspect pt is approaching baseline swallow function will be follow-up for further tolerance.    Additionally, patient has goals to address cognitive-linguistic deficits. However, pt skeptical of SLP on this date and therefore further cognitive-linguistic intervention deferred.     Patient will benefit from skilled intervention to address the above impairments.     PLAN :  Recommendations and Planned Interventions:  Diet: Regular and thin liquids  Meds with liquids vs as tolerated  Good oral care  General aspiration precautions    Recommend utilizing the following strategies to facilitate patient's functional communication, command following , and orientation  -- Utilize simple Yes/No questions to obtain information  -- Provide patient with visual cues   -- Allow patient extra time to process and respond      Acute SLP Services: Yes, SLP will continue to follow per plan of care.    Discharge Recommendations: Yes, recommend SLP treatment at next level of care     SUBJECTIVE:   Patient stated, “I don't want anyone trying to take my blood and clone me.”    OBJECTIVE:     Past Medical History:   Diagnosis Date    Asthma     Brachiocephalic artery injury     chronic dissection    Glaucoma, low tension     Hyperlipidemia     Hypertension     Ill-defined condition     high cholesterol     Past Surgical History:   Procedure Laterality Date    COLONOSCOPY N/A 5/1/2017    COLONOSCOPY performed by Sagar Woodard MD at hospitals ENDOSCOPY    TONSILLECTOMY       Prior Level of Function/Home Situation:   Social/Functional History  Lives

## 2024-01-04 NOTE — PROGRESS NOTES
Neurointerventional Surgery Progress Note  Neurocritical Care NP    Admit Date: 12/18/2023   LOS: 17 days      Daily Progress Note: 1/4/2024    Assessment & Plan     Cerebral Hemorrhage - SAH due to ruptured fusiform right MCA aneurysm, Morin Miller 3, Motley Grade 4, baseline mRS: 0. S/p cerebral angiogram w/ flow diversion stent embolization on 12/18/23 by Dr. Hamiltno. Increase in SAH, IVH seen on repeat CTAh-n 12/31/23. Pt taken for cerebral angiogram revealing enlargement of previously embolized right ICA aneurysm thus additional flow-diverting stent placed by Dr. Hamilton on 12/31/23.     Cerebral vasospasm - S/p cerebral angiogram and IA nicardipine infusion on 12/26/2023 for cerebral vasospasm. 12/31/23: TCDs showed evidence of bilateral CARLITOS vasospasm. Pt treated w/  16 mg of IA verapamil during angiogram. Persistent lethargy possibly r/t vasospasm.      Milrinone IV to help improve cerebral vasospasm  Plan for repeat CTAh-n on Friday 1/5/24  SBP Goal 100-140, prn Labetalol/Hydralazine  Q1hr neuro checks. Q2hr neuro checks ok overnight  Cont DAPT - ASA 81mg daily and ticagrelor 90mg BID  Day 18/21 of Nimodipine to prevent delayed cerebral ischemia  Keppra 500 mg BID for seizure ppx  Continue LR 100mL/hr to maintain euvolemia  Strict I&O.  +890mL for the past 24 hrs  Labs reviewed: , K 4.3, Mg 2.1 Phos 3.4.  BMP, mg, phos daily   TCDs daily. 1/3/31: evidence of left CARLITOS vasospasm  PT/OT/SLP evals      Plan d/w Dr. Hamilton, Intensivist, primary RN, pt and her .      HPI: Soledad Geller is a 58 y.o. AA female with a PMH of asthma, HTN, and HDL who presented to [facility] ED on 12/17/23 with decreased LOC and vomiting. She had been reporting headache for several days prior to presentation. Pt reported taking NSAIDs for pain but denied use of ASA or blood thinners. A stat CTH was obtained which showed a large SAH with IPH. CTA head/neck was then obtained which showed a large fusiform aneurysm of the M1 segment on

## 2024-01-04 NOTE — PLAN OF CARE
directions;Difficulty dividing attention  Insights: Decreased awareness of deficits  Initiation: Requires cues for some  Sequencing: Requires cues for some    Functional Mobility and Transfers for ADLs:  Bed Mobility:  Bed Mobility Training  Bed Mobility Training: Yes  Interventions: Tactile cues;Verbal cues  Supine to Sit: Maximum assistance;Assist X2     Transfers:   Transfer Training  Transfer Training: Yes  Interventions: Tactile cues;Verbal cues;Manual cues  Sit to Stand: Moderate assistance  Stand to Sit: Moderate assistance           Balance:  Standing: Impaired  Balance  Sitting: Impaired  Sitting - Static: Unsupported;Fair (occasional);Other (comment) (L lateral lean, progressing to CGA for short amount of time)  Sitting - Dynamic: Poor (constant support)  Standing: Impaired  Standing - Static: Constant support;Fair  Standing - Dynamic: Poor;Constant support      ADL Intervention:      LE Dressing: Dependent/Total  LE Dressing Skilled Clinical Factors: Pt seated in recliner with cues for visual attention. Pt unable to verbalize which LE to thread first. Pt donned underwear, assisting with lifting each LE with max verbal and tactile cues. Pt requiring two person assist for standing and totalA to pull pants over hips.    Toileting: Dependent/Total  Toileting Skilled Clinical Factors: Pt requiring totalA for perineal hygiene while standing MaxA.    Therapeutic Activity:    Pt standing using LUE to reach anteriorly/laterally/superiorly 5x 2 sets with Michael for LUE support and Mod-MaxA for standing with RUE supported on table. Pt requiring rest breaks between sets with encouragement to not lay down. Pt HR max 145 bpm, recovering with rest. Pt able to attend to midline with verbal cues for visual attention. Difficulty attending past midline without physical assist.    Manual facilitation from PT required for visual attention to L side. Pt able to idenity 2 objects on left side. Pt tracking L hand (with head held  Birth Control Pills Counseling: Birth Control Pill Counseling: I discussed with the patient the potential side effects of OCPs including but not limited to increased risk of stroke, heart attack, thrombophlebitis, deep venous thrombosis, hepatic adenomas, breast changes, GI upset, headaches, and depression.  The patient verbalized understanding of the proper use and possible adverse effects of OCPs. All of the patient's questions and concerns were addressed.

## 2024-01-04 NOTE — ADT AUTH CERT
Comment  [facility] 7S2 INTENSIVE CARE   [address] [address]   [id number]   [id number]   Auth number: [id number]     Return Contact:   Brittany Vyas   Ph: [phone]   Fax: [phone]         Last edited by Brittany Vyas on 23 at 1638     Patient Demographics    Name Patient ID SSN Gender Identity Birth Date   Soledad Geller [id number]  Female [birth year] (58 yrs)     Address Phone Email Employer    [employer] [place] [address] [phone] (M)   [phone] (H) [email or web address] Race Occupation Emp Status    [employer] /  -- Full Time      Reg Status PCP Date Last Verified Next Review Date    Verified Serena Correia APRN - YURIDIA  [phone] 23      Admission Date Discharge Date Admitting Provider     23 -- Gary Abel MD       Marital Status Church       Worship        Emergency Contact 1 Emergency Contact 2 Emergency Contact 3   Kathryn Geller (2)   [address] [address]   [facility]   [phone] (H) Eloisa Oleary (3)   [phone] (M) Christian Geller (C)   [phone] (M)     Subscriber Details  Hospital Account #[id number]  G Subscriber Name/Sex/Relation Subscriber  Subscriber Address/Phone Subscriber Emp/Emp Phone   1. HALEY   [id number] KATHRYN GELLER - Male   (Spouse) 1967 Former[facility] named [facility] [place]  [address] [phone](H) [facility]      Utilization Reviews        -  by Maricel Crouch  Last Updated by Maricel Crouch on 2023 1537     Review Status Review Type Created By   In Primary -- Maricel Crouch      Criteria Review   DATE:  23        PERTINENT UPDATES:  Angiogram scheduled for      VITALS:  99.4, 20, 101, 130/74, 97% RA     ABNL/PERTINENT LABS/RADIOLOGY/DIAGNOSTIC STUDIES:  23 Vascular Transcranial Doppler:  Distal left A1 CARLITOS velocity is elevated  movements intact.      Conjunctiva/sclera: Conjunctivae normal.      Pupils: Pupils are equal, round, and reactive to light.   Cardiovascular:      Rate and Rhythm: Normal rate and regular rhythm.      Pulses: Normal pulses.      Heart sounds: Normal heart sounds.   Pulmonary:      Effort: Pulmonary effort is normal. No respiratory distress.      Breath sounds: Normal breath sounds.   Abdominal:      General: Abdomen is flat. Bowel sounds are normal. There is no distension.   Musculoskeletal:         General: Normal range of motion.      Cervical back: Normal range of motion and neck supple. No rigidity.      Right lower leg: No edema.      Left lower leg: No edema.   Skin:     General: Skin is warm and dry.      Capillary Refill: Capillary refill takes less than 2 seconds.      Coloration: Skin is not jaundiced.   Neurological:      General: No focal deficit present.      Mental Status: She is alert.      Cranial Nerves: No cranial nerve deficit.      Comments: Waxing and waning orientation, occasionally impulsive   Psychiatric:         Mood and Affect: Mood normal.         MD CONSULTS/ASSESSMENT AND PLAN:  Critical Care:  clinically unchanged  Tentative plan for repeat angio 12/26  Plan:  Neurochecks per protocol, stretch out if possible to aid w/ confusion o/n  C/w nifedipine  Received rocephin/azithromycin for CAP coverage        Neurointerventional Surgery:  Patient easily wakes up to voice and commands.  She is alert and oriented x 4.  No acute neuro events overnight.   is at bedside.  She denies any discomfort at the current time and states she can brush her teeth herself.  More easily overcome by the sound of her 's voice sitting on her right side than previous exam on 12/20/23  Left mild facial ptosis.    Plan:  Day 5/21 of Nimotop to prevent delayed cerebral ischemia  TCDs daily - today resulted with no evidence of arterial vasospasm  more easily overcome by the sound of her 's voice

## 2024-01-04 NOTE — PROGRESS NOTES
NUTRITION    Reason for Assessment: LOS      Recommendations/Interventions/Plan:     Continue Regular diet    Modify supplements:     Discontinue Ensure Plus and Magic cup     Trial Ensure Clear and Orange gelatein      Will rescreen per policy       Past Medical History:   Diagnosis Date    Asthma     Brachiocephalic artery injury     chronic dissection    Glaucoma, low tension     Hyperlipidemia     Hypertension     Ill-defined condition     high cholesterol         Pt screened for LOS. Chart/labs/meds reviewed. Admitted with Aneurysmal subarachnoid hemorrhage (ruptured MCA aneurysm). S/p cerebral angiogram w/ flow diversion stent embolization on 12/18/23 by Dr. Hamilton. Additional angiogram and IA nicardipine infusion 12/26 and 12/31. Remains on Nimodipine (day 18).    Appetite appears to be slowly improving since return to ICU 12/31-pt fairly lethargic since returning to the ICU. Spoke with patient and family @ bedside. Patient kept eyes closed during most of conversation. Dislikes the Ensure and Magic cup supplements but willing to try Ensure Clear. Will also add Orange gelatein supplement for pt to trial (contains 20 gm protein).    Family also bringing in food from home which pt prefers and does eat better. Obtained preferences from family member and will send alternative items on pt's tray.      Nutrition Related Findings:   Edema: Generalized   Edema Generalized: Trace           LLE Edema: Non-pitting      Last BM: 01/03/24      Skin:WNL      Current Nutrition Therapies:  Diet: Regular  Supplements: Ensure Plus breakfast/dinner; Magic cup @ lunch  Meal Intake:   Patient Vitals for the past 168 hrs:   PO Meals Eaten (%)   01/03/24 1500 26 - 50%   01/03/24 0900 26 - 50%   12/30/23 1000 76 - 100%   12/30/23 0800 76 - 100%   12/29/23 1500 76 - 100%   12/29/23 0800 76 - 100%     Supplement Intake:  No data found.      Weight Hx:  Wt Readings from Last 10 Encounters:   01/04/24 71.6 kg (157 lb 13.6 oz)   12/16/23  76.2 kg (168 lb)   11/20/23 77.1 kg (170 lb)   10/06/23 78 kg (172 lb)   09/06/22 78.5 kg (173 lb)   07/06/22 77.6 kg (171 lb)   04/07/22 77.6 kg (171 lb)   03/10/22 79 kg (174 lb 3.2 oz)   08/27/21 78.5 kg (173 lb)   08/27/21 78.9 kg (174 lb)         Estimated Nutrition Needs:   Energy Requirements Based On: Formula  Weight Used for Energy Requirements: Current (73.8 kg)  Energy (kcal/day): 1650 (MSJ x 1.2)  Weight Used for Protein Requirements: Current  Protein (g/day): 74-89 (1.0-1.2g/kg)  Method Used for Fluid Requirements: 1 ml/kcal  Fluid (ml/day):          Recent Labs     01/02/24  0508 01/03/24  0330 01/04/24  0239   GLUCOSE 108* 99 115*   BUN 8 7 8   CREATININE 0.67 0.75 0.71   * 136 135*   K 4.2 3.8 4.3    106 106   CO2 21 26 23   CALCIUM 9.7 10.5* 10.5*   PHOS 2.4* 3.2 3.4   MG 2.0 2.1 2.1       No results for input(s): \"POCGLU\" in the last 72 hours.    Lab Results   Component Value Date/Time    LABA1C 5.1 12/18/2023 04:41 AM    LABA1C 5.1 10/06/2023 12:34 PM    LABA1C 5.1 02/12/2021 03:05 PM     12/18/2023 04:41 AM     02/12/2021 03:05 PM    EAG 5.5 02/12/2021 03:05 PM           JESSIE Landry  Available via [employer]

## 2024-01-05 ENCOUNTER — HOSPITAL ENCOUNTER (INPATIENT)
Facility: HOSPITAL | Age: 59
Discharge: HOME | End: 2024-01-08
Attending: RADIOLOGY
Payer: OTHER GOVERNMENT

## 2024-01-05 ENCOUNTER — APPOINTMENT (OUTPATIENT)
Facility: HOSPITAL | Age: 59
End: 2024-01-05
Attending: ANESTHESIOLOGY
Payer: OTHER GOVERNMENT

## 2024-01-05 LAB
ANION GAP SERPL CALC-SCNC: 6 MMOL/L (ref 5–15)
BUN SERPL-MCNC: 9 MG/DL (ref 6–20)
BUN/CREAT SERPL: 14 (ref 12–20)
CALCIUM SERPL-MCNC: 9.7 MG/DL (ref 8.5–10.1)
CHLORIDE SERPL-SCNC: 104 MMOL/L (ref 97–108)
CO2 SERPL-SCNC: 24 MMOL/L (ref 21–32)
CREAT SERPL-MCNC: 0.65 MG/DL (ref 0.55–1.02)
ECHO BSA: 1.93 M2
ERYTHROCYTE [DISTWIDTH] IN BLOOD BY AUTOMATED COUNT: 12.6 % (ref 11.5–14.5)
GLUCOSE SERPL-MCNC: 100 MG/DL (ref 65–100)
HCT VFR BLD AUTO: 34.4 % (ref 35–47)
HGB BLD-MCNC: 11 G/DL (ref 11.5–16)
MAGNESIUM SERPL-MCNC: 2 MG/DL (ref 1.6–2.4)
MCH RBC QN AUTO: 29.2 PG (ref 26–34)
MCHC RBC AUTO-ENTMCNC: 32 G/DL (ref 30–36.5)
MCV RBC AUTO: 91.2 FL (ref 80–99)
NRBC # BLD: 0 K/UL (ref 0–0.01)
NRBC BLD-RTO: 0 PER 100 WBC
PHOSPHATE SERPL-MCNC: 2.7 MG/DL (ref 2.6–4.7)
PLATELET # BLD AUTO: 442 K/UL (ref 150–400)
PMV BLD AUTO: 8.8 FL (ref 8.9–12.9)
POTASSIUM SERPL-SCNC: 3.7 MMOL/L (ref 3.5–5.1)
RBC # BLD AUTO: 3.77 M/UL (ref 3.8–5.2)
SODIUM SERPL-SCNC: 134 MMOL/L (ref 136–145)
VAS BASILAR ARTERY EDV: 28.6 CM/S
VAS BASILAR ARTERY MEAN VEL: 44 CM/S
VAS BASILAR ARTERY PSV: 75.2 CM/S
VAS LEFT ACA EDV: 66.8 CM/S
VAS LEFT ACA MEAN VEL: 92.7 CM/S
VAS LEFT ACA PSV: 144.4 CM/S
VAS LEFT EX ICA MEAN VEL: 26 CM/S
VAS LEFT ICA DIST EDV: 16.8 CM/S
VAS LEFT ICA DIST PSV: 44.4 CM/S
VAS LEFT ICA EDV: 33.3 CM/S
VAS LEFT ICA MEAN VEL: 53.2 CM/S
VAS LEFT ICA PSV: 93.1 CM/S
VAS LEFT LINDEGAARD RATIO: 1.4
VAS LEFT MCA 1 EDV: 23.4 CM/S
VAS LEFT MCA 1 MEAN VEL: 37.1 CM/S
VAS LEFT MCA 1 PSV: 64.5 CM/S
VAS LEFT PCA 1 EDV: 23.7 CM/S
VAS LEFT PCA 1 MEAN VEL: 37.6 CM/S
VAS LEFT PCA 1 PSV: 65.4 CM/S
VAS LEFT VERTEBRAL EDV TCD: 15.1 CM/S
VAS LEFT VERTEBRAL MEAN VEL: 23.7 CM/S
VAS LEFT VERTEBRAL PSV TCD: 40.9 CM/S
VAS RIGHT ACA EDV: 57.1 CM/S
VAS RIGHT ACA MEAN VEL: 81.3 CM/S
VAS RIGHT ACA PSV: 129.8 CM/S
VAS RIGHT EX ICA MEAN VEL: 30 CM/S
VAS RIGHT ICA DIST EDV: 17.4 CM/S
VAS RIGHT ICA DIST PSV: 56.6 CM/S
VAS RIGHT ICA EDV: 24.8 CM/S
VAS RIGHT ICA MEAN VEL: 39.4 CM/S
VAS RIGHT ICA PSV: 68.7 CM/S
VAS RIGHT LINDEGAARD RATIO: 2.7
VAS RIGHT MCA 1 EDV: 54.1 CM/S
VAS RIGHT MCA 1 MEAN VEL: 80.9 CM/S
VAS RIGHT MCA 1 PSV: 134.6 CM/S
VAS RIGHT PCA 1 EDV: 22.6 CM/S
VAS RIGHT PCA 1 MEAN VEL: 35.8 CM/S
VAS RIGHT PCA 1 PSV: 62.1 CM/S
VAS RIGHT VERTEBRAL EDV TCD: 20 CM/S
VAS RIGHT VERTEBRAL MEAN VEL: 27.2 CM/S
VAS RIGHT VERTEBRAL PSV TCD: 41.5 CM/S
WBC # BLD AUTO: 5.7 K/UL (ref 3.6–11)

## 2024-01-05 PROCEDURE — 2580000003 HC RX 258: Performed by: INTERNAL MEDICINE

## 2024-01-05 PROCEDURE — 6370000000 HC RX 637 (ALT 250 FOR IP): Performed by: INTERNAL MEDICINE

## 2024-01-05 PROCEDURE — 6360000004 HC RX CONTRAST MEDICATION: Performed by: RADIOLOGY

## 2024-01-05 PROCEDURE — 84100 ASSAY OF PHOSPHORUS: CPT

## 2024-01-05 PROCEDURE — 93886 INTRACRANIAL COMPLETE STUDY: CPT

## 2024-01-05 PROCEDURE — 80048 BASIC METABOLIC PNL TOTAL CA: CPT

## 2024-01-05 PROCEDURE — 85027 COMPLETE CBC AUTOMATED: CPT

## 2024-01-05 PROCEDURE — 92507 TX SP LANG VOICE COMM INDIV: CPT | Performed by: SPEECH-LANGUAGE PATHOLOGIST

## 2024-01-05 PROCEDURE — 83735 ASSAY OF MAGNESIUM: CPT

## 2024-01-05 PROCEDURE — 2000000000 HC ICU R&B

## 2024-01-05 PROCEDURE — 36415 COLL VENOUS BLD VENIPUNCTURE: CPT

## 2024-01-05 PROCEDURE — 70498 CT ANGIOGRAPHY NECK: CPT

## 2024-01-05 PROCEDURE — 2580000003 HC RX 258: Performed by: NURSE PRACTITIONER

## 2024-01-05 PROCEDURE — 2500000003 HC RX 250 WO HCPCS: Performed by: INTERNAL MEDICINE

## 2024-01-05 RX ORDER — NOREPINEPHRINE BITARTRATE 0.06 MG/ML
.5-2 INJECTION, SOLUTION INTRAVENOUS CONTINUOUS
Status: DISPENSED | OUTPATIENT
Start: 2024-01-05 | End: 2024-01-07

## 2024-01-05 RX ORDER — SODIUM CHLORIDE 9 MG/ML
INJECTION, SOLUTION INTRAVENOUS CONTINUOUS
Status: DISCONTINUED | OUTPATIENT
Start: 2024-01-05 | End: 2024-01-12 | Stop reason: HOSPADM

## 2024-01-05 RX ADMIN — NIMODIPINE 60 MG: 30 CAPSULE, LIQUID FILLED ORAL at 09:55

## 2024-01-05 RX ADMIN — SODIUM CHLORIDE: 9 INJECTION, SOLUTION INTRAVENOUS at 18:20

## 2024-01-05 RX ADMIN — SODIUM CHLORIDE 5 MCG/MIN: 9 INJECTION, SOLUTION INTRAVENOUS at 09:50

## 2024-01-05 RX ADMIN — METHYLPHENIDATE HYDROCHLORIDE 5 MG: 5 TABLET ORAL at 13:45

## 2024-01-05 RX ADMIN — TICAGRELOR 90 MG: 90 TABLET ORAL at 08:57

## 2024-01-05 RX ADMIN — TICAGRELOR 90 MG: 90 TABLET ORAL at 21:56

## 2024-01-05 RX ADMIN — SENNOSIDES AND DOCUSATE SODIUM 1 TABLET: 50; 8.6 TABLET ORAL at 21:59

## 2024-01-05 RX ADMIN — SODIUM CHLORIDE: 9 INJECTION, SOLUTION INTRAVENOUS at 08:57

## 2024-01-05 RX ADMIN — PRAVASTATIN SODIUM 40 MG: 40 TABLET ORAL at 21:56

## 2024-01-05 RX ADMIN — SODIUM CHLORIDE, PRESERVATIVE FREE 10 ML: 5 INJECTION INTRAVENOUS at 21:57

## 2024-01-05 RX ADMIN — METHYLPHENIDATE HYDROCHLORIDE 5 MG: 5 TABLET ORAL at 08:57

## 2024-01-05 RX ADMIN — NIMODIPINE 60 MG: 30 CAPSULE, LIQUID FILLED ORAL at 02:09

## 2024-01-05 RX ADMIN — LEVETIRACETAM 500 MG: 500 TABLET, FILM COATED ORAL at 21:59

## 2024-01-05 RX ADMIN — SODIUM CHLORIDE, POTASSIUM CHLORIDE, SODIUM LACTATE AND CALCIUM CHLORIDE: 600; 310; 30; 20 INJECTION, SOLUTION INTRAVENOUS at 04:14

## 2024-01-05 RX ADMIN — NIMODIPINE 60 MG: 30 CAPSULE, LIQUID FILLED ORAL at 18:20

## 2024-01-05 RX ADMIN — IOPAMIDOL 100 ML: 755 INJECTION, SOLUTION INTRAVENOUS at 03:05

## 2024-01-05 RX ADMIN — NIMODIPINE 60 MG: 30 CAPSULE, LIQUID FILLED ORAL at 21:59

## 2024-01-05 RX ADMIN — NIMODIPINE 60 MG: 30 CAPSULE, LIQUID FILLED ORAL at 06:13

## 2024-01-05 RX ADMIN — SODIUM CHLORIDE, PRESERVATIVE FREE 10 ML: 5 INJECTION INTRAVENOUS at 08:58

## 2024-01-05 RX ADMIN — ASPIRIN 81 MG CHEWABLE TABLET 81 MG: 81 TABLET CHEWABLE at 08:57

## 2024-01-05 RX ADMIN — LEVETIRACETAM 500 MG: 500 TABLET, FILM COATED ORAL at 09:01

## 2024-01-05 RX ADMIN — NIMODIPINE 60 MG: 30 CAPSULE, LIQUID FILLED ORAL at 13:45

## 2024-01-05 ASSESSMENT — PAIN SCALES - GENERAL
PAINLEVEL_OUTOF10: 0
PAINLEVEL_OUTOF10: 0

## 2024-01-05 NOTE — PROGRESS NOTES
11:00 AM  Received report from Sukumar CLEANING. Primary RN informed me that pt had breakfast between 9-10AM and has been NPO since 0945. Breakfast contained fruit, toast, sausage, and an ensure. Notified IR MD Hamilton- will consult anesthesiology.    11:05 AM  This RN spoke with Dr. Lopez, anesthesiologist- pt would need to be NPO for 8hrs. Joy PASTOR aware.

## 2024-01-05 NOTE — PROGRESS NOTES
[facility] ICU Progress Note        Soledad Geller  1965  [id number]  1/5/2024      Assessment and plan:  Cerebral Hemorrhage due to SAH due to ruptured fusiform right MCA aneurysm  -post flow diversion stent embo done on 12/18  -pseudoaneurysm right ICA aneurysm   -repeat angio with additional flow diverting stent placed to right ICA aneurysm   -complicated by acute CARLITOS vasospasm despite nimodipine ppx  -cont TCDs daily   - post intracerebral verapamil and cardene by NIS  --> repeat angio with intervention again today    -target SBP < 160  -cont keppra for szr ppx, 500 IV BID    -dc milrinone with drop in BP and change to NE, target MAP > 70     Acute encephalopathy:  better but not baseline.    -improved today.  More awake and active with stimulant dose    -? Hypoactive delirium +  vasospasm is the etiology    -tcont ritalin 5 bID at 0800 and 1300     HTN:  -prn labetalol and hydralzine    -target SBP < 160     Mobilize daily  She is tolerating PO       Discussed with neurointerventional team.  Discussed with case management    HPI:  Remains critically ill.  Vasospasm persists and is slow to improve.  CTA reviewed and discussed with Dr Hamilton.  Continued vasospasm and plan for return to injury.            ICU DAILY CHECKLIST     Code Status:full   DVT Prophylaxis:SCDs  T/L/D: PIVs  SUP: yes  Diet: yes  Activity Level:as tolerated  ABCDEF Bundle/Checklist Completed:Yes  Disposition: cont ICU care  Multidisciplinary Rounds Completed: yes  Goals of Care Discussion/Palliative: yes  Patient/Family Updated: yes      OBJECTIVE  Vitals:    01/05/24 1415 01/05/24 1430 01/05/24 1445 01/05/24 1500   BP: (!) 144/83 129/74 120/69 122/70   Pulse: (!) 111 (!) 120 (!) 130 (!) 128   Resp: 20 19 20 23   Temp:       TempSrc:       SpO2: 100% 100% 100% 100%   Weight:       Height:         EXAM:   GEN: easier to arouse.    HEENT  -Head: NC/AT;  -Eyes: PERRL, EOMI. No discharge or redness;  -Ears: External ears are

## 2024-01-05 NOTE — PROGRESS NOTES
Physical Therapy 1/5/2024         Chart reviewed. Family requesting PT to defer.  Will continue to follow    Zhou Bui, PT

## 2024-01-05 NOTE — PLAN OF CARE
Speech LAnguage Pathology TREATMENT    Patient: Soledad Geller (58 y.o. female)  Date: 1/5/2024  Primary Diagnosis: Aneurysmal subarachnoid hemorrhage (HCC) [I60.8]       Precautions:  Fall Risk                  ASSESSMENT : Speech session only, no swallow tx. She has been cleared for swallow and also is NPO for procedure .  Based on the objective data described below, the patient presents with lethargy impacting participation today. She was willing to participate, but would fall appear to be considering answers to questions, and in that response delay would often fall back asleep. She was most engaged when answering questions about her immediate family. Did not verbally respond to questions about more recent events.     Patient will benefit from skilled intervention to address the above impairments.     PLAN :  Recommendations and Planned Interventions:  Diet: Regular and thin liquids  Meds as tolerated    Recommend utilizing the following strategies to facilitate patient's functional communication, command following , and orientation  -- Utilize simple Yes/No questions to obtain information  -- Provide patient with visual cues   -- Allow patient extra time to process and respond      Acute SLP Services: Yes, SLP will continue to follow per plan of care.    Discharge Recommendations: Yes, recommend SLP treatment at next level of care     SUBJECTIVE:   Patient stated, “2 are in [place].” re: family     OBJECTIVE:     Past Medical History:   Diagnosis Date    Asthma     Brachiocephalic artery injury     chronic dissection    Glaucoma, low tension     Hyperlipidemia     Hypertension     Ill-defined condition     high cholesterol     Past Surgical History:   Procedure Laterality Date    COLONOSCOPY N/A 5/1/2017    COLONOSCOPY performed by Sagar Woodard MD at [facility] ENDOSCOPY    TONSILLECTOMY       Prior Level of Function/Home Situation:   Social/Functional History  Lives With: Spouse  Type of Home: House  Home

## 2024-01-05 NOTE — PROGRESS NOTES
Occupational Therapy  01/05/24    Chart reviewed. RN approved therapy to see Pt, however Pt family at bedside and declining therapy at this time. Will defer and follow up as able.    Cheryl Guerrero, OT

## 2024-01-05 NOTE — PROGRESS NOTES
Neurocritical Care Brief Progress Note:    Patient sitting up in bed, drinking soda.  She has eaten dinner with her .  She is able to recite last night's events about having her toes pinched.      SBP as of 1745 is 149/81 on 15 mcg/min of Levophed.  Nursing can take it up to a Max of 20 mcg/min when she is given doses of Nimotop.  NS running at 100 ml/hr to try to keep -180    NPO after midnight.  Dr. Hamm is on call and will evaluate patient in the morning.  TCDs ordered for AM.    Physical Exam:  Gen: NAD, calm, cooperative  Neuro: A&O to person, month and place, gets confused with year. Follows commands. Speech dysarthric. Affect normal. PERRL, 3 mm bilaterally. Blinks to threat. Right gaze preference, but can cross midline. EOMI. Face symmetric. Palate symmetric. Tongue midline. Yonis spontaneously. Strength 5/5 RUE and 3/5 RLE.  4/5 on the LUE and 2/5 on the LLE.  Drift with the RUE. Bulk and tone normal. No involuntary movements. Gait deferred.  Skin: Warm, dry, color appropriate for ethnicity.     Christina Hernadez, APRN - NP  Neurocritical Care Nurse Practitioner  [phone]

## 2024-01-05 NOTE — PROGRESS NOTES
Neurocritical Care Brief Progress Note:  Soledad Geller is 58 y.o female with pmh of Asthma, FMD, HTN, HLD, chronic brachiocephalic artery dissection who arrived from [facility] ED intubated to [facility] ICU early morning of 12/18/23 with SAH from a ruptured cerebral aneurysm. CTA showed a large fusiform aneurysm of the right supraclinoid ICA with eccentric broad-based component.    Pt is s/p cerebral/cervical arteriogram and Flow diversion stent embolization of right supraclinoid ICA aneurysm on 12/18/23 by Dr. Hamilton.    Pt was noted with mild to moderate vasospasm of the RMCA and B/L ACAs s/p cerebral angiogram and IA nicardipine infusion on 12/26/2023 by Dr. Hamilton.     12/29/23 TCD's consistent with possible bilateral distal A1 CARLITOS vasospasm.    12/30/23 TCD's showed possible right CARLITOS vasospasm. Pt noted with worsening left sided weakness with decreased sensation. STAT CTH obtained showed no worsening bleed or acute infarct. Small volume right sylvian fissure SAH and right posterior horn lateral ventricle IVH, decreased from prior imaging.    12/31/23 continues to have worsening left-sided weakness and stat CTA h/n showed worsening right sided intraventricular and subarachnoid hemorrhage. Generalized cerebral edema.    The pt was taken to IR for flow diversion stent embolization of HERNÁN aneurysm, IA Verapamil infusion for intracranial vasospasm    01/03/24 Pt continues with drowsiness and persistent left-sided weakness without improvement. Repeat CT Head obtained showed unchanged subarachnoid and   intraventricular hemorrhages.    1/4/24 TCD with possible mild right MCA vasospasm, possible right and left CARLITOS vasospasm.    01/5/24 patient continues to have drowsiness and persistent left sided weakness. She is + 171.1 at the start of the shift. CT Angio Head and Neck ordered for 1/5 AM.       Physical Exam:   Blood pressure 136/85, pulse 93, temperature 98.2 °F (36.8 °C), temperature source Oral, resp. rate 18, height 1.727  m (5' 7.99\"), weight 71.6 kg (157 lb 13.6 oz), SpO2 100 %.    Constitutional: Drowsy, requiring repeated sternal rub and painful stimulus to participate in the neurological exam. Participation in the neurological exam is limited.   Respiratory: Unlabored respirations       NEURO:  Mental status: Patient is drowsy. Briefly opens eyes before closing them again. Oriented to self. She states that she is at home and that is is month 10. When asked the year she states she does not know. Following commands to show two fingers, give thumbs up, make fist/open.  Speech: Fluent. Does not participate in naming and repetition. Telling examiner that she is sleepy and has to go to the bathroom.   Cranial Nerves: EOMI, Left homonymous hemianopia, PERRL, 3mm, no nystagmus, no ptosis. Full facial strength with mild left asymmetry. Tongue protrudes to midline, palate elevates symmetrically. Shrug Shoulders B/L  Motor: Normal bulk and tone. Unable to do formal strength testing due to drowsiness. RUE and RLE are antigravity. LUE antigravity with drift. LLE is not antigravity however she is able to flex at the knee and wiggle toes.   Coordination: EVARISTO  Sensation: Intact to light tough throughout   Gait:  Deferred     Continue current plan per NIS.     Clarissa Staples, [facility]BBLES[facility]-BC   Neurocritical Care Nurse Practitioner

## 2024-01-05 NOTE — PROGRESS NOTES
0000: Clarissa neuro NP, at bedside for dual neuro exam. Patient even more lethargic and drowsy than at baseline which made full neuro exam very difficult. Patient having to have repeated painful stimuli to wake up during exam. No acute concerns per NP, will continue with planned CTA at 0300.     0243: patient to CT. Tolerated well.

## 2024-01-05 NOTE — CARE COORDINATION
Transition of Care Plan:     RUR: 11 %   Prior Level of Functioning: Independent   Disposition: IPR   IPR: Date FOC offered: 12/21/23  Date FOC received: 12/21/23  Accepting facility: [facility]  Transportation at discharge: S  /IMM Medicare/[place] given: NA  Is patient a  and connected with VA? No  Caregiver Contact: Ravinder Geller   Discharge Caregiver contacted prior to discharge? Yes  Care Conference needed? No  Barriers to discharge:    Medical stability   [facility]  [facility]'s   [facility] Following    Heather Valentin RN,Care Management

## 2024-01-05 NOTE — PROGRESS NOTES
(L) 11.5 - 16.0 g/dL    Hematocrit 34.4 (L) 35.0 - 47.0 %    MCV 91.2 80.0 - 99.0 FL    MCH 29.2 26.0 - 34.0 PG    MCHC 32.0 30.0 - 36.5 g/dL    RDW 12.6 11.5 - 14.5 %    Platelets 442 (H) 150 - 400 K/uL    MPV 8.8 (L) 8.9 - 12.9 FL    Nucleated RBCs 0.0 0  WBC    nRBC 0.00 0.00 - 0.01 K/uL   Magnesium    Collection Time: 01/05/24  4:12 AM   Result Value Ref Range    Magnesium 2.0 1.6 - 2.4 mg/dL   Phosphorus    Collection Time: 01/05/24  4:12 AM   Result Value Ref Range    Phosphorus 2.7 2.6 - 4.7 MG/DL   Vascular transcranial Doppler (TCD) complete    Collection Time: 01/05/24  8:40 AM   Result Value Ref Range    Right MCA 1 .6 cm/s    Right MCA 1 EDV 54.1 cm/s    Right CARLITOS .8 cm/s    Right CARLITOS EDV 57.1 cm/s    Right ICA PSV 68.7 cm/s    Right ICA EDV 24.8 cm/s    Right PCA 1 PSV 62.1 cm/s    Right PCA 1 EDV 22.6 cm/s    Right Vertebral PSV 41.5 cm/s    Right Vertebral EDV 20.0 cm/s    Left MCA 1 PSV 64.5 cm/s    Left MCA 1 EDV 23.4 cm/s    Left CARLITOS .4 cm/s    Left CARLITOS EDV 66.8 cm/s    Left ICA PSV 93.1 cm/s    Left ICA EDV 33.3 cm/s    Left PCA 1 PSV 65.4 cm/s    Left PCA 1 EDV 23.7 cm/s    Left Vertebral PSV 40.9 cm/s    Left Vertebral EDV 15.1 cm/s    Basilar Artery PSV 75.2 cm/s    Basilar Artery EDV 28.6 cm/s    Right ICA dist PSV 56.6 cm/s    Right ICA dist EDV 17.4 cm/s    Left ICA dist PSV 44.4 cm/s    Left ICA dist EDV 16.8 cm/s    Body Surface Area 1.93 m2    Right MCA 1 Mean Velocity 80.9 cm/s    Right CARLITOS Mean Velocity 81.3 cm/s    Right ICA Mean Velocity 39.4 cm/s    Right PCA 1 Mean Velocity 35.8 cm/s    Right Vertebral Mean Velocity 27.2 cm/s    Left MCA 1 Mean Velocity 37.1 cm/s    Left CRALITOS Mean Velocity 92.7 cm/s    Left ICA Mean Velocity 53.2 cm/s    Left PCA 1 Mean Velocity 37.6 cm/s    Left Vertebral Mean Velocity 23.7 cm/s         Imaging (personally reviewed by myself):  CTH 12/17/23: large SAH with IPH  CTH 12/18/23 at 0615: new IVH but no hydro  CTH 12/18/23 at  possible mild distal LT A1 CARLITOS vasospasm (did not reflect RT MCA/CARLITOS territory vasospasm seen on CTA.        I have spent 60 minutes of time involved in chart review, lab review, imaging review, consultations with specialists and attendings, family discussion/decision making and documentation.     Signed By: BETZY Quintanilla - NP, Neurocritical Care Nurse Practitioner    January 5, 2024

## 2024-01-06 ENCOUNTER — APPOINTMENT (OUTPATIENT)
Facility: HOSPITAL | Age: 59
End: 2024-01-06
Attending: ANESTHESIOLOGY
Payer: OTHER GOVERNMENT

## 2024-01-06 LAB
ANION GAP SERPL CALC-SCNC: 7 MMOL/L (ref 5–15)
BUN SERPL-MCNC: 7 MG/DL (ref 6–20)
BUN/CREAT SERPL: 10 (ref 12–20)
CALCIUM SERPL-MCNC: 9.7 MG/DL (ref 8.5–10.1)
CHLORIDE SERPL-SCNC: 110 MMOL/L (ref 97–108)
CO2 SERPL-SCNC: 21 MMOL/L (ref 21–32)
CREAT SERPL-MCNC: 0.69 MG/DL (ref 0.55–1.02)
ERYTHROCYTE [DISTWIDTH] IN BLOOD BY AUTOMATED COUNT: 12.6 % (ref 11.5–14.5)
GLUCOSE SERPL-MCNC: 119 MG/DL (ref 65–100)
HCT VFR BLD AUTO: 33.8 % (ref 35–47)
HGB BLD-MCNC: 11 G/DL (ref 11.5–16)
MAGNESIUM SERPL-MCNC: 1.9 MG/DL (ref 1.6–2.4)
MCH RBC QN AUTO: 28.8 PG (ref 26–34)
MCHC RBC AUTO-ENTMCNC: 32.5 G/DL (ref 30–36.5)
MCV RBC AUTO: 88.5 FL (ref 80–99)
NRBC # BLD: 0 K/UL (ref 0–0.01)
NRBC BLD-RTO: 0 PER 100 WBC
PHOSPHATE SERPL-MCNC: 2.5 MG/DL (ref 2.6–4.7)
PLATELET # BLD AUTO: 513 K/UL (ref 150–400)
PMV BLD AUTO: 9.1 FL (ref 8.9–12.9)
POTASSIUM SERPL-SCNC: 3.3 MMOL/L (ref 3.5–5.1)
RBC # BLD AUTO: 3.82 M/UL (ref 3.8–5.2)
SODIUM SERPL-SCNC: 138 MMOL/L (ref 136–145)
WBC # BLD AUTO: 6.4 K/UL (ref 3.6–11)

## 2024-01-06 PROCEDURE — 2000000000 HC ICU R&B

## 2024-01-06 PROCEDURE — 6370000000 HC RX 637 (ALT 250 FOR IP): Performed by: INTERNAL MEDICINE

## 2024-01-06 PROCEDURE — C1769 GUIDE WIRE: HCPCS

## 2024-01-06 PROCEDURE — 2580000003 HC RX 258: Performed by: INTERNAL MEDICINE

## 2024-01-06 PROCEDURE — 99233 SBSQ HOSP IP/OBS HIGH 50: CPT | Performed by: NURSE PRACTITIONER

## 2024-01-06 PROCEDURE — 36415 COLL VENOUS BLD VENIPUNCTURE: CPT

## 2024-01-06 PROCEDURE — 83735 ASSAY OF MAGNESIUM: CPT

## 2024-01-06 PROCEDURE — B31R1ZZ FLUOROSCOPY OF INTRACRANIAL ARTERIES USING LOW OSMOLAR CONTRAST: ICD-10-PCS | Performed by: PSYCHIATRY & NEUROLOGY

## 2024-01-06 PROCEDURE — APPNB30 APP NON BILLABLE TIME 0-30 MINS: Performed by: NURSE PRACTITIONER

## 2024-01-06 PROCEDURE — 84100 ASSAY OF PHOSPHORUS: CPT

## 2024-01-06 PROCEDURE — 6370000000 HC RX 637 (ALT 250 FOR IP): Performed by: PSYCHIATRY & NEUROLOGY

## 2024-01-06 PROCEDURE — 93886 INTRACRANIAL COMPLETE STUDY: CPT

## 2024-01-06 PROCEDURE — B3181ZZ FLUOROSCOPY OF BILATERAL INTERNAL CAROTID ARTERIES USING LOW OSMOLAR CONTRAST: ICD-10-PCS | Performed by: PSYCHIATRY & NEUROLOGY

## 2024-01-06 PROCEDURE — 2580000003 HC RX 258: Performed by: PSYCHIATRY & NEUROLOGY

## 2024-01-06 PROCEDURE — 2500000003 HC RX 250 WO HCPCS: Performed by: INTERNAL MEDICINE

## 2024-01-06 PROCEDURE — 2500000003 HC RX 250 WO HCPCS: Performed by: PSYCHIATRY & NEUROLOGY

## 2024-01-06 PROCEDURE — 6360000004 HC RX CONTRAST MEDICATION: Performed by: PSYCHIATRY & NEUROLOGY

## 2024-01-06 PROCEDURE — B31D1ZZ FLUOROSCOPY OF RIGHT VERTEBRAL ARTERY USING LOW OSMOLAR CONTRAST: ICD-10-PCS | Performed by: PSYCHIATRY & NEUROLOGY

## 2024-01-06 PROCEDURE — 6360000002 HC RX W HCPCS: Performed by: PSYCHIATRY & NEUROLOGY

## 2024-01-06 PROCEDURE — 3E0Q3GC INTRODUCTION OF OTHER THERAPEUTIC SUBSTANCE INTO CRANIAL CAVITY AND BRAIN, PERCUTANEOUS APPROACH: ICD-10-PCS | Performed by: PSYCHIATRY & NEUROLOGY

## 2024-01-06 PROCEDURE — 80048 BASIC METABOLIC PNL TOTAL CA: CPT

## 2024-01-06 PROCEDURE — B3151ZZ FLUOROSCOPY OF BILATERAL COMMON CAROTID ARTERIES USING LOW OSMOLAR CONTRAST: ICD-10-PCS | Performed by: PSYCHIATRY & NEUROLOGY

## 2024-01-06 PROCEDURE — 85027 COMPLETE CBC AUTOMATED: CPT

## 2024-01-06 PROCEDURE — 36223 PLACE CATH CAROTID/INOM ART: CPT

## 2024-01-06 RX ORDER — VERAPAMIL HYDROCHLORIDE 2.5 MG/ML
INJECTION, SOLUTION INTRAVENOUS PRN
Status: COMPLETED | OUTPATIENT
Start: 2024-01-06 | End: 2024-01-06

## 2024-01-06 RX ORDER — LIDOCAINE AND PRILOCAINE 25; 25 MG/G; MG/G
CREAM TOPICAL PRN
Status: COMPLETED | OUTPATIENT
Start: 2024-01-06 | End: 2024-01-06

## 2024-01-06 RX ORDER — SODIUM BICARBONATE 42 MG/ML
INJECTION, SOLUTION INTRAVENOUS PRN
Status: COMPLETED | OUTPATIENT
Start: 2024-01-06 | End: 2024-01-06

## 2024-01-06 RX ORDER — NITROGLYCERIN 20 MG/100ML
INJECTION INTRAVENOUS PRN
Status: COMPLETED | OUTPATIENT
Start: 2024-01-06 | End: 2024-01-06

## 2024-01-06 RX ORDER — LIDOCAINE HYDROCHLORIDE 10 MG/ML
INJECTION, SOLUTION EPIDURAL; INFILTRATION; INTRACAUDAL; PERINEURAL PRN
Status: COMPLETED | OUTPATIENT
Start: 2024-01-06 | End: 2024-01-06

## 2024-01-06 RX ORDER — HEPARIN SODIUM 1000 [USP'U]/ML
INJECTION, SOLUTION INTRAVENOUS; SUBCUTANEOUS PRN
Status: COMPLETED | OUTPATIENT
Start: 2024-01-06 | End: 2024-01-06

## 2024-01-06 RX ORDER — MIDAZOLAM HYDROCHLORIDE 2 MG/2ML
INJECTION, SOLUTION INTRAMUSCULAR; INTRAVENOUS PRN
Status: COMPLETED | OUTPATIENT
Start: 2024-01-06 | End: 2024-01-06

## 2024-01-06 RX ADMIN — MIDAZOLAM HYDROCHLORIDE 1 MG: 1 INJECTION, SOLUTION INTRAMUSCULAR; INTRAVENOUS at 09:50

## 2024-01-06 RX ADMIN — METHYLPHENIDATE HYDROCHLORIDE 5 MG: 5 TABLET ORAL at 15:21

## 2024-01-06 RX ADMIN — TICAGRELOR 90 MG: 90 TABLET ORAL at 08:13

## 2024-01-06 RX ADMIN — NITROGLYCERIN 100 MCG: 20 INJECTION INTRAVENOUS at 09:49

## 2024-01-06 RX ADMIN — HEPARIN SODIUM 2000 UNITS: 1000 INJECTION INTRAVENOUS; SUBCUTANEOUS at 09:39

## 2024-01-06 RX ADMIN — SODIUM CHLORIDE 2 MCG/MIN: 9 INJECTION, SOLUTION INTRAVENOUS at 08:15

## 2024-01-06 RX ADMIN — HEPARIN SODIUM: 1000 INJECTION INTRAVENOUS; SUBCUTANEOUS at 09:46

## 2024-01-06 RX ADMIN — METHYLPHENIDATE HYDROCHLORIDE 5 MG: 5 TABLET ORAL at 08:13

## 2024-01-06 RX ADMIN — SODIUM CHLORIDE, PRESERVATIVE FREE 10 ML: 5 INJECTION INTRAVENOUS at 20:51

## 2024-01-06 RX ADMIN — IOPAMIDOL 82 ML: 612 INJECTION, SOLUTION INTRAVENOUS at 10:00

## 2024-01-06 RX ADMIN — NITROGLYCERIN 100 MCG: 20 INJECTION INTRAVENOUS at 09:39

## 2024-01-06 RX ADMIN — SODIUM CHLORIDE, PRESERVATIVE FREE 10 ML: 5 INJECTION INTRAVENOUS at 08:13

## 2024-01-06 RX ADMIN — LEVETIRACETAM 500 MG: 500 TABLET, FILM COATED ORAL at 08:13

## 2024-01-06 RX ADMIN — SODIUM CHLORIDE, PRESERVATIVE FREE 10 ML: 5 INJECTION INTRAVENOUS at 20:52

## 2024-01-06 RX ADMIN — POTASSIUM PHOSPHATE, MONOBASIC POTASSIUM PHOSPHATE, DIBASIC INJECTION, 30 MMOL: 236; 224 SOLUTION, CONCENTRATE INTRAVENOUS at 08:18

## 2024-01-06 RX ADMIN — VERAPAMIL HYDROCHLORIDE 2.5 MG: 2.5 INJECTION, SOLUTION INTRAVENOUS at 09:39

## 2024-01-06 RX ADMIN — SENNOSIDES AND DOCUSATE SODIUM 1 TABLET: 50; 8.6 TABLET ORAL at 20:51

## 2024-01-06 RX ADMIN — TICAGRELOR 90 MG: 90 TABLET ORAL at 20:52

## 2024-01-06 RX ADMIN — NIMODIPINE 60 MG: 30 CAPSULE, LIQUID FILLED ORAL at 12:04

## 2024-01-06 RX ADMIN — NIMODIPINE 60 MG: 30 CAPSULE, LIQUID FILLED ORAL at 02:11

## 2024-01-06 RX ADMIN — NIMODIPINE 60 MG: 30 CAPSULE, LIQUID FILLED ORAL at 06:28

## 2024-01-06 RX ADMIN — LEVETIRACETAM 500 MG: 500 TABLET, FILM COATED ORAL at 20:50

## 2024-01-06 RX ADMIN — SODIUM BICARBONATE 0.5 MEQ: 42 INJECTION, SOLUTION INTRAVENOUS at 09:39

## 2024-01-06 RX ADMIN — PRAVASTATIN SODIUM 40 MG: 40 TABLET ORAL at 20:50

## 2024-01-06 RX ADMIN — LIDOCAINE AND PRILOCAINE 1 ML: 25; 25 CREAM TOPICAL at 09:30

## 2024-01-06 RX ADMIN — SODIUM CHLORIDE: 9 INJECTION, SOLUTION INTRAVENOUS at 03:48

## 2024-01-06 RX ADMIN — NITROGLYCERIN 1 INCH: 20 OINTMENT TOPICAL at 09:30

## 2024-01-06 RX ADMIN — LIDOCAINE HYDROCHLORIDE 5 ML: 10 INJECTION, SOLUTION EPIDURAL; INFILTRATION; INTRACAUDAL; PERINEURAL at 09:37

## 2024-01-06 RX ADMIN — SODIUM CHLORIDE, PRESERVATIVE FREE 10 ML: 5 INJECTION INTRAVENOUS at 08:14

## 2024-01-06 RX ADMIN — VERAPAMIL HYDROCHLORIDE 40 MG: 2.5 INJECTION, SOLUTION INTRAVENOUS at 09:49

## 2024-01-06 RX ADMIN — NIMODIPINE 60 MG: 30 CAPSULE, LIQUID FILLED ORAL at 16:40

## 2024-01-06 RX ADMIN — ASPIRIN 81 MG CHEWABLE TABLET 81 MG: 81 TABLET CHEWABLE at 08:13

## 2024-01-06 RX ADMIN — SODIUM CHLORIDE 5 MCG/MIN: 9 INJECTION, SOLUTION INTRAVENOUS at 18:37

## 2024-01-06 RX ADMIN — SODIUM CHLORIDE: 9 INJECTION, SOLUTION INTRAVENOUS at 18:37

## 2024-01-06 RX ADMIN — NIMODIPINE 60 MG: 30 CAPSULE, LIQUID FILLED ORAL at 20:48

## 2024-01-06 ASSESSMENT — PULMONARY FUNCTION TESTS
PIF_VALUE: 1
PIF_VALUE: 1
PIF_VALUE: 0

## 2024-01-06 ASSESSMENT — PAIN SCALES - GENERAL
PAINLEVEL_OUTOF10: 0

## 2024-01-06 NOTE — PROGRESS NOTES
Neurointerventional Surgery Progress Note  Skylar Shi, [facility]  Neurocritical Care NP    Admit Date: 12/18/2023   LOS: 19 days      Daily Progress Note: 1/6/2024    Assessment:     Cerebral Hemorrhage - SAH due to ruptured fusiform right MCA aneurysm, Morin Miller 3, Motley Grade 4, baseline mRS: 0. S/p cerebral angiogram w/ flow diversion stent embolization on 12/18/23 by Dr. Hamilton. Increase in SAH, IVH seen on repeat CTAh-n 12/31/23. Pt taken for cerebral angiogram revealing enlargement of previously embolized right ICA aneurysm thus additional flow-diverting stent placed by Dr. Hamilton on 12/31/23.      Cerebral vasospasm - S/p cerebral angiogram and IA nicardipine infusion on 12/26/2023 for cerebral vasospasm. 12/31/23: TCDs showed evidence of bilateral CARLITOS vasospasm. Pt treated w/ 16 mg of IA verapamil during angiogram.       Plan:     Planning for cerebral angiogram and verapamil infusion for vasospasm treatment today  SBP Goal 140-180, Levophed gtt prn  Neuro checks every hour   Cont DAPT - ASA 81mg daily and ticagrelor 90mg BID  Day 20/21 of Nimodipine to prevent delayed cerebral ischemia  Keppra 500 mg BID for seizure ppx  Continue NS 100mL/hr to maintain euvolemia  Strict I&O  Labs reviewed: , K 3.3, Mag 1.9 Phos 2.5.  BMP, mg, phos daily, patient give Kphos replacement this morning   TCDs daily, TCDs today pending   PT/OT/SLP evals    Plan d/w Dr. Hamm, ICU Intensivist Dr. Guzmán, Primary Nurse, patient, and patient's .      HPI: Soledad Geller is a 58 y.o. AA female with a PMH of asthma, HTN, and HDL who presented to [facility] ED on 12/17/23 with decreased LOC and vomiting. She had been reporting headache for several days prior to presentation. Pt reported taking NSAIDs for pain but denied use of ASA or blood thinners. A stat CTH was obtained which showed a large SAH with IPH. CTA head/neck was then obtained which showed a large fusiform aneurysm of the M1 segment on the right measuring 8.8 x  12.6 11.5 - 14.5 %    Platelets 513 (H) 150 - 400 K/uL    MPV 9.1 8.9 - 12.9 FL    Nucleated RBCs 0.0 0  WBC    nRBC 0.00 0.00 - 0.01 K/uL   Magnesium    Collection Time: 01/06/24  3:36 AM   Result Value Ref Range    Magnesium 1.9 1.6 - 2.4 mg/dL   Phosphorus    Collection Time: 01/06/24  3:36 AM   Result Value Ref Range    Phosphorus 2.5 (L) 2.6 - 4.7 MG/DL         Imaging (personally reviewed by myself):  CTH 12/17/23: large SAH with IPH  CTH 12/18/23 at 0615: new IVH but no hydro  CTH 12/18/23 at 1225: stable  CTH 12/19/23: mild ventricular dilation, stable SAH  CTH 12/20/23: stable  CTH 12/30/23: stable appearance compared to prior CTH  CTH 12/31/23: Right ICA stent visualized, increased SAH and IVH  CTH 1/3/24: Stable appearance of SAH, IVH w/ mild edema seen in right hemisphere near internal capsule      CTA 12/17/23: large fusiform aneurysm of the M1 segment on the right measuring 8.8 x 16 x 9.9 mm  CTA 12/31/23: Right MCA stenosis, worsening right side SAH, IVH   CTA  01/05/24: revealed postoperative changes of right ICA/MCA stenting. There is persistent enhancement within the excluded aneurysm sac, although the sac is decreased in size compared to prior examination.  2. Stenosis at the distal margin of the stent.       ECHO with EF 75-80%.      TCDs 12/18/23: No vasospasm.  TCDs 12/19/23: No vasospasm.  TCDs 12/20/23: Possible right CARLIOTS vasospasm.  No further intracranial vasospasm identified bilaterally   TCDs 12/21/23: No vasospasm.   TCDs 12/22/23: No vasospasm.   TCDs 12/23/23: No vasospasm.  TCDs 12/24/23: No vasospasm.  TCDs 12/25/23: Distal left A1 CARLITOS velocity is elevated to a level consistent with possible vasospasm.No evidence of vasospasm on the right.  TCDs 12/27/2023: No evidence of intracranial vasospasm.  TCDS 12/29/2023: Imaging is consistent with possible bilateral distal A1 CARLITOS vasospasm.   TCDS 12/30/2023: Imaging shows evidence of possible right CARLITOS vasospasm  TCDs 12/31/2023:

## 2024-01-06 NOTE — PROGRESS NOTES
Neurocritical Care Brief Progress Note:  Soledad Geller is 58 y.o female with pmh of Asthma, FMD, HTN, HLD, chronic brachiocephalic artery dissection who arrived from [facility] ED intubated to [facility] ICU early morning of 12/18/23 with SAH from a ruptured cerebral aneurysm. CTA showed a large fusiform aneurysm of the right supraclinoid ICA with eccentric broad-based component.    Pt is s/p cerebral/cervical arteriogram and Flow diversion stent embolization of right supraclinoid ICA aneurysm on 12/18/23 by Dr. Hamilton.    Pt was noted with mild to moderate vasospasm of the RMCA and B/L ACAs s/p cerebral angiogram and IA nicardipine infusion on 12/26/2023 by Dr. Hamilton.     12/29/23 TCD's consistent with possible bilateral distal A1 CARLITOS vasospasm.    12/30/23 TCD's showed possible right CARLITOS vasospasm. Pt noted with worsening left sided weakness with decreased sensation. STAT CTH obtained showed no worsening bleed or acute infarct. Small volume right sylvian fissure SAH and right posterior horn lateral ventricle IVH, decreased from prior imaging.    12/31/23 continues to have worsening left-sided weakness and stat CTA h/n showed worsening right sided intraventricular and subarachnoid hemorrhage. Generalized cerebral edema.    The pt was taken to IR for flow diversion stent embolization of HERNÁN aneurysm, IA Verapamil infusion for intracranial vasospasm    01/03/24 Pt continues with drowsiness and persistent left-sided weakness without improvement. Repeat CT Head obtained showed unchanged subarachnoid and   intraventricular hemorrhages.    1/4/24 TCD with possible mild right MCA vasospasm, possible right and left CARLITOS vasospasm.    01/5/24 patient continues to have drowsiness and persistent left sided weakness.CT Angio Head and Neck showed vasospasm to the right MCA/CARLITOS. 1/5 TCD w/possible mild distal left A1 CARLITOS vasospasm, no right hemispheric vasospasm. She is to go to angio tomorrow on 1/6 and is currently NPO at the start of the

## 2024-01-06 NOTE — PRE SEDATION
Sedation Pre-Procedure Note    Patient Name: Soledad Geller   YOB: 1965  Room/Bed: [address] [place] [address]  Medical Record Number: [id number]  Date: 1/6/2024   Time: 9:28 AM       Indication:  Cerebral angiogram for vasospasm treatment     Consent: I have discussed with the patient and/or the patient representative the indication, alternatives, and the possible risks and/or complications of the planned procedure and the anesthesia methods. The patient and/or patient representative appear to understand and agree to proceed.    Vital Signs:   Vitals:    01/06/24 0845   BP: (!) 138/90   Pulse: 67   Resp: 17   Temp:    SpO2: 100%       Past Medical History:   has a past medical history of Asthma, Brachiocephalic artery injury, Glaucoma, low tension, Hyperlipidemia, Hypertension, and Ill-defined condition.    Past Surgical History:   has a past surgical history that includes Colonoscopy (N/A, 5/1/2017) and Tonsillectomy.    Medications:   Scheduled Meds:    potassium phosphate IVPB (PERIPHERAL LINE)  30 mmol IntraVENous Once    methylphenidate  5 mg Oral BID    [Held by provider] metoprolol tartrate  25 mg Oral BID    niMODipine  60 mg Oral 6 times per day    levETIRAcetam  500 mg Oral BID    pravastatin  40 mg Oral Nightly    aspirin  81 mg Oral Daily    ticagrelor  90 mg Oral BID    sodium chloride flush  5-40 mL IntraVENous 2 times per day    sennosides-docusate sodium  1 tablet Oral BID    sodium chloride flush  5-40 mL IntraVENous 2 times per day     Continuous Infusions:    sodium chloride 100 mL/hr at 01/06/24 0348    norepinephrine 2 mcg/min (01/06/24 0815)    sodium chloride      dextrose      sodium chloride       PRN Meds: niCARdipine (CARDENE) 25 mg in sodium chloride 0.9% 25 mL IV syringe (1 mg/mL concentration), labetalol, hydrALAZINE, potassium chloride **OR** potassium alternative oral replacement **OR** potassium chloride, magnesium sulfate, sodium chloride flush, sodium chloride, ondansetron **OR**

## 2024-01-06 NOTE — PROGRESS NOTES
NIS Brief Post Procedure Progress Note    Patient is s/p DSA and cerebral infusion therapy with verapamil for vasospasm treatment.     Patient denies any current headache or vision changes. She remains drowsy, but arouses to voice. Oriented x 4. No aphasia. Mild dysarthria. Tongue slightly deviates to the left, but able to move left and right. Palate elevates symmetrically. No facial droop noted. Right eye lid lazy at times when opening. PERRL. EOMs intact. Left partial hemianopia, otherwise visual fields intact. Left arm drift present. 4/5 strength in LUE. RUE 5/5 strength. RLE 4/5. LLE 3/5 (able to hold leg up off of bed slightly for 5 seconds). Left visual neglect. Sensation intact throughout FTN and HTS intact on the right side. Difficult to assess on the left side due to weakness. No involuntary movements. Gait deferred.     Skin warm to touch, but feet cool to touch. Right wrist arteriotomy site c/d/I with TR band in place. No bleeding, hematoma, or bruising. Scattered bruising to BUE. Radial pulses palpable bilaterally.     PLAN:  SBP goal 140-180  Follow neurovascular checks per protocol  TCDs from earlier this morning showing bilateral possible CARLITOS vasospasm.    DAVID Ortega-BC  Neurocritical care NP/Neurointerventional Surgery

## 2024-01-06 NOTE — BRIEF OP NOTE
Brief Procedure Note      Patient: Soledad Geller MRN: [id number]     YOB: 1965  Age: 58 y.o.  Sex: female      Service: Neurointerventional Surgery    Date of Procedure: 1/6/2024    Pre-Procedure Diagnosis: Cerebral vasospasm    Post-Procedure Diagnosis: SAME    : Gustabo Hamm DO    Assistant(s): N/A    Procedure(s):   Diagnostic cerebral angiogram  Cerebral infusion therapy    Vessels Studied:   Right CCA  Right ICA  Left CCA  Left ICA  Right vertebral artery    Puncture Site: Right radial artery    Preliminary Findings:   Mod r mca vasospasm  Verpamil + ntg R ICA    Plan:   - -180  - euvolemia  - follow exam      Specimens: None    Implants: None    Drains: None    Anesthesia: local    Estimated Blood Loss: 5 cc      Apparent Intraoperative Complications: None immediate    Patient Condition: Stable    Disposition: ICU      Signed:   Gustabo Hamm DO  [facility] Neurointerventional Surgery  [facility]

## 2024-01-06 NOTE — PROGRESS NOTES
[facility] ICU Progress Note        Soledad Geller  1965  [id number]  1/6/2024      Assessment and plan:  Cerebral Hemorrhage due to SAH due to ruptured fusiform right MCA aneurysm  -post flow diversion stent embo done on 12/18  -pseudoaneurysm right ICA aneurysm   -repeat angio with additional flow diverting stent placed to right ICA aneurysm   -complicated by acute CARLITOS vasospasm despite nimodipine ppx  -cont TCDs daily   - post intracerebral verapamil and cardene by NIS  --> repeat angio with intervention again today    -target SBP < 160  -cont keppra for szr ppx, 500 IV BID    -dc milrinone with drop in BP and change to NE, target MAP > 70  -to angio again toady      Acute encephalopathy:  better but not baseline.    -improved today.  More awake and active with stimulant dose    -? Hypoactive delirium +  vasospasm is the etiology    -tcont ritalin 5 bID at 0800 and 1300     HTN:  -prn labetalol and hydralzine    -target SBP < 160     Mobilize daily  She is tolerating PO       Discussed with neurointerventional team.  Discussed with case management    HPI:  plan for angio today       ICU DAILY CHECKLIST     Code Status:full   DVT Prophylaxis: SCDs  T/L/D: PIVs, purewick   SUP: yes  Diet:yes  Activity Level: as tolerated  ABCDEF Bundle/Checklist Completed:Yes  Disposition:cont ICU  Multidisciplinary Rounds Completed: yes  Goals of Care Discussion/Palliative: *yes  Patient/Family Updated: yes      OBJECTIVE  Vitals:    01/06/24 1315 01/06/24 1330 01/06/24 1345 01/06/24 1400   BP: 135/79 132/77 136/77 131/78   Pulse: 95 98 100 94   Resp: 19 15 16 19   Temp:       TempSrc:       SpO2: 100%  100% 100%   Weight:       Height:         EXAM:   GEN: awake alert this am.  Answers questions appropriately    HEENT  -Head: NC/AT;  -Eyes: PERRL, EOMI. No discharge or redness;  -Ears: External ears are normal. Normal TMs.  -Nose: Normal nares.  -Mouth and throat: MMM. Normal gums, mucosa, palate,. Good

## 2024-01-06 NOTE — PROGRESS NOTES
0908: Report received from [facility].    0930: Patient arrive to angio for diagnotic cerebral angiogram an possible treatment for vasospasm to the right CARLITOS and MCA, arterial closure device.    0950: 40 mg verapamil given into right ICA, patient started having a seizure with jolting of the head and left arm, HR 140s, 1 mg versed given, seizure stopped. Activity lasting erika. 1 min.    1020: TRANSFER - OUT REPORT:    Verbal report given to ICU 9 on Soledad Geller  being transferred to [facility] for routine post-op       Report consisted of patient's Situation, Background, Assessment and   Recommendations(SBAR).     Information from the following report(s) Surgery Report, MAR, Recent Results, Cardiac Rhythm NSR, Alarm Parameters, and Neuro Assessment was reviewed with the receiving nurse.           Lines:   Peripheral IV 12/28/23 Left Forearm (Active)   Site Assessment Clean, dry & intact 01/06/24 0800   Line Status Infusing 01/06/24 0800   Line Care Connections checked and tightened 01/06/24 0800   Phlebitis Assessment No symptoms 01/06/24 0800   Infiltration Assessment 0 01/06/24 0800   Alcohol Cap Used Yes 01/06/24 0800   Dressing Status Clean, dry & intact 01/06/24 0800   Dressing Type Transparent 01/06/24 0800       Peripheral IV 01/04/24 Right Antecubital (Active)   Site Assessment Clean, dry & intact 01/06/24 0800   Line Status Capped;Flushed 01/06/24 0800   Line Care Connections checked and tightened 01/06/24 0800   Phlebitis Assessment No symptoms 01/06/24 0800   Infiltration Assessment 0 01/06/24 0800   Alcohol Cap Used Yes 01/06/24 0800   Dressing Status Clean, dry & intact 01/06/24 0800   Dressing Type Transparent 01/06/24 0800   Dressing Intervention New 01/04/24 0662        Opportunity for questions and clarification was provided.      Patient transported with:  Monitor, O2 @ 4lpm, Registered Nurse, and Tech

## 2024-01-07 ENCOUNTER — APPOINTMENT (OUTPATIENT)
Facility: HOSPITAL | Age: 59
End: 2024-01-07
Attending: ANESTHESIOLOGY
Payer: OTHER GOVERNMENT

## 2024-01-07 LAB
ANION GAP SERPL CALC-SCNC: 7 MMOL/L (ref 5–15)
BUN SERPL-MCNC: 4 MG/DL (ref 6–20)
BUN/CREAT SERPL: 7 (ref 12–20)
CALCIUM SERPL-MCNC: 10.3 MG/DL (ref 8.5–10.1)
CHLORIDE SERPL-SCNC: 109 MMOL/L (ref 97–108)
CO2 SERPL-SCNC: 23 MMOL/L (ref 21–32)
CREAT SERPL-MCNC: 0.57 MG/DL (ref 0.55–1.02)
ECHO BSA: 1.93 M2
ECHO BSA: 1.93 M2
ERYTHROCYTE [DISTWIDTH] IN BLOOD BY AUTOMATED COUNT: 12.6 % (ref 11.5–14.5)
GLUCOSE SERPL-MCNC: 110 MG/DL (ref 65–100)
HCT VFR BLD AUTO: 34.3 % (ref 35–47)
HGB BLD-MCNC: 11.5 G/DL (ref 11.5–16)
MAGNESIUM SERPL-MCNC: 1.8 MG/DL (ref 1.6–2.4)
MCH RBC QN AUTO: 29 PG (ref 26–34)
MCHC RBC AUTO-ENTMCNC: 33.5 G/DL (ref 30–36.5)
MCV RBC AUTO: 86.4 FL (ref 80–99)
NRBC # BLD: 0 K/UL (ref 0–0.01)
NRBC BLD-RTO: 0 PER 100 WBC
PHOSPHATE SERPL-MCNC: 2.5 MG/DL (ref 2.6–4.7)
PLATELET # BLD AUTO: 488 K/UL (ref 150–400)
PMV BLD AUTO: 8.7 FL (ref 8.9–12.9)
POTASSIUM SERPL-SCNC: 3.5 MMOL/L (ref 3.5–5.1)
RBC # BLD AUTO: 3.97 M/UL (ref 3.8–5.2)
SODIUM SERPL-SCNC: 139 MMOL/L (ref 136–145)
VAS BASILAR ARTERY EDV: 25.1 CM/S
VAS BASILAR ARTERY EDV: 28.1 CM/S
VAS BASILAR ARTERY MEAN VEL: 41 CM/S
VAS BASILAR ARTERY MEAN VEL: 43 CM/S
VAS BASILAR ARTERY PSV: 71.9 CM/S
VAS BASILAR ARTERY PSV: 73.1 CM/S
VAS LEFT ACA EDV: 63.5 CM/S
VAS LEFT ACA EDV: 67 CM/S
VAS LEFT ACA MEAN VEL: 94 CM/S
VAS LEFT ACA MEAN VEL: 94.8 CM/S
VAS LEFT ACA PSV: 148 CM/S
VAS LEFT ACA PSV: 157.3 CM/S
VAS LEFT EX ICA MEAN VEL: 29 CM/S
VAS LEFT EX ICA MEAN VEL: 31 CM/S
VAS LEFT ICA DIST EDV: 15.8 CM/S
VAS LEFT ICA DIST EDV: 17.4 CM/S
VAS LEFT ICA DIST PSV: 55.9 CM/S
VAS LEFT ICA DIST PSV: 60.2 CM/S
VAS LEFT ICA EDV: 28.9 CM/S
VAS LEFT ICA EDV: 29 CM/S
VAS LEFT ICA MEAN VEL: 47.3 CM/S
VAS LEFT ICA MEAN VEL: 49.4 CM/S
VAS LEFT ICA PSV: 84 CM/S
VAS LEFT ICA PSV: 90.4 CM/S
VAS LEFT LINDEGAARD RATIO: 1.3
VAS LEFT LINDEGAARD RATIO: 1.4
VAS LEFT MCA 1 EDV: 22.5 CM/S
VAS LEFT MCA 1 EDV: 28 CM/S
VAS LEFT MCA 1 MEAN VEL: 39.2 CM/S
VAS LEFT MCA 1 MEAN VEL: 42.7 CM/S
VAS LEFT MCA 1 PSV: 72 CM/S
VAS LEFT MCA 1 PSV: 72.6 CM/S
VAS LEFT PCA 1 EDV: 20.7 CM/S
VAS LEFT PCA 1 EDV: 24 CM/S
VAS LEFT PCA 1 MEAN VEL: 31.3 CM/S
VAS LEFT PCA 1 MEAN VEL: 36 CM/S
VAS LEFT PCA 1 PSV: 52.6 CM/S
VAS LEFT PCA 1 PSV: 60 CM/S
VAS LEFT VERTEBRAL EDV TCD: 18.5 CM/S
VAS LEFT VERTEBRAL EDV TCD: 18.6 CM/S
VAS LEFT VERTEBRAL MEAN VEL: 28.3 CM/S
VAS LEFT VERTEBRAL MEAN VEL: 32.4 CM/S
VAS LEFT VERTEBRAL PSV TCD: 47.7 CM/S
VAS LEFT VERTEBRAL PSV TCD: 60.2 CM/S
VAS RIGHT ACA EDV: 66 CM/S
VAS RIGHT ACA EDV: 66 CM/S
VAS RIGHT ACA MEAN VEL: 103.9 CM/S
VAS RIGHT ACA MEAN VEL: 96.3 CM/S
VAS RIGHT ACA PSV: 157 CM/S
VAS RIGHT ACA PSV: 179.8 CM/S
VAS RIGHT EX ICA MEAN VEL: 37 CM/S
VAS RIGHT EX ICA MEAN VEL: 51 CM/S
VAS RIGHT ICA DIST EDV: 20.2 CM/S
VAS RIGHT ICA DIST EDV: 28 CM/S
VAS RIGHT ICA DIST PSV: 70.3 CM/S
VAS RIGHT ICA DIST PSV: 98 CM/S
VAS RIGHT ICA EDV: 23 CM/S
VAS RIGHT ICA EDV: 24.1 CM/S
VAS RIGHT ICA MEAN VEL: 40 CM/S
VAS RIGHT ICA MEAN VEL: 43.1 CM/S
VAS RIGHT ICA PSV: 74 CM/S
VAS RIGHT ICA PSV: 81.1 CM/S
VAS RIGHT LINDEGAARD RATIO: 1.5
VAS RIGHT LINDEGAARD RATIO: 2.2
VAS RIGHT MCA 1 EDV: 44.6 CM/S
VAS RIGHT MCA 1 EDV: 52 CM/S
VAS RIGHT MCA 1 MEAN VEL: 76.4 CM/S
VAS RIGHT MCA 1 MEAN VEL: 83 CM/S
VAS RIGHT MCA 1 PSV: 140 CM/S
VAS RIGHT MCA 1 PSV: 145 CM/S
VAS RIGHT PCA 1 EDV: 22 CM/S
VAS RIGHT PCA 1 EDV: 25.4 CM/S
VAS RIGHT PCA 1 MEAN VEL: 32.7 CM/S
VAS RIGHT PCA 1 MEAN VEL: 36.6 CM/S
VAS RIGHT PCA 1 PSV: 54 CM/S
VAS RIGHT PCA 1 PSV: 59.1 CM/S
VAS RIGHT VERTEBRAL EDV TCD: 12 CM/S
VAS RIGHT VERTEBRAL EDV TCD: 17 CM/S
VAS RIGHT VERTEBRAL MEAN VEL: 18.9 CM/S
VAS RIGHT VERTEBRAL MEAN VEL: 25.1 CM/S
VAS RIGHT VERTEBRAL PSV TCD: 32.8 CM/S
VAS RIGHT VERTEBRAL PSV TCD: 41.2 CM/S
WBC # BLD AUTO: 4.6 K/UL (ref 3.6–11)

## 2024-01-07 PROCEDURE — 2580000003 HC RX 258: Performed by: INTERNAL MEDICINE

## 2024-01-07 PROCEDURE — 2000000000 HC ICU R&B

## 2024-01-07 PROCEDURE — 83735 ASSAY OF MAGNESIUM: CPT

## 2024-01-07 PROCEDURE — 2500000003 HC RX 250 WO HCPCS: Performed by: INTERNAL MEDICINE

## 2024-01-07 PROCEDURE — 85027 COMPLETE CBC AUTOMATED: CPT

## 2024-01-07 PROCEDURE — 93886 INTRACRANIAL COMPLETE STUDY: CPT

## 2024-01-07 PROCEDURE — 6370000000 HC RX 637 (ALT 250 FOR IP): Performed by: INTERNAL MEDICINE

## 2024-01-07 PROCEDURE — 84100 ASSAY OF PHOSPHORUS: CPT

## 2024-01-07 PROCEDURE — 99233 SBSQ HOSP IP/OBS HIGH 50: CPT | Performed by: NURSE PRACTITIONER

## 2024-01-07 PROCEDURE — 80048 BASIC METABOLIC PNL TOTAL CA: CPT

## 2024-01-07 PROCEDURE — 6370000000 HC RX 637 (ALT 250 FOR IP): Performed by: NURSE PRACTITIONER

## 2024-01-07 PROCEDURE — 36415 COLL VENOUS BLD VENIPUNCTURE: CPT

## 2024-01-07 RX ORDER — NIMODIPINE 30 MG/1
60 CAPSULE, LIQUID FILLED ORAL
Status: DISCONTINUED | OUTPATIENT
Start: 2024-01-07 | End: 2024-01-07

## 2024-01-07 RX ORDER — NIMODIPINE 30 MG/1
60 CAPSULE, LIQUID FILLED ORAL
Status: COMPLETED | OUTPATIENT
Start: 2024-01-07 | End: 2024-01-07

## 2024-01-07 RX ORDER — NOREPINEPHRINE BITARTRATE 0.06 MG/ML
.5-5 INJECTION, SOLUTION INTRAVENOUS CONTINUOUS
Status: DISCONTINUED | OUTPATIENT
Start: 2024-01-07 | End: 2024-01-08

## 2024-01-07 RX ADMIN — ACETAMINOPHEN 650 MG: 325 TABLET ORAL at 20:30

## 2024-01-07 RX ADMIN — METHYLPHENIDATE HYDROCHLORIDE 5 MG: 5 TABLET ORAL at 13:56

## 2024-01-07 RX ADMIN — SODIUM CHLORIDE, PRESERVATIVE FREE 10 ML: 5 INJECTION INTRAVENOUS at 08:02

## 2024-01-07 RX ADMIN — PRAVASTATIN SODIUM 40 MG: 40 TABLET ORAL at 20:30

## 2024-01-07 RX ADMIN — SENNOSIDES AND DOCUSATE SODIUM 1 TABLET: 50; 8.6 TABLET ORAL at 20:29

## 2024-01-07 RX ADMIN — POTASSIUM PHOSPHATE, MONOBASIC POTASSIUM PHOSPHATE, DIBASIC INJECTION, 30 MMOL: 236; 224 SOLUTION, CONCENTRATE INTRAVENOUS at 19:12

## 2024-01-07 RX ADMIN — NIMODIPINE 60 MG: 30 CAPSULE, LIQUID FILLED ORAL at 15:41

## 2024-01-07 RX ADMIN — NIMODIPINE 60 MG: 30 CAPSULE, LIQUID FILLED ORAL at 18:12

## 2024-01-07 RX ADMIN — NIMODIPINE 60 MG: 30 CAPSULE, LIQUID FILLED ORAL at 23:30

## 2024-01-07 RX ADMIN — NIMODIPINE 60 MG: 30 CAPSULE, LIQUID FILLED ORAL at 08:02

## 2024-01-07 RX ADMIN — TICAGRELOR 90 MG: 90 TABLET ORAL at 08:02

## 2024-01-07 RX ADMIN — NIMODIPINE 60 MG: 30 CAPSULE, LIQUID FILLED ORAL at 00:22

## 2024-01-07 RX ADMIN — NIMODIPINE 60 MG: 30 CAPSULE, LIQUID FILLED ORAL at 05:22

## 2024-01-07 RX ADMIN — ASPIRIN 81 MG CHEWABLE TABLET 81 MG: 81 TABLET CHEWABLE at 08:02

## 2024-01-07 RX ADMIN — METHYLPHENIDATE HYDROCHLORIDE 5 MG: 5 TABLET ORAL at 08:02

## 2024-01-07 RX ADMIN — LEVETIRACETAM 500 MG: 500 TABLET, FILM COATED ORAL at 08:02

## 2024-01-07 RX ADMIN — LEVETIRACETAM 500 MG: 500 TABLET, FILM COATED ORAL at 20:30

## 2024-01-07 RX ADMIN — TICAGRELOR 90 MG: 90 TABLET ORAL at 20:30

## 2024-01-07 RX ADMIN — SODIUM CHLORIDE, PRESERVATIVE FREE 10 ML: 5 INJECTION INTRAVENOUS at 20:30

## 2024-01-07 RX ADMIN — SENNOSIDES AND DOCUSATE SODIUM 1 TABLET: 50; 8.6 TABLET ORAL at 08:02

## 2024-01-07 RX ADMIN — SODIUM CHLORIDE, PRESERVATIVE FREE 10 ML: 5 INJECTION INTRAVENOUS at 20:31

## 2024-01-07 ASSESSMENT — PAIN DESCRIPTION - LOCATION: LOCATION: GENERALIZED

## 2024-01-07 ASSESSMENT — PAIN SCALES - GENERAL
PAINLEVEL_OUTOF10: 0
PAINLEVEL_OUTOF10: 10

## 2024-01-07 ASSESSMENT — PAIN DESCRIPTION - DESCRIPTORS: DESCRIPTORS: SORE

## 2024-01-07 NOTE — PROGRESS NOTES
Body Surface Area 1.93 m2    Right MCA 1 Mean Velocity 76.4 cm/s    Right CARLITOS Mean Velocity 103.9 cm/s    Right ICA Mean Velocity 43.1 cm/s    Right PCA 1 Mean Velocity 36.6 cm/s    Right Vertebral Mean Velocity 18.9 cm/s    Left MCA 1 Mean Velocity 39.2 cm/s    Left CARLITOS Mean Velocity 94.8 cm/s    Left ICA Mean Velocity 49.4 cm/s    Left PCA 1 Mean Velocity 31.3 cm/s    Left Vertebral Mean Velocity 32.4 cm/s    Right External ICA Mean Velocity 51.0 cm/s    Right Lindegaard Ratio 1.50     Basilar Artery Mean Eile 43.0 cm/s    Left External ICA Mean Velocity 31.0 cm/s    Left Lindegaard Ratio 1.30          Imaging (personally reviewed by myself):  CTH 12/17/23: large SAH with IPH  CTH 12/18/23 at 0615: new IVH but no hydro  CTH 12/18/23 at 1225: stable  CTH 12/19/23: mild ventricular dilation, stable SAH  CTH 12/20/23: stable  CTH 12/30/23: stable appearance compared to prior CTH  CTH 12/31/23: Right ICA stent visualized, increased SAH and IVH  CTH 1/3/24: Stable appearance of SAH, IVH w/ mild edema seen in right hemisphere near internal capsule      CTA 12/17/23: large fusiform aneurysm of the M1 segment on the right measuring 8.8 x 16 x 9.9 mm  CTA 12/31/23: Right MCA stenosis, worsening right side SAH, IVH   CTA  01/05/24: revealed postoperative changes of right ICA/MCA stenting. There is persistent enhancement within the excluded aneurysm sac, although the sac is decreased in size compared to prior examination.  2. Stenosis at the distal margin of the stent.       ECHO with EF 75-80%.      TCDs 12/18/23: No vasospasm.  TCDs 12/19/23: No vasospasm.  TCDs 12/20/23: Possible right CARLITOS vasospasm.  No further intracranial vasospasm identified bilaterally   TCDs 12/21/23: No vasospasm.   TCDs 12/22/23: No vasospasm.   TCDs 12/23/23: No vasospasm.  TCDs 12/24/23: No vasospasm.  TCDs 12/25/23: Distal left A1 CARLITOS velocity is elevated to a level consistent with possible vasospasm.No evidence of vasospasm on the right.  TCDs  12/27/2023: No evidence of intracranial vasospasm.  TCDS 12/29/2023: Imaging is consistent with possible bilateral distal A1 CARLITOS vasospasm.   TCDS 12/30/2023: Imaging shows evidence of possible right CARLITOS vasospasm  TCDs 12/31/2023: Evidence of bilateral CARLITOS vasospasm  TCDs 1/1/24:  There is evidence of moderate right MCA vasospasm. Imaging is consistent with bilateral CARLITOS vasospasm.  TCDs 1/2/24: There is evidence of mild right MCA vasospasm.  Imaging is consistent with bilateral CARLITOS vasospasm.  TCDs 1/3/24: Possible left CARLITOS vasospasm & elevated velocities in right MCA, more likely hyperemia than spasm.  TCDs 1/4/24: stable, possible mild RT MCA vasospasm, possible RT & LT CARLITOS vasospasm  TCDs 1/5/24: stable with possible improvement, possible mild distal LT A1 CARLITOS vasospasm (did not reflect RT MCA/CARLITOS territory vasospasm seen on CTA.  TCDS 1/6/24: Bilateral possible CARLITOS vasospasm.   TCDs 1/7/2024:  Possible bilateral CARLITOS vasospasm. IVC collapse greater than 50%.       I have spent 50 minutes of time involved in chart review, lab review, imaging review, consultations with specialists and attendings, family discussion/decision making and documentation.     Signed By: BETZY Ortega NP, Neurocritical Care Nurse Practitioner    January 7, 2024

## 2024-01-07 NOTE — PROGRESS NOTES
[facility] ICU Progress Note        Soleadd Geller  1965  [id number]  1/7/2024      Assessment and plan:  Cerebral Hemorrhage due to SAH due to ruptured fusiform right MCA aneurysm  -post flow diversion stent embo done on 12/18, additional stent placed 12/31/23  -pseudoaneurysm right ICA aneurysm has decreased   -repeat angio with additional flow diverting stent placed to right ICA aneurysm   -complicated by acute CARLITOS vasospasm despite nimodipine ppx  -cont TCDs daily   - post intracerebral verapamil and cardene by NIS   -cont keppra for szr ppx, 500 IV BID    -dc milrinone with drop in BP and change to NE, target MAP > 70  -cont NE for now to target hyperperfusion  -nimodipine stops today      Acute encephalopathy:  better but not baseline.    -improved today.  More awake and active with stimulant dose    -? Hypoactive delirium +  vasospasm is the etiology    -cont ritalin 5 bID at 0800 and 1300     HTN:  -BB on hold      Mobilize daily  She is tolerating PO         HPI:  Remains critically ill on NE for increased perfusion.  Continued vasospasm.        ICU DAILY CHECKLIST     Code Status:full   DVT Prophylaxis: SCDs  T/L/D: PIVs, purewick   SUP: yes  Diet: regular  Activity Level:PTOT mobilize daily   ABCDEF Bundle/Checklist Completed:Yes  Disposition: cont ICU, may be able to downgrade tomorrow   Multidisciplinary Rounds Completed: yes  Goals of Care Discussion/Palliative:yes  Patient/Family Updated: yes      OBJECTIVE  Vitals:    01/07/24 1400 01/07/24 1404 01/07/24 1500 01/07/24 1600   BP: (!) 159/103 (!) 158/101 (!) 162/100 (!) 140/92   Pulse: (!) 104 (!) 102 (!) 109 (!) 118   Resp: 18 18 15 18   Temp:       TempSrc:       SpO2:   99% 98%   Weight:       Height:         EXAM:   GEN: awake alert and oriented but sleepy this am    HEENT  -Head: NC/AT;  -Eyes: PERRL, EOMI. No discharge or redness;  -Ears: External ears are normal. Normal TMs.  -Nose: Normal nares.  -Mouth and throat: MMM.

## 2024-01-07 NOTE — PROGRESS NOTES
Neurocritical Care Brief Progress Note:  Soledad Geller is 58 y.o female with pmh of Asthma, FMD, HTN, HLD, chronic brachiocephalic artery dissection who arrived from [facility] ED intubated to [facility] ICU early morning of 12/18/23 with SAH from a ruptured cerebral aneurysm. CTA showed a large fusiform aneurysm of the right supraclinoid ICA with eccentric broad-based component.    Pt is s/p cerebral/cervical arteriogram and Flow diversion stent embolization of right supraclinoid ICA aneurysm on 12/18/23 by Dr. Hamilton.    Pt was noted with mild to moderate vasospasm of the RMCA and B/L ACAs s/p cerebral angiogram and IA nicardipine infusion on 12/26/2023 by Dr. Hamilton.     12/29/23 TCD's consistent with possible bilateral distal A1 CARLITOS vasospasm.    12/30/23 TCD's showed possible right CARLITOS vasospasm. Pt noted with worsening left sided weakness with decreased sensation. STAT CTH obtained showed no worsening bleed or acute infarct. Small volume right sylvian fissure SAH and right posterior horn lateral ventricle IVH, decreased from prior imaging.    12/31/23 continues to have worsening left-sided weakness and stat CTA h/n showed worsening right sided intraventricular and subarachnoid hemorrhage. Generalized cerebral edema.    The pt was taken to IR for flow diversion stent embolization of HERNÁN aneurysm, IA Verapamil infusion for intracranial vasospasm    01/03/24 Pt continues with drowsiness and persistent left-sided weakness without improvement. Repeat CT Head obtained showed unchanged subarachnoid and   intraventricular hemorrhages.    1/4/24 TCD with possible mild right MCA vasospasm, possible right and left CARLITOS vasospasm.    01/5/24 patient continues to have drowsiness and persistent left sided weakness.CT Angio Head and Neck showed vasospasm to the right MCA/CARLITOS. 1/5 TCD w/possible mild distal left A1 CARLITOS vasospasm, no right hemispheric vasospasm.     1/6/24 patient underwent diagnostic cerebral angiogram which showed

## 2024-01-07 NOTE — PROGRESS NOTES
initiated visit during rounds for emotional support due to length of stay. Soledad Geller was asleep when I entered the room. Her  was present. I reintroduced myself and role, explored his feelings/concerns/hopes. He shared that this was day 21 for his wife's hospitalization and her progress. He remains hopeful for her recovery. No needs were articulated. I offered words of encouragement and presence. He expressed appreciation for my visit.    For additional spiritual care, please contact the  on-call at ([phone]).    Sonia Sifuentes MDiv, MS, [facility]  Staff

## 2024-01-08 ENCOUNTER — APPOINTMENT (OUTPATIENT)
Facility: HOSPITAL | Age: 59
End: 2024-01-08
Attending: ANESTHESIOLOGY
Payer: OTHER GOVERNMENT

## 2024-01-08 LAB
ANION GAP SERPL CALC-SCNC: 7 MMOL/L (ref 5–15)
BUN SERPL-MCNC: 5 MG/DL (ref 6–20)
BUN/CREAT SERPL: 7 (ref 12–20)
CALCIUM SERPL-MCNC: 10.6 MG/DL (ref 8.5–10.1)
CHLORIDE SERPL-SCNC: 111 MMOL/L (ref 97–108)
CO2 SERPL-SCNC: 23 MMOL/L (ref 21–32)
CREAT SERPL-MCNC: 0.69 MG/DL (ref 0.55–1.02)
ERYTHROCYTE [DISTWIDTH] IN BLOOD BY AUTOMATED COUNT: 12.9 % (ref 11.5–14.5)
GLUCOSE SERPL-MCNC: 110 MG/DL (ref 65–100)
HCT VFR BLD AUTO: 36.2 % (ref 35–47)
HGB BLD-MCNC: 11.9 G/DL (ref 11.5–16)
MAGNESIUM SERPL-MCNC: 2 MG/DL (ref 1.6–2.4)
MCH RBC QN AUTO: 29 PG (ref 26–34)
MCHC RBC AUTO-ENTMCNC: 32.9 G/DL (ref 30–36.5)
MCV RBC AUTO: 88.1 FL (ref 80–99)
NRBC # BLD: 0 K/UL (ref 0–0.01)
NRBC BLD-RTO: 0 PER 100 WBC
PHOSPHATE SERPL-MCNC: 3.4 MG/DL (ref 2.6–4.7)
PLATELET # BLD AUTO: 498 K/UL (ref 150–400)
PMV BLD AUTO: 8.7 FL (ref 8.9–12.9)
POTASSIUM SERPL-SCNC: 3.6 MMOL/L (ref 3.5–5.1)
RBC # BLD AUTO: 4.11 M/UL (ref 3.8–5.2)
SODIUM SERPL-SCNC: 141 MMOL/L (ref 136–145)
WBC # BLD AUTO: 4 K/UL (ref 3.6–11)

## 2024-01-08 PROCEDURE — 92507 TX SP LANG VOICE COMM INDIV: CPT

## 2024-01-08 PROCEDURE — 2580000003 HC RX 258: Performed by: INTERNAL MEDICINE

## 2024-01-08 PROCEDURE — 85027 COMPLETE CBC AUTOMATED: CPT

## 2024-01-08 PROCEDURE — 6370000000 HC RX 637 (ALT 250 FOR IP): Performed by: INTERNAL MEDICINE

## 2024-01-08 PROCEDURE — 97530 THERAPEUTIC ACTIVITIES: CPT

## 2024-01-08 PROCEDURE — 2060000000 HC ICU INTERMEDIATE R&B

## 2024-01-08 PROCEDURE — 83735 ASSAY OF MAGNESIUM: CPT

## 2024-01-08 PROCEDURE — 92526 ORAL FUNCTION THERAPY: CPT

## 2024-01-08 PROCEDURE — 99232 SBSQ HOSP IP/OBS MODERATE 35: CPT | Performed by: NURSE PRACTITIONER

## 2024-01-08 PROCEDURE — 84100 ASSAY OF PHOSPHORUS: CPT

## 2024-01-08 PROCEDURE — 80048 BASIC METABOLIC PNL TOTAL CA: CPT

## 2024-01-08 PROCEDURE — 97112 NEUROMUSCULAR REEDUCATION: CPT | Performed by: OCCUPATIONAL THERAPIST

## 2024-01-08 PROCEDURE — 36415 COLL VENOUS BLD VENIPUNCTURE: CPT

## 2024-01-08 RX ORDER — POTASSIUM CHLORIDE 750 MG/1
40 TABLET, FILM COATED, EXTENDED RELEASE ORAL ONCE
Status: COMPLETED | OUTPATIENT
Start: 2024-01-08 | End: 2024-01-08

## 2024-01-08 RX ADMIN — SODIUM CHLORIDE, PRESERVATIVE FREE 10 ML: 5 INJECTION INTRAVENOUS at 08:14

## 2024-01-08 RX ADMIN — LEVETIRACETAM 500 MG: 500 TABLET, FILM COATED ORAL at 08:08

## 2024-01-08 RX ADMIN — PRAVASTATIN SODIUM 40 MG: 40 TABLET ORAL at 20:30

## 2024-01-08 RX ADMIN — TICAGRELOR 90 MG: 90 TABLET ORAL at 20:30

## 2024-01-08 RX ADMIN — METHYLPHENIDATE HYDROCHLORIDE 5 MG: 5 TABLET ORAL at 08:08

## 2024-01-08 RX ADMIN — SODIUM CHLORIDE, PRESERVATIVE FREE 10 ML: 5 INJECTION INTRAVENOUS at 20:28

## 2024-01-08 RX ADMIN — SENNOSIDES AND DOCUSATE SODIUM 1 TABLET: 50; 8.6 TABLET ORAL at 08:08

## 2024-01-08 RX ADMIN — METHYLPHENIDATE HYDROCHLORIDE 5 MG: 5 TABLET ORAL at 13:08

## 2024-01-08 RX ADMIN — SODIUM CHLORIDE: 9 INJECTION, SOLUTION INTRAVENOUS at 03:52

## 2024-01-08 RX ADMIN — TICAGRELOR 90 MG: 90 TABLET ORAL at 08:08

## 2024-01-08 RX ADMIN — ASPIRIN 81 MG CHEWABLE TABLET 81 MG: 81 TABLET CHEWABLE at 08:08

## 2024-01-08 RX ADMIN — POTASSIUM CHLORIDE 40 MEQ: 750 TABLET, FILM COATED, EXTENDED RELEASE ORAL at 13:08

## 2024-01-08 ASSESSMENT — PAIN SCALES - GENERAL
PAINLEVEL_OUTOF10: 0
PAINLEVEL_OUTOF10: 0

## 2024-01-08 NOTE — PROGRESS NOTES
Hospitalist Progress Note  Ivette Espana MD  Answering service: [phone]        Date of Service:  2024  NAME:  Soledad Geller  :  1965  MRN:  [id number]      Admission Summary:   Soledad Geller is a 58 y.o. AA female with a PMH of asthma, HTN, and HDL who presented to [facility] ED on 23 with decreased LOC and vomiting. She had been reporting headache x several days prior to presentation, had been taking NSAIDs for pain but does not take any ASA/blood thinners. A stat CT head was obtained which showed a large SAH with IPH. CTA head/neck was then obtained which showed a large fusiform aneurysm of the M1 segment on the right measuring 8.8 x 16 x 9.9 mm. NSGY and NIS were consulted and pt was then transferred to [facility] for higher level of care.   PT was   intubated  in [facility] ER and transferred to [facility] ICU early morning of 23 .   Pt is s/p cerebral/cervical arteriogram and Flow diversion stent embolization of right supraclinoid ICA aneurysm on 23 by Dr. Hamilton.   she was then closely monitored in ICU   2023: Pt with episode of staring during PT lasting approx 30 seconds. No evidence of tonic/clonic activity, twitching, incontinence, or nystagmus.  Pt with mild to moderate vasospasm of the RMCA and B/L ACAs s/p cerebral angiogram and IA nicardipine infusion on 2023 by Dr. Hamilton.   She was ramining drawly but arousae to follow commands and tolerate diet and ICU/ NIS ok to downgrade pt from ICU  .daily TCD monitor   -developed left sided weakness:   Pt taken for cerebral angiogram revealing enlargement of previously embolized right ICA aneurysm thus additional flow-diverting stent placed by Dr. Hamilton on 23.    Monitor ICU :23 and 2023 DSA and IA verapamil for vasospasm treatment.         Interval history / Subjective:   Mental status clear,  Easily getting fatigue after PT

## 2024-01-08 NOTE — PROGRESS NOTES
reflect RT MCA/CARLITOS territory vasospasm seen on CTA.  TCDS 1/6/24: Bilateral possible CARLITOS vasospasm.   TCDs 1/7/2024:  Possible bilateral CARLITOS vasospasm. IVC collapse greater than 50%.    SUBJECTIVE:   Improved this AM     NEUROLOGICAL:    Cerebral Hemorrhage due to SAH due to ruptured fusiform right MCA aneurysm   Acute Encephalopathy (improved)  --Continue recommendation as per neurointerventional team,  -- Systolic blood pressure goal 120-160  -- Continue DAPT, ticagrelor, aspirin  --Status post completion of Nimotop, status post completion of Keppra  --Will hold off on ritaline  -- Holding off on TCD's,  -- Continue PT/OT/speech evaluation.       PULMONOLOGY:   No acute issues     CARDIOVASCULAR:   Hypertension  Off Levophed for hypotension, continue holding home blood pressure medications for 24 hours treat per goal.       GASTROINTESTINAL:   Speech, continue regular diet     RENAL/ELECTROLYTE/FLUIDS:   No acute issues     ENDOCRINE:   No acute issues     HEMATOLOGY/ONCOLOGY:   ALFREDO     ID/MICRO:   ALFREDO    SURGICAL/MSK/DERM/ENT:    ALFREDO     ICU DAILY CHECKLIST      Code Status: Full  DVT Prophylaxis: As per neurointerventional team.  T/L/D: PIV  SUP:   Diet: Continue regular  Activity Level: As per PT/OT  ABCDEF Bundle/Checklist Completed:  Disposition: Transfer to neuro stepdown unit  Multidisciplinary Rounds Completed: Yes  Patient/Family Updated: Yes at bedside      POD:  * No surgery found *    S/P:       Active Problem List:     [unfilled]    Past Medical History:      has a past medical history of Asthma, Brachiocephalic artery injury, Glaucoma, low tension, Hyperlipidemia, Hypertension, and Ill-defined condition.    Past Surgical History:      has a past surgical history that includes Colonoscopy (N/A, 5/1/2017) and Tonsillectomy.    Home Medications:     Prior to Admission medications    Medication Sig Start Date End Date Taking? Authorizing Provider   ondansetron (ZOFRAN) 4 MG tablet Take 1 tablet by mouth 3  discharge or redness;  -Ears: External ears are normal. Normal TMs.  -Nose: Normal nares.  -Mouth and throat: MMM. Normal gums, mucosa, palate,  NECK: Supple, with no masses. No JVD   CV: RRR, no m/r/g.  LUNGS: CTAB, no w/r/c.  ABD: Soft, NT/ND, no masses, NTTP  SKIN: Warm, well perfused. No skin rashes or abnormal lesions.  EXT: No clubbing, cyanosis, or edema.  NEURO: LLE still weak.    LABS AND  DATA: Personally reviewed  Recent Labs     01/07/24 0435 01/08/24  0241   WBC 4.6 4.0   HGB 11.5 11.9   HCT 34.3* 36.2   * 498*     Recent Labs     01/07/24 0435 01/08/24  0241    141   K 3.5 3.6   * 111*   CO2 23 23   BUN 4* 5*   MG 1.8 2.0   PHOS 2.5* 3.4     No results for input(s): \"TP\", \"ALB\", \"GLOB\", \"AML\" in the last 72 hours.    Invalid input(s): \"SGOT\", \"GPT\", \"AP\", \"TBIL\", \"AMYP\", \"LPSE\"  No results for input(s): \"INR\", \"APTT\" in the last 72 hours.    Invalid input(s): \"PTP\"   Invalid input(s): \"PHI\", \"PCO2I\", \"PO2I\", \"FIO2I\"  No results for input(s): \"CPK\", \"CKMB\" in the last 72 hours.    Invalid input(s): \"TROIQ\", \"BNPP\"    Ventilator Settings:  Mode Rate Tidal Volume Pressure FiO2 PEEP          5 cm H2O         Peak airway pressure:      Minute ventilation:          MEDS: Reviewed    Chest X-Ray:  Reviewed      CRITICAL CARE CONSULTANT NOTE  I had a face to face encounter with the patient, reviewed and interpreted patient data including clinical events, labs, images, vital signs, I/O's, and examined patient.  I have discussed the case and the plan and management of the patient's care with the consulting services, the bedside nurses and the respiratory therapist.      NOTE OF PERSONAL INVOLVEMENT IN CARE   This patient has a high probability of imminent, clinically significant deterioration, which requires the highest level of preparedness to intervene urgently. I participated in the decision-making and personally managed or directed the management of the following life and organ

## 2024-01-08 NOTE — PROGRESS NOTES
Physician Progress Note      PATIENT:               TRACEY CONTI  CSN #:                  [id number]  :                       1965  ADMIT DATE:       2023 1:45 AM  DISCH DATE:  RESPONDING  PROVIDER #:        Ivette Espana MD        QUERY TEXT:    Type of Encephalopathy: Please provide further specificity, if known.    Clinical indicators include: acute, encephalopathy, alcohol use, infectious  Options provided:  -- Anoxic/hypoxic encephalopathy  -- Metabolic encephalopathy  -- Toxic encephalopathy  -- Hepatic encephalopathy  -- Hypertensive encephalopathy  -- Other - I will add my own diagnosis  -- Disagree - Not applicable / Not valid  -- Disagree - Clinically Unable to determine / Unknown        PROVIDER RESPONSE TEXT:    The patient has metabolic encephalopathy.      Electronically signed by:  Ivette Espana MD 2024 6:04 PM

## 2024-01-08 NOTE — PROGRESS NOTES
diversion stent embolization of right supraclinoid ICA aneurysm by Dr. Hamilton on 12/18/23.    Subjective:   Patient drowsy, but arouses to voice. No acute events overnight. Denies headache, vision changes, numbness, tingling, chest pain, SOB, nausea, or vomiting. Still endorses left-sided weakness, but it has improved.       Current Facility-Administered Medications   Medication Dose Route Frequency Provider Last Rate Last Admin    norepinephrine (LEVOPHED) 16 mg in sodium chloride 0.9 % 250 mL infusion  0.5-5 mcg/min IntraVENous Continuous Eloisa Guzmán MD   Stopped at 01/08/24 0921    0.9 % sodium chloride infusion   IntraVENous Continuous Eloisa Guzmán  mL/hr at 01/08/24 0352 New Bag at 01/08/24 0352    methylphenidate (RITALIN) tablet 5 mg  5 mg Oral BID Eloisa Guzmán MD   5 mg at 01/08/24 0808    labetalol (NORMODYNE;TRANDATE) injection 10 mg  10 mg IntraVENous Q4H PRN Angle Huang APRN - YURIDIA        hydrALAZINE (APRESOLINE) injection 10 mg  10 mg IntraVENous Q6H PRN Raul Bruce MD        [Held by provider] metoprolol tartrate (LOPRESSOR) tablet 25 mg  25 mg Oral BID Leeanne Gentile MD   25 mg at 01/04/24 0853    potassium chloride (KLOR-CON) extended release tablet 40 mEq  40 mEq Oral PRN Leeanne Gentile MD   40 mEq at 12/24/23 1205    Or    potassium bicarb-citric acid (EFFER-K) effervescent tablet 40 mEq  40 mEq Oral PRN Leeanne Gentile MD        Or    potassium chloride 10 mEq/100 mL IVPB (Peripheral Line)  10 mEq IntraVENous PRN Leeanne Gentile MD        magnesium sulfate 2000 mg in 50 mL IVPB premix  2,000 mg IntraVENous PRN Leeanne Gentile MD        pravastatin (PRAVACHOL) tablet 40 mg  40 mg Oral Nightly Leeanne Gentile MD   40 mg at 01/07/24 2030    aspirin chewable tablet 81 mg  81 mg Oral Daily Leeanne Gentile MD   81 mg at 01/08/24 0808    ticagrelor (BRILINTA) tablet 90 mg  90 mg Oral BID Leeanne Gentile MD   90 mg at 01/08/24 0808    sodium chloride flush 0.9 % injection 5-40 mL   5-40 mL IntraVENous 2 times per day Leeanne Gentile MD   10 mL at 24 0814    sodium chloride flush 0.9 % injection 5-40 mL  5-40 mL IntraVENous PRN Leeanne Gentile MD        0.9 % sodium chloride infusion   IntraVENous PRN Leeanne Gentile MD        ondansetron (ZOFRAN-ODT) disintegrating tablet 4 mg  4 mg Oral Q8H PRN Leeanne Gentile MD        Or    ondansetron (ZOFRAN) injection 4 mg  4 mg IntraVENous Q6H PRN Leeanne Gentile MD        polyethylene glycol (GLYCOLAX) packet 17 g  17 g Oral Daily PRN Leeanne Gentile MD        acetaminophen (TYLENOL) tablet 650 mg  650 mg Oral Q6H PRN Leeanne Gentile MD   650 mg at 24 2030    Or    acetaminophen (TYLENOL) suppository 650 mg  650 mg Rectal Q6H PRN Leeanne Gentile MD        sennosides-docusate sodium (SENOKOT-S) 8.6-50 MG tablet 1 tablet  1 tablet Oral BID Leeanne Gentile MD   1 tablet at 24 0808    dextrose bolus 10% 125 mL  125 mL IntraVENous PRN Leeanne Gentile MD        Or    dextrose bolus 10% 250 mL  250 mL IntraVENous PRN Leeanne Gentile MD        glucagon injection 1 mg  1 mg SubCUTAneous PRN Leeanne Gentile MD        dextrose 10 % infusion   IntraVENous Continuous PRN Leeanne Gentile MD        sodium chloride flush 0.9 % injection 5-40 mL  5-40 mL IntraVENous 2 times per day Leeanne Gentile MD   10 mL at 24 0814    sodium chloride flush 0.9 % injection 5-40 mL  5-40 mL IntraVENous PRN Leeanne Gentile MD        0.9 % sodium chloride infusion   IntraVENous PRN Leeanne Gentile MD           No Known Allergies    Review of Systems:  Pertinent items noted in HPI.    Objective:     Vital signs  Temp (24hrs), Av.2 °F (36.8 °C), Min:97.3 °F (36.3 °C), Max:98.9 °F (37.2 °C)   701 -  1900  In: 101.9 [I.V.:101.9]  Out: -    190 -  0700  In: 4500.4 [P.O.:380; I.V.:3620.4]  Out: 3150 [Urine:3150]    /88   Pulse (!) 117   Temp 97.9 °F (36.6 °C) (Axillary)   Resp 18   Ht 1.727 m (5' 7.99\")   Wt 70.7 kg (155 lb 13.8

## 2024-01-08 NOTE — PROGRESS NOTES
Spoke with Dr. Guzmán (Intensivist) and patient Phos was 2.5 today. Kphos ordered per Intensivist. No end time was noted on Nimotop order. After review, patient received initial full day dosing ( 6 doses a day) on 12/18/2023 which will count as Day 1 of 21 of Nimotop. Last dose will be tonight. Verified with Pharmacist and also discussed with Dr. Hamm, Intensivist, and RN.     Skylar Shi, [facility]  Neurocritical care NP/Neurointerventional Surgery

## 2024-01-08 NOTE — PLAN OF CARE
ALAINA, & CASTRO Prajapati (2013). Preventing delirium in the intensive care unit. Critical Care Clinics, 29(1), 51-65. https://doi.org/10.1016/j.ccc.2012.10.007)       Acute SLP Services: Yes, SLP will continue to follow per plan of care.    Discharge Recommendations: Yes, recommend SLP treatment at next level of care     SUBJECTIVE:   Patient stated, “I don't have to answer that,\" when SLP asked how she felt her thinking was going.     OBJECTIVE:     Past Medical History:   Diagnosis Date    Asthma     Brachiocephalic artery injury     chronic dissection    Glaucoma, low tension     Hyperlipidemia     Hypertension     Ill-defined condition     high cholesterol     Past Surgical History:   Procedure Laterality Date    COLONOSCOPY N/A 5/1/2017    COLONOSCOPY performed by Sagar Woodard MD at [facility] ENDOSCOPY    TONSILLECTOMY       Prior Level of Function/Home Situation:   Social/Functional History  Lives With: Spouse  Type of Home: House  Home Layout: Two level  Home Access: Stairs to enter with rails  Entrance Stairs - Number of Steps: 2  Entrance Stairs - Rails: Both  Bathroom Shower/Tub: Tub/Shower unit  Bathroom Equipment: Grab bars in shower, Shower chair  Home Equipment: None  Receives Help From: Family  ADL Assistance: Independent  Ambulation Assistance: Independent  Transfer Assistance: Independent  Active : Yes  Mode of Transportation: Car  Type of Occupation: [place] [employer] [place]      Cognitive and Communication Status:  Neurologic State: Alert and Confused  Orientation Level: Oriented to person, Disoriented to situation, and Disoriented to time  Cognition: Decreased command following  Respiratory Status/Airway:  Room air                               After treatment:   Call bell left within reach and Nursing notified    COMMUNICATION/EDUCATION:     The patient's plan of care including recommendations, planned interventions, and recommended diet changes were discussed with: Registered nurse    Patient is unable  to participate in goal setting and plan of care    Thank you,  Kourtney Gay, SLP      Problem: SLP Adult - Impaired Swallowing  Goal: By Discharge: Advance to least restrictive diet without signs or symptoms of aspiration for planned discharge setting.  See evaluation for individualized goals.  Description: Speech Therapy Goals:  1. Patient will tolerate baseline diet without adverse effects within 7 days. Initiated 12/19/23, Continued 1/2/24.   2. Patient will improve attention to structured therapy tasks with 80% acc given mod-max cues. Initiated 1/2/24.  3. Patient will name at least 2 cognitive or physical changes since neurologic injury with mod-max cues. Initiated 1/2/24.   4. Patient will recall 2-3 salient details after >1 minute delay with mod-max cues. Initiated 1/2/24.   Outcome: Progressing

## 2024-01-08 NOTE — PROGRESS NOTES
Neurocritical Care Brief Progress Note:    Patient seen resting in bed, easily awakened to voice.  She denies headache, nausea, vomiting, chest pain, abdominal pain, numbness or tingling.  She is very drowsy and it takes multiple vocal prompts to get her to respond to commands at times.    Physical Exam:  Gen: NAD, calm, cooperative  Neuro: A&Ox3, to person, month and year.  She states she was at a \"dealership\".   Follows commands. Speech slightly dysarthric. Affect normal. PERRL, 3 mm bilaterally. Blinks to threat. No disconjugate gaze present. Patient does have left hemianopia and left visual neglect.  Patient's right eye lid is slower to open than the left and appears slightly weaker.  EOMI. Face slightly asymmetric with left facial droop.  Tongue appears midline, but patient did not fully open her mouth despite repeated prompting. Yonis spontaneously. Strength 5/5 in RUE and RLE.  Strength 4/5 in the LUE and 3/5 in the LLE.  Positive for a drift in the LUE. Bulk and tone normal. No involuntary movements. Gait deferred.  Skin: Warm, dry, color appropriate for ethnicity. Right wrist arteriotomy site soft and non-tender on palpation, with no hematoma or bleeding noted.      Continue plan per NIS.  Last dose of Nimotop is at approximately 11:30 pm tonight. I/O are +100 so far for the shift, but are not entirely accurate related to the use of a purewick.  TCDs are ordered for in the morning.    BETZY Quintanilla - NP  Neurocritical Care Nurse Practitioner  [phone]

## 2024-01-08 NOTE — PLAN OF CARE
errors made  Insights: Decreased awareness of deficits  Initiation: Requires cues for some  Sequencing: Requires cues for some    Functional Mobility Training:  Bed Mobility:  Bed Mobility Training  Bed Mobility Training: Yes  Overall Level of Assistance: Moderate assistance;Additional time;Assist X2  Interventions: Safety awareness training;Manual cues;Tactile cues;Verbal cues;Weight shifting training/pressure relief  Rolling: Additional time;Assist X1;Maximum assistance  Supine to Sit: Moderate assistance;Additional time;Assist X2  Sit to Supine: Moderate assistance;Assist X2  Scooting: Minimum assistance  Transfers:  Transfer Training  Transfer Training: Yes  Overall Level of Assistance: Moderate assistance;Additional time;Assist X2  Interventions: Manual cues;Safety awareness training;Tactile cues;Verbal cues;Weight shifting training/pressure relief  Sit to Stand: Moderate assistance;Additional time;Assist X1  Stand to Sit: Moderate assistance;Additional time;Assist X1  Stand Pivot Transfers: Moderate assistance;Additional time;Assist X2  Bed to Chair: Moderate assistance;Additional time;Assist X2  Toilet Transfer: Moderate assistance;Additional time;Assist X2  Balance:  Balance  Sitting: Impaired  Sitting - Static: Fair (occasional)  Sitting - Dynamic: Fair (occasional)  Standing: Impaired  Standing - Static: Fair;Constant support  Standing - Dynamic: Poor;Constant support   Ambulation/Gait Training:     Gait  Overall Level of Assistance: Moderate assistance;Assist X2  Distance (ft): 2 Feet  Assistive Device: Gait belt (B HHA)  Interventions: Manual cues;Tactile cues;Verbal cues;Weight shifting training/pressure relief  Base of Support: Narrowed;Center of gravity altered  Speed/Katharine: Pace decreased (< 100 feet/min)  Step Length: Left shortened;Right lengthened  Swing Pattern: Left asymmetrical  Stance: Left decreased  Gait Abnormalities: Decreased step clearance;Foot drop;Path deviations;Step to gait;Trunk

## 2024-01-08 NOTE — PLAN OF CARE
Problem: Occupational Therapy - Adult  Goal: By Discharge: Performs self-care activities at highest level of function for planned discharge setting.  See evaluation for individualized goals.  Description: FUNCTIONAL STATUS PRIOR TO ADMISSION:  Patient was ambulatory without the use of AE  Receives Help From: Family, ADL Assistance: Independent, Ambulation Assistance: Independent, Transfer Assistance: Independent, Active : Yes     HOME SUPPORT: Patient lived with spouse but didn't require assistance.    Occupational Therapy Goals:  Initiated 12/19/2023.  Reviewed 12/27/23 and goals modified.    Weekly Re-assessment 1/3/24:  1.  Patient will perform self-feeding with Modified [place] within 7 day(s).  CONTINUE  2.  Patient will perform grooming in standing with ModA within 7 day(s). Downgrade from Michael to ModA.  3.  Patient will perform bathing with ModA within 7 day(s). Downgrade from Michael to ModA  4.  Patient will perform toilet transfers with Max Assist  within 7 day(s). CONTINUE  5.  Patient will perform all aspects of toileting with Max Assist within 7 day(s). CONTINUE  6.  Patient will participate in upper extremity therapeutic exercise/activities with Supervision for 5 minutes within 7 day(s).  Continue  7.  Patient will utilize energy conservation techniques during functional activities with verbal cues within 7 day(s).   Outcome: Progressing     OCCUPATIONAL THERAPY TREATMENT  Patient: Soledad Geller (58 y.o. female)  Date: 1/8/2024  Primary Diagnosis: Aneurysmal subarachnoid hemorrhage (HCC) [I60.8]       Precautions: Fall Risk                Chart, occupational therapy assessment, plan of care, and goals were reviewed.    ASSESSMENT  Patient continues to benefit from skilled OT services and is progressing towards goals. Pt demonstrated improved functional mobility and attention to L this session.  She currently requires up to max A for LE ADLs and toileting and mod A x2 for functional mobility  and Transfers for ADLs:  Bed Mobility:  Bed Mobility Training  Bed Mobility Training: Yes  Overall Level of Assistance: Moderate assistance;Additional time;Assist X2  Interventions: Safety awareness training;Manual cues;Tactile cues;Verbal cues;Weight shifting training/pressure relief  Rolling: Additional time;Assist X1;Maximum assistance  Supine to Sit: Moderate assistance;Additional time;Assist X2  Scooting: Minimum assistance     Transfers:   Transfer Training  Transfer Training: Yes  Overall Level of Assistance: Moderate assistance;Additional time;Assist X2  Interventions: Manual cues;Safety awareness training;Tactile cues;Verbal cues;Weight shifting training/pressure relief  Sit to Stand: Moderate assistance;Additional time;Assist X1  Stand to Sit: Moderate assistance;Additional time;Assist X1  Stand Pivot Transfers: Moderate assistance;Additional time;Assist X2  Bed to Chair: Moderate assistance;Additional time;Assist X2  Toilet Transfer: Moderate assistance;Additional time;Assist X2           Balance:  Standing: Impaired  Balance  Sitting: Impaired  Sitting - Static: Fair (occasional)  Sitting - Dynamic: Fair (occasional)  Standing: Impaired  Standing - Static: Fair;Constant support  Standing - Dynamic: Poor;Constant support      ADL Intervention:    LE Dressing: Dependent/Total  LE Dressing Skilled Clinical Factors: pt unable to manage slipper socks this session long sitting or seated EOB    Toileting: Maximum assistance  Toileting Skilled Clinical Factors: pt able to assist with bladder hygine and maintaining standing balance with mod A    Functional Mobility: Dependent/Total  Functional Mobility Skilled Clinical Factors: mod A x2       Pain Ratin/10       Activity Tolerance:   Fair  and requires frequent rest breaks  Please refer to the flowsheet for vital signs taken during this treatment.    After treatment:   Patient left in no apparent distress sitting up in chair, Call bell within reach, and Bed/

## 2024-01-09 DIAGNOSIS — I60.9 SUBARACHNOID HEMORRHAGE (HCC): Primary | ICD-10-CM

## 2024-01-09 PROCEDURE — 2580000003 HC RX 258: Performed by: INTERNAL MEDICINE

## 2024-01-09 PROCEDURE — 97535 SELF CARE MNGMENT TRAINING: CPT

## 2024-01-09 PROCEDURE — 2060000000 HC ICU INTERMEDIATE R&B

## 2024-01-09 PROCEDURE — 6370000000 HC RX 637 (ALT 250 FOR IP): Performed by: INTERNAL MEDICINE

## 2024-01-09 PROCEDURE — 6360000002 HC RX W HCPCS

## 2024-01-09 PROCEDURE — 6360000002 HC RX W HCPCS: Performed by: INTERNAL MEDICINE

## 2024-01-09 PROCEDURE — 97530 THERAPEUTIC ACTIVITIES: CPT

## 2024-01-09 RX ADMIN — LABETALOL HYDROCHLORIDE 10 MG: 5 INJECTION, SOLUTION INTRAVENOUS at 09:00

## 2024-01-09 RX ADMIN — SODIUM CHLORIDE: 9 INJECTION, SOLUTION INTRAVENOUS at 18:15

## 2024-01-09 RX ADMIN — TICAGRELOR 90 MG: 90 TABLET ORAL at 21:24

## 2024-01-09 RX ADMIN — SENNOSIDES AND DOCUSATE SODIUM 1 TABLET: 50; 8.6 TABLET ORAL at 09:09

## 2024-01-09 RX ADMIN — ASPIRIN 81 MG CHEWABLE TABLET 81 MG: 81 TABLET CHEWABLE at 09:08

## 2024-01-09 RX ADMIN — SODIUM CHLORIDE, PRESERVATIVE FREE 10 ML: 5 INJECTION INTRAVENOUS at 21:25

## 2024-01-09 RX ADMIN — HYDRALAZINE HYDROCHLORIDE 10 MG: 20 INJECTION, SOLUTION INTRAMUSCULAR; INTRAVENOUS at 18:08

## 2024-01-09 RX ADMIN — SODIUM CHLORIDE, PRESERVATIVE FREE 5 ML: 5 INJECTION INTRAVENOUS at 09:12

## 2024-01-09 RX ADMIN — PRAVASTATIN SODIUM 40 MG: 40 TABLET ORAL at 21:24

## 2024-01-09 RX ADMIN — SENNOSIDES AND DOCUSATE SODIUM 1 TABLET: 50; 8.6 TABLET ORAL at 21:24

## 2024-01-09 RX ADMIN — METHYLPHENIDATE HYDROCHLORIDE 5 MG: 5 TABLET ORAL at 13:47

## 2024-01-09 RX ADMIN — TICAGRELOR 90 MG: 90 TABLET ORAL at 09:10

## 2024-01-09 ASSESSMENT — PAIN SCALES - GENERAL: PAINLEVEL_OUTOF10: 0

## 2024-01-09 NOTE — PROGRESS NOTES
Medication Dose Route Frequency Provider Last Rate Last Admin    0.9 % sodium chloride infusion   IntraVENous Continuous Eloisa Guzmán  mL/hr at 01/08/24 0352 New Bag at 01/08/24 0352    methylphenidate (RITALIN) tablet 5 mg  5 mg Oral BID Eloisa Guzmán MD   5 mg at 01/08/24 1308    labetalol (NORMODYNE;TRANDATE) injection 10 mg  10 mg IntraVENous Q4H PRN Angle Huang APRN - NP   10 mg at 01/09/24 0900    hydrALAZINE (APRESOLINE) injection 10 mg  10 mg IntraVENous Q6H PRN Raul Bruce MD        [Held by provider] metoprolol tartrate (LOPRESSOR) tablet 25 mg  25 mg Oral BID Leeanne Gentile MD   25 mg at 01/04/24 0853    potassium chloride (KLOR-CON) extended release tablet 40 mEq  40 mEq Oral PRN Leeanne Gentile MD   40 mEq at 12/24/23 1205    Or    potassium bicarb-citric acid (EFFER-K) effervescent tablet 40 mEq  40 mEq Oral PRN Leeanne Gentile MD        Or    potassium chloride 10 mEq/100 mL IVPB (Peripheral Line)  10 mEq IntraVENous PRN Leeanne Gentile MD        magnesium sulfate 2000 mg in 50 mL IVPB premix  2,000 mg IntraVENous PRN Leeanne Gentile MD        pravastatin (PRAVACHOL) tablet 40 mg  40 mg Oral Nightly Leeanne Gentile MD   40 mg at 01/08/24 2030    aspirin chewable tablet 81 mg  81 mg Oral Daily Leeanne Gentile MD   81 mg at 01/09/24 0908    ticagrelor (BRILINTA) tablet 90 mg  90 mg Oral BID Leeanne Gentile MD   90 mg at 01/09/24 0910    sodium chloride flush 0.9 % injection 5-40 mL  5-40 mL IntraVENous 2 times per day Leeanne Gentile MD   5 mL at 01/09/24 0912    sodium chloride flush 0.9 % injection 5-40 mL  5-40 mL IntraVENous PRN Leeanne Gentile MD        0.9 % sodium chloride infusion   IntraVENous PRN Leeanne Gentile MD        ondansetron (ZOFRAN-ODT) disintegrating tablet 4 mg  4 mg Oral Q8H PRN Leeanne Gentile MD        Or    ondansetron (ZOFRAN) injection 4 mg  4 mg IntraVENous Q6H PRN Leeanne Gentile MD        polyethylene glycol (GLYCOLAX) packet 17 g  17 g Oral Daily

## 2024-01-09 NOTE — PROGRESS NOTES
Hospitalist Progress Note  Ivette Espana MD  Answering service: [phone]        Date of Service:  2024  NAME:  Soledad Geller  :  1965  MRN:  [id number]      Admission Summary:   Soledad Geller is a 58 y.o. AA female with a PMH of asthma, HTN, and HDL who presented to [facility] ED on 23 with decreased LOC and vomiting. She had been reporting headache x several days prior to presentation, had been taking NSAIDs for pain but does not take any ASA/blood thinners. A stat CT head was obtained which showed a large SAH with IPH. CTA head/neck was then obtained which showed a large fusiform aneurysm of the M1 segment on the right measuring 8.8 x 16 x 9.9 mm. NSGY and NIS were consulted and pt was then transferred to [facility] for higher level of care.   PT was   intubated  in [facility] ER and transferred to [facility] ICU early morning of 23 .   Pt is s/p cerebral/cervical arteriogram and Flow diversion stent embolization of right supraclinoid ICA aneurysm on 23 by Dr. Hamilton.   she was then closely monitored in ICU   2023: Pt with episode of staring during PT lasting approx 30 seconds. No evidence of tonic/clonic activity, twitching, incontinence, or nystagmus.  Pt with mild to moderate vasospasm of the RMCA and B/L ACAs s/p cerebral angiogram and IA nicardipine infusion on 2023 by Dr. Hamilton.   She was ramining drawly but arousae to follow commands and tolerate diet and ICU/ NIS ok to downgrade pt from ICU  .daily TCD monitor   -developed left sided weakness:   Pt taken for cerebral angiogram revealing enlargement of previously embolized right ICA aneurysm thus additional flow-diverting stent placed by Dr. Hamilton on 23.    Monitor ICU :23 and 2023 DSA and IA verapamil for vasospasm treatment.         Interval history / Subjective:   Mental status clear,  No changes      Assessment &

## 2024-01-09 NOTE — PROGRESS NOTES
Neurocritical Care Brief Progress Note:  Soledad Geller is 58 y.o female with pmh of Asthma, FMD, HTN, HLD, chronic brachiocephalic artery dissection who arrived from [facility] ED intubated to [facility] ICU early morning of 12/18/23 with SAH from a ruptured cerebral aneurysm. CTA showed a large fusiform aneurysm of the right supraclinoid ICA with eccentric broad-based component.    12/18/23~Pt is s/p cerebral/cervical arteriogram and Flow diversion stent embolization of right supraclinoid ICA aneurysm by Dr. Hamilton.    12/26/2023~Pt was noted with mild to moderate vasospasm of the RMCA and B/L ACAs s/p cerebral angiogram and IA nicardipine infusion by Dr. Hamilton.     12/29/2023~TCD's consistent with possible bilateral distal A1 CARLITOS vasospasm.    12/30/2023~TCD's showed possible right CARLITOS vsp. Pt noted with worsening left sided weakness with decreased sensation. STAT CTH obtained showed no worsening bleed or acute infarct. Small volume right sylvian fissure SAH and right posterior horn lateral ventricle IVH, decreased from prior imaging.    12/31/2023~continues to have worsening left-sided weakness and stat CTA h/n showed worsening right sided intraventricular and subarachnoid hemorrhage. Generalized cerebral edema.    The pt was taken to IR for flow diversion stent embolization of HERNÁN aneurysm, IA Verapamil infusion for intracranial vasospasm    Repeat p2y12 was therapeutic 62.      01/03/2024~Pt continues with drowsiness and persistent left-sided weakness without improvement. Repeat CTH obtained showed unchanged subarachnoid and intraventricular hemorrhages.    01/06/2024~Pt was taken to IR for diagnostic cerebral angiogram and cerebral infusion therapy by Dr. Hamm.    01/08/2024~pt downgraded to nstu.      Current i/o is +603 daily total euvolemia. Continue current IVF LR rate 100ml/hr. Mag 2.0, K+ 3.6, N+ 141.   Physical Exam:   Blood pressure (!) 130/94, pulse (!) 104, temperature 98.6 °F (37 °C), temperature source Oral,

## 2024-01-09 NOTE — PROGRESS NOTES
1930: Bedside and Verbal shift change report given to Lyndsey CLEANING (oncoming nurse) by Carissa CLEANING (offgoing nurse). Report included the following information Nurse Handoff Report, Adult Overview, Intake/Output, MAR, Recent Results, Cardiac Rhythm sinus tach, Alarm Parameters, Neuro Assessment, and Event Log.     2030:  at bedside. Aware of room transfer and new room number. Waiting for room to be cleaned    2235: Called and gave report to HERMILA Aviles on NSTU. Patient transferred down with RN and tech

## 2024-01-09 NOTE — PLAN OF CARE
assistance using the least restrictive device within 7 day(s).  4.  Patient will ambulate with moderate assistance for 10 feet with the least restrictive device within 7 day(s).   5.  Patient will ascend/descend 2 stairs with 1 handrail(s) with moderate assistance within 7 day(s).  6.  Patient will improve Thorpe Balance score by 7 points within 7 days.   1/8/2024 1555 by Priscila Ramos, PT  Outcome: Progressing     Problem: Occupational Therapy - Adult  Goal: By Discharge: Performs self-care activities at highest level of function for planned discharge setting.  See evaluation for individualized goals.  Description: FUNCTIONAL STATUS PRIOR TO ADMISSION:  Patient was ambulatory without the use of AE  Receives Help From: Family, ADL Assistance: Independent, Ambulation Assistance: Independent, Transfer Assistance: Independent, Active : Yes     HOME SUPPORT: Patient lived with spouse but didn't require assistance.    Occupational Therapy Goals:  Initiated 12/19/2023.  Reviewed 12/27/23 and goals modified.    Weekly Re-assessment 1/3/24:  1.  Patient will perform self-feeding with Modified Tompkins within 7 day(s).  CONTINUE  2.  Patient will perform grooming in standing with ModA within 7 day(s). Downgrade from Michael to ModA.  3.  Patient will perform bathing with ModA within 7 day(s). Downgrade from Michael to ModA  4.  Patient will perform toilet transfers with Max Assist  within 7 day(s). CONTINUE  5.  Patient will perform all aspects of toileting with Max Assist within 7 day(s). CONTINUE  6.  Patient will participate in upper extremity therapeutic exercise/activities with Supervision for 5 minutes within 7 day(s).  Continue  7.  Patient will utilize energy conservation techniques during functional activities with verbal cues within 7 day(s).   1/8/2024 1124 by Cheryl Combs, OT  Outcome: Progressing     Problem: SLP Adult - Impaired Swallowing  Goal: By Discharge: Advance to least restrictive diet  without signs or symptoms of aspiration for planned discharge setting.  See evaluation for individualized goals.  Description: Speech Therapy Goals:  1. Patient will tolerate baseline diet without adverse effects within 7 days. Initiated 12/19/23, Continued 1/2/24.   2. Patient will improve attention to structured therapy tasks with 80% acc given mod-max cues. Initiated 1/2/24.  3. Patient will name at least 2 cognitive or physical changes since neurologic injury with mod-max cues. Initiated 1/2/24.   4. Patient will recall 2-3 salient details after >1 minute delay with mod-max cues. Initiated 1/2/24.   1/8/2024 1431 by Kourtney Gay, SLP  Outcome: Progressing

## 2024-01-09 NOTE — CARE COORDINATION
Transition of Care: recs are for [facility]PR; [facility]; (will need insurance authorization- not yet started)     Physical Medicine Rehab- consulted on 1/9/24  (Dr. Bailey)     Transport Plan:will need stretcher (not set up yet)     RUR: 12%    Main contact is spouse- Ravinder Geller- [phone]    Discharge pending:  -patient is transfer from ICU on 1/9 to [address] [place] [address]  -pending medical progress and care recommendations      Prior Level of Functioning: lives at home with spouse; independent, alert and oriented  Disposition: IPR- [facility] accepted  If SNF or IPR: Date FOC offered: on other unit  Date FOC received: on other unit  Accepting facility: [facility]  Date authorization started with reference number: not yet started  Date authorization received and expires: not yet  Follow up appointments: specialists/pcp  DME needed: none if going to IPR  Transportation at discharge: will need stretcher  IM/IMM Medicare/ letter given: n/a  Is patient a [place] and connected with [facility]? no   If yes, was  transfer form completed and [facility] notified? N/a  Caregiver Contact: spouse- Ravinder Geller  Discharge Caregiver contacted prior to discharge? Not yet  Care Conference needed? no  Barriers to discharge:  insurance auth, medical progress    CM following   Elsa Hankins RN

## 2024-01-09 NOTE — PROGRESS NOTES
This was a follow up visit to a patient in [address] [place] [address]. The wife was seated on the chair and looked asleep. The  was present and very engaging. He said the patient was a Jew but he did not follow any particular Congregation. He said that the patient always had the support of the family. The  noticed great improvement of his wife. The  encouraged the  to continue their love and support to the patient because it looked like it was helping her a lot.    The  used active listening and sustaining presence during the visit. The  expressed gratitude for the visit of the .    The  informed her that she could always call the  if she needed more support.    Chaplain Harvey fontaine  [phone]

## 2024-01-09 NOTE — PLAN OF CARE
Problem: Discharge Planning  Goal: Discharge to home or other facility with appropriate resources  Outcome: Progressing  Flowsheets (Taken 1/9/2024 0800)  Discharge to home or other facility with appropriate resources: Identify barriers to discharge with patient and caregiver     Problem: Pain  Goal: Verbalizes/displays adequate comfort level or baseline comfort level  Outcome: Progressing  Flowsheets (Taken 1/9/2024 0800)  Verbalizes/displays adequate comfort level or baseline comfort level: Encourage patient to monitor pain and request assistance     Problem: Safety - Adult  Goal: Free from fall injury  Outcome: Progressing  Flowsheets (Taken 1/9/2024 0800)  Free From Fall Injury: Instruct family/caregiver on patient safety     Problem: Skin/Tissue Integrity  Goal: Absence of new skin breakdown  Description: 1.  Monitor for areas of redness and/or skin breakdown  2.  Assess vascular access sites hourly  3.  Every 4-6 hours minimum:  Change oxygen saturation probe site  4.  Every 4-6 hours:  If on nasal continuous positive airway pressure, respiratory therapy assess nares and determine need for appliance change or resting period.  Outcome: Progressing     Problem: ABCDS Injury Assessment  Goal: Absence of physical injury  Outcome: Progressing     Problem: Physical Therapy - Adult  Goal: By Discharge: Performs mobility at highest level of function for planned discharge setting.  See evaluation for individualized goals.  Description: FUNCTIONAL STATUS PRIOR TO ADMISSION: Patient was independent and active without use of DME.    HOME SUPPORT PRIOR TO ADMISSION: The patient lived with spouse but did not require assistance.    Physical Therapy Goals  Initiated 12/19/2023, continued 12/27/2023; continued 1/3/2024  1.  Patient will move from supine to sit and sit to supine in bed with minimal assistance within 7 day(s).    2.  Patient will perform sit to stand with minimal assistance within 7 day(s).  3.  Patient will

## 2024-01-09 NOTE — PLAN OF CARE
Problem: Occupational Therapy - Adult  Goal: By Discharge: Performs self-care activities at highest level of function for planned discharge setting.  See evaluation for individualized goals.  Description: FUNCTIONAL STATUS PRIOR TO ADMISSION:  Patient was ambulatory without the use of AE  Receives Help From: Family, ADL Assistance: Independent, Ambulation Assistance: Independent, Transfer Assistance: Independent, Active : Yes     HOME SUPPORT: Patient lived with spouse but didn't require assistance.    Occupational Therapy Goals:  Initiated 12/19/2023.  Reviewed 12/27/23 and goals modified.    Weekly Re-assessment 1/3/24:  1.  Patient will perform self-feeding with Modified Sophia within 7 day(s).  CONTINUE  2.  Patient will perform grooming in standing with ModA within 7 day(s). Downgrade from Michael to ModA.  3.  Patient will perform bathing with ModA within 7 day(s). Downgrade from Michael to ModA  4.  Patient will perform toilet transfers with Max Assist  within 7 day(s). CONTINUE  5.  Patient will perform all aspects of toileting with Max Assist within 7 day(s). CONTINUE  6.  Patient will participate in upper extremity therapeutic exercise/activities with Supervision for 5 minutes within 7 day(s).  Continue  7.  Patient will utilize energy conservation techniques during functional activities with verbal cues within 7 day(s).   Outcome: Progressing   OCCUPATIONAL THERAPY TREATMENT  Patient: Soledad Geller (58 y.o. female)  Date: 1/9/2024  Primary Diagnosis: Aneurysmal subarachnoid hemorrhage (HCC) [I60.8]       Precautions: Fall Risk                Chart, occupational therapy assessment, plan of care, and goals were reviewed.    ASSESSMENT  Patient continues to benefit from skilled OT services and is slowly progressing towards goals. Patient received semi supine in bed and agreeable to working with therapy, drowsy throughout session with frequent multimodal cueing to maintain attention. Patient

## 2024-01-09 NOTE — PLAN OF CARE
Problem: Physical Therapy - Adult  Goal: By Discharge: Performs mobility at highest level of function for planned discharge setting.  See evaluation for individualized goals.  Description: FUNCTIONAL STATUS PRIOR TO ADMISSION: Patient was independent and active without use of DME.    HOME SUPPORT PRIOR TO ADMISSION: The patient lived with spouse but did not require assistance.    Physical Therapy Goals  Initiated 12/19/2023, continued 12/27/2023; continued 1/3/2024  1.  Patient will move from supine to sit and sit to supine in bed with minimal assistance within 7 day(s).    2.  Patient will perform sit to stand with minimal assistance within 7 day(s).  3.  Patient will transfer from bed to chair and chair to bed with minimal assistance using the least restrictive device within 7 day(s).  4.  Patient will ambulate with moderate assistance for 10 feet with the least restrictive device within 7 day(s).   5.  Patient will ascend/descend 2 stairs with 1 handrail(s) with moderate assistance within 7 day(s).  6.  Patient will improve Thorpe Balance score by 7 points within 7 days.   Outcome: Progressing     PHYSICAL THERAPY TREATMENT    Patient: Soledad Geller (58 y.o. female)  Date: 1/9/2024  Diagnosis: Aneurysmal subarachnoid hemorrhage (HCC) [I60.8] Aneurysmal subarachnoid hemorrhage (HCC)      Precautions: Fall Risk                    ASSESSMENT:  Patient continues to benefit from skilled PT services and is slowly progressing towards goals however remains most limited L inattention, L VF cut, R cervical rotation preference, L weakness, decr tolerance to activity, impaired balance, increased lethargy today, and impaired gait leading to increased falls risk and dependency from baseline level.     Received pt supine in bed, cleared for mobility by RN. Note to be much more drowsy today - requiring increased cues to maintain alertness and attention to task; limited improvement with time and stimulation. Facilitated  supine<>sit EOB with up to mod A x2, increased time, and one step cues. Demos fair/poor sitting balance once up - constant assist to correct from posterior lean/LOB. Completed multiple sit<>stands from EOB with mod A x2 with heavy NM facilitation through L hip/knee/trunk extensors to promote upright stance. +L LE buckle in WB.  Quick fatigue. Ultimately completed pivot transfer to the chair; Pt left up in chair at end of session, NAD.     She remains far below her baseline level and a high falls risk; continue to recommend discharge to [facility] once medically cleared.      PLAN:  Patient continues to benefit from skilled intervention to address the above impairments.  Continue treatment per established plan of care.    Recommendation for discharge: (in order for the patient to meet his/her long term goals): Therapy 3 hours/day 5-7 days/week    Other factors to consider for discharge: patient's current support system is unable to meet their requirements for physical assistance, poor safety awareness, impaired cognition, high risk for falls, not safe to be alone, and concern for safely navigating or managing the home environment    IF patient discharges home will need the following DME: continuing to assess with progress       SUBJECTIVE:   Patient stated, \"I'm  tired.\"    OBJECTIVE DATA SUMMARY:   Critical Behavior:  Orientation  Overall Orientation Status: Within Normal Limits  Orientation Level: Oriented X4  Cognition  Overall Cognitive Status: Exceptions (drowsy during session)    Functional Mobility Training:  Bed Mobility:  Bed Mobility Training  Bed Mobility Training: Yes  Supine to Sit: Moderate assistance;Additional time;Assist X2  Sit to Supine:  (pt left in recliner)  Scooting: Moderate assistance;Assist X1  Transfers:  Transfer Training  Transfer Training: Yes  Sit to Stand: Moderate assistance;Additional time;Assist X1  Stand to Sit: Moderate assistance;Additional time;Assist X2  Stand Pivot Transfers:

## 2024-01-10 LAB
ANION GAP SERPL CALC-SCNC: 8 MMOL/L (ref 5–15)
BUN SERPL-MCNC: 6 MG/DL (ref 6–20)
BUN/CREAT SERPL: 9 (ref 12–20)
CALCIUM SERPL-MCNC: 11.3 MG/DL (ref 8.5–10.1)
CHLORIDE SERPL-SCNC: 104 MMOL/L (ref 97–108)
CO2 SERPL-SCNC: 23 MMOL/L (ref 21–32)
CREAT SERPL-MCNC: 0.64 MG/DL (ref 0.55–1.02)
ERYTHROCYTE [DISTWIDTH] IN BLOOD BY AUTOMATED COUNT: 12.7 % (ref 11.5–14.5)
GLUCOSE SERPL-MCNC: 102 MG/DL (ref 65–100)
HCT VFR BLD AUTO: 38.2 % (ref 35–47)
HGB BLD-MCNC: 12.7 G/DL (ref 11.5–16)
MAGNESIUM SERPL-MCNC: 2.1 MG/DL (ref 1.6–2.4)
MCH RBC QN AUTO: 28.9 PG (ref 26–34)
MCHC RBC AUTO-ENTMCNC: 33.2 G/DL (ref 30–36.5)
MCV RBC AUTO: 87 FL (ref 80–99)
NRBC # BLD: 0 K/UL (ref 0–0.01)
NRBC BLD-RTO: 0 PER 100 WBC
PHOSPHATE SERPL-MCNC: 2.9 MG/DL (ref 2.6–4.7)
PLATELET # BLD AUTO: 498 K/UL (ref 150–400)
PMV BLD AUTO: 8.8 FL (ref 8.9–12.9)
POTASSIUM SERPL-SCNC: 3.9 MMOL/L (ref 3.5–5.1)
RBC # BLD AUTO: 4.39 M/UL (ref 3.8–5.2)
SODIUM SERPL-SCNC: 135 MMOL/L (ref 136–145)
WBC # BLD AUTO: 5.7 K/UL (ref 3.6–11)

## 2024-01-10 PROCEDURE — 97112 NEUROMUSCULAR REEDUCATION: CPT

## 2024-01-10 PROCEDURE — 2580000003 HC RX 258: Performed by: INTERNAL MEDICINE

## 2024-01-10 PROCEDURE — 6370000000 HC RX 637 (ALT 250 FOR IP): Performed by: INTERNAL MEDICINE

## 2024-01-10 PROCEDURE — 92526 ORAL FUNCTION THERAPY: CPT

## 2024-01-10 PROCEDURE — 6370000000 HC RX 637 (ALT 250 FOR IP): Performed by: NURSE PRACTITIONER

## 2024-01-10 PROCEDURE — 36415 COLL VENOUS BLD VENIPUNCTURE: CPT

## 2024-01-10 PROCEDURE — 97530 THERAPEUTIC ACTIVITIES: CPT

## 2024-01-10 PROCEDURE — 80048 BASIC METABOLIC PNL TOTAL CA: CPT

## 2024-01-10 PROCEDURE — 83735 ASSAY OF MAGNESIUM: CPT

## 2024-01-10 PROCEDURE — 85027 COMPLETE CBC AUTOMATED: CPT

## 2024-01-10 PROCEDURE — 84100 ASSAY OF PHOSPHORUS: CPT

## 2024-01-10 PROCEDURE — 2060000000 HC ICU INTERMEDIATE R&B

## 2024-01-10 PROCEDURE — 92507 TX SP LANG VOICE COMM INDIV: CPT

## 2024-01-10 RX ORDER — SODIUM CHLORIDE 1 G/1
1 TABLET ORAL
Status: DISCONTINUED | OUTPATIENT
Start: 2024-01-10 | End: 2024-01-12 | Stop reason: HOSPADM

## 2024-01-10 RX ADMIN — METHYLPHENIDATE HYDROCHLORIDE 5 MG: 5 TABLET ORAL at 09:52

## 2024-01-10 RX ADMIN — SENNOSIDES AND DOCUSATE SODIUM 1 TABLET: 50; 8.6 TABLET ORAL at 20:55

## 2024-01-10 RX ADMIN — ASPIRIN 81 MG CHEWABLE TABLET 81 MG: 81 TABLET CHEWABLE at 09:52

## 2024-01-10 RX ADMIN — SODIUM CHLORIDE, PRESERVATIVE FREE 10 ML: 5 INJECTION INTRAVENOUS at 20:55

## 2024-01-10 RX ADMIN — SODIUM CHLORIDE 1 G: 1 TABLET ORAL at 12:09

## 2024-01-10 RX ADMIN — TICAGRELOR 90 MG: 90 TABLET ORAL at 20:55

## 2024-01-10 RX ADMIN — SODIUM CHLORIDE: 9 INJECTION, SOLUTION INTRAVENOUS at 20:55

## 2024-01-10 RX ADMIN — METHYLPHENIDATE HYDROCHLORIDE 5 MG: 5 TABLET ORAL at 14:51

## 2024-01-10 RX ADMIN — SODIUM CHLORIDE 1 G: 1 TABLET ORAL at 17:10

## 2024-01-10 RX ADMIN — TICAGRELOR 90 MG: 90 TABLET ORAL at 09:52

## 2024-01-10 RX ADMIN — PRAVASTATIN SODIUM 40 MG: 40 TABLET ORAL at 20:54

## 2024-01-10 NOTE — PLAN OF CARE
Problem: Physical Therapy - Adult  Goal: By Discharge: Performs mobility at highest level of function for planned discharge setting.  See evaluation for individualized goals.  Description: FUNCTIONAL STATUS PRIOR TO ADMISSION: Patient was independent and active without use of DME.    HOME SUPPORT PRIOR TO ADMISSION: The patient lived with spouse but did not require assistance.    Physical Therapy Goals  Initiated 12/19/2023, continued 12/27/2023; continued 1/3/2024; continued 1/10  1.  Patient will move from supine to sit and sit to supine in bed with minimal assistance within 7 day(s).    2.  Patient will perform sit to stand with minimal assistance within 7 day(s).  3.  Patient will transfer from bed to chair and chair to bed with minimal assistance using the least restrictive device within 7 day(s).  4.  Patient will ambulate with moderate assistance for 10 feet with the least restrictive device within 7 day(s).   5.  Patient will ascend/descend 2 stairs with 1 handrail(s) with moderate assistance within 7 day(s).  6.  Patient will improve Thorpe Balance score by 7 points within 7 days.   Outcome: Progressing     PHYSICAL THERAPY TREATMENT: WEEKLY REASSESSMENT    Patient: Soledad Geller (58 y.o. female)  Date: 1/10/2024  Primary Diagnosis: Aneurysmal subarachnoid hemorrhage (HCC) [I60.8]       Precautions: Fall Risk                    ASSESSMENT :  Patient continues to benefit from skilled PT services and is progressing towards goals however remains most limited L inattention, L VF cut, R cervical rotation preference, L weakness, decr tolerance to activity, impaired balance, increased lethargy today, and impaired gait leading to increased falls risk and dependency from baseline level.     Received pt supine in bed, cleared for mobility by RN. Continue to demo flat affect with slowed processing and generalized drowsiness requiring constant cues/stimulation to maintain alertness. Facilitated supine>sit EOB towards

## 2024-01-10 NOTE — PLAN OF CARE
Speech LAnguage Pathology RE-EVALUATION    Patient: Soledad Geller (58 y.o. female)  Date: 1/10/2024  Primary Diagnosis: Aneurysmal subarachnoid hemorrhage (HCC) [I60.8]    Precautions: Aspiration, Low Vision, Fall Risk                  ASSESSMENT :  Therapy targeted swallowing, speech, and cognitive re-assessment. Patient awake and agreeable to therapy. Patient's  present and actively participated in therapy.      Swallowing: PO trials of thin liquids and solids given. Patient self-fed with assistance due to L visuoperceptual deficits. Oral phase characterized by prolonged mastication and with L oral residue for solids. Patient with reduced awareness of labial/buccal residue. Pharyngeal phase did not reveal overt s/s of aspiration with either trialed consistency. Anticipate she will benefit from ongoing PO diet modification due to her cognitive status and oral deficits. Will continue easy to chew diet with thin liquids with aspiration precautions as listed below.     Cognition/Communication: Patient continues to demonstrate cognitive deficits in areas of attention, initiation, processing speed, affect, abstract reasoning, problem solving, L visuoperception, and executive functioning. Patient also continues to exhibit mild dysarthria, most notably characterized by aprosodia, slow rate, and breath support, all of which impacted speech naturalness and pragmatics. Clinical presentation is consistent with RHD. She will benefit from intensive SLP services at next level of care to address aforementioned functional deficits in areas of dysphagia, dysarthria, and cognition.    Patient will benefit from skilled intervention to address the above impairments.     PLAN :  Recommendations and Planned Interventions:  Diet: Easy to chew and thin liquids  -Oral medications whole in purees or one at a time with liquids  -Aspiration precautions: Upright position, slow rate, single and small bites, check for L oral residue,

## 2024-01-10 NOTE — PROGRESS NOTES
Neurocritical Care Brief Progress Note:  Soledad Geller is 58 y.o female with pmh of Asthma, FMD, HTN, HLD, chronic brachiocephalic artery dissection who arrived from [facility] ED intubated to [facility] ICU early morning of 12/18/23 with SAH from a ruptured cerebral aneurysm. CTA showed a large fusiform aneurysm of the right supraclinoid ICA with eccentric broad-based component.    12/18/23~Pt is s/p cerebral/cervical arteriogram and Flow diversion stent embolization of right supraclinoid ICA aneurysm by Dr. Hamilton.    12/26/2023~Pt was noted with mild to moderate vasospasm of the RMCA and B/L ACAs s/p cerebral angiogram and IA nicardipine infusion by Dr. Hamilton.     12/29/2023~TCD's consistent with possible bilateral distal A1 CARLITOS vasospasm.    12/30/2023~TCD's showed possible right CARLITOS vsp. Pt noted with worsening left sided weakness with decreased sensation. STAT CTH obtained showed no worsening bleed or acute infarct. Small volume right sylvian fissure SAH and right posterior horn lateral ventricle IVH, decreased from prior imaging.    12/31/2023~continues to have worsening left-sided weakness and stat CTA h/n showed worsening right sided intraventricular and subarachnoid hemorrhage. Generalized cerebral edema.    The pt was taken to IR for flow diversion stent embolization of HERNÁN aneurysm, IA Verapamil infusion for intracranial vasospasm    Repeat p2y12 was therapeutic 62.      01/03/2024~Pt continues with drowsiness and persistent left-sided weakness without improvement. Repeat CTH obtained showed unchanged subarachnoid and intraventricular hemorrhages.    01/06/2024~Pt was taken to IR for diagnostic cerebral angiogram and cerebral infusion therapy by Dr. Hamm.    01/08/2024~pt downgraded to nstu.     01/09/2024~On ritalin 5mg bid. Continue Brilinta 90mg bid and aspirin 81 mg daily. LR at 100ml/hr. +600 daily total I/o. No labs obtained this morning. Will order for tomorrow.     Pt sleepy but easily arousable. Was able  to stay awake longer enough to carry-on conversation and was telling examiner she's ready to be dc home. This the best I've seen pt as well as the primary nurses. Pt denied pain or any discomfort  Physical Exam:   Blood pressure 138/88, pulse (!) 102, temperature 98.3 °F (36.8 °C), temperature source Oral, resp. rate 17, height 1.727 m (5' 7.99\"), weight 77 kg (169 lb 12.1 oz), SpO2 100 %.    GEN: Calm, cooperative with exams but too sleepy to participate, well developed and nourished, patient in NAD  HEENT: Normocephalic. Non-icteric, no congestion  Lungs: diminished bilaterally ant, non-labored breathing, RA  Cardiac: S1,S2, normal rate and rhythm, + murmurs, no gallops  Abdomen: hypoactive bowel sounds, no distention, soft, non-tender  Extremities: 2+ Radial pulses, no clubbing, cyanosis, or edema  Skin: no rashes or lesions noted, scattered bruising       NEURO:  Mental status: Pt is sleepy but easily arouse to stay awake for neuro exams. oriented to self, place, current mos/yr, and situation. Able to follow commands   Cranial Nerves: Speech/Language is clear and intact. EOMI, Left visual fields cut minimally improved, +hemianopsia to left, PERRL, 3mm, no nystagmus, no ptosis. No dysarthria or aphasia. Full facial strength with mild asymmetry smile. Hearing intact bilaterally. Tongue protrudes to midline, palate elevates symmetrically. Shrug Shoulders B/L  Motor: Normal bulk and tone, LUE 5-/5 and LLE 3-/5 strength, strength to RUE is 5/5 proximally and distally, RLE strength 4/5.  No involuntary movements.  Coordination: pt with visual deficits which makes it difficult to assess FTN. HTS testing not perform  Reflexes: +2 throughout, equivocal planter response b/l  Sensation: + sensation to x4 ext to LT  Gait:  Deferred       Continue current plan per NIS.       spent 25 minutes of time involved in chart review, lab review, imaging review       Neelam Quezada [facility]-NP  Neurocritical Care Nurse

## 2024-01-10 NOTE — CONSULTS
Chart reviewed. Pt will be seen in the afternoon of 1/10/2024.    Thank you for this consultation  Please feel free to contact me directly with any questions or concerns.  Brunilda Bailey, DO  [phone]

## 2024-01-10 NOTE — CARE COORDINATION
Transition of Care: recs are for [facility]; [facility] (will need insurance authorization- started on 1/9/24 by [facility])       Physical Medicine Rehab- consulted on 1/9/24  (Dr. Bailey)      Transport Plan:will need stretcher (not set up yet)      RUR: 12%     Main contact is spouse- Ravinder Geller- [phone]     Discharge pending:  -patient is transfer from ICU on 1/9 to [address] [place] [address]  -pending medical progress and care recommendations        Prior Level of Functioning: lives at home with spouse; independent, alert and oriented  Disposition: [facility]PR- [facility]  If SNF or IPR: Date FOC offered: on other unit  Date FOC received: on other unit  [facility]: [facility]  Date authorization started with reference number:  started on 1/9/24  Date authorization received and expires: not yet  Follow up appointments: specialists/pcp  DME needed: none if going to IPR  Transportation at discharge: will need stretcher  IM/IMM Medicare/ letter given: n/a  Is patient a [place] and connected with [facility]? no              If yes, was [place] transfer form completed and [facility] notified? N/a  Caregiver Contact: spouse- Ravinder Geller  Discharge Caregiver contacted prior to discharge? Not yet  Care Conference needed? no  Barriers to discharge:  insurance auth, medical progress     CM following   Elsa Hankins RN

## 2024-01-10 NOTE — CONSULTS
Consult Note            Date:1/10/2024        Patient Name:Soledad Geller     YOB: 1965     Age:58 y.o.    Consults Physical Medicine and Rehabilitation    Chief Complaint   Headache    History Obtained From   patient, electronic medical record    History of Present Illness   Ms. Geller is a 58-year-old female with a history of asthma, hypertension, hyperlipidemia who presented to [facility] ED on 12/13/2003 with decreased level of consciousness and vomiting.  Facility prior to presentation she had reported headache and been taking NSAIDs but with no relief.  A stat CT of the head was obtained which showed a large subarachnoid hemorrhage with intraparenchymal hemorrhage.  CTA of the head and neck was obtained showing arteries coming from the M1 segment on the right measuring 8.8 x 16 x 9.9 mm.  Neurosurgery and NIS were consulted and the patient was transferred to [facility] on 4/18/2023.  Patient was intubated and originally planned.  Patient underwent cerebral/cervical arteriogram and flow diversion stent embolization of the right supraclinoid ICA aneurysm on 4/18/2023 by Dr. Hamilton.  On 12/21/2023 patient had an episode of staring during PT last approximate 30 seconds on 11 tonic-clonic activity twitching or incontinence.  Patient noted to have mild to moderate vasospasm of the right MCA and bilateral CARLITOS's status post remote angiogram and intra-arterial nicardipine infusion on 12/26/2023.  On 12/31/2023 patient developed left-sided weakness and she was taken for cerebral angiogram which revealed enlargement of previously embolized right ICA aneurysm and therefore additional flow diverting stent was placed on 12/31/2023.  Between 12/31/2023 and 1/6/2024 DSA and intra-arterial verapamil was given for vasospasm treatment.  Systolic pressure goal of 1 20-1 60.  She is now off of Levophed.  She has completed a 20-day course of nimotop.  Keppra has been  Exam  Constitutional:       Appearance: Normal appearance.   HENT:      Head: Normocephalic and atraumatic.      Mouth/Throat:      Mouth: Mucous membranes are dry.   Eyes:      Comments: Pupils equal round and reactive to light and accommodation.  Patient not tracking completely to the left likely due to neglect   Cardiovascular:      Comments: Tachycardic on telemetry with rates in the 120s to 130s, appears regular, no murmurs gallops or rubs  Pulmonary:      Breath sounds: Normal breath sounds. No wheezing, rhonchi or rales.   Abdominal:      General: Bowel sounds are normal.      Palpations: Abdomen is soft.   Musculoskeletal:         General: No swelling, tenderness or deformity.   Skin:     General: Skin is warm.   Neurological:      Mental Status: She is alert.      Comments: Alert and oriented to self, follows February, can provide me a year.  Actively moving right upper and lower extremity antigravity and strength roughly 4+ out of 5 throughout.  Left shoulder abduction 4 out of 5.  Left elbow flexion 4 out of 5.  Left elbow extension 3+ out of 5.  Left hip flexion is not appreciated.  Left knee extension 3+ out of 5.  Left knee flexion 3+ out of 5.  Left ankle dorsiflexion not performed by patient.  Unable to assess sensation.  DTRs 3+ throughout.         Labs    CBC:  Recent Labs     01/08/24  0241   WBC 4.0   RBC 4.11   HGB 11.9   HCT 36.2   MCV 88.1   RDW 12.9   *     CHEMISTRIES:  Recent Labs     01/08/24  0241      K 3.6   *   CO2 23   BUN 5*   CREATININE 0.69   GLUCOSE 110*   PHOS 3.4   MG 2.0     PT/INR:No results for input(s): \"PROTIME\", \"INR\" in the last 72 hours.  APTT:No results for input(s): \"APTT\" in the last 72 hours.  LIVER PROFILE:No results for input(s): \"AST\", \"ALT\", \"BILIDIR\", \"BILITOT\", \"ALKPHOS\" in the last 72 hours.    Imaging/Diagnostics   CTA HEAD NECK W CONTRAST    Result Date: 1/5/2024  1. Postoperative changes of right ICA/MCA stenting. There is persistent

## 2024-01-10 NOTE — PROGRESS NOTES
Hospitalist Progress Note  Ivette Espana MD  Answering service: [phone]        Date of Service:  1/10/2024  NAME:  Soledad Geller  :  1965  MRN:  [id number]      Admission Summary:   Soledad Geller is a 58 y.o. AA female with a PMH of asthma, HTN, and HDL who presented to [facility] ED on 23 with decreased LOC and vomiting. She had been reporting headache x several days prior to presentation, had been taking NSAIDs for pain but does not take any ASA/blood thinners. A stat CT head was obtained which showed a large SAH with IPH. CTA head/neck was then obtained which showed a large fusiform aneurysm of the M1 segment on the right measuring 8.8 x 16 x 9.9 mm. NSGY and NIS were consulted and pt was then transferred to [facility] for higher level of care.   PT was   intubated  in [facility] ER and transferred to [facility] ICU early morning of 23 .   Pt is s/p cerebral/cervical arteriogram and Flow diversion stent embolization of right supraclinoid ICA aneurysm on 23 by Dr. Hamilton.   she was then closely monitored in ICU   2023: Pt with episode of staring during PT lasting approx 30 seconds. No evidence of tonic/clonic activity, twitching, incontinence, or nystagmus.  Pt with mild to moderate vasospasm of the RMCA and B/L ACAs s/p cerebral angiogram and IA nicardipine infusion on 2023 by Dr. Hamilton.   She was ramining drawly but arousae to follow commands and tolerate diet and ICU/ NIS ok to downgrade pt from ICU  .daily TCD monitor   -developed left sided weakness:   Pt taken for cerebral angiogram revealing enlargement of previously embolized right ICA aneurysm thus additional flow-diverting stent placed by Dr. Hamilton on 23.    Monitor ICU :23 and 2023 DSA and IA verapamil for vasospasm treatment.         Interval history / Subjective:   Mental status clear,  No changes      Assessment &  Plan:        Cerebral Hemorrhage due to SAH due to ruptured fusiform right MCA aneurysm   Acute Encephalopathy (improved)  Cerebral vasospasm -   s/p cerebral/cervical arteriogram and Flow diversion stent embolization of right supraclinoid ICA aneurysm on 12/18/23 by Dr. Hamilton.  S/p cerebral angiogram and IA nicardipine infusion on 12/26/2023 for cerebral vasospasm.   cerebral angiogram revealing enlargement of previously embolized right ICA aneurysm thus additional flow-diverting stent placed by Dr. Hamilton on 12/31/23.    12/31/23 and 1/6/2023 DSA and IA verapamil for vasospasm treatment   -transfer to neuro IMCU 1/8   SBP goal 120-160, was in ICU for levophed ,now off   Neuro checks every 4  hours   Cont DAPT - ASA 81mg daily and ticagrelor 90mg BID  Completed 21 day course of Nimotop 1/7/2024  D/c Keppra, no concern for seizures  Continue NS 100mL/hr to maintain euvolemia, will dc in am   PT/OT/SPL       -     HTN:  - see above;      Hypercholesterolemia:  - Continue statin         Code status: full   Prophylaxis: scd  Care Plan discussed with: patient, , RN, NIS consultant,, CM,   Anticipated Disposition:  IPR, awaiting INS auth   Central Line:                Review of Systems:   Pertinent items are noted in HPI.         Vital Signs:    Last 24hrs VS reviewed since prior progress note. Most recent are:  Vitals:    01/10/24 1800   BP:    Pulse: (!) 115   Resp:    Temp:    SpO2:        No intake or output data in the 24 hours ending 01/10/24 1814       Physical Examination:     I had a face to face encounter with this patient and independently examined them on 1/10/2024 as outlined below:    Gen: awake  conversation better,   and following comands.   CV: RRR, S1, S2 present, no click, rub, or gallop   Resp: Lungs CTA anteriorly   Ext: no cyanosis, PT present with doppler. Pedal pulses palpable.  Skin: Warm, dry, color appropriate for ethnicity. Right groin site with scant bruising. No hematoma, bleeding, or

## 2024-01-10 NOTE — PROGRESS NOTES
voice. Oriented x 4.  Appropriate affect, mood and behavior.       Speech and Language:      Clear, but slow with some delayed responses. Follows commands.     Cranial Nerves:     Pupils 3 mm, equal, round and briskly reactive to light.  Visual fields with left visual field cut  Extraocular movements with right gaze preference but can cross midline  Facial sensation intact.  Full facial strength, no asymmetry.   Hearing intact bilaterally.  Mild dysarthria. Tongue protrudes to midline, palate elevates symmetrically.     Shoulder shrug 5/5 bilaterally.    Motor:      5/5 power in RUE/RLE, 4/5 with drift in LUE, 3/5 in LLE   Bulk and tone normal.   No involuntary movements.    Sensation:      Sensation intact throughout to light touch.    Coordination:  FTN, HTS intact on right.    Gait:   Observed pt working with PT this am, able to stand with assistance.       24 hour results:  Lab Results   Component Value Date    WBC 5.7 01/10/2024    HGB 12.7 01/10/2024    HCT 38.2 01/10/2024    MCV 87.0 01/10/2024     (H) 01/10/2024     Lab Results   Component Value Date/Time     01/10/2024 08:11 AM    K 3.9 01/10/2024 08:11 AM     01/10/2024 08:11 AM    CO2 23 01/10/2024 08:11 AM    BUN 6 01/10/2024 08:11 AM    CREATININE 0.64 01/10/2024 08:11 AM    GLUCOSE 102 01/10/2024 08:11 AM    CALCIUM 11.3 01/10/2024 08:11 AM    LABGLOM >60 01/10/2024 08:11 AM      Imaging (personally reviewed by myself):  CTH 12/17/23: large SAH with IPH  CTH 12/18/23 at 0615: new IVH but no hydro  CTH 12/18/23 at 1225: stable  CTH 12/19/23: mild ventricular dilation, stable SAH  CTH 12/20/23: stable    CTA: large fusiform aneurysm of the M1 segment on the right measuring 8.8 x 16 x 9.9 mm    ECHO with EF of 55 - 60% with normal wall motion.     TCDs 12/18/23: No vasospasm.  TCDs 12/19/23: No vasospasm.  TCDs 12/20/23: Possible right CARLITOS vasospasm.  No further intracranial vasospasm identified bilaterally   TCDs 12/21/23: No  vasospasm.   TCDs 12/22/23: No vasospasm.   TCDs 12/23/23: No vasospasm.  TCDs 12/24/23: No vasospasm.  TCDs 12/25/23: Possible vasospasm.   TCDS 12/30/2023: Possible right CARLITOS vasospasm.   TCDs 1/1/24:  Moderate right MCA vasospasm, bilateral CARLITOS vasospasm.  TCDs 1/2/24: mild right MCA vasospasm, with bilateral CARLITOS vasospasm.  TCDs 1/3/24: Possible left CARLITOS vasospasm & elevated velocities in right MCA, more likely hyperemia than spasm.  TCDs 1/4/24: stable, possible mild RT MCA vasospasm, possible RT & LT CARLITOS vasospasm  TCDs 1/5/24: stable with possible improvement, possible mild distal LT A1 CARLITOS vasospasm (did not reflect RT MCA/CARLITOS territory vasospasm seen on CTA.  TCDS 1/6/24: Bilateral possible CARLITOS vasospasm.   TCDs 1/7/2024:  Possible bilateral CARLITOS vasospasm. IVC collapse greater than 50%.      I have spent 35 minutes of time involved in chart review, lab review, imaging review, consultations with specialists and attendings, family discussion/decision making and documentation.     Signed By: BETZY Calhoun NP, Neurocritical Care Nurse Practitioner    January 10, 2024

## 2024-01-10 NOTE — PLAN OF CARE
to advance feet in standing requiring pivot transfer. Patient received semi supine in bed and agreeable to working with therapy. Patient drowsy throughout session requiring verbal and tactile cueing to maintain arousal. Patient transferred to sitting and with left lateral/posterior lean. Assisted patient to come down onto elbow x3 at EOB with improvement in maintaining midline that decreases with time in sitting. Patient stood with worsened balance this date requiring a pivot transfer as patient unable to take a step with right leg in standing, requiring physical assist to manually bring forward. Patient left in recliner, chair alarm on, and nursing updated on patient transfer. Patient would benefit from skilled OT services during admission to improve independence with self care and functional mobility/transfers. Recommend discharge to [facility] at this time as patient s/p hemorrhage and well below independent baseline.       PLAN  Goals have been updated based on progression since last assessment.  Patient continues to benefit from skilled intervention to address the above impairments.    Recommendations and Planned Interventions:   self care training, therapeutic activities, functional mobility training, balance training, therapeutic exercise, neuromuscular re-education, visual/perceptual training, endurance activities, cognitive retraining, patient education, home safety training, and family training/education    Frequency/Duration: OT Plan of Care: 5 times/week    Recommend with staff: up to chair for meals as tolerated via Kari Aceves and assist x2, BSC if able to communicate needs    Recommend next OT session: continue POC    Recommendation for discharge: (in order for the patient to meet his/her long term goals): Therapy 3 hours/day 5-7 days/week    Other factors to consider for discharge: patient's current support system is unable to meet their requirements for physical assistance, high risk for falls, not safe to  be alone, and concern for safely navigating or managing the home environment    IF patient discharges home will need the following DME: continuing to assess with progress     SUBJECTIVE:   Patient stated “I'm trying.” when asked to step forward with right leg.    OBJECTIVE DATA SUMMARY:   Cognitive/Behavioral Status:  Orientation  Overall Orientation Status: Within Normal Limits  Orientation Level: Oriented X4  Cognition  Overall Cognitive Status: Exceptions  Arousal/Alertness: Delayed responses to stimuli  Following Commands: Follows one step commands with repetition;Follows one step commands with increased time  Attention Span: Difficulty attending to directions;Difficulty dividing attention  Safety Judgement: Decreased awareness of need for assistance;Decreased awareness of need for safety  Insights: Decreased awareness of deficits  Initiation: Requires cues for all  Sequencing: Requires cues for some    Functional Mobility and Transfers for ADLs:  Bed Mobility:  Bed Mobility Training  Bed Mobility Training: Yes  Supine to Sit: Moderate assistance;Additional time;Assist X2  Sit to Supine:  (pt left seated in recliner)  Scooting: Moderate assistance;Assist X1     Transfers:   Transfer Training  Transfer Training: Yes  Sit to Stand: Moderate assistance;Assist X2  Stand to Sit: Moderate assistance;Assist X2  Stand Pivot Transfers: Maximum assistance;Assist X2  Bed to Chair: Maximum assistance;Assist X2    Balance:  Standing: Impaired  Balance  Sitting: Impaired  Sitting - Static: Fair (occasional)  Sitting - Dynamic: Poor (constant support)  Standing: Impaired  Standing - Static: Poor;Constant support  Standing - Dynamic: Poor;Constant support    ADL Intervention:                                        Pain Rating:  Patient reports pain, when asked reports it was following testing this AM in neck/back, did not rate     Pain Intervention(s):   repositioning    Activity Tolerance:   Fair , requires rest breaks, and

## 2024-01-11 LAB
ANION GAP SERPL CALC-SCNC: 9 MMOL/L (ref 5–15)
BUN SERPL-MCNC: 6 MG/DL (ref 6–20)
BUN/CREAT SERPL: 10 (ref 12–20)
CALCIUM SERPL-MCNC: 10.9 MG/DL (ref 8.5–10.1)
CHLORIDE SERPL-SCNC: 105 MMOL/L (ref 97–108)
CO2 SERPL-SCNC: 23 MMOL/L (ref 21–32)
CREAT SERPL-MCNC: 0.6 MG/DL (ref 0.55–1.02)
ERYTHROCYTE [DISTWIDTH] IN BLOOD BY AUTOMATED COUNT: 12.8 % (ref 11.5–14.5)
GLUCOSE SERPL-MCNC: 94 MG/DL (ref 65–100)
HCT VFR BLD AUTO: 35.1 % (ref 35–47)
HGB BLD-MCNC: 11.3 G/DL (ref 11.5–16)
MAGNESIUM SERPL-MCNC: 2 MG/DL (ref 1.6–2.4)
MCH RBC QN AUTO: 28.8 PG (ref 26–34)
MCHC RBC AUTO-ENTMCNC: 32.2 G/DL (ref 30–36.5)
MCV RBC AUTO: 89.5 FL (ref 80–99)
NRBC # BLD: 0 K/UL (ref 0–0.01)
NRBC BLD-RTO: 0 PER 100 WBC
PHOSPHATE SERPL-MCNC: 2.6 MG/DL (ref 2.6–4.7)
PLATELET # BLD AUTO: 444 K/UL (ref 150–400)
PMV BLD AUTO: 9.2 FL (ref 8.9–12.9)
POTASSIUM SERPL-SCNC: 3.4 MMOL/L (ref 3.5–5.1)
RBC # BLD AUTO: 3.92 M/UL (ref 3.8–5.2)
SODIUM SERPL-SCNC: 137 MMOL/L (ref 136–145)
WBC # BLD AUTO: 4.4 K/UL (ref 3.6–11)

## 2024-01-11 PROCEDURE — 84100 ASSAY OF PHOSPHORUS: CPT

## 2024-01-11 PROCEDURE — 80048 BASIC METABOLIC PNL TOTAL CA: CPT

## 2024-01-11 PROCEDURE — 6370000000 HC RX 637 (ALT 250 FOR IP): Performed by: NURSE PRACTITIONER

## 2024-01-11 PROCEDURE — 2580000003 HC RX 258: Performed by: INTERNAL MEDICINE

## 2024-01-11 PROCEDURE — 6370000000 HC RX 637 (ALT 250 FOR IP): Performed by: INTERNAL MEDICINE

## 2024-01-11 PROCEDURE — APPNB45 APP NON BILLABLE 31-45 MINUTES

## 2024-01-11 PROCEDURE — 97112 NEUROMUSCULAR REEDUCATION: CPT

## 2024-01-11 PROCEDURE — 97535 SELF CARE MNGMENT TRAINING: CPT

## 2024-01-11 PROCEDURE — 97530 THERAPEUTIC ACTIVITIES: CPT

## 2024-01-11 PROCEDURE — 2060000000 HC ICU INTERMEDIATE R&B

## 2024-01-11 PROCEDURE — 83735 ASSAY OF MAGNESIUM: CPT

## 2024-01-11 PROCEDURE — 36415 COLL VENOUS BLD VENIPUNCTURE: CPT

## 2024-01-11 PROCEDURE — 85027 COMPLETE CBC AUTOMATED: CPT

## 2024-01-11 RX ADMIN — SODIUM CHLORIDE, PRESERVATIVE FREE 10 ML: 5 INJECTION INTRAVENOUS at 22:38

## 2024-01-11 RX ADMIN — SENNOSIDES AND DOCUSATE SODIUM 1 TABLET: 50; 8.6 TABLET ORAL at 09:33

## 2024-01-11 RX ADMIN — SODIUM CHLORIDE 1 G: 1 TABLET ORAL at 16:54

## 2024-01-11 RX ADMIN — METHYLPHENIDATE HYDROCHLORIDE 5 MG: 5 TABLET ORAL at 09:37

## 2024-01-11 RX ADMIN — PRAVASTATIN SODIUM 40 MG: 40 TABLET ORAL at 22:37

## 2024-01-11 RX ADMIN — ASPIRIN 81 MG CHEWABLE TABLET 81 MG: 81 TABLET CHEWABLE at 09:34

## 2024-01-11 RX ADMIN — TICAGRELOR 90 MG: 90 TABLET ORAL at 09:34

## 2024-01-11 RX ADMIN — TICAGRELOR 90 MG: 90 TABLET ORAL at 22:37

## 2024-01-11 RX ADMIN — SODIUM CHLORIDE: 9 INJECTION, SOLUTION INTRAVENOUS at 06:35

## 2024-01-11 RX ADMIN — SODIUM CHLORIDE 1 G: 1 TABLET ORAL at 09:39

## 2024-01-11 RX ADMIN — SODIUM CHLORIDE 1 G: 1 TABLET ORAL at 11:29

## 2024-01-11 NOTE — PROGRESS NOTES
aNeurointerventional Surgery Progress Note  Neurocritical Care NP    Admit Date: 12/18/2023   LOS: 24 days      Daily Progress Note: 1/11/2024    Assessment:     - SAH due to ruptured fusform RMCA aneurysm, Morin Miller 3, Motley Grade 4, baseline mRS: 0, s/p cerebral angiogram and pipeline embo on 12/18/23 by Dr. Hamilton, additional pipeline placed 12/31/23 due to enlargement of aneurysm    - Cerebral vasospasm - S/p cerebral angiogram and IA nicardipine infusion on 12/26/2023, 12/31/23 and 1/6/2023 IA verapamil for vasospasm treatment     Plan:      - Cont DAPT with ASA 81 mg daily and Brilinta 90 mg BID   - Day 21/21 of Nimotop completed   - Keppra DC   - Ritalin 5 mg daily to promote wakefulness    - Labs reviewed:  (salt tabs started 1/10/24), K 3.4, Mg 2.0, Phos 2.6      - NS at 100 ml/hr to maintain euvolemia   - I&O every 4 hours   - q4 neuro checks   - No further TCDs needed    - SBP goal 120-160   - Cont PT/OT/SLP     Plan d/w Dr. Hamm. OK for discharge planning. Pt to get repeat CTA head/neck in 6 weeks and then follow up in NIS clinic with Dr Hamilton, message sent to office staff.     HPI: Soledad Geller is a 57 yo black female with a pmh of asthma, HTN, and HDL who presented to [facility] ED on 12/17/23 with decreased LOC and vomiting. She had been reporting headache x several days prior to presentation, had been taking NSAIDs for pain but does not take any ASA/blood thinners. A stat CT head was obtained which showed a large SAH with IPH. CTA head/neck was then obtained which showed a large fusiform aneurysm of the M1 segment on the right measuring 8.8 x 16 x 9.9 mm. NSGY and NIS were consulted and pt was then transferred to [facility] for higher level of care.   12/21/2023: Pt with episode of staring during PT lasting approx 30 seconds. No evidence of tonic/clonic activity, twitching, incontinence, or nystagmus. Returned to baseline immediately following, felt to be vasovagal response.     Subjective:     No neuro

## 2024-01-11 NOTE — PLAN OF CARE
Problem: Physical Therapy - Adult  Goal: By Discharge: Performs mobility at highest level of function for planned discharge setting.  See evaluation for individualized goals.  Description: FUNCTIONAL STATUS PRIOR TO ADMISSION: Patient was independent and active without use of DME.    HOME SUPPORT PRIOR TO ADMISSION: The patient lived with spouse but did not require assistance.    Physical Therapy Goals  Initiated 12/19/2023, continued 12/27/2023; continued 1/3/2024; continued 1/10  1.  Patient will move from supine to sit and sit to supine in bed with minimal assistance within 7 day(s).    2.  Patient will perform sit to stand with minimal assistance within 7 day(s).  3.  Patient will transfer from bed to chair and chair to bed with minimal assistance using the least restrictive device within 7 day(s).  4.  Patient will ambulate with moderate assistance for 10 feet with the least restrictive device within 7 day(s).   5.  Patient will ascend/descend 2 stairs with 1 handrail(s) with moderate assistance within 7 day(s).  6.  Patient will improve Thorpe Balance score by 7 points within 7 days.   Outcome: Progressing       PHYSICAL THERAPY TREATMENT    Patient: Soledad Geller (58 y.o. female)  Date: 1/11/2024  Diagnosis: Aneurysmal subarachnoid hemorrhage (HCC) [I60.8] Aneurysmal subarachnoid hemorrhage (HCC)      Precautions: Fall Risk                    ASSESSMENT:  Patient continues to benefit from skilled PT services and is progressing towards goals. Patient more alert during treatment, but still somewhat lethargic. Patient needed verbal and tactile cuing to stay on task and for sequencing. Patient ambulated with Maori walker to encourage increased upright trunk posture, ace wrap to facilitate left dorsiflexion, min to mod assist for left lower ext support in stance and facilitation for swing.   Patient performed standing balance activity with swedish walker for support with min assist for left hip and knee

## 2024-01-11 NOTE — CARE COORDINATION
Transition of Care: recs are for IPR; [facility] has accepted ( insurance authorized on 1/12)       Physical Medicine Rehab- consulted on 1/9/24  (Dr. Bailey)      Transport Plan: Hospital to Home- stretcher- (capacity black) $258     RUR: 13%     Main contact is spouseMaynor Geller- [phone] 0930-1100: this CM spoke to Winifrede at [facility]; the insurance was authorized today and [facility] has a bed today; this CM updated patient and her - Ravinder Geller at bedside on discharge plan  Patient needs transport- stretcher- set up H2H stretcher for 1400 transport time to [facility]        Inpatient Rehab/ Hospital to Hospital Transition of Care Note /Discharge Note     Accepting Physician: Dr. Brunilda Bailey    Discharging Physician: Dr. Mtz    Accepting Representative: Winifrede    Accepting Facility: [facility]    RN call to report: [phone]    Transport: H2H- stretcher- 2pm       time: 2pm    Ambulance packet at bedside chart.       Family notified: CM met with the family member- spouse- Ravinder Geller at bedside to update on dc plan      The attending physician and the primary nurse were notified of the plan.          Prior Level of Functioning: lives at home with spouse; independent, alert and oriented  Disposition: IPR- [facility] accepted  If SNF or IPR: Date FOC offered: on other unit  Date FOC received: on other unit  Accepting facility: [facility]  Date authorization started with reference number:  started on 1/9/24  Date authorization received and expires: 1/12/24  Follow up appointments: specialists/pcp  DME needed: none if going to IPR  Transportation at discharge: H2H stretcher- scheduled for 2pm on 1/12/24  IM/IMM Medicare/ letter given: n/a  Is patient a [place] and connected with [facility]? no              If yes, was [place] transfer form completed and [facility] notified? N/a  Caregiver Contact: spouse- Ravinder

## 2024-01-11 NOTE — ADT AUTH CERT
Progress Notes by Ruth Bloom MD at 2024  4:15 PM    Author: Ruth Bloom MD Service: Critical Care Author Type: Physician   Filed: 2024  5:26 PM Date of Service: 2024  4:15 PM Status: Signed   : Ruth Bloom MD (Physician)   Expand All Collapse All                         [facility]     ICU TEAM Progress Note           Name: Soledad Geller   : 1965   MRN: [id number]   Date: 2024          CU PROBLEM LIST   -SAH w/ IPH   -     HISTORY OF PRESENT ILLNESS:   59 y/o past medical history of SAH w/ IPH, due to ruptured R MCA aneurysm, Pt taken for cerebral angiogram revealing enlargement of previously embolized right ICA aneurysm thus additional flow-diverting stent placed by Dr. Hamilton on 23.      Hospital course complicated by Cerebral vasospasm - S/p cerebral angiogram and IA nicardipine infusion on 2023 for cerebral vasospasm. 23 and 2023 DSA and IA verapamil for vasospasm treatment.      ECHO with EF 75-80%.      TCDs 23: No vasospasm.  TCDs 23: No vasospasm.  TCDs 23: Possible right CARLITOS vasospasm.  No further intracranial vasospasm identified bilaterally   TCDs 23: No vasospasm.   TCDs 23: No vasospasm.   TCDs 23: No vasospasm.  TCDs 23: No vasospasm.  TCDs 23: Distal left A1 CARLITOS velocity is elevated to a level consistent with possible vasospasm.No evidence of vasospasm on the right.  TCDs 2023: No evidence of intracranial vasospasm.  TCDS 2023: Imaging is consistent with possible bilateral distal A1 CARLITOS vasospasm.   TCDS 2023: Imaging shows evidence of possible right CARLITOS vasospasm  TCDs 2023: Evidence of bilateral CARLITOS vasospasm  TCDs 24:  There is evidence of moderate right MCA vasospasm. Imaging is consistent with bilateral CARLITOS vasospasm.  TCDs 24: There is evidence of mild right MCA vasospasm.  Imaging is consistent with bilateral CARLITOS vasospasm.  TCDs  preference but can cross midline  Facial sensation intact.  Full facial strength, no asymmetry.   Hearing intact bilaterally.  Mild dysarthria. Tongue protrudes to midline, palate elevates symmetrically.                        Shoulder shrug 5/5 bilaterally.     Motor:                            5/5 power in RUE/RLE, 4/5 with drift in LUE, 3/5 in LLE   Bulk and tone normal.   No involuntary movements.     Sensation:                     Sensation intact throughout to light touch.     Coordination:  FTN, HTS intact on right.     Gait:       Deferred.        24 hour results:        Lab Results   Component Value Date     WBC 4.4 01/11/2024     HGB 11.3 (L) 01/11/2024     HCT 35.1 01/11/2024     MCV 89.5 01/11/2024      (H) 01/11/2024            Lab Results   Component Value Date/Time      01/11/2024 12:13 AM     K 3.4 01/11/2024 12:13 AM      01/11/2024 12:13 AM     CO2 23 01/11/2024 12:13 AM     BUN 6 01/11/2024 12:13 AM     CREATININE 0.60 01/11/2024 12:13 AM     GLUCOSE 94 01/11/2024 12:13 AM     CALCIUM 10.9 01/11/2024 12:13 AM     LABGLOM >60 01/11/2024 12:13 AM      Imaging (personally reviewed by myself):  CTH 12/17/23: large SAH with IPH  CTH 12/18/23 at 0615: new IVH but no hydro  CTH 12/18/23 at 1225: stable  CTH 12/19/23: mild ventricular dilation, stable SAH  CTH 12/20/23: stable     CTA: large fusiform aneurysm of the M1 segment on the right measuring 8.8 x 16 x 9.9 mm     ECHO with EF of 55 - 60% with normal wall motion.      TCDs 12/18/23: No vasospasm.  TCDs 12/19/23: No vasospasm.  TCDs 12/20/23: Possible right CARLITOS vasospasm.  No further intracranial vasospasm identified bilaterally   TCDs 12/21/23: No vasospasm.   TCDs 12/22/23: No vasospasm.   TCDs 12/23/23: No vasospasm.  TCDs 12/24/23: No vasospasm.  TCDs 12/25/23: Possible vasospasm.   TCDS 12/30/2023: Possible right CARLITOS vasospasm.   TCDs 1/1/24:  Moderate right MCA vasospasm, bilateral CARLITOS vasospasm.  TCDs 1/2/24: mild right MCA

## 2024-01-11 NOTE — PLAN OF CARE
transferred to EOB and into standing with [place]orese walker. Ambulated into hallway with significant assistance to avoid knee buckling and for advancing legs. Patient then sat on [place] walker and taken into room to stand at sink to wash face. Then requesting to use bathroom so BSC brought behind patient, had BM and voided on commode and nursing aware. Patient stood for toileting, required assist due to need for BUE support in standing and then transferred to recliner. Patient left in recliner at end of session, no chair alarm per nursing request. Of note, patient's HR did increase to 130s with activity this date. Patient would benefit from skilled OT services during admission to improve independence with self care and functional mobility/transfers. Recommend discharge to [facility] at this time as patient remains well below her independent baseline with significant neurological deficits. Patient has good rehab potential and would benefit from intensity.          PLAN :  Patient continues to benefit from skilled intervention to address the above impairments.  Continue treatment per established plan of care to address goals.    Recommend with staff: up to chair via Kari Aceves and assist of 2    Recommend next OT session: continue POC, grooming in standing and attention to left side of body    Recommendation for discharge: (in order for the patient to meet his/her long term goals): Therapy 3 hours/day 5-7 days/week    Other factors to consider for discharge: patient's current support system is unable to meet their requirements for physical assistance, high risk for falls, not safe to be alone, and concern for safely navigating or managing the home environment    IF patient discharges home will need the following DME: continuing to assess with progress     SUBJECTIVE:   Patient stated “I slept well last night.”    OBJECTIVE DATA SUMMARY:     Cognitive/Behavioral Status:  Orientation  Overall Orientation Status: Within Normal  nurse    Patient Education  Education Given To: Patient  Education Provided: Role of Therapy;Plan of Care;ADL Adaptive Strategies;Transfer Training;Fall Prevention Strategies  Education Provided Comments: attention to L  Education Method: Demonstration;Verbal  Barriers to Learning: Cognition  Education Outcome: Verbalized understanding;Unable to demonstrate understanding;Continued education needed    Thank you for this referral.  Cristina Gunderson OTR/L  Minutes: 42

## 2024-01-11 NOTE — PROGRESS NOTES
Neurocritical Care Brief Progress Note:  Soledad Geller is 58 y.o female with pmh of Asthma, FMD, HTN, HLD, chronic brachiocephalic artery dissection who arrived from [facility] ED intubated to [facility] ICU early morning of 12/18/23 with SAH from a ruptured cerebral aneurysm. CTA showed a large fusiform aneurysm of the right supraclinoid ICA with eccentric broad-based component.    12/18/23~Pt is s/p cerebral/cervical arteriogram and Flow diversion stent embolization of right supraclinoid ICA aneurysm by Dr. Hamilton.    12/26/2023~Pt was noted with mild to moderate vasospasm of the RMCA and B/L ACAs s/p cerebral angiogram and IA nicardipine infusion by Dr. Hamilton.     12/30/2023~TCD's showed possible right CARLITOS vsp. Pt noted with worsening left sided weakness with decreased sensation. STAT CTH obtained showed no worsening bleed or acute infarct. Small volume right sylvian fissure SAH and right posterior horn lateral ventricle IVH, decreased from prior imaging.    12/31/2023~continues to have worsening left-sided weakness and stat CTA h/n showed worsening right sided intraventricular and subarachnoid hemorrhage. Generalized cerebral edema. The pt was taken to IR for flow diversion stent embolization of HERNÁN aneurysm, IA Verapamil infusion for intracranial vasospasm    01/05/2024~CTA h/n showed postoperative changes of right ICA/MCA stenting with persistent enhancement within the excluded aneurysm sac, although the sac is decreased in size compared to prior examination. Stenosis at the distal margin of the stent and focal stenosis of the right V4 segment, right P1 segment, and left P1/P2  junction. Stable bilateral intraventricular hemorrhage. No hydrocephalus. Redemonstrated right subclavian artery dissection.    01/06/2024~Pt was taken to IR for diagnostic cerebral angiogram and cerebral infusion therapy by Dr. Hamm.    01/08/2024~pt downgraded to nstu.     01/10/24~pt on NS at 100ml/hr. Current i/o is -2.8L (all outpt), no

## 2024-01-11 NOTE — PLAN OF CARE
and chair to bed with minimal assistance using the least restrictive device within 7 day(s).  4.  Patient will ambulate with moderate assistance for 10 feet with the least restrictive device within 7 day(s).   5.  Patient will ascend/descend 2 stairs with 1 handrail(s) with moderate assistance within 7 day(s).  6.  Patient will improve Thorpe Balance score by 7 points within 7 days.   1/10/2024 1512 by Priscila Ramos, PT  Outcome: Progressing     Problem: Occupational Therapy - Adult  Goal: By Discharge: Performs self-care activities at highest level of function for planned discharge setting.  See evaluation for individualized goals.  Description: FUNCTIONAL STATUS PRIOR TO ADMISSION:  Patient was ambulatory without the use of AE  Receives Help From: Family, ADL Assistance: Independent, Ambulation Assistance: Independent, Transfer Assistance: Independent, Active : Yes     HOME SUPPORT: Patient lived with spouse but didn't require assistance.    Occupational Therapy Goals:  Initiated 12/19/2023.  Reviewed 12/27/23 and goals modified.    Weekly Re-assessment 1/3/24, modified at weekly RA 1/10/24  1.  Patient will perform self-feeding with supervision within 7 day(s). Downgraded  2.  Patient will perform grooming in standing with ModA within 7 day(s). Continue  3.  Patient will perform bathing with ModA within 7 day(s). Continue  4.  Patient will perform toilet transfers to [facility] with Moderate Assistance within 7 day(s). Revised  5.  Patient will perform all aspects of toileting with Moderate Assist within 7 day(s). Upgraded  6.  Patient will participate in upper extremity therapeutic exercise/activities with Supervision for 5 minutes within 7 day(s).  Continue  7.  Patient will utilize energy conservation techniques during functional activities with verbal cues within 7 day(s). Continue  1/10/2024 1306 by Cristina Gunderson, OT  Outcome: Progressing     Problem: SLP Adult - Impaired Swallowing  Goal: By Discharge:

## 2024-01-12 VITALS
WEIGHT: 162.04 LBS | TEMPERATURE: 98.7 F | DIASTOLIC BLOOD PRESSURE: 99 MMHG | HEIGHT: 68 IN | HEART RATE: 90 BPM | OXYGEN SATURATION: 99 % | SYSTOLIC BLOOD PRESSURE: 140 MMHG | RESPIRATION RATE: 17 BRPM | BODY MASS INDEX: 24.56 KG/M2

## 2024-01-12 LAB
ANION GAP SERPL CALC-SCNC: 6 MMOL/L (ref 5–15)
BUN SERPL-MCNC: 4 MG/DL (ref 6–20)
BUN/CREAT SERPL: 7 (ref 12–20)
CALCIUM SERPL-MCNC: 10.6 MG/DL (ref 8.5–10.1)
CALCIUM SERPL-MCNC: 10.8 MG/DL (ref 8.5–10.1)
CHLORIDE SERPL-SCNC: 108 MMOL/L (ref 97–108)
CO2 SERPL-SCNC: 24 MMOL/L (ref 21–32)
CREAT SERPL-MCNC: 0.57 MG/DL (ref 0.55–1.02)
ERYTHROCYTE [DISTWIDTH] IN BLOOD BY AUTOMATED COUNT: 12.9 % (ref 11.5–14.5)
GLUCOSE SERPL-MCNC: 102 MG/DL (ref 65–100)
HCT VFR BLD AUTO: 33.4 % (ref 35–47)
HGB BLD-MCNC: 11.1 G/DL (ref 11.5–16)
MAGNESIUM SERPL-MCNC: 2 MG/DL (ref 1.6–2.4)
MCH RBC QN AUTO: 29.2 PG (ref 26–34)
MCHC RBC AUTO-ENTMCNC: 33.2 G/DL (ref 30–36.5)
MCV RBC AUTO: 87.9 FL (ref 80–99)
NRBC # BLD: 0 K/UL (ref 0–0.01)
NRBC BLD-RTO: 0 PER 100 WBC
PHOSPHATE SERPL-MCNC: 2.6 MG/DL (ref 2.6–4.7)
PLATELET # BLD AUTO: 431 K/UL (ref 150–400)
PMV BLD AUTO: 9.1 FL (ref 8.9–12.9)
POTASSIUM SERPL-SCNC: 3.3 MMOL/L (ref 3.5–5.1)
PTH-INTACT SERPL-MCNC: 76.8 PG/ML (ref 18.4–88)
RBC # BLD AUTO: 3.8 M/UL (ref 3.8–5.2)
SODIUM SERPL-SCNC: 138 MMOL/L (ref 136–145)
WBC # BLD AUTO: 4.9 K/UL (ref 3.6–11)

## 2024-01-12 PROCEDURE — 85027 COMPLETE CBC AUTOMATED: CPT

## 2024-01-12 PROCEDURE — 84100 ASSAY OF PHOSPHORUS: CPT

## 2024-01-12 PROCEDURE — 83970 ASSAY OF PARATHORMONE: CPT

## 2024-01-12 PROCEDURE — 6370000000 HC RX 637 (ALT 250 FOR IP): Performed by: FAMILY MEDICINE

## 2024-01-12 PROCEDURE — 97530 THERAPEUTIC ACTIVITIES: CPT

## 2024-01-12 PROCEDURE — 99231 SBSQ HOSP IP/OBS SF/LOW 25: CPT | Performed by: NURSE PRACTITIONER

## 2024-01-12 PROCEDURE — 80048 BASIC METABOLIC PNL TOTAL CA: CPT

## 2024-01-12 PROCEDURE — 6370000000 HC RX 637 (ALT 250 FOR IP): Performed by: INTERNAL MEDICINE

## 2024-01-12 PROCEDURE — 83735 ASSAY OF MAGNESIUM: CPT

## 2024-01-12 PROCEDURE — 36415 COLL VENOUS BLD VENIPUNCTURE: CPT

## 2024-01-12 PROCEDURE — 2580000003 HC RX 258: Performed by: INTERNAL MEDICINE

## 2024-01-12 PROCEDURE — 97535 SELF CARE MNGMENT TRAINING: CPT

## 2024-01-12 PROCEDURE — 6370000000 HC RX 637 (ALT 250 FOR IP): Performed by: NURSE PRACTITIONER

## 2024-01-12 RX ORDER — SENNA AND DOCUSATE SODIUM 50; 8.6 MG/1; MG/1
1 TABLET, FILM COATED ORAL 2 TIMES DAILY PRN
Qty: 30 TABLET | Refills: 0 | Status: SHIPPED
Start: 2024-01-12

## 2024-01-12 RX ORDER — ASPIRIN 81 MG/1
81 TABLET, CHEWABLE ORAL DAILY
Qty: 30 TABLET | Refills: 3 | Status: SHIPPED | OUTPATIENT
Start: 2024-01-13

## 2024-01-12 RX ORDER — LATANOPROST 50 UG/ML
1 SOLUTION/ DROPS OPHTHALMIC NIGHTLY
Qty: 1 EACH | Refills: 0 | Status: SHIPPED
Start: 2024-01-12 | End: 2024-02-11

## 2024-01-12 RX ADMIN — SENNOSIDES AND DOCUSATE SODIUM 1 TABLET: 50; 8.6 TABLET ORAL at 08:42

## 2024-01-12 RX ADMIN — ASPIRIN 81 MG CHEWABLE TABLET 81 MG: 81 TABLET CHEWABLE at 08:43

## 2024-01-12 RX ADMIN — SODIUM CHLORIDE 1 G: 1 TABLET ORAL at 08:42

## 2024-01-12 RX ADMIN — TICAGRELOR 90 MG: 90 TABLET ORAL at 08:43

## 2024-01-12 RX ADMIN — SODIUM CHLORIDE 1 G: 1 TABLET ORAL at 12:04

## 2024-01-12 RX ADMIN — METOPROLOL TARTRATE 25 MG: 25 TABLET, FILM COATED ORAL at 13:19

## 2024-01-12 RX ADMIN — SODIUM CHLORIDE, PRESERVATIVE FREE 5 ML: 5 INJECTION INTRAVENOUS at 08:43

## 2024-01-12 NOTE — PROGRESS NOTES
Neurointerventional Surgery Progress Note  Skylar Shi, [facility]  Neurocritical Care NP    Admit Date: 12/18/2023   LOS: 25 days      Daily Progress Note: 1/12/2024    Assessment:     Cerebral Hemorrhage - SAH due to ruptured fusiform right MCA aneurysm, Morin Miller 3, Motley Grade 4, baseline mRS: 0. S/p cerebral angiogram w/ flow diversion stent embolization on 12/18/23 by Dr. Hamilton. Increase in SAH, IVH seen on repeat CTAh-n 12/31/23. Pt taken for cerebral angiogram revealing enlargement of previously embolized right ICA aneurysm thus additional flow-diverting stent placed by Dr. Hamilton on 12/31/23.      Cerebral vasospasm - S/p cerebral angiogram and IA nicardipine infusion on 12/26/2023 for cerebral vasospasm. 12/31/23 and 1/6/2023 DSA and IA verapamil for vasospasm treatment      Plan:     Neuro checks every 4 hours   Cont DAPT - ASA 81mg daily and ticagrelor 90mg BID  Completed 21 day course of Nimotop  Continue NS 100mL/hr to maintain euvolemia  Strict I&O  Labs reviewed: , K 3.3, Mag 2.0 Phos 2.6, defer electrolyte replacement to Hospitalist, but informed Hospitalist K 3.3 overnight with labs  PT/OT/SLP evals  Follow up with Dr. Hamilton outpatient after rehab, appointment scheduled for 2/21/23 at 10:30 AM  Patient educated on stroke-like symptoms and to call 911 immediately. Patient instructed to seek ER services if she experiences the worst headache of her life.     Plan d/w Dr. Hamm, Hospitalist Dr. Mtz, RN, and patient.       HPI: Soledad Geller is a 58 y.o. AA female with a PMH of asthma, HTN, and HDL who presented to [facility] ED on 12/17/23 with decreased LOC and vomiting. She had been reporting headache for several days prior to presentation. Pt reported taking NSAIDs for pain but denied use of ASA or blood thinners. A stat CTH was obtained which showed a large SAH with IPH. CTA head/neck was then obtained which showed a large fusiform aneurysm of the M1 segment on the right measuring 8.8 x 16 x  Metabolic Panel    Collection Time: 01/12/24 12:35 AM   Result Value Ref Range    Sodium 138 136 - 145 mmol/L    Potassium 3.3 (L) 3.5 - 5.1 mmol/L    Chloride 108 97 - 108 mmol/L    CO2 24 21 - 32 mmol/L    Anion Gap 6 5 - 15 mmol/L    Glucose 102 (H) 65 - 100 mg/dL    BUN 4 (L) 6 - 20 MG/DL    Creatinine 0.57 0.55 - 1.02 MG/DL    Bun/Cre Ratio 7 (L) 12 - 20      Est, Glom Filt Rate >60 >60 ml/min/1.73m2    Calcium 10.6 (H) 8.5 - 10.1 MG/DL   Magnesium    Collection Time: 01/12/24 12:35 AM   Result Value Ref Range    Magnesium 2.0 1.6 - 2.4 mg/dL   Phosphorus    Collection Time: 01/12/24 12:35 AM   Result Value Ref Range    Phosphorus 2.6 2.6 - 4.7 MG/DL   PTH, Intact    Collection Time: 01/12/24 12:36 AM   Result Value Ref Range    Calcium 10.8 (H) 8.5 - 10.1 MG/DL    Pth Intact 76.8 18.4 - 88.0 pg/mL         Imaging (personally reviewed by myself):  CTH 12/17/23: large SAH with IPH  CTH 12/18/23 at 0615: new IVH but no hydro  CTH 12/18/23 at 1225: stable  CTH 12/19/23: mild ventricular dilation, stable SAH  CTH 12/20/23: stable  CTH 12/30/23: stable appearance compared to prior CTH  CTH 12/31/23: Right ICA stent visualized, increased SAH and IVH  CTH 1/3/24: Stable appearance of SAH, IVH w/ mild edema seen in right hemisphere near internal capsule      CTA 12/17/23: large fusiform aneurysm of the M1 segment on the right measuring 8.8 x 16 x 9.9 mm  CTA 12/31/23: Right MCA stenosis, worsening right side SAH, IVH   CTA  01/05/24: revealed postoperative changes of right ICA/MCA stenting. There is persistent enhancement within the excluded aneurysm sac, although the sac is decreased in size compared to prior examination.  2. Stenosis at the distal margin of the stent.       ECHO with EF 75-80%.      TCDs 12/18/23: No vasospasm.  TCDs 12/19/23: No vasospasm.  TCDs 12/20/23: Possible right CARLITOS vasospasm.  No further intracranial vasospasm identified bilaterally   TCDs 12/21/23: No vasospasm.   TCDs 12/22/23: No

## 2024-01-12 NOTE — PLAN OF CARE
Problem: Occupational Therapy - Adult  Goal: By Discharge: Performs self-care activities at highest level of function for planned discharge setting.  See evaluation for individualized goals.  Description: FUNCTIONAL STATUS PRIOR TO ADMISSION:  Patient was ambulatory without the use of AE  Receives Help From: Family, ADL Assistance: Independent, Ambulation Assistance: Independent, Transfer Assistance: Independent, Active : Yes     HOME SUPPORT: Patient lived with spouse but didn't require assistance.    Occupational Therapy Goals:  Initiated 12/19/2023.  Reviewed 12/27/23 and goals modified.    Weekly Re-assessment 1/3/24, modified at weekly RA 1/10/24  1.  Patient will perform self-feeding with supervision within 7 day(s). Downgraded  2.  Patient will perform grooming in standing with ModA within 7 day(s). Continue  3.  Patient will perform bathing with ModA within 7 day(s). Continue  4.  Patient will perform toilet transfers to [facility] with Moderate Assistance within 7 day(s). Revised  5.  Patient will perform all aspects of toileting with Moderate Assist within 7 day(s). Upgraded  6.  Patient will participate in upper extremity therapeutic exercise/activities with Supervision for 5 minutes within 7 day(s).  Continue  7.  Patient will utilize energy conservation techniques during functional activities with verbal cues within 7 day(s). Continue    Outcome: Progressing    OCCUPATIONAL THERAPY TREATMENT  Patient: Soledad Geller (58 y.o. female)  Date: 1/12/2024  Primary Diagnosis: Aneurysmal subarachnoid hemorrhage (HCC) [I60.8]       Precautions: Fall Risk                Chart, occupational therapy assessment, plan of care, and goals were reviewed.    ASSESSMENT  Patient continues to benefit from skilled OT services and is slowly progressing towards goals. Patient ADLs continue to be limited by impaired balance, generalized weakness, L inattention, decreased cognition (attention to task, command following, etc),  impaired ROM/strength/coordination in L side and limited lower body access. Patient required mod A x2 to come to sitting on EOB and up to min A to maintain sitting balance while completing ADLs in unsupported sitting. Patient required multimodal cues for sequencing to complete oral care, comb hair and wash face. Patient with max-total A to stand and transfer to chair. Patient noted to have saturated brief - required max A to change and complete stephon care. Patient left sitting in chair with call bell in reach, RN aware, all needs met, VSS and alarm active. Continue to recommend IPR once cleared for D/C.      PLAN :  Patient continues to benefit from skilled intervention to address the above impairments.  Continue treatment per established plan of care to address goals.    Recommend with staff: Recommend with nursing, ADLs with supervision/setup, OOB to chair 3x/day via sear steady and toileting via beside commode. Thank you for completing as able in order to maintain patient strength, endurance and independence.     Recommend next OT session: lower body dressing, toileting     Recommendation for discharge: (in order for the patient to meet his/her long term goals): Therapy 3 hours/day 5-7 days/week    Other factors to consider for discharge: patient's current support system is unable to meet their requirements for physical assistance, poor safety awareness, impaired cognition, high risk for falls, not safe to be alone, and concern for safely navigating or managing the home environment    IF patient discharges home will need the following DME: continuing to assess with progress       SUBJECTIVE:   Patient stated “I hear babies crying.”    OBJECTIVE DATA SUMMARY:   Cognitive/Behavioral Status:  Orientation  Overall Orientation Status: Within Normal Limits  Orientation Level: Oriented X4  Cognition  Overall Cognitive Status: Exceptions  Arousal/Alertness: Delayed responses to stimuli;Inconsistent responses to

## 2024-01-12 NOTE — PLAN OF CARE
(occasional);Poor (constant support)  Standing: Impaired  Standing - Static: Constant support;Poor  Standing - Dynamic: Constant support;Poor   Ambulation/Gait Training:     Gait  Overall Level of Assistance: Maximum assistance  Distance (ft): 2 Feet  Assistive Device: Other (comment) (anterior support.)  Interventions: Manual cues;Verbal cues;Tactile cues      Pain Rating:  No complaints of pain   Pain Intervention(s):   pain is at a level acceptable to the patient    Activity Tolerance:   Good    After treatment:   Patient left in no apparent distress sitting up in chair, Call bell within reach, and Bed/ chair alarm activated      COMMUNICATION/EDUCATION:   The patient's plan of care was discussed with: registered nurse    Patient Education  Education Given To: Patient  Education Provided: Role of Therapy;Plan of Care  Education Method: Verbal  Barriers to Learning: Cognition  Education Outcome: Continued education needed      Zhou Bui, PT  Minutes: 28

## 2024-01-12 NOTE — DISCHARGE SUMMARY
Discharge Summary       PATIENT ID: Soledad Geller  MRN: [id number]   YOB: 1965    DATE OF ADMISSION: 12/18/2023  1:45 AM    DATE OF DISCHARGE: 1/12/24   PRIMARY CARE PROVIDER: Serena Correia APRN - NP     ATTENDING PHYSICIAN: PRABHU  DISCHARGING PROVIDER: Jimbo Mtz MD      CONSULTATIONS: IP CONSULT TO NEUROSURGERY  IP CONSULT TO NEUROINTERVENTIONAL SURGERY  IP CONSULT TO PHYSICAL MEDICINE REHAB    PROCEDURES/SURGERIES: * No surgery found *    ADMISSION SUMMARY AND HOSPITAL COURSE:     Soledad Geller is a 58 y.o. AA female with a PMH of asthma, HTN, and HDL who presented to [facility] ED on 12/17/23 with decreased LOC and vomiting. She had been reporting headache x several days prior to presentation, had been taking NSAIDs for pain but does not take any ASA/blood thinners. A stat CT head was obtained which showed a large SAH with IPH. CTA head/neck was then obtained which showed a large fusiform aneurysm of the M1 segment on the right measuring 8.8 x 16 x 9.9 mm. NSGY and NIS were consulted and pt was then transferred to [facility] for higher level of care.   PT was   intubated  in [facility] ER and transferred to [facility] ICU early morning of 12/18/23 .   Pt is s/p cerebral/cervical arteriogram and Flow diversion stent embolization of right supraclinoid ICA aneurysm on 12/18/23 by Dr. Hamilton.   she was then closely monitored in ICU   12/21/2023: Pt with episode of staring during PT lasting approx 30 seconds. No evidence of tonic/clonic activity, twitching, incontinence, or nystagmus.  Pt with mild to moderate vasospasm of the RMCA and B/L ACAs s/p cerebral angiogram and IA nicardipine infusion on 12/26/2023 by Dr. Hamilton.   She was ramining drawly but arousae to follow commands and tolerate diet and ICU/ NIS ok to downgrade pt from ICU 12/29 .daily TCD monitor   12/31-developed left sided weakness:   Pt taken for cerebral angiogram revealing enlargement of previously embolized right ICA aneurysm thus

## 2024-01-12 NOTE — PLAN OF CARE
Problem: Discharge Planning  Goal: Discharge to home or other facility with appropriate resources  Outcome: Progressing     Problem: Pain  Goal: Verbalizes/displays adequate comfort level or baseline comfort level  Outcome: Progressing     Problem: Safety - Adult  Goal: Free from fall injury  Outcome: Progressing     Problem: Skin/Tissue Integrity  Goal: Absence of new skin breakdown  Description: 1.  Monitor for areas of redness and/or skin breakdown  2.  Assess vascular access sites hourly  3.  Every 4-6 hours minimum:  Change oxygen saturation probe site  4.  Every 4-6 hours:  If on nasal continuous positive airway pressure, respiratory therapy assess nares and determine need for appliance change or resting period.  Outcome: Progressing     Problem: ABCDS Injury Assessment  Goal: Absence of physical injury  Outcome: Progressing     Problem: Physical Therapy - Adult  Goal: By Discharge: Performs mobility at highest level of function for planned discharge setting.  See evaluation for individualized goals.  Description: FUNCTIONAL STATUS PRIOR TO ADMISSION: Patient was independent and active without use of DME.    HOME SUPPORT PRIOR TO ADMISSION: The patient lived with spouse but did not require assistance.    Physical Therapy Goals  Initiated 12/19/2023, continued 12/27/2023; continued 1/3/2024; continued 1/10  1.  Patient will move from supine to sit and sit to supine in bed with minimal assistance within 7 day(s).    2.  Patient will perform sit to stand with minimal assistance within 7 day(s).  3.  Patient will transfer from bed to chair and chair to bed with minimal assistance using the least restrictive device within 7 day(s).  4.  Patient will ambulate with moderate assistance for 10 feet with the least restrictive device within 7 day(s).   5.  Patient will ascend/descend 2 stairs with 1 handrail(s) with moderate assistance within 7 day(s).  6.  Patient will improve Thorpe Balance score by 7 points within 7

## 2024-01-12 NOTE — CARE COORDINATION
Transition of Care: recs are for IPR; [facility] has accepted ( insurance authorized on 1/12)       Physical Medicine Rehab- consulted on 1/9/24  (Dr. Bailey)      Transport Plan: Hospital to Home- stretcher- (capacity black) $258     RUR: 13%     Main contact is spouse- Ravinder Geller- [phone]     [id number]: this CM spoke to [place] at [facility]; the insurance was authorized today and [facility] has a bed today; this CM informed attending; this CM updated patient and her - Ravinder Geller at bedside on discharge plan; patient needs transport- stretcher- set up H2H stretcher for 1400 transport time to [facility]    1249: this Cm updated patient and her spouse at bedside on transport time of 1400 with H2H stretcher ; they verbalized understanding           Inpatient Rehab/ Hospital to Hospital Transition of Care Note /Discharge Note      Accepting Physician: Dr. Brunilda Bailey     Discharging Physician: Dr. Mtz     Accepting Representative: [place]     Accepting Facility: [facility]     RN call to report: [phone]     Transport: H2H- stretcher- 2pm        time: 2pm     Ambulance packet at bedside chart.         Family notified: CM met with the family member- spouse- Ravinder Geller at bedside to update on dc plan; he verbalized understanding        The attending physician and the primary nurse were notified of the plan.             Prior Level of Functioning: lives at home with spouse; independent, alert and oriented  Disposition: [facility] accepted  If SNF or IPR: Date FOC offered: on other unit  Date FOC received: on other unit  Accepting facility: [facility]  Date authorization started with reference number:  started on 1/9/24  Date authorization received and expires: 1/12/24  Follow up appointments: specialists/pcp  DME needed: none if going to IPR  Transportation at discharge: H2H stretcher- scheduled for 2pm on

## 2024-01-12 NOTE — DISCHARGE INSTRUCTIONS
You were admitted, diagnosed, and treated for the following medical issues with discharge plan as follows:  Subarachnoid hemorrhage due to ruptured fusform RMCA aneurysm   - Cont DAPT with ASA 81 mg daily and Brilinta 90 mg BID   - systolic blood pressure goal 120-160   - Cont PT/OT/SLP  - . Pt to get repeat CTA head/neck in 6 weeks   and then follow up in neurointerventional clinic with Dr Hamilton     Activity as tolerated  Soft- easy to chew diet  Metoprolol started for mild tachycardia and mildly elevated blood pressure  - this dose many need to be titrated for HR < 100 and systolic BP < 140  We are recommending that you hold your home med HCTZ on discharge for now  - this may need to be restarted at a later date    Follow up with primary care for elevated calcium levels with normal parathyroid hormone levels  No calcium supplements

## 2024-01-12 NOTE — PROGRESS NOTES
Neurocritical Care Brief Progress Note:  Soledad Geller is 58 y.o female with pmh of Asthma, FMD, HTN, HLD, chronic brachiocephalic artery dissection who arrived from [facility] ED intubated to [facility] ICU early morning of 12/18/23 with SAH from a ruptured cerebral aneurysm. CTA showed a large fusiform aneurysm of the right supraclinoid ICA with eccentric broad-based component.    12/18/23~Pt is s/p cerebral/cervical arteriogram and Flow diversion stent embolization of right supraclinoid ICA aneurysm by Dr. Hamilton.    12/26/2023~Pt was noted with mild to moderate vasospasm of the RMCA and B/L ACAs s/p cerebral angiogram and IA nicardipine infusion by Dr. Hamilton.       12/31/2023~continues to have worsening left-sided weakness and stat CTA h/n showed worsening right sided intraventricular and subarachnoid hemorrhage. Generalized cerebral edema. The pt was taken to IR for flow diversion stent embolization of HERNÁN aneurysm, IA Verapamil infusion for intracranial vasospasm    01/05/2024~CTA h/n showed postoperative changes of right ICA/MCA stenting with persistent enhancement within the excluded aneurysm sac, although the sac is decreased in size compared to prior examination. Stenosis at the distal margin of the stent and focal stenosis of the right V4 segment, right P1 segment, and left P1/P2 junction. Stable bilateral intraventricular hemorrhage. No hydrocephalus. Redemonstrated right subclavian artery dissection.    01/06/2024~Pt was taken to IR for diagnostic cerebral angiogram and IA verapamil d/t moderate right MCA vasospasm by Dr. Hamm.    01/08/2024~pt downgraded to nstu.     01/10/24~pt on NS at 100ml/hr. Current i/o is -2.8L (all outpt), no intake recorded since morning till now. Discussed with primary nurse to record pt intake.     1/11/24 - No acute events today  Physical Exam:   Blood pressure (!) 134/90, pulse (!) 112, temperature 98 °F (36.7 °C), temperature source Oral, resp. rate 17, height 1.727 m (5' 7.99\"),

## 2024-01-12 NOTE — PROGRESS NOTES
Hospitalist Progress Note  Jimbo Mtz MD  Answering service: [phone]        Date of Service:  2024  NAME:  Soledad Geller  :  1965  MRN:  [id number]      Admission Summary:   Soledad Geller is a 58 y.o. AA female with a PMH of asthma, HTN, and HDL who presented to [facility] ED on 23 with decreased LOC and vomiting. She had been reporting headache x several days prior to presentation, had been taking NSAIDs for pain but does not take any ASA/blood thinners. A stat CT head was obtained which showed a large SAH with IPH. CTA head/neck was then obtained which showed a large fusiform aneurysm of the M1 segment on the right measuring 8.8 x 16 x 9.9 mm. NSGY and NIS were consulted and pt was then transferred to [facility] for higher level of care.   PT was   intubated  in [facility] ER and transferred to [facility] ICU early morning of 23 .   Pt is s/p cerebral/cervical arteriogram and Flow diversion stent embolization of right supraclinoid ICA aneurysm on 23 by Dr. Hamilton.   she was then closely monitored in ICU   2023: Pt with episode of staring during PT lasting approx 30 seconds. No evidence of tonic/clonic activity, twitching, incontinence, or nystagmus.  Pt with mild to moderate vasospasm of the RMCA and B/L ACAs s/p cerebral angiogram and IA nicardipine infusion on 2023 by Dr. Hamilton.   She was ramining drawly but arousae to follow commands and tolerate diet and ICU/ NIS ok to downgrade pt from ICU  .daily TCD monitor   -developed left sided weakness:   Pt taken for cerebral angiogram revealing enlargement of previously embolized right ICA aneurysm thus additional flow-diverting stent placed by Dr. Hamilton on 23.    Monitor ICU :23 and 2023 DSA and IA verapamil for vasospasm treatment.         Interval history / Subjective:   Mental status clear,  Pt seen and examined,  1/4/2024  Mild to moderate vasospasm involving bilateral anterior and middle cerebral artery vasculature noted. Selective intra-arterial nicardipine infusion performed and bilateral internal carotid artery vasculature. Post nicardipine infusion internal carotid arteriograms demonstrated improvement in vasospasm in distal perfusion. Persistent opacification of the flow diversion stent embolized right supraclinoid ICA fusiform aneurysm noted.    CT HEAD WO CONTRAST    Result Date: 1/3/2024  Unchanged subarachnoid and intraventricular hemorrhages.     CT HEAD WO CONTRAST    Result Date: 1/1/2024  Status post right ICA stent with contrast material adjacent to the stent and slight interval increase in intraventricular and subarachnoid hemorrhage as above.    CTA HEAD NECK W CONTRAST    Addendum Date: 12/31/2023    Addendum: Stenosis of right MCA distal M1 as well as M2 segments, likely vasospasm.    Result Date: 12/31/2023  1.  Status post stenting of right MCA aneurysm. Persistent flow in the aneurysm, measuring up to 13 x 11 x 14 mm. 2.  Worsening right sided intraventricular and subarachnoid hemorrhage. 3.  Generalized cerebral edema. 4.  Mild interval increase in size of ventricles. Findings were communicated with ICU at 12:11 PM.      CT HEAD WO CONTRAST    Result Date: 12/30/2023  Small volume right sylvian fissure subarachnoid hemorrhage and right posterior horn lateral ventricle intraventricular hemorrhage, decreased from prior imaging.     IR ARCH UNI CAR CERV CEREBRAL    Result Date: 12/30/2023  Successful transcatheter embolization of an eccentric, fusiform right supraclinoid ICA aneurysm performed using flow diversion stenting, as detailed above. No immediate complications.    CT HEAD WO CONTRAST    Result Date: 12/20/2023  Subarachnoid and intraventricular hemorrhage again shown with surgical artifacts involving right MCA. Generalized cerebral edema is again demonstrated with greater symmetric narrowing of

## 2024-02-06 ENCOUNTER — OFFICE VISIT (OUTPATIENT)
Age: 59
End: 2024-02-06
Payer: OTHER GOVERNMENT

## 2024-02-06 VITALS
SYSTOLIC BLOOD PRESSURE: 112 MMHG | TEMPERATURE: 98.1 F | BODY MASS INDEX: 23.58 KG/M2 | HEART RATE: 86 BPM | OXYGEN SATURATION: 98 % | RESPIRATION RATE: 16 BRPM | DIASTOLIC BLOOD PRESSURE: 78 MMHG | HEIGHT: 68 IN | WEIGHT: 155.6 LBS

## 2024-02-06 DIAGNOSIS — I10 ESSENTIAL (PRIMARY) HYPERTENSION: ICD-10-CM

## 2024-02-06 DIAGNOSIS — R53.1 LEFT-SIDED WEAKNESS: ICD-10-CM

## 2024-02-06 DIAGNOSIS — I67.1 INTRACRANIAL ANEURYSM: ICD-10-CM

## 2024-02-06 DIAGNOSIS — I60.9 SAH (SUBARACHNOID HEMORRHAGE) (HCC): ICD-10-CM

## 2024-02-06 DIAGNOSIS — Z09 HOSPITAL DISCHARGE FOLLOW-UP: Primary | ICD-10-CM

## 2024-02-06 DIAGNOSIS — E83.52 HYPERCALCEMIA: ICD-10-CM

## 2024-02-06 DIAGNOSIS — N39.0 URINARY TRACT INFECTION WITHOUT HEMATURIA, SITE UNSPECIFIED: ICD-10-CM

## 2024-02-06 PROCEDURE — 3078F DIAST BP <80 MM HG: CPT | Performed by: NURSE PRACTITIONER

## 2024-02-06 PROCEDURE — 1111F DSCHRG MED/CURRENT MED MERGE: CPT | Performed by: NURSE PRACTITIONER

## 2024-02-06 PROCEDURE — 99215 OFFICE O/P EST HI 40 MIN: CPT | Performed by: NURSE PRACTITIONER

## 2024-02-06 PROCEDURE — 3074F SYST BP LT 130 MM HG: CPT | Performed by: NURSE PRACTITIONER

## 2024-02-06 RX ORDER — PHENOL 1.4 %
1 AEROSOL, SPRAY (ML) MUCOUS MEMBRANE
COMMUNITY

## 2024-02-06 ASSESSMENT — PATIENT HEALTH QUESTIONNAIRE - PHQ9
SUM OF ALL RESPONSES TO PHQ9 QUESTIONS 1 & 2: 0
2. FEELING DOWN, DEPRESSED OR HOPELESS: 0
SUM OF ALL RESPONSES TO PHQ QUESTIONS 1-9: 0
1. LITTLE INTEREST OR PLEASURE IN DOING THINGS: 0
SUM OF ALL RESPONSES TO PHQ QUESTIONS 1-9: 0

## 2024-02-06 NOTE — PROGRESS NOTES
Chief Complaint   Patient presents with    Follow-Up from Hospital       1. \"Have you been to the ER, urgent care clinic since your last visit?  Hospitalized since your last visit?\" Yes  [facility] for Aneurysm.     2. \"Have you seen or consulted any other health care providers outside of the [facility] since your last visit?\" No     3. For patients aged 45-75: Has the patient had a colonoscopy / FIT/ Cologuard? Yes      If the patient is female:    4. For patients aged 40-74: Has the patient had a mammogram within the past 2 years? Yes      5. For patients aged 21-65: Has the patient had a pap smear? Yes      The patient, Soledad ROSINA Allisons, identity was verified by name and .      Health Maintenance Due   Topic Date Due    DTaP/Tdap/Td vaccine (1 - Tdap) Never done    Shingles vaccine (2 of 2) 2023

## 2024-02-06 NOTE — PROGRESS NOTES
Post-Discharge Transitional Care  Follow Up      Soledad Geller   YOB: 1965    Date of Office Visit:  2/6/2024  Date of Hospital Admission: 12/18/23  Date of Hospital Discharge: 1/12/24  Risk of hospital readmission (high >=14%. Medium >=10%) :Readmission Risk Score: 12.5      Care management risk score Rising risk (score 2-5) and Complex Care (Scores >=6): No Risk Score On File     Non face to face  following discharge, date last encounter closed (first attempt may have been earlier): *No documented post hospital discharge outreach found in the last 14 days    Call initiated 2 business days of discharge: *No response recorded in the last 14 days    ASSESSMENT/PLAN:   Hospital discharge follow-up  -     [place] DISCHARGE MEDS RECONCILED W/ CURRENT OUTPATIENT MED LIST  Intracranial aneurysm - has appt with neurointerventional radiologist on 2/21/24.  Patient has order for CTA head/neck in chart that is active.  Patient's  to schedule for 2 days prior to appt with neurointerventional.  To discuss with them how long to take Brilinta  SAH (subarachnoid hemorrhage) (HCC)  Left-sided weakness - continue with OT and PT  Essential (primary) hypertension - stable.  Hypercalcemia  -     CBC; Future  -     Comprehensive Metabolic Panel; Future  -     TSH; Future  -     Vitamin D 25 Hydroxy; Future  -     PTH, Intact; Future  Urinary tract infection without hematuria, site unspecified  -     Urinalysis with Microscopic; Future  -     Culture, Urine; Future    Return in about 3 months (around 5/6/2024).    On this date 2/6/2024 I have spent 45 minutes reviewing previous notes, test results and face to face with the patient discussing the diagnosis and importance of compliance with the treatment plan as well as documenting on the day of the visit.       Subjective:   HPI:  Follow up of Hospital problems/diagnosis(es): cerebral hemorrhage due to SAH due to ruptured fusiform right MCA aneurysm    Inpatient

## 2024-02-07 LAB
25(OH)D3 SERPL-MCNC: <9 NG/ML (ref 30–100)
ALBUMIN SERPL-MCNC: 3.8 G/DL (ref 3.5–5)
ALBUMIN/GLOB SERPL: 1.1 (ref 1.1–2.2)
ALP SERPL-CCNC: 84 U/L (ref 45–117)
ALT SERPL-CCNC: 18 U/L (ref 12–78)
ANION GAP SERPL CALC-SCNC: 5 MMOL/L (ref 5–15)
APPEARANCE UR: ABNORMAL
AST SERPL-CCNC: 10 U/L (ref 15–37)
BACTERIA URNS QL MICRO: NEGATIVE /HPF
BILIRUB SERPL-MCNC: 0.3 MG/DL (ref 0.2–1)
BILIRUB UR QL: NEGATIVE
BUN SERPL-MCNC: 11 MG/DL (ref 6–20)
BUN/CREAT SERPL: 14 (ref 12–20)
CALCIUM SERPL-MCNC: 10.8 MG/DL (ref 8.5–10.1)
CALCIUM SERPL-MCNC: 10.9 MG/DL (ref 8.5–10.1)
CHLORIDE SERPL-SCNC: 109 MMOL/L (ref 97–108)
CO2 SERPL-SCNC: 27 MMOL/L (ref 21–32)
COLOR UR: ABNORMAL
COMMENT:: NORMAL
CREAT SERPL-MCNC: 0.8 MG/DL (ref 0.55–1.02)
EPITH CASTS URNS QL MICRO: ABNORMAL /LPF
ERYTHROCYTE [DISTWIDTH] IN BLOOD BY AUTOMATED COUNT: 13.3 % (ref 11.5–14.5)
GLOBULIN SER CALC-MCNC: 3.5 G/DL (ref 2–4)
GLUCOSE SERPL-MCNC: 96 MG/DL (ref 65–100)
GLUCOSE UR STRIP.AUTO-MCNC: NEGATIVE MG/DL
HCT VFR BLD AUTO: 40.3 % (ref 35–47)
HGB BLD-MCNC: 12.5 G/DL (ref 11.5–16)
HGB UR QL STRIP: NEGATIVE
KETONES UR QL STRIP.AUTO: NEGATIVE MG/DL
LEUKOCYTE ESTERASE UR QL STRIP.AUTO: NEGATIVE
MCH RBC QN AUTO: 28.5 PG (ref 26–34)
MCHC RBC AUTO-ENTMCNC: 31 G/DL (ref 30–36.5)
MCV RBC AUTO: 91.8 FL (ref 80–99)
NITRITE UR QL STRIP.AUTO: NEGATIVE
NRBC # BLD: 0 K/UL (ref 0–0.01)
NRBC BLD-RTO: 0 PER 100 WBC
PH UR STRIP: 6 (ref 5–8)
PLATELET # BLD AUTO: 369 K/UL (ref 150–400)
PMV BLD AUTO: 10.8 FL (ref 8.9–12.9)
POTASSIUM SERPL-SCNC: 4.4 MMOL/L (ref 3.5–5.1)
PROT SERPL-MCNC: 7.3 G/DL (ref 6.4–8.2)
PROT UR STRIP-MCNC: NEGATIVE MG/DL
PTH-INTACT SERPL-MCNC: 77.5 PG/ML (ref 18.4–88)
RBC # BLD AUTO: 4.39 M/UL (ref 3.8–5.2)
RBC #/AREA URNS HPF: ABNORMAL /HPF (ref 0–5)
SODIUM SERPL-SCNC: 141 MMOL/L (ref 136–145)
SP GR UR REFRACTOMETRY: 1.02 (ref 1–1.03)
SPECIMEN HOLD: NORMAL
TSH SERPL DL<=0.05 MIU/L-ACNC: 0.66 UIU/ML (ref 0.36–3.74)
UROBILINOGEN UR QL STRIP.AUTO: 0.2 EU/DL (ref 0.2–1)
WBC # BLD AUTO: 3.8 K/UL (ref 3.6–11)
WBC URNS QL MICRO: ABNORMAL /HPF (ref 0–4)

## 2024-02-09 LAB
BACTERIA SPEC CULT: ABNORMAL
BACTERIA SPEC CULT: ABNORMAL
CC UR VC: ABNORMAL
SERVICE CMNT-IMP: ABNORMAL

## 2024-02-14 ENCOUNTER — HOSPITAL ENCOUNTER (OUTPATIENT)
Age: 59
Discharge: HOME | End: 2024-02-17
Payer: OTHER GOVERNMENT

## 2024-02-14 DIAGNOSIS — I60.9 SUBARACHNOID HEMORRHAGE (HCC): ICD-10-CM

## 2024-02-14 PROCEDURE — 6360000004 HC RX CONTRAST MEDICATION: Performed by: RADIOLOGY

## 2024-02-14 PROCEDURE — 70496 CT ANGIOGRAPHY HEAD: CPT

## 2024-02-14 RX ADMIN — IOPAMIDOL 100 ML: 755 INJECTION, SOLUTION INTRAVENOUS at 11:38

## 2024-02-21 ENCOUNTER — CLINICAL DOCUMENTATION (OUTPATIENT)
Age: 59
End: 2024-02-21

## 2024-02-21 ENCOUNTER — OFFICE VISIT (OUTPATIENT)
Age: 59
End: 2024-02-21
Payer: COMMERCIAL

## 2024-02-21 DIAGNOSIS — I60.8 ANEURYSMAL SUBARACHNOID HEMORRHAGE (HCC): ICD-10-CM

## 2024-02-21 DIAGNOSIS — I60.9 SAH (SUBARACHNOID HEMORRHAGE) (HCC): Primary | ICD-10-CM

## 2024-02-21 PROCEDURE — 99213 OFFICE O/P EST LOW 20 MIN: CPT | Performed by: RADIOLOGY

## 2024-02-21 PROCEDURE — 3074F SYST BP LT 130 MM HG: CPT | Performed by: RADIOLOGY

## 2024-02-21 PROCEDURE — 3079F DIAST BP 80-89 MM HG: CPT | Performed by: RADIOLOGY

## 2024-02-22 ENCOUNTER — PATIENT MESSAGE (OUTPATIENT)
Age: 59
End: 2024-02-22

## 2024-02-23 VITALS
TEMPERATURE: 97.8 F | DIASTOLIC BLOOD PRESSURE: 80 MMHG | HEART RATE: 62 BPM | SYSTOLIC BLOOD PRESSURE: 122 MMHG | OXYGEN SATURATION: 100 % | HEIGHT: 68 IN | BODY MASS INDEX: 22.88 KG/M2 | WEIGHT: 151 LBS

## 2024-02-23 RX ORDER — LATANOPROST 50 UG/ML
1 SOLUTION/ DROPS OPHTHALMIC NIGHTLY
COMMUNITY

## 2024-02-23 NOTE — PROGRESS NOTES
[facility] procedure follow up for Cerebral aneurysm and s/p Stroke.  Spouse at visit.  Patient reports occasional headaches and continued left side weakness.  Continues with out patient therapy.  No acute problems voiced.

## 2024-02-26 ENCOUNTER — TELEPHONE (OUTPATIENT)
Age: 59
End: 2024-02-26

## 2024-02-26 NOTE — TELEPHONE ENCOUNTER
Patient spouse stated he has filled both prescriptions and will call and schedule with Endocrinologist today.     ----- Message from BETZY Khanna NP sent at 2/19/2024  6:25 PM EST -----  I was out of the office last week  Urine culture came back showing a urinary tract infection.  Would like for you to take Bactrim for 5 days    Blood counts normal.  Thyroid screening negative.  Liver and kidney function normal.    Vitamin D is really low, which could cause you calcium levels to be a little higher.  Vitamin D facilitates the intestinal absorption of calcium. With lower levels of vitamin D, dietary calcium is not absorbed efficiently, making your high calcium problem worse.  I would like for you to start taking Vitamin D supplement 50,000 units once weekly for 12 weeks and we can recheck levels.  I sent a prescription to your pharmacy  Would also like for you to schedule with an endocrinologist as well for the elevated calcium levels.

## 2024-02-27 ENCOUNTER — TELEPHONE (OUTPATIENT)
Age: 59
End: 2024-02-27

## 2024-02-27 ENCOUNTER — CLINICAL DOCUMENTATION (OUTPATIENT)
Age: 59
End: 2024-02-27

## 2024-02-27 NOTE — TELEPHONE ENCOUNTER
Orders Placed This Encounter    ticagrelor (BRILINTA) 90 MG TABS tablet     Sig: Take 1 tablet by mouth in the morning and at bedtime     Dispense:  60 tablet     Refill:  2

## 2024-02-29 ENCOUNTER — TELEPHONE (OUTPATIENT)
Age: 59
End: 2024-02-29

## 2024-02-29 ENCOUNTER — CLINICAL DOCUMENTATION (OUTPATIENT)
Age: 59
End: 2024-02-29

## 2024-02-29 NOTE — TELEPHONE ENCOUNTER
Called patient to make follow-up for her imaging that is now scheduled for 3/22/24, spoke to her  (Ravinder).  He was busy and asked if we could call back in 30 minutes.    I told him that I would do my best, but cannot guarantee my ability to call back in 30 minutes and suggested he call when it is a more convenient time.

## 2024-02-29 NOTE — PROGRESS NOTES
Discharged from home health services order faxed to [facility] [phone].

## 2024-03-01 NOTE — TELEPHONE ENCOUNTER
From: Soledad Geller  To: Serena Correia  Sent: 2/22/2024 3:18 PM EST  Subject: Soledad Geller    Good Evening this is Soledad's . I picked her prescription up today. My question is should I do a follow up after she's finished with the UTI meds? And I'll keep a check on her MyChart more regular Thanks

## 2024-03-14 ENCOUNTER — CLINICAL DOCUMENTATION (OUTPATIENT)
Age: 59
End: 2024-03-14

## 2024-03-14 NOTE — PROGRESS NOTES
Forms for [facility], scanned on 3/14/24, faxed to number on form  
soft/nondistended

## 2024-03-21 NOTE — PROGRESS NOTES
Neurointerventional Surgery Clinic Note        Patient: Soledad Geller MRN: [id number]  SSN: [id number]    YOB: 1965  Age: 59 y.o.  Sex: female      Subjective:      Soledad Geller is a 59 y.o. female who is being seen for follow-up after undergoing flow diversion stent embolization of a ectatic, fusiform right supraclinoid ICA aneurysm which had ruptured in December 2023.  She presents to the clinic with a follow-up CTA of the head.    Past Medical History:   Diagnosis Date    Asthma     Brachiocephalic artery injury     chronic dissection    Glaucoma, low tension     Headache     Hyperlipidemia     Hypertension     Ill-defined condition     high cholesterol    Memory disorder     Stroke (HCC)     Visual disturbance      Past Surgical History:   Procedure Laterality Date    COLONOSCOPY N/A 5/1/2017    COLONOSCOPY performed by Sagar Woodard MD at [facility]    TONSILLECTOMY        Family History   Problem Relation Age of Onset    Heart Disease Maternal Grandmother     Heart Attack Maternal Grandmother     Hypertension Maternal Grandmother     Diabetes Maternal Grandmother     Glaucoma Father     Hypertension Mother     No Known Problems Sister     No Known Problems Brother      Social History     Tobacco Use    Smoking status: Never     Passive exposure: Never    Smokeless tobacco: Never   Substance Use Topics    Alcohol use: Yes     Comment: occ      Current Outpatient Medications   Medication Sig Dispense Refill    latanoprost (XALATAN) 0.005 % ophthalmic solution Place 1 drop into the right eye nightly      vitamin D (ERGOCALCIFEROL) 1.25 MG (01019 UT) CAPS capsule Take 1 capsule by mouth once a week 12 capsule 0    Melatonin 10 MG TABS Take 1 tablet by mouth nightly      sennosides-docusate sodium (SENOKOT-S) 8.6-50 MG tablet Take 1 tablet by mouth 2 times daily as needed for Constipation 30 tablet 0    aspirin 81 MG chewable tablet Take 1 tablet by mouth daily 30 tablet 3    albuterol

## 2024-03-22 ENCOUNTER — HOSPITAL ENCOUNTER (OUTPATIENT)
Facility: HOSPITAL | Age: 59
Discharge: HOME | End: 2024-03-22
Attending: RADIOLOGY
Payer: OTHER GOVERNMENT

## 2024-03-22 DIAGNOSIS — I60.8 ANEURYSMAL SUBARACHNOID HEMORRHAGE (HCC): ICD-10-CM

## 2024-03-22 DIAGNOSIS — I60.9 SAH (SUBARACHNOID HEMORRHAGE) (HCC): ICD-10-CM

## 2024-03-22 PROCEDURE — 6360000004 HC RX CONTRAST MEDICATION: Performed by: RADIOLOGY

## 2024-03-22 PROCEDURE — 70496 CT ANGIOGRAPHY HEAD: CPT

## 2024-03-22 RX ADMIN — IOPAMIDOL 100 ML: 755 INJECTION, SOLUTION INTRAVENOUS at 11:42

## 2024-03-28 ENCOUNTER — TELEPHONE (OUTPATIENT)
Age: 59
End: 2024-03-28

## 2024-03-28 NOTE — TELEPHONE ENCOUNTER
Mackenzie Adry called asking for a number where she could fax her mother's short term disability paperwork.  I let her know that we do not do these forms and she should reach out to her mother's primary care.

## 2024-04-03 ENCOUNTER — OFFICE VISIT (OUTPATIENT)
Age: 59
End: 2024-04-03
Payer: COMMERCIAL

## 2024-04-03 VITALS
DIASTOLIC BLOOD PRESSURE: 70 MMHG | HEIGHT: 68 IN | TEMPERATURE: 97.8 F | WEIGHT: 154.4 LBS | BODY MASS INDEX: 23.4 KG/M2 | HEART RATE: 72 BPM | RESPIRATION RATE: 16 BRPM | SYSTOLIC BLOOD PRESSURE: 110 MMHG

## 2024-04-03 DIAGNOSIS — I60.9 SAH (SUBARACHNOID HEMORRHAGE) (HCC): Primary | ICD-10-CM

## 2024-04-03 PROCEDURE — 99213 OFFICE O/P EST LOW 20 MIN: CPT | Performed by: RADIOLOGY

## 2024-04-03 PROCEDURE — 3078F DIAST BP <80 MM HG: CPT | Performed by: RADIOLOGY

## 2024-04-03 PROCEDURE — G8420 CALC BMI NORM PARAMETERS: HCPCS | Performed by: RADIOLOGY

## 2024-04-03 PROCEDURE — 3017F COLORECTAL CA SCREEN DOC REV: CPT | Performed by: RADIOLOGY

## 2024-04-03 PROCEDURE — G8427 DOCREV CUR MEDS BY ELIG CLIN: HCPCS | Performed by: RADIOLOGY

## 2024-04-03 PROCEDURE — 3074F SYST BP LT 130 MM HG: CPT | Performed by: RADIOLOGY

## 2024-04-03 PROCEDURE — 1036F TOBACCO NON-USER: CPT | Performed by: RADIOLOGY

## 2024-04-08 ENCOUNTER — CLINICAL DOCUMENTATION (OUTPATIENT)
Age: 59
End: 2024-04-08

## 2024-04-08 NOTE — PROGRESS NOTES
[employer] PAPERWORK FOR DAUGHTER FILLED OUT AND SIGNED BY DR. TAYLOR; HAS BEEN LEFT UP FRONT FOR  AND SCANNED INTO CHART.

## 2024-04-10 ENCOUNTER — TELEPHONE (OUTPATIENT)
Age: 59
End: 2024-04-10

## 2024-04-10 NOTE — TELEPHONE ENCOUNTER
Patient advised that it is safe for her to take Allegra for her allergies. Patient stated that in the past she was taking Allegra.

## 2024-04-10 NOTE — TELEPHONE ENCOUNTER
Patient would like arian get a phone call from nurse regarding would it be safe for her to take allegra for her allergies please give her a call @ [phone]

## 2024-04-12 ENCOUNTER — TELEPHONE (OUTPATIENT)
Age: 59
End: 2024-04-12

## 2024-04-12 NOTE — TELEPHONE ENCOUNTER
Mackenzie Rider called about status of FMLA Form. Mackenzie informed that FMLA Form can be picked up at  .

## 2024-04-16 ENCOUNTER — TELEPHONE (OUTPATIENT)
Age: 59
End: 2024-04-16

## 2024-04-16 NOTE — TELEPHONE ENCOUNTER
[facility] [phone] requesting a call back or status of medical form  sent today and on 4/9/24. Fax number is on the form.  This is the last form needed for disability review.

## 2024-04-24 ENCOUNTER — CLINICAL DOCUMENTATION (OUTPATIENT)
Age: 59
End: 2024-04-24

## 2024-04-30 ENCOUNTER — TELEPHONE (OUTPATIENT)
Age: 59
End: 2024-04-30

## 2024-04-30 NOTE — TELEPHONE ENCOUNTER
Spoke to spouse and informed him per Dr Hamilton, Mrs Geller can stop taking Brilinta and Start taking Plavix 75 mg daily.  Continue current dose of Aspirin.  Spouse stated he picked up refill this morning.  Informed him we will change to Plavix next month.  Spouse stated understanding.

## 2024-05-06 RX ORDER — CLOPIDOGREL BISULFATE 75 MG/1
75 TABLET ORAL DAILY
Qty: 30 TABLET | Refills: 3 | Status: SHIPPED | OUTPATIENT
Start: 2024-05-28

## 2024-05-11 DIAGNOSIS — E83.52 HYPERCALCEMIA: ICD-10-CM

## 2024-05-11 DIAGNOSIS — E55.9 VITAMIN D DEFICIENCY: ICD-10-CM

## 2024-05-13 RX ORDER — ERGOCALCIFEROL 1.25 MG/1
50000 CAPSULE ORAL WEEKLY
Qty: 12 CAPSULE | Refills: 0 | Status: SHIPPED | OUTPATIENT
Start: 2024-05-13

## 2024-05-13 NOTE — TELEPHONE ENCOUNTER
Last appointment: 2/6/24  Next appointment: 10/22/24  Previous refill encounter(s): 2/19/24 #12    Requested Prescriptions     Pending Prescriptions Disp Refills    vitamin D (ERGOCALCIFEROL) 1.25 MG (38140 UT) CAPS capsule [Pharmacy Med Name: VITAMIN D2 50,000IU (ERGO) CAP RX] 12 capsule 0     Sig: TAKE 1 CAPSULE BY MOUTH 1 TIME A WEEK         For Pharmacy Admin Tracking Only    Program: Medication Refill  CPA in place:    Recommendation Provided To:   Intervention Detail: New Rx: 1, reason: Patient Preference  Intervention Accepted By:   Gap Closed?:    Time Spent (min): 5

## 2024-05-29 RX ORDER — TICAGRELOR 90 MG/1
90 TABLET ORAL 2 TIMES DAILY
Qty: 60 TABLET | Refills: 2 | Status: SHIPPED | OUTPATIENT
Start: 2024-05-29

## 2024-05-29 NOTE — TELEPHONE ENCOUNTER
Initial Session Note  Initial Session:   Session Type: Individual   Date: 18   Start Time: 1pm 50min   Mood: Depressed   Behavior: Withdrawn at times.   Diagnoses: F34.1, F60.5, F43.22   Mental Status: No Problem Indicated   Risk of Harm: Low   Suicide/Homicide: Passive thoughts   Self-Injury: None   Abuse (Phys,Sex,Emot): None   Type of Treatment: Clinical Interview and 3 weeks.   Initial Session Summary: 155/1. This is a returning client, last seen by this therapist in 2011. She is returning, to therapy following her move back to [place] in 2017. She states she has struggled with her depression, \"I am not feeling much liane, and is recognizing she is more prone to withdrawing and spending more time alone. She shared she tried therapy a few time when she lived in [place], [place] but could not find someone that was a good fit. Approximately 18 months after she moved out there, her sister and much of her family moved to [place], so moving to be closer to family and work on a closer relationship with her sister did not happen. In fact, they had had a 3 year falling out. Her brother in law has been  for 2 years. Client moved back due to being closer to family. Her son and his wife are here in town. Her other son is in [place]. She purchased a condo that is in the same building as a good friend of hers. She is averaging about 6 hrs a sleep a night, but she says it is not good quality, as she wakes up a few times during the night, but can go back to sleep. Her appetite is one main meal usually early to mid afternoon and then 2 smaller meals. She has gained 10 lbs in the last year. She is beginning to get into local theatre, and still enjoys reading She denies any real sucidial ideation, but says if something did happen to her, she wouldn't mind. There is some concern about the ongoing aging process, including a hip replacement in 2018. Denies living with physical painShe is not currently  Last appointment: 2/6/24  Next appointment: 10/22/24  Previous refill encounter(s): 2/27/24 #60 with 2 refills    Requested Prescriptions     Pending Prescriptions Disp Refills    BRILINTA 90 MG TABS tablet [Pharmacy Med Name: BRILINTA 90MG TABLETS] 60 tablet 2     Sig: TAKE 1 TABLET BY MOUTH IN THE MORNING AND AT BEDTIME         For Pharmacy Admin Tracking Only    Program: Medication Refill  CPA in place:    Recommendation Provided To:   Intervention Detail: New Rx: 1, reason: Patient Preference  Intervention Accepted By:   Gap Closed?:    Time Spent (min): 5   taking any medication, following several attempts of medication while in [place] and nothing seemed to work. Sparse consumption of alcohol, but still smokes 4-5 cigarettes a day. Client is returning to therapy on her own, but was encouraged by her two close friends, Magalis and Flor. Will complete abbreviated IPE, as some historical data has not changed.      Signatures   Electronically signed by : WILFREDO QUINONES PsyD; Jul 5 2018 10:20AM CST (Author)

## 2024-06-12 ENCOUNTER — OFFICE VISIT (OUTPATIENT)
Age: 59
End: 2024-06-12
Payer: COMMERCIAL

## 2024-06-12 VITALS
OXYGEN SATURATION: 100 % | HEIGHT: 68 IN | DIASTOLIC BLOOD PRESSURE: 71 MMHG | WEIGHT: 166.4 LBS | BODY MASS INDEX: 25.22 KG/M2 | SYSTOLIC BLOOD PRESSURE: 97 MMHG

## 2024-06-12 DIAGNOSIS — E83.52 HYPERCALCEMIA: Primary | ICD-10-CM

## 2024-06-12 DIAGNOSIS — E21.3 HYPERPARATHYROIDISM (HCC): ICD-10-CM

## 2024-06-12 PROCEDURE — G8419 CALC BMI OUT NRM PARAM NOF/U: HCPCS | Performed by: GENERAL ACUTE CARE HOSPITAL

## 2024-06-12 PROCEDURE — 1036F TOBACCO NON-USER: CPT | Performed by: GENERAL ACUTE CARE HOSPITAL

## 2024-06-12 PROCEDURE — 3078F DIAST BP <80 MM HG: CPT | Performed by: GENERAL ACUTE CARE HOSPITAL

## 2024-06-12 PROCEDURE — 3074F SYST BP LT 130 MM HG: CPT | Performed by: GENERAL ACUTE CARE HOSPITAL

## 2024-06-12 PROCEDURE — 3017F COLORECTAL CA SCREEN DOC REV: CPT | Performed by: GENERAL ACUTE CARE HOSPITAL

## 2024-06-12 PROCEDURE — 99204 OFFICE O/P NEW MOD 45 MIN: CPT | Performed by: GENERAL ACUTE CARE HOSPITAL

## 2024-06-12 PROCEDURE — G8427 DOCREV CUR MEDS BY ELIG CLIN: HCPCS | Performed by: GENERAL ACUTE CARE HOSPITAL

## 2024-06-12 NOTE — PROGRESS NOTES
Identified pt with two pt identifiers(name and ). Reviewed record in preparation for visit and have obtained necessary documentation. All patient medications has been reviewed.  Chief Complaint   Patient presents with    New Patient         Health Maintenance Due   Topic    DTaP/Tdap/Td vaccine (1 - Tdap)    Shingles vaccine (2 of 2)     Health Maintenance Review: Patient reminded of \"due or due soon\" health maintenance. I have asked the patient to contact his/her primary care provider (PCP) for follow-up on his/her health maintenance.        Wt Readings from Last 3 Encounters:   24 75.5 kg (166 lb 6.4 oz)   24 70 kg (154 lb 6.4 oz)   24 68.5 kg (151 lb)     Temp Readings from Last 3 Encounters:   24 97.8 °F (36.6 °C) (Temporal)   24 97.8 °F (36.6 °C) (Temporal)   24 98.1 °F (36.7 °C) (Oral)     BP Readings from Last 3 Encounters:   24 97/71   24 110/70   24 122/80     Pulse Readings from Last 3 Encounters:   24 72   24 62   24 86       \"Have you been to the ER, urgent care clinic since your last visit?  Hospitalized since your last visit?\"    NO    “Have you seen or consulted any other health care providers outside of [facility] since your last visit?”    NO              
is normal, no edema  Neurological: reflexes 2+ at biceps, no tremors  Psychiatric: normal mood and affect    RTC = 3 months    Please note that this dictation was completed with [employer], the computer voice recognition software.  Quite often unanticipated grammatical, syntax, homophones, and other interpretive errors are inadvertently transcribed by the computer software.  Efforts were made to correct these errors in proofreading. Please excuse any errors that have escaped final proofreading.  Thank you.     Faiza Tamayo MD  [facility]     Patient Instructions   24 HOUR URINE COLLECTION  As part of your evaluation, we would like for you to collect a 24 hour sample of urine. This means that you will collect all of your urine into a collection jug for 24 hours.     - The first step is to visit the laboratory to receive a collection jug.   - Then select a day to start the urine collection. Keep in mind when selecting a day that you will need to return to the laboratory the following day to submit your urine sample.   - On the morning of the day you start collecting urine, the very first urine void should go into the toilet however everything else collected afterward should be collected into the jug, including any urine overnight, AND the first urine the following morning.   - After this you can stop collecting and bring the sample to the laboratory.     We will discuss the results with you along with any other labs you have completed. If you have other blood work to perform, you can complete that when you submit the urine sample to the lab. Just in case your lab tests may require it, return to the laboratory in the morning fasting.

## 2024-06-17 ENCOUNTER — HOSPITAL ENCOUNTER (OUTPATIENT)
Facility: HOSPITAL | Age: 59
Discharge: HOME | End: 2024-06-20
Attending: RADIOLOGY
Payer: COMMERCIAL

## 2024-06-17 DIAGNOSIS — I60.9 SAH (SUBARACHNOID HEMORRHAGE) (HCC): ICD-10-CM

## 2024-06-17 PROCEDURE — 70546 MR ANGIOGRAPH HEAD W/O&W/DYE: CPT

## 2024-06-17 PROCEDURE — 70544 MR ANGIOGRAPHY HEAD W/O DYE: CPT

## 2024-06-22 DIAGNOSIS — I60.8 ANEURYSMAL SUBARACHNOID HEMORRHAGE (HCC): Primary | ICD-10-CM

## 2024-06-22 DIAGNOSIS — E55.9 VITAMIN D DEFICIENCY: ICD-10-CM

## 2024-06-22 DIAGNOSIS — E83.52 HYPERCALCEMIA: ICD-10-CM

## 2024-06-24 RX ORDER — ERGOCALCIFEROL 1.25 MG/1
50000 CAPSULE ORAL WEEKLY
Qty: 4 CAPSULE | Refills: 0 | Status: SHIPPED | OUTPATIENT
Start: 2024-06-24

## 2024-06-24 RX ORDER — CLOPIDOGREL BISULFATE 75 MG/1
75 TABLET ORAL DAILY
Qty: 90 TABLET | Refills: 1 | Status: SHIPPED | OUTPATIENT
Start: 2024-06-24

## 2024-06-24 NOTE — TELEPHONE ENCOUNTER
[facility] is requesting a 90 day supply  Previous Rx was sent to [facility]    Last appointment: 2/6/24  Next appointment: 10/22/24    Requested Prescriptions     Pending Prescriptions Disp Refills    vitamin D (ERGOCALCIFEROL) 1.25 MG (88581 UT) CAPS capsule [Pharmacy Med Name: VIT D-2 CAP(ERGOCAL)1.25MG 50,000U] 12 capsule 0     Sig: Take 1 capsule by mouth once a week         For Pharmacy Admin Tracking Only    Program: Medication Refill  CPA in place:    Recommendation Provided To:   Intervention Detail: New Rx: 1, reason: Patient Preference  Intervention Accepted By:   Gap Closed?:    Time Spent (min): 5

## 2024-06-25 LAB
CALCIUM 24H UR-MCNC: 19.6 MG/DL
CALCIUM 24H UR-MRATE: 137 MG/24 HR (ref 0–320)
CREAT 24H UR-MRATE: 839 MG/24 HR (ref 800–1800)
CREAT UR-MCNC: 119.8 MG/DL

## 2024-06-26 ENCOUNTER — TELEMEDICINE (OUTPATIENT)
Age: 59
End: 2024-06-26

## 2024-06-26 DIAGNOSIS — I60.9 SAH (SUBARACHNOID HEMORRHAGE) (HCC): Primary | ICD-10-CM

## 2024-06-26 NOTE — PATIENT INSTRUCTIONS
Follow up in 2 weeks after imaging is completed.  See attached paperwork to schedule imaging.  Follow up [facility] appointment on 7/10/2024 at 9:15 am.

## 2024-07-04 ENCOUNTER — PATIENT MESSAGE (OUTPATIENT)
Age: 59
End: 2024-07-04

## 2024-07-04 DIAGNOSIS — L30.9 DERMATITIS: Primary | ICD-10-CM

## 2024-07-04 LAB
25(OH)D3+25(OH)D2 SERPL-MCNC: 42.2 NG/ML (ref 30–100)
ALBUMIN SERPL-MCNC: 4.2 G/DL (ref 3.8–4.9)
ALP SERPL-CCNC: 77 IU/L (ref 44–121)
ALT SERPL-CCNC: 12 IU/L (ref 0–32)
AST SERPL-CCNC: 9 IU/L (ref 0–40)
BILIRUB SERPL-MCNC: 0.3 MG/DL (ref 0–1.2)
BUN SERPL-MCNC: 12 MG/DL (ref 6–24)
BUN/CREAT SERPL: 13 (ref 9–23)
CALCIUM SERPL-MCNC: 10 MG/DL (ref 8.7–10.2)
CHLORIDE SERPL-SCNC: 106 MMOL/L (ref 96–106)
CO2 SERPL-SCNC: 24 MMOL/L (ref 20–29)
CREAT SERPL-MCNC: 0.9 MG/DL (ref 0.57–1)
EGFRCR SERPLBLD CKD-EPI 2021: 74 ML/MIN/1.73
GLOBULIN SER CALC-MCNC: 2.6 G/DL (ref 1.5–4.5)
GLUCOSE SERPL-MCNC: 82 MG/DL (ref 70–99)
MAGNESIUM SERPL-MCNC: 2.1 MG/DL (ref 1.6–2.3)
PHOSPHATE SERPL-MCNC: 3.1 MG/DL (ref 3–4.3)
POTASSIUM SERPL-SCNC: 4.3 MMOL/L (ref 3.5–5.2)
PROT SERPL-MCNC: 6.8 G/DL (ref 6–8.5)
PTH-INTACT SERPL-MCNC: 28 PG/ML (ref 15–65)
SODIUM SERPL-SCNC: 142 MMOL/L (ref 134–144)
TSH SERPL DL<=0.005 MIU/L-ACNC: 1.16 UIU/ML (ref 0.45–4.5)

## 2024-07-08 ENCOUNTER — HOSPITAL ENCOUNTER (OUTPATIENT)
Facility: HOSPITAL | Age: 59
Discharge: HOME | End: 2024-07-11
Attending: RADIOLOGY

## 2024-07-08 DIAGNOSIS — I60.9 SAH (SUBARACHNOID HEMORRHAGE) (HCC): ICD-10-CM

## 2024-07-08 NOTE — TELEPHONE ENCOUNTER
Per my note in Oct 2023:       Dermatitis - will trial stronger topical steroid.  If no improvement, patient to schedule with dermatology     Referral placed for dermatology. Patient to call and schedule own appt    Dr. Nazia Linares (or someone in group)  [address] [address] [phone]

## 2024-07-08 NOTE — PROGRESS NOTES
CT unable to get an IV for CTA outpatient exam. Pt's appointment was in the evening and there were no resources available to start an IV, the ER was also unavailable. Pt was asked to reschedule after attempting IV multiple times during daytime hours.

## 2024-07-08 NOTE — TELEPHONE ENCOUNTER
From: Soledad Geller  To: Serena Correia  Sent: 7/4/2024 1:26 PM EDT  Subject: Soledad Geller    I would like to be referred to a Dermatologist for further evaluation and treatment. The cream you prescribed for my skin isn't working anymore I have a rash on my arms and around my neck. Thank you

## 2024-07-19 ENCOUNTER — HOSPITAL ENCOUNTER (OUTPATIENT)
Facility: HOSPITAL | Age: 59
Discharge: HOME | End: 2024-07-19
Attending: RADIOLOGY
Payer: COMMERCIAL

## 2024-07-19 PROCEDURE — 70496 CT ANGIOGRAPHY HEAD: CPT

## 2024-07-19 PROCEDURE — 6360000004 HC RX CONTRAST MEDICATION: Performed by: RADIOLOGY

## 2024-07-19 RX ADMIN — IOPAMIDOL 100 ML: 755 INJECTION, SOLUTION INTRAVENOUS at 10:28

## 2024-07-30 ENCOUNTER — TELEPHONE (OUTPATIENT)
Age: 59
End: 2024-07-30

## 2024-07-30 NOTE — TELEPHONE ENCOUNTER
Called patient to let her know that imaging has not yet resulted.  We will keep her appointment on schedule, and will update in morning.

## 2024-07-31 ENCOUNTER — TELEMEDICINE (OUTPATIENT)
Age: 59
End: 2024-07-31

## 2024-07-31 DIAGNOSIS — I60.8 ANEURYSMAL SUBARACHNOID HEMORRHAGE (HCC): ICD-10-CM

## 2024-07-31 DIAGNOSIS — I60.9 SAH (SUBARACHNOID HEMORRHAGE) (HCC): Primary | ICD-10-CM

## 2024-07-31 RX ORDER — ASPIRIN 325 MG
325 TABLET ORAL DAILY
Qty: 30 TABLET | Refills: 3 | Status: SHIPPED | OUTPATIENT
Start: 2024-07-31

## 2024-07-31 NOTE — PROGRESS NOTES
Phone call to patient in preparation for Virtual/phone visit with provider.  Name and  verified.  Virtual visit. Spouse on video with patient.  Follow up imaging review for SAH.   Patient reports no problems at this time.  Denies headaches, dizziness, numbness or tingling, blurred or double vision.  No new problems reported.

## 2024-07-31 NOTE — PATIENT INSTRUCTIONS
Follow up in November 2024 after imaging is completed.  See attached paperwork to schedule imaging.  Stop Plavix 75 mg daily and Aspirin 81 mg daily.  Start Aspirin 325 mg daily.

## 2024-07-31 NOTE — PROGRESS NOTES
Virtual Clinic Note    Soledad Geller is a 59 y.o. female established patient who was seen by synchronous (real-time) audio-video technology on 7/31/2024.      Consent: Soledad Geller, who was seen by synchronous (real-time) audio-video technology, and/or her healthcare decision maker, is aware that this patient-initiated, Telehealth encounter on 7/31/2024 is a billable service, with coverage as determined by her insurance carrier. She is aware that she may receive a bill and has provided verbal consent to proceed: NA- Consent obtained within past 12 months    Assessment & Plan:       I have spent at least 15 minutes reviewing previous notes, test results and face to face (virtual) with the patient discussing the diagnosis and importance of compliance with the treatment plan as well as documenting on the day of the visit.    Subjective:   Soledad Geller is a 59 y.o. female who underwent flow diversion stent of a ruptured, fusiform right supraclinoid ICA aneurysm in 12/2023.  Patient had residual filling of the aneurysm on subsequent follow-up studies.  Patient is now being seen after undergoing CTA of the head.    Chief Complaint: SAH and Follow-up (Imaging review.)      Prior to Admission medications    Medication Sig Start Date End Date Taking? Authorizing Provider   aspirin (WILLIAM ASPIRIN) 325 MG tablet Take 1 tablet by mouth daily 7/31/24  Yes Venu Hamilton MD   metoprolol tartrate (LOPRESSOR) 25 MG tablet Take 0.5 tablets by mouth 2 times daily 6/24/24  Yes Mar Gonzalez MD   vitamin D (ERGOCALCIFEROL) 1.25 MG (86915 UT) CAPS capsule Take 1 capsule by mouth once a week 6/24/24  Yes Mar Gonzalez MD   latanoprost (XALATAN) 0.005 % ophthalmic solution Place 1 drop into the right eye nightly   Yes Provider, MD Gladys   albuterol sulfate HFA (PROVENTIL;VENTOLIN;PROAIR) 108 (90 Base) MCG/ACT inhaler Inhale into the lungs every 4 hours as needed   Yes Automatic Reconciliation, Ar

## 2024-08-15 DIAGNOSIS — E55.9 VITAMIN D DEFICIENCY: ICD-10-CM

## 2024-08-15 DIAGNOSIS — E83.52 HYPERCALCEMIA: ICD-10-CM

## 2024-08-15 RX ORDER — ERGOCALCIFEROL 1.25 MG/1
50000 CAPSULE ORAL WEEKLY
Qty: 12 CAPSULE | Refills: 0 | Status: SHIPPED | OUTPATIENT
Start: 2024-08-15

## 2024-08-15 NOTE — TELEPHONE ENCOUNTER
Last appointment: 2/6/24  Next appointment: 10/22/24  Previous refill encounter(s): 6/24/24 #4    Requested Prescriptions     Pending Prescriptions Disp Refills    vitamin D (ERGOCALCIFEROL) 1.25 MG (13778 UT) CAPS capsule [Pharmacy Med Name: VITAMIN D2 50,000IU (ERGO) CAP RX] 12 capsule 0     Sig: TAKE 1 CAPSULE BY MOUTH 1 TIME A WEEK         For Pharmacy Admin Tracking Only    Program: Medication Refill  CPA in place:    Recommendation Provided To:   Intervention Detail: New Rx: 1, reason: Patient Preference  Intervention Accepted By:   Gap Closed?:    Time Spent (min): 5

## 2024-09-21 ENCOUNTER — PATIENT MESSAGE (OUTPATIENT)
Age: 59
End: 2024-09-21

## 2024-09-23 RX ORDER — PRAVASTATIN SODIUM 40 MG
40 TABLET ORAL NIGHTLY
Qty: 90 TABLET | Refills: 1 | Status: SHIPPED | OUTPATIENT
Start: 2024-09-23

## 2024-10-01 NOTE — PROGRESS NOTES
Phone call to patient in preparation for Virtual/phone visit with provider.  Name and  verified.  Patient unable to obtain vital signs at home.    Follow up for imaging review.  No new problems reported.  
Grandmother     Hypertension Maternal Grandmother     Diabetes Maternal Grandmother     Glaucoma Father     Hypertension Mother     No Known Problems Sister     No Known Problems Brother      Social History     Tobacco Use    Smoking status: Never     Passive exposure: Never    Smokeless tobacco: Never   Substance Use Topics    Alcohol use: Not Currently     Comment: occ       Review of Systems  : Pertinent ROS included in HPI    Objective:   Vital Signs: (As obtained by patient/caregiver at home)  LMP  (LMP Unknown)        Constitutional: [x] Appears well-developed and well-nourished [x] No apparent distress      [] Abnormal -     Mental status: [x] Alert and awake  [x] Oriented to person/place/time [x] Able to follow commands    [] Abnormal -       Neurologic Exam: No neuro exam performed during this visit.    We discussed the expected course, resolution and complications of the diagnosis(es) in detail.  Medication risks, benefits, costs, interactions, and alternatives were discussed as indicated.  I advised her to contact the office if her condition worsens, changes or fails to improve as anticipated. She expressed understanding with the diagnosis(es) and plan.     Soledad Geller was evaluated through a synchronous (real-time) audio-video encounter. The patient (or guardian if applicable) is aware that this is a billable service, which includes applicable co-pays. This Virtual Visit was conducted with patient's (and/or legal guardian's) consent. Patient identification was verified, and a caregiver was present when appropriate.   The patient was located at Home: [address] [place] [id number]  Provider was located at Facility (Appt Dept): [address] [place] [address], [address]  [place] [address]    Venu Hamilton MD

## 2024-10-03 NOTE — PROGRESS NOTES
Neurointerventional Surgery Clinic Note        Patient: Soledad Geller MRN: [id number]  SSN: [id number]    YOB: 1965  Age: 59 y.o.  Sex: female      Subjective:      Soledad Geller is a 59 y.o. female who is being seen for follow-up.  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  Patient is following with a CTA of the head performed on 3/22/2024.    Past Medical History:   Diagnosis Date    Asthma     Brachiocephalic artery injury     chronic dissection    Glaucoma, low tension     Headache     Hyperlipidemia     Hypertension     Ill-defined condition     high cholesterol    Memory disorder     Stroke (HCC)     Visual disturbance      Past Surgical History:   Procedure Laterality Date    COLONOSCOPY N/A 5/1/2017    COLONOSCOPY performed by Sagar Woodard MD at [facility] ENDOSCOPY    TONSILLECTOMY        Family History   Problem Relation Age of Onset    Heart Disease Maternal Grandmother     Heart Attack Maternal Grandmother     Hypertension Maternal Grandmother     Diabetes Maternal Grandmother     Glaucoma Father     Hypertension Mother     No Known Problems Sister     No Known Problems Brother      Social History     Tobacco Use    Smoking status: Never     Passive exposure: Never    Smokeless tobacco: Never   Substance Use Topics    Alcohol use: Not Currently     Comment: occ      Current Outpatient Medications   Medication Sig Dispense Refill    latanoprost (XALATAN) 0.005 % ophthalmic solution Place 1 drop into the right eye nightly      albuterol sulfate HFA (PROVENTIL;VENTOLIN;PROAIR) 108 (90 Base) MCG/ACT inhaler Inhale into the lungs every 4 hours as needed      pravastatin (PRAVACHOL) 40 MG tablet Take 1 tablet by mouth nightly 90 tablet 1    vitamin D (ERGOCALCIFEROL) 1.25 MG (83981 UT) CAPS capsule TAKE 1 CAPSULE BY MOUTH 1 TIME A WEEK 12 capsule 0    aspirin (WILLIAM ASPIRIN) 325 MG tablet

## 2024-10-15 ENCOUNTER — OFFICE VISIT (OUTPATIENT)
Age: 59
End: 2024-10-15

## 2024-10-15 VITALS
DIASTOLIC BLOOD PRESSURE: 66 MMHG | SYSTOLIC BLOOD PRESSURE: 108 MMHG | BODY MASS INDEX: 28.13 KG/M2 | HEART RATE: 74 BPM | HEIGHT: 68 IN | WEIGHT: 185.6 LBS

## 2024-10-15 DIAGNOSIS — E83.52 HYPERCALCEMIA: Primary | ICD-10-CM

## 2024-10-15 DIAGNOSIS — R22.1 NECK ENLARGEMENT: ICD-10-CM

## 2024-10-15 DIAGNOSIS — E21.3 HYPERPARATHYROIDISM (HCC): ICD-10-CM

## 2024-10-15 NOTE — PROGRESS NOTES
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     ASSESSMENT AND PLAN:     Hypercalcemia, resolved    Blood and urine calcium levels within normal limits   Will repeat calcium and PTH (parathyroid hormone) levels, if within normal limits again no need for further follow up at this time and to continue following up with PCP ,  Patient indicates understanding and agrees with plan.   Recommend daily:  Vitamin D supplementation: 1000 units daily  Calcium supplementation: 1000 mg of calcium daily total (see calcium containing foods)    Neck Enlargement  Complaining of  neck enlargement, will obtain neck ultrasound and based on the results will decide on the next steps in management   -----------------------------------------------------------------------------------------------    Past Medical History:   Diagnosis Date    Asthma     Brachiocephalic artery injury     chronic dissection    Glaucoma, low tension     Headache     Hyperlipidemia     Hypertension     Ill-defined condition     high cholesterol    Memory disorder     Stroke (HCC)     Visual disturbance      Patient was first noted for elevated calcium    Family history is not significant for hypercalcemia.  Patient denies  history of kidney stones.  Vitamin D status:-  Calcium supplementation:-  Hx of fracture: -    Symptoms:  Abdominal pain/ Nausea/vomiting/Constipation: -  Trouble concentrating/Fatigue: no  Long bone pain: no  Polyuria/polydipsia: no  Recent immobilization: no  Hx of granulomotous dx (sarcoidosis/TB, etc): no  Hx of auto immune dx: no  Hx of Cancer: no    On Calcium increasing meds (eg.Thiazides, abaloparatide, teriparatide, lithium, excessive vitamin A, Aluminium or magnesium

## 2024-10-15 NOTE — PATIENT INSTRUCTIONS
Your blood and urine test showed normal results! Keep up the good work!  Please have blood test done after seeing your PCP and based on the results will decide on the next steps in management.  If everything is normal again then please continue to follow up with your PCP and if any abnormalities I we can help address we will do that     Please complete neck ultrasound at your earliest convenience.

## 2024-10-22 ENCOUNTER — OFFICE VISIT (OUTPATIENT)
Age: 59
End: 2024-10-22
Payer: COMMERCIAL

## 2024-10-22 VITALS
DIASTOLIC BLOOD PRESSURE: 64 MMHG | WEIGHT: 187.6 LBS | RESPIRATION RATE: 18 BRPM | HEART RATE: 71 BPM | BODY MASS INDEX: 28.52 KG/M2 | SYSTOLIC BLOOD PRESSURE: 100 MMHG | TEMPERATURE: 97.8 F

## 2024-10-22 DIAGNOSIS — E55.9 VITAMIN D DEFICIENCY: ICD-10-CM

## 2024-10-22 DIAGNOSIS — I10 ESSENTIAL (PRIMARY) HYPERTENSION: Primary | ICD-10-CM

## 2024-10-22 DIAGNOSIS — E83.52 HYPERCALCEMIA: ICD-10-CM

## 2024-10-22 DIAGNOSIS — I60.9 SAH (SUBARACHNOID HEMORRHAGE) (HCC): ICD-10-CM

## 2024-10-22 DIAGNOSIS — Z23 ENCOUNTER FOR IMMUNIZATION: ICD-10-CM

## 2024-10-22 DIAGNOSIS — R53.1 LEFT-SIDED WEAKNESS: ICD-10-CM

## 2024-10-22 PROCEDURE — G8427 DOCREV CUR MEDS BY ELIG CLIN: HCPCS | Performed by: NURSE PRACTITIONER

## 2024-10-22 PROCEDURE — 99214 OFFICE O/P EST MOD 30 MIN: CPT | Performed by: NURSE PRACTITIONER

## 2024-10-22 PROCEDURE — G8484 FLU IMMUNIZE NO ADMIN: HCPCS | Performed by: NURSE PRACTITIONER

## 2024-10-22 PROCEDURE — G8419 CALC BMI OUT NRM PARAM NOF/U: HCPCS | Performed by: NURSE PRACTITIONER

## 2024-10-22 PROCEDURE — 3078F DIAST BP <80 MM HG: CPT | Performed by: NURSE PRACTITIONER

## 2024-10-22 PROCEDURE — 1036F TOBACCO NON-USER: CPT | Performed by: NURSE PRACTITIONER

## 2024-10-22 PROCEDURE — 3074F SYST BP LT 130 MM HG: CPT | Performed by: NURSE PRACTITIONER

## 2024-10-22 PROCEDURE — 90471 IMMUNIZATION ADMIN: CPT | Performed by: NURSE PRACTITIONER

## 2024-10-22 PROCEDURE — 3017F COLORECTAL CA SCREEN DOC REV: CPT | Performed by: NURSE PRACTITIONER

## 2024-10-22 PROCEDURE — 90661 CCIIV3 VAC ABX FR 0.5 ML IM: CPT | Performed by: NURSE PRACTITIONER

## 2024-10-22 RX ORDER — TRIAMCINOLONE ACETONIDE 1 MG/G
CREAM TOPICAL 2 TIMES DAILY PRN
COMMUNITY
Start: 2024-08-07

## 2024-10-22 SDOH — ECONOMIC STABILITY: INCOME INSECURITY: HOW HARD IS IT FOR YOU TO PAY FOR THE VERY BASICS LIKE FOOD, HOUSING, MEDICAL CARE, AND HEATING?: NOT HARD AT ALL

## 2024-10-22 SDOH — ECONOMIC STABILITY: FOOD INSECURITY: WITHIN THE PAST 12 MONTHS, THE FOOD YOU BOUGHT JUST DIDN'T LAST AND YOU DIDN'T HAVE MONEY TO GET MORE.: NEVER TRUE

## 2024-10-22 SDOH — ECONOMIC STABILITY: FOOD INSECURITY: WITHIN THE PAST 12 MONTHS, YOU WORRIED THAT YOUR FOOD WOULD RUN OUT BEFORE YOU GOT MONEY TO BUY MORE.: NEVER TRUE

## 2024-10-22 ASSESSMENT — ENCOUNTER SYMPTOMS
GASTROINTESTINAL NEGATIVE: 1
ABDOMINAL PAIN: 0
CONSTIPATION: 0
COUGH: 0
BLOOD IN STOOL: 0
NAUSEA: 0
SHORTNESS OF BREATH: 0
VOMITING: 0
RESPIRATORY NEGATIVE: 1
DIARRHEA: 0

## 2024-10-22 ASSESSMENT — PATIENT HEALTH QUESTIONNAIRE - PHQ9
SUM OF ALL RESPONSES TO PHQ9 QUESTIONS 1 & 2: 0
SUM OF ALL RESPONSES TO PHQ QUESTIONS 1-9: 0
1. LITTLE INTEREST OR PLEASURE IN DOING THINGS: NOT AT ALL
2. FEELING DOWN, DEPRESSED OR HOPELESS: NOT AT ALL
SUM OF ALL RESPONSES TO PHQ QUESTIONS 1-9: 0

## 2024-10-22 NOTE — PATIENT INSTRUCTIONS
condition, such as heart disease, cancer, or diabetes, flu can make it worse.  Flu can cause fever and chills, sore throat, muscle aches, fatigue, cough, headache, and runny or stuffy nose. Some people may have vomiting and diarrhea, though this is more common in children than adults.  Each year, thousands of people in the [place] die from flu, and many more are hospitalized. Flu vaccine prevents millions of illnesses and flu-related visits to the doctor each year.  Influenza vaccines  CDC recommends everyone 6 months and older get vaccinated every flu season. Children 6 months through 8 years of age may need 2 doses during a single flu season. Everyone else needs only 1 dose each flu season.  It takes about 2 weeks for protection to develop after vaccination.  There are many flu viruses, and they are always changing. Each year a new flu vaccine is made to protect against the influenza viruses believed to be likely to cause disease in the upcoming flu season. Even when the vaccine doesn't exactly match these viruses, it may still provide some protection.  Influenza vaccine does not cause flu.  Influenza vaccine may be given at the same time as other vaccines.  Talk with your health care provider  Tell your vaccination provider if the person getting the vaccine:  Has had an allergic reaction after a previous dose of influenza vaccine, or has any severe, life-threatening allergies  Has ever had Guillain-Barré Syndrome (also called \"GBS\")  In some cases, your health care provider may decide to postpone influenza vaccination until a future visit.  Influenza vaccine can be administered at any time during pregnancy. People who are or will be pregnant during influenza season should receive inactivated influenza vaccine.  People with minor illnesses, such as a cold, may be vaccinated. People who are moderately or severely ill should usually wait until they recover before getting influenza vaccine.  Your health care

## 2024-10-22 NOTE — PROGRESS NOTES
Chief Complaint   Patient presents with    8 month follow up       \"Have you been to the ER, urgent care clinic since your last visit?  Hospitalized since your last visit?\"    NO    “Have you seen or consulted any other health care providers outside of [facility] since your last visit?”    NO    Have you had a mammogram?”   NO    Date of last Mammogram: 2022       Per orders of YURIDIA Correia, injection of Flucelvax was given in the Left deltoid . Patient tolerated it well. Patient instructed to report any adverse reaction to me immediately.       Click Here for Release of Records Request       There were no vitals filed for this visit.   Health Maintenance Due   Topic Date Due    Hepatitis B vaccine (1 of 3 - 19+ 3-dose series) Never done    DTaP/Tdap/Td vaccine (1 - Tdap) Never done    Shingles vaccine (2 of 2) 2023    Flu vaccine (1) 2024    COVID-19 Vaccine (2023-24 season) 2024    Breast cancer screen  2024    Lipids  10/06/2024        The patient, Soledad Geller, identity was verified by name and .

## 2024-10-22 NOTE — PROGRESS NOTES
Soledad Geller (:  1965) is a 59 y.o. female,Established patient, here for evaluation of the following chief complaint(s):  8 month follow up      Assessment & Plan   ASSESSMENT/PLAN:  1. Essential (primary) hypertension - stable.,  continue metoprolol 12.5mg BID  2. Nontraumatic SAH (subarachnoid hemorrhage) (HCC) - followed by [facility] neurointernventional - had flow diversion stent of a ruptured, fusiform right supraclinoid ICA aneurysm in 2023. Patient had residual filling of the aneurysm on subsequent follow-up studies.  Due for follow up CTA in 2024.  Brilinta was stopped in July,  recommended to take ASA 325mg  3. Left-sided weakness - RESOLVED  4. Hypercalcemia - per endo  5. Vitamin D deficiency - per endo, on supplementation  6. Encounter for immunization  -     Influenza, FLUCELVAX Trivalent, (age 6 mo+) IM, Preservative Free, 0.5mL      Patient to schedule follow up with new provider in next 3-4 months as I will be leaving the practice on 24.  Patient provided with a list of primary care providers in the area to schedule with.        Subjective   SUBJECTIVE/OBJECTIVE:  HPI  patient comes in today for follow up    Being followed by endo for hypercalcemia.  Taking vitamin D supplement    Followed by neurointerventional surgery at [facility] for hx of SAH.  She had flow diversion stent of a ruptured, fusiform right supraclinoid ICA aneurysm in 2023. Patient had residual filling of the aneurysm on subsequent follow-up studies. Had CTA done in 2024.  Results per neurointerventional:  \"Given the imaging findings which demonstrate continued decrease in size of the residual component of the embolized aneurysm, no additional intervention is recommended at this time.  Patient has done well without any interval new complaints, focal deficits or headaches.  Given that she is more than 7 months post flow diversion stenting, Plavix can be discontinued at this time.  Aspirin to be continued as

## 2024-10-23 ENCOUNTER — OFFICE VISIT (OUTPATIENT)
Age: 59
End: 2024-10-23

## 2024-10-23 DIAGNOSIS — I10 ESSENTIAL (PRIMARY) HYPERTENSION: Primary | ICD-10-CM

## 2024-10-23 DIAGNOSIS — I10 ESSENTIAL (PRIMARY) HYPERTENSION: ICD-10-CM

## 2024-10-23 NOTE — PROGRESS NOTES
Chief Complaint   Patient presents with    Labs Only       The patient, Soledad Best, identity was verified by name and .

## 2024-10-24 LAB
CHOLEST SERPL-MCNC: 179 MG/DL (ref 100–199)
HDLC SERPL-MCNC: 71 MG/DL
IMP & REVIEW OF LAB RESULTS: NORMAL
LDLC SERPL CALC-MCNC: 89 MG/DL (ref 0–99)
TRIGL SERPL-MCNC: 105 MG/DL (ref 0–149)
VLDLC SERPL CALC-MCNC: 19 MG/DL (ref 5–40)

## 2024-10-31 DIAGNOSIS — E55.9 VITAMIN D DEFICIENCY: ICD-10-CM

## 2024-10-31 DIAGNOSIS — E83.52 HYPERCALCEMIA: ICD-10-CM

## 2024-10-31 RX ORDER — METOPROLOL TARTRATE 25 MG/1
12.5 TABLET, FILM COATED ORAL 2 TIMES DAILY
Qty: 90 TABLET | Refills: 1 | Status: SHIPPED | OUTPATIENT
Start: 2024-10-31

## 2024-10-31 RX ORDER — ERGOCALCIFEROL 1.25 MG/1
50000 CAPSULE, LIQUID FILLED ORAL WEEKLY
Qty: 12 CAPSULE | Refills: 1 | Status: SHIPPED | OUTPATIENT
Start: 2024-10-31

## 2024-10-31 RX ORDER — PRAVASTATIN SODIUM 40 MG
40 TABLET ORAL NIGHTLY
Qty: 90 TABLET | Refills: 1 | Status: SHIPPED | OUTPATIENT
Start: 2024-10-31

## 2024-10-31 NOTE — TELEPHONE ENCOUNTER
[facility] is requesting a 90 day supply  Previous Rx's were sent to [facility]    Last appointment: 10/22/24  Next appointment: 4/29/25    Requested Prescriptions     Pending Prescriptions Disp Refills    metoprolol tartrate (LOPRESSOR) 25 MG tablet [Pharmacy Med Name: METOPROLOL TARTRATE TABS 25MG] 90 tablet 1     Sig: Take 0.5 tablets by mouth 2 times daily    pravastatin (PRAVACHOL) 40 MG tablet [Pharmacy Med Name: PRAVASTATIN TABS 40MG] 90 tablet 1     Sig: Take 1 tablet by mouth nightly    vitamin D (ERGOCALCIFEROL) 1.25 MG (34855 UT) CAPS capsule [Pharmacy Med Name: VIT D-2 CAP(ERGOCAL)1.25MG 50,000U] 12 capsule 1     Sig: Take 1 capsule by mouth once a week         For Pharmacy Admin Tracking Only    Program: Medication Refill  CPA in place:    Recommendation Provided To:   Intervention Detail: New Rx: 3, reason: Improve Adherence  Intervention Accepted By:   Gap Closed?:    Time Spent (min): 5

## 2024-11-20 ENCOUNTER — HOSPITAL ENCOUNTER (OUTPATIENT)
Facility: HOSPITAL | Age: 59
Discharge: HOME | End: 2024-11-23
Attending: RADIOLOGY
Payer: COMMERCIAL

## 2024-11-20 DIAGNOSIS — I60.9 SAH (SUBARACHNOID HEMORRHAGE) (HCC): ICD-10-CM

## 2024-11-20 DIAGNOSIS — I60.8 ANEURYSMAL SUBARACHNOID HEMORRHAGE (HCC): ICD-10-CM

## 2024-11-20 PROCEDURE — 70496 CT ANGIOGRAPHY HEAD: CPT

## 2024-11-20 PROCEDURE — 6360000004 HC RX CONTRAST MEDICATION: Performed by: RADIOLOGY

## 2024-11-20 RX ORDER — IOPAMIDOL 755 MG/ML
100 INJECTION, SOLUTION INTRAVASCULAR
Status: COMPLETED | OUTPATIENT
Start: 2024-11-20 | End: 2024-11-20

## 2024-11-20 RX ADMIN — IOPAMIDOL 100 ML: 755 INJECTION, SOLUTION INTRAVENOUS at 09:35

## 2024-11-25 ENCOUNTER — TELEMEDICINE (OUTPATIENT)
Age: 59
End: 2024-11-25
Payer: COMMERCIAL

## 2024-11-25 DIAGNOSIS — I60.9 SAH (SUBARACHNOID HEMORRHAGE) (HCC): ICD-10-CM

## 2024-11-25 DIAGNOSIS — I60.8 ANEURYSMAL SUBARACHNOID HEMORRHAGE (HCC): Primary | ICD-10-CM

## 2024-11-25 PROCEDURE — 99213 OFFICE O/P EST LOW 20 MIN: CPT | Performed by: NURSE PRACTITIONER

## 2024-11-25 NOTE — PROGRESS NOTES
Virtual Clinic Note    Soledad Geller is a 59 y.o. female established patient who was seen by synchronous (real-time) audio-video technology on 11/25/2024.      Consent: Soledad Geller, who was seen by synchronous (real-time) audio-video technology, and/or her healthcare decision maker, is aware that this patient-initiated, Telehealth encounter on 11/25/2024 is a billable service, with coverage as determined by her insurance carrier. She is aware that she may receive a bill and has provided verbal consent to proceed: Yes    Assessment & Plan:   CTA head/neck reviewed personally by Dr Hamilton and results discussed with pt and her , RMCA PED construct is patent with overall decrease in size of fusiform aneurysm.    Follow up CTA head/neck for continued surveillance in 1 year with RTC.    Reviewed today with the patient:   - Cont ASA   - BEFAST, call 911 if symptoms present   - Strict BP control (less than 140/90 long term)   - Call 911 for \"worse headache of life\" or new neurologic symptoms   - Avoid straining    I have spent at least 20 minutes reviewing previous notes, test results and face to face (virtual) with the patient discussing the diagnosis and importance of compliance with the treatment plan as well as documenting on the day of the visit.    Subjective:   Soledad Geller is a 59 y.o. female asthma, HTN, and SAH in Dec 2023 due to ruptured fusform RMCA aneurysm, Morin Miller 3, Motley Grade 4, baseline mRS: 0, s/p cerebral angiogram and pipeline embo on 12/18/23 by Dr. Hamilton, additional pipeline placed 12/31/23 due to enlargement of aneurysm. Her hospital course was complicated by severe cerebral vasospasm requiring IA verapamil x 3.     She presented via virtual visit accompanied by her . She reports she has been doing well overall, denies any headaches or new neurologic findings. She reports her BP has been well controlled at home. She continues to take ASA as directed.     Chief Complaint:

## 2024-11-25 NOTE — PROGRESS NOTES
Phone call to patient in preparation for Virtual/phone visit with provider.  Name and  verified.  Patient unable to obtain vital signs at home.  Follow up for SAH and imaging review.  Patient reports no symptoms.  Denies headaches, dizziness, numbness or tingling, blurred or double vision.  No new problems reported.

## 2024-11-25 NOTE — PATIENT INSTRUCTIONS
Follow up in 1 year, November 2025, after imaging is completed.  See attached paperwork to schedule imaging.

## 2024-12-20 ENCOUNTER — TRANSCRIBE ORDERS (OUTPATIENT)
Facility: HOSPITAL | Age: 59
End: 2024-12-20

## 2024-12-20 ENCOUNTER — TELEPHONE (OUTPATIENT)
Age: 59
End: 2024-12-20

## 2024-12-20 DIAGNOSIS — Z12.31 ENCOUNTER FOR MAMMOGRAM TO ESTABLISH BASELINE MAMMOGRAM: Primary | ICD-10-CM

## 2024-12-20 NOTE — TELEPHONE ENCOUNTER
----- Message from Rolo BREEN sent at 12/20/2024  2:14 PM EST -----  Regarding: [facility] Referral Request  [facility] Referral Request    Reason for referral request: Lab/Test Order: Mammogram    Specialist/Lab/Test patient is requesting (if known):Deborah Hua DO        Specialist Phone Number (if applicable):[phone]    Additional Information: patient wanted a mammogram test  --------------------------------------------------------------------------------------------------------------------------    Relationship to Patient: Self     Call Back Information: OK to leave message on voicemail  Preferred Call Back Number: Phone [phone]

## 2025-01-01 DIAGNOSIS — I60.8 ANEURYSMAL SUBARACHNOID HEMORRHAGE (HCC): ICD-10-CM

## 2025-01-01 DIAGNOSIS — I60.9 SAH (SUBARACHNOID HEMORRHAGE) (HCC): ICD-10-CM

## 2025-01-06 RX ORDER — ASPIRIN 325 MG
325 TABLET ORAL DAILY
Qty: 30 TABLET | Refills: 3 | Status: SHIPPED | OUTPATIENT
Start: 2025-01-06

## 2025-01-15 ENCOUNTER — TELEPHONE (OUTPATIENT)
Age: 60
End: 2025-01-15

## 2025-01-15 ENCOUNTER — E-VISIT (OUTPATIENT)
Age: 60
End: 2025-01-15

## 2025-01-15 DIAGNOSIS — U07.1 COVID-19: Primary | ICD-10-CM

## 2025-01-15 ASSESSMENT — LIFESTYLE VARIABLES: SMOKING_STATUS: NO, I HAVE NEVER SMOKED

## 2025-01-15 NOTE — PROGRESS NOTES
Soledad Geller (1965) initiated an asynchronous digital communication through [employer].    HPI: per patient questionnaire     Exam: not applicable    Diagnoses and all orders for this visit:  Diagnoses and all orders for this visit:    COVID-19      Patient Instructions   Hi Ms. Geller!    I am sorry to hear about your recent positive COVID test.  Are you having any symptoms like fever, congestion, shortness of breath?  If so, you may be a candidate for Paxlovid.  This is an antiviral treatment that can reduce the amount of time that you have COVID but does not necessarily help the symptoms.  If your symptoms are improving already, Paxlovid most likely would not be the best treatment for you. Some insurances still cover this.  Let me know if you are interested in this treatment.    Here are a  few other tips:    -Flonase, 1 spray intranasally once daily for nasal congestion  -Continue conservative measures; Adequate hydration, Tylenol or Ibuprofen as needed for pain/fever, flonase if congested, mucinex as needed for cough, sanitizing etc.  -Follow CDC guidelines to limit spread; stay home and away from other people for at least 5 days from onset of symptoms. Wear a well -fitted mask when around others at home, if possible.  -Seek immediate medical care at the Emergency Room or call 911 if any of the worsening covid-19 signs/symptoms (Trouble breathing, oxygen saturations below 91%, persistent high fevers, chest pain, confusion, nausea/vomiting).    Dr. Deborah Hua DO  01/15/25  4:50 PM        Time: EV2 - 11-20 minutes were spent on the digital evaluation and management of this patient.     Deborah Hua DO

## 2025-01-15 NOTE — TELEPHONE ENCOUNTER
Patient's  Ravinder calling to get a Virtual visit for patient had Positive COVID test this past Sunday. Symptoms are cough , stuffiness, headache.  [phone]. Pharmacy is [facility] [phone]

## 2025-01-15 NOTE — PATIENT INSTRUCTIONS
Hi Ms. Geller!    I am sorry to hear about your recent positive COVID test.  Are you having any symptoms like fever, congestion, shortness of breath?  If so, you may be a candidate for Paxlovid.  This is an antiviral treatment that can reduce the amount of time that you have COVID but does not necessarily help the symptoms.  If your symptoms are improving already, Paxlovid most likely would not be the best treatment for you. Some insurances still cover this.  Let me know if you are interested in this treatment.    Here are a  few other tips:    -Flonase, 1 spray intranasally once daily for nasal congestion  -Continue conservative measures; Adequate hydration, Tylenol or Ibuprofen as needed for pain/fever, flonase if congested, mucinex as needed for cough, sanitizing etc.  -Follow CDC guidelines to limit spread; stay home and away from other people for at least 5 days from onset of symptoms. Wear a well -fitted mask when around others at home, if possible.  -Seek immediate medical care at the Emergency Room or call 911 if any of the worsening covid-19 signs/symptoms (Trouble breathing, oxygen saturations below 91%, persistent high fevers, chest pain, confusion, nausea/vomiting).    Dr. Deborah Hua, DO  01/15/25  4:50 PM

## 2025-01-16 RX ORDER — BENZONATATE 100 MG/1
100 CAPSULE ORAL 3 TIMES DAILY PRN
Qty: 30 CAPSULE | Refills: 0 | Status: SHIPPED | OUTPATIENT
Start: 2025-01-16 | End: 2025-01-26

## 2025-01-16 RX ORDER — FLUTICASONE PROPIONATE 50 MCG
2 SPRAY, SUSPENSION (ML) NASAL DAILY
Qty: 16 G | Refills: 0 | Status: SHIPPED | OUTPATIENT
Start: 2025-01-16

## 2025-02-13 DIAGNOSIS — U07.1 COVID-19: ICD-10-CM

## 2025-02-13 RX ORDER — FLUTICASONE PROPIONATE 50 MCG
2 SPRAY, SUSPENSION (ML) NASAL DAILY
Qty: 16 G | Refills: 2 | Status: SHIPPED | OUTPATIENT
Start: 2025-02-13

## 2025-02-13 NOTE — TELEPHONE ENCOUNTER
Last appointment: 10/23/24  Next appointment: 4/29/25  Previous refill encounter(s): 1/16/25 #1    Requested Prescriptions     Pending Prescriptions Disp Refills    fluticasone (FLONASE) 50 MCG/ACT nasal spray [Pharmacy Med Name: FLUTICASONE 50MCG NASAL SP (120) RX] 16 g 0     Sig: SHAKE LIQUID AND USE 2 SPRAYS IN EACH NOSTRIL DAILY         For Pharmacy Admin Tracking Only    Program: Medication Refill  CPA in place:    Recommendation Provided To:   Intervention Detail: New Rx: 1, reason: Patient Preference  Intervention Accepted By:   Gap Closed?:    Time Spent (min): 5

## 2025-04-29 ENCOUNTER — OFFICE VISIT (OUTPATIENT)
Age: 60
End: 2025-04-29
Payer: COMMERCIAL

## 2025-04-29 VITALS
SYSTOLIC BLOOD PRESSURE: 98 MMHG | WEIGHT: 196.4 LBS | DIASTOLIC BLOOD PRESSURE: 73 MMHG | BODY MASS INDEX: 29.77 KG/M2 | TEMPERATURE: 97.6 F | OXYGEN SATURATION: 97 % | RESPIRATION RATE: 20 BRPM | HEART RATE: 65 BPM | HEIGHT: 68 IN

## 2025-04-29 DIAGNOSIS — R53.83 FATIGUE AS SEQUELA OF CEREBROVASCULAR ACCIDENT (CVA): ICD-10-CM

## 2025-04-29 DIAGNOSIS — I69.398 FATIGUE AS SEQUELA OF CEREBROVASCULAR ACCIDENT (CVA): ICD-10-CM

## 2025-04-29 DIAGNOSIS — E78.2 MIXED HYPERLIPIDEMIA: ICD-10-CM

## 2025-04-29 DIAGNOSIS — I10 PRIMARY HYPERTENSION: ICD-10-CM

## 2025-04-29 DIAGNOSIS — J30.2 SEASONAL ALLERGIES: ICD-10-CM

## 2025-04-29 DIAGNOSIS — Z76.89 ENCOUNTER TO ESTABLISH CARE: Primary | ICD-10-CM

## 2025-04-29 DIAGNOSIS — E55.9 VITAMIN D DEFICIENCY: ICD-10-CM

## 2025-04-29 DIAGNOSIS — E83.52 HYPERCALCEMIA: ICD-10-CM

## 2025-04-29 DIAGNOSIS — Z12.31 ENCOUNTER FOR SCREENING MAMMOGRAM FOR MALIGNANT NEOPLASM OF BREAST: ICD-10-CM

## 2025-04-29 DIAGNOSIS — I67.1 INTRACRANIAL ANEURYSM: ICD-10-CM

## 2025-04-29 DIAGNOSIS — Z23 NEED FOR VACCINATION: ICD-10-CM

## 2025-04-29 PROCEDURE — 3078F DIAST BP <80 MM HG: CPT | Performed by: STUDENT IN AN ORGANIZED HEALTH CARE EDUCATION/TRAINING PROGRAM

## 2025-04-29 PROCEDURE — 90471 IMMUNIZATION ADMIN: CPT | Performed by: STUDENT IN AN ORGANIZED HEALTH CARE EDUCATION/TRAINING PROGRAM

## 2025-04-29 PROCEDURE — 90750 HZV VACC RECOMBINANT IM: CPT | Performed by: STUDENT IN AN ORGANIZED HEALTH CARE EDUCATION/TRAINING PROGRAM

## 2025-04-29 PROCEDURE — 3074F SYST BP LT 130 MM HG: CPT | Performed by: STUDENT IN AN ORGANIZED HEALTH CARE EDUCATION/TRAINING PROGRAM

## 2025-04-29 PROCEDURE — 99214 OFFICE O/P EST MOD 30 MIN: CPT | Performed by: STUDENT IN AN ORGANIZED HEALTH CARE EDUCATION/TRAINING PROGRAM

## 2025-04-29 RX ORDER — ALBUTEROL SULFATE 90 UG/1
2 INHALANT RESPIRATORY (INHALATION) EVERY 4 HOURS PRN
Qty: 18 G | Refills: 2 | Status: SHIPPED | OUTPATIENT
Start: 2025-04-29

## 2025-04-29 RX ORDER — ERGOCALCIFEROL 1.25 MG/1
50000 CAPSULE, LIQUID FILLED ORAL
Qty: 6 CAPSULE | Refills: 1 | Status: SHIPPED
Start: 2025-04-29 | End: 2025-04-30

## 2025-04-29 SDOH — ECONOMIC STABILITY: FOOD INSECURITY: WITHIN THE PAST 12 MONTHS, YOU WORRIED THAT YOUR FOOD WOULD RUN OUT BEFORE YOU GOT MONEY TO BUY MORE.: NEVER TRUE

## 2025-04-29 SDOH — ECONOMIC STABILITY: FOOD INSECURITY: WITHIN THE PAST 12 MONTHS, THE FOOD YOU BOUGHT JUST DIDN'T LAST AND YOU DIDN'T HAVE MONEY TO GET MORE.: NEVER TRUE

## 2025-04-29 ASSESSMENT — PATIENT HEALTH QUESTIONNAIRE - PHQ9
SUM OF ALL RESPONSES TO PHQ QUESTIONS 1-9: 0
SUM OF ALL RESPONSES TO PHQ QUESTIONS 1-9: 0
1. LITTLE INTEREST OR PLEASURE IN DOING THINGS: NOT AT ALL
2. FEELING DOWN, DEPRESSED OR HOPELESS: NOT AT ALL

## 2025-04-29 NOTE — PROGRESS NOTES
Chief Complaint   Patient presents with    Establish Care       \"Have you been to the ER, urgent care clinic since your last visit?  Hospitalized since your last visit?\"    NO    “Have you seen or consulted any other health care providers outside of [facility] since your last visit?”    NO    Have you had a mammogram?”   NO    Date of last Mammogram: 2022             Click Here for Release of Records Request     No results found for this visit on 25.   Vitals:    25 1001   BP: 98/73   Pulse: 65   Resp: 20   Temp: 97.6 °F (36.4 °C)   TempSrc: Temporal   SpO2: 97%   Weight: 89.1 kg (196 lb 6.4 oz)   Height: 1.727 m (5' 8\")      Health Maintenance Due   Topic Date Due    Shingles vaccine (2 of 2) 2023    Breast cancer screen  2024        The patient, Soledad Geller, identity was verified by name and .     Verbal Order with Readback given by Dr. Hua for Zoster. Given in Right Deltoid without difficulty.  Observed for 20 minutes with no reaction.  Injection tolerated well   
(Non-Medical): No   Physical Activity: Not on file   Stress: Not on file   Social Connections: Not on file   Intimate Partner Violence: Not on file   Housing Stability: Low Risk  (4/29/2025)    Housing Stability Vital Sign     Unable to Pay for Housing in the Last Year: No     Number of Times Moved in the Last Year: 0     Homeless in the Last Year: No       Current Outpatient Medications on File Prior to Visit   Medication Sig Dispense Refill    fluticasone (FLONASE) 50 MCG/ACT nasal spray SHAKE LIQUID AND USE 2 SPRAYS IN EACH NOSTRIL DAILY 16 g 2    aspirin 325 MG tablet TAKE 1 TABLET BY MOUTH DAILY 30 tablet 3    metoprolol tartrate (LOPRESSOR) 25 MG tablet Take 0.5 tablets by mouth 2 times daily 90 tablet 1    pravastatin (PRAVACHOL) 40 MG tablet Take 1 tablet by mouth nightly 90 tablet 1    triamcinolone (KENALOG) 0.1 % cream Apply topically 2 times daily as needed      latanoprost (XALATAN) 0.005 % ophthalmic solution Place 1 drop into the right eye nightly       No current facility-administered medications on file prior to visit.       Immunization History   Administered Date(s) Administered    COVID-19, MODERNA BLUE border, Primary or Immunocompromised, (age 12y+), IM, 100 mcg/0.5mL 01/22/2021, 02/22/2021, 11/03/2021    Hepatitis B 09/15/2005, 10/13/2005, 04/04/2006    Influenza Virus Vaccine 01/01/2021    Influenza, FLUARIX, FLULAVAL, FLUZONE (age 6 mo+) and AFLURIA, (age 3 y+), Quadv PF, 0.5mL 02/02/2018, 09/08/2020, 10/20/2021    Influenza, FLUCELVAX, (age 6 mo+) IM, Trivalent PF, 0.5mL 10/22/2024    Influenza, FLUCELVAX, (age 6 mo+), MDCK, Quadv PF, 0.5mL 10/06/2023    TDaP, ADACEL (age 10y-64y), BOOSTRIX (age 10y+), IM, 0.5mL 08/31/2005    Zoster Recombinant (Shingrix) 10/06/2023, 04/29/2025           Objective     BP 98/73   Pulse 65   Temp 97.6 °F (36.4 °C) (Temporal)   Resp 20   Ht 1.727 m (5' 8\")   Wt 89.1 kg (196 lb 6.4 oz)   LMP  (LMP Unknown)   SpO2 97%   BMI 29.86 kg/m²     Physical

## 2025-04-30 DIAGNOSIS — E83.52 HYPERCALCEMIA: ICD-10-CM

## 2025-04-30 DIAGNOSIS — E55.9 VITAMIN D DEFICIENCY: ICD-10-CM

## 2025-04-30 RX ORDER — ERGOCALCIFEROL 1.25 MG/1
50000 CAPSULE, LIQUID FILLED ORAL
Qty: 6 CAPSULE | Refills: 1 | Status: SHIPPED | OUTPATIENT
Start: 2025-04-30

## 2025-04-30 NOTE — TELEPHONE ENCOUNTER
Rx pended with updated dose of 1 every 14 days as per 4/29/25.    Last appointment: 4/29/25  Next appointment: 10/29/25    Requested Prescriptions     Pending Prescriptions Disp Refills    vitamin D (ERGOCALCIFEROL) 1.25 MG (44881 UT) CAPS capsule [Pharmacy Med Name: VIT D-2 CAP(ERGOCAL)1.25MG 50,000U] 6 capsule 1     Sig: Take 1 capsule by mouth every 14 days         For Pharmacy Admin Tracking Only    Program: Medication Refill  CPA in place:    Recommendation Provided To:   Intervention Detail: New Rx: 1, reason: Patient Preference  Intervention Accepted By:   Gap Closed?:    Time Spent (min): 5

## 2025-06-04 RX ORDER — PRAVASTATIN SODIUM 40 MG
40 TABLET ORAL NIGHTLY
Qty: 90 TABLET | Refills: 1 | Status: SHIPPED | OUTPATIENT
Start: 2025-06-04

## 2025-06-04 RX ORDER — METOPROLOL TARTRATE 25 MG/1
12.5 TABLET, FILM COATED ORAL 2 TIMES DAILY
Qty: 90 TABLET | Refills: 1 | Status: SHIPPED | OUTPATIENT
Start: 2025-06-04

## 2025-06-04 NOTE — TELEPHONE ENCOUNTER
Last appointment: 4/29/25  Next appointment: 10/29/25  Previous refill encounter(s): 10/31/24 90 d/s with 1 refill    Requested Prescriptions     Pending Prescriptions Disp Refills    pravastatin (PRAVACHOL) 40 MG tablet [Pharmacy Med Name: PRAVASTATIN TABS 40MG] 90 tablet 1     Sig: Take 1 tablet by mouth nightly    metoprolol tartrate (LOPRESSOR) 25 MG tablet [Pharmacy Med Name: METOPROLOL TARTRATE TABS 25MG] 90 tablet 1     Sig: Take 0.5 tablets by mouth 2 times daily         For Pharmacy Admin Tracking Only    Program: Medication Refill  CPA in place:    Recommendation Provided To:   Intervention Detail: New Rx: 2, reason: Patient Preference  Intervention Accepted By:   Gap Closed?:    Time Spent (min): 5

## 2025-07-23 PROBLEM — Z86.73 HISTORY OF STROKE: Status: ACTIVE | Noted: 2024-02-22

## 2025-08-19 ENCOUNTER — HOSPITAL ENCOUNTER (OUTPATIENT)
Facility: HOSPITAL | Age: 60
Discharge: HOME | End: 2025-08-22
Attending: STUDENT IN AN ORGANIZED HEALTH CARE EDUCATION/TRAINING PROGRAM
Payer: COMMERCIAL

## 2025-08-19 DIAGNOSIS — Z12.31 ENCOUNTER FOR SCREENING MAMMOGRAM FOR MALIGNANT NEOPLASM OF BREAST: ICD-10-CM

## 2025-08-19 PROCEDURE — 77067 SCR MAMMO BI INCL CAD: CPT

## 2025-08-22 ENCOUNTER — TELEPHONE (OUTPATIENT)
Age: 60
End: 2025-08-22

## 2025-09-03 ENCOUNTER — OFFICE VISIT (OUTPATIENT)
Age: 60
End: 2025-09-03
Payer: COMMERCIAL

## 2025-09-03 VITALS
BODY MASS INDEX: 31.04 KG/M2 | OXYGEN SATURATION: 99 % | TEMPERATURE: 97.7 F | SYSTOLIC BLOOD PRESSURE: 122 MMHG | WEIGHT: 204.8 LBS | DIASTOLIC BLOOD PRESSURE: 84 MMHG | HEART RATE: 86 BPM | RESPIRATION RATE: 18 BRPM | HEIGHT: 68 IN

## 2025-09-03 DIAGNOSIS — E78.2 MIXED HYPERLIPIDEMIA: ICD-10-CM

## 2025-09-03 DIAGNOSIS — Z13.1 SCREENING FOR DIABETES MELLITUS: ICD-10-CM

## 2025-09-03 DIAGNOSIS — E55.9 VITAMIN D DEFICIENCY: ICD-10-CM

## 2025-09-03 DIAGNOSIS — I10 PRIMARY HYPERTENSION: ICD-10-CM

## 2025-09-03 DIAGNOSIS — M25.472 LEFT ANKLE SWELLING: Primary | ICD-10-CM

## 2025-09-03 PROCEDURE — 3079F DIAST BP 80-89 MM HG: CPT | Performed by: STUDENT IN AN ORGANIZED HEALTH CARE EDUCATION/TRAINING PROGRAM

## 2025-09-03 PROCEDURE — 3074F SYST BP LT 130 MM HG: CPT | Performed by: STUDENT IN AN ORGANIZED HEALTH CARE EDUCATION/TRAINING PROGRAM

## 2025-09-03 PROCEDURE — 99214 OFFICE O/P EST MOD 30 MIN: CPT | Performed by: STUDENT IN AN ORGANIZED HEALTH CARE EDUCATION/TRAINING PROGRAM

## 2025-09-03 ASSESSMENT — PATIENT HEALTH QUESTIONNAIRE - PHQ9
1. LITTLE INTEREST OR PLEASURE IN DOING THINGS: NOT AT ALL
SUM OF ALL RESPONSES TO PHQ QUESTIONS 1-9: 0
2. FEELING DOWN, DEPRESSED OR HOPELESS: NOT AT ALL
SUM OF ALL RESPONSES TO PHQ QUESTIONS 1-9: 0

## (undated) DEVICE — NEEDLE HYPO 18GA L1.5IN PNK S STL HUB POLYPR SHLD REG BVL

## (undated) DEVICE — NEONATAL-ADULT SPO2 SENSOR: Brand: NELLCOR

## (undated) DEVICE — SOLIDIFIER MEDC 1200ML -- CONVERT TO 356117

## (undated) DEVICE — BLOCK BITE ENDOSCP AD 21 MM W/ DIL BLU LF DISP

## (undated) DEVICE — STRAINER URIN CALC RNL MSH -- CONVERT TO ITEM 357634

## (undated) DEVICE — BASIN EMESIS 500CC ROSE 250/CS 60/PLT: Brand: MEDEGEN MEDICAL PRODUCTS, LLC

## (undated) DEVICE — 1200 GUARD II KIT W/5MM TUBE W/O VAC TUBE: Brand: GUARDIAN

## (undated) DEVICE — SNARE ENDOSCP M L240CM W27MM SHTH DIA2.4MM CHN 2.8MM OVL

## (undated) DEVICE — Device

## (undated) DEVICE — MEDI-VAC YANK SUCT HNDL W/TPRD BULBOUS TIP: Brand: CARDINAL HEALTH

## (undated) DEVICE — BAG SPEC BIOHZRD 10 X 10 IN --

## (undated) DEVICE — SYR 3ML LL TIP 1/10ML GRAD --

## (undated) DEVICE — NON-REM POLYHESIVE PATIENT RETURN ELECTRODE: Brand: VALLEYLAB

## (undated) DEVICE — FORCEPS BX L160CM DIA8MM GRSP DISECT CUP TIP NONLOCKING ROT

## (undated) DEVICE — SET ADMIN 16ML TBNG L100IN 2 Y INJ SITE IV PIGGY BK DISP

## (undated) DEVICE — TOWEL 4 PLY TISS 19X30 SUE WHT

## (undated) DEVICE — CATH IV AUTOGRD BC BLU 22GA 25 -- INSYTE

## (undated) DEVICE — Z DISCONTINUED PER MEDLINE LINE GAS SAMPLING O2/CO2 LNG AD 13 FT NSL W/ TBNG FILTERLINE

## (undated) DEVICE — KENDALL RADIOLUCENT FOAM MONITORING ELECTRODE RECTANGULAR SHAPE: Brand: KENDALL

## (undated) DEVICE — CONTAINER SPEC 20 ML LID NEUT BUFF FORMALIN 10 % POLYPR STS

## (undated) DEVICE — (D)SYR 10ML 1/5ML GRAD NSAF -- PKGING CHANGE USE ITEM 338027

## (undated) DEVICE — Device: Brand: MEDEX

## (undated) DEVICE — TRAP SUC MUCOUS 70ML -- MEDICHOICE MEDLINE